# Patient Record
Sex: FEMALE | Race: WHITE | NOT HISPANIC OR LATINO | Employment: FULL TIME | URBAN - METROPOLITAN AREA
[De-identification: names, ages, dates, MRNs, and addresses within clinical notes are randomized per-mention and may not be internally consistent; named-entity substitution may affect disease eponyms.]

---

## 2017-02-10 ENCOUNTER — OFFICE VISIT (OUTPATIENT)
Dept: PSYCHIATRY | Facility: CLINIC | Age: 42
End: 2017-02-10
Payer: COMMERCIAL

## 2017-02-10 DIAGNOSIS — F33.1 MDD (MAJOR DEPRESSIVE DISORDER), RECURRENT EPISODE, MODERATE: Primary | ICD-10-CM

## 2017-02-10 DIAGNOSIS — F41.1 GAD (GENERALIZED ANXIETY DISORDER): ICD-10-CM

## 2017-02-10 DIAGNOSIS — R53.83 FATIGUE, UNSPECIFIED TYPE: ICD-10-CM

## 2017-02-10 PROCEDURE — 90834 PSYTX W PT 45 MINUTES: CPT | Mod: S$GLB,,, | Performed by: SOCIAL WORKER

## 2017-02-27 NOTE — PROGRESS NOTES
Individual Psychotherapy (PhD/LCSW)    2/10/2017    Site:  [facility]         Therapeutic Intervention: Met with patient.  Outpatient - Insight oriented psychotherapy 45 min - CPT code 94988 and Outpatient - Behavior modifying psychotherapy 45 min - CPT code 55392    Chief complaint/reason for encounter: depression , anxiety, worrying, racing thoughts, nervousness, sleep     Interval history and content of current session:  Pt presents as casually dressed, cooperative, anxious, reports ongoing and worsening stress at work, can't address with supervisor due to possibility of being fired . Did try to talk to her about pt stress at work and was not given any assistance. Cymbalta was increased but notes no difference in symptom relief. Reports  depression and anxiety symptoms are 8/10  severe since last session.  Pt is tearful frequently throughout the day,  has low energy, anhedonia, feelings of panic and social isolation, fatigue and sleepiness.  She denies any SI/HI. Denies any A/VH.  Discussed with pt the need to have a follow up with PCP and she agrees. She has appt in March, but needs to see him sooner.     Pt MIL also  over  break.  and pt have been helping his sibs go through her home and take care of her belongings, paperwork, etc. Also stressful . Focused on CBT,  relaxation skills, finding a hobby or interest, the importance of  exercise or to increase enjoyable activities. Discussed pt  self esteem , positive self talk, assertive communication, coping skills to decrease anxiety.  Does have sleep problems, fatigue, low energy and low motivation. Pt verbalized understanding. Pt denies any current SI/HI. Denies any current A/VH.     She rates :  Anxiety 8/10 severe  Depression 8/10 severe.       Treatment plan:  · Target symptoms: depression, anxiety , work stress  · Why chosen therapy is appropriate versus another modality: relevant to diagnosis, patient responds to this modality,  evidence based practice  · Outcome monitoring methods: self-report, observation  · Therapeutic intervention type: insight oriented psychotherapy, behavior modifying psychotherapy, supportive psychotherapy    Risk parameters:  Patient reports no suicidal ideation  Patient reports no homicidal ideation  Patient reports no self-injurious behavior  Patient reports no violent behavior    Verbal deficits: None    Patient's response to intervention:  The patient's response to intervention is accepting.    Progress toward goals and other mental status changes:  The patient's progress toward goals is limited.    Diagnosis:     ICD-10-CM ICD-9-CM   1. MDD (major depressive disorder), recurrent episode, moderate F33.1 296.32   2. PASTORA (generalized anxiety disorder) F41.1 300.02   3. Fatigue, unspecified type R53.83 780.79   GAF=56    Plan:  individual psychotherapy and medication management by physician. Pt to go to ED or call 911 if symptoms worsen or if she has thoughts of harming self and /or others. Pt verbalized understanding.       Return to clinic: 2 weeks, earlier if needed.     Length of Service (minutes): 45

## 2017-03-02 ENCOUNTER — PATIENT OUTREACH (OUTPATIENT)
Dept: ADMINISTRATIVE | Facility: HOSPITAL | Age: 42
End: 2017-03-02

## 2017-03-10 ENCOUNTER — PATIENT OUTREACH (OUTPATIENT)
Dept: ADMINISTRATIVE | Facility: HOSPITAL | Age: 42
End: 2017-03-10

## 2017-03-10 NOTE — LETTER
March 10, 2017    Anneliese Rocha  [address]  [facility] [address]             [facility]  [address]  [facility] [address]  Phone: [phone] Dear Mrs. Rocha:    We have tried to reach you by mychart unsuccessfully.    [facility] is committed to your overall health.  To help you get the most out of each of your visits, we will review your information to make sure you are up to date on all of your recommended tests and/or procedures.       Dr. Holcomb        has found that you may be due for:     Tetanus immunization   Mammogram     If you have a home blood pressure monitor, please bring it with you to your appointment so that we may test for accuracy.     If you have had any of the above done at another facility, please bring the records or information with you so that your record at [facility] will be complete.     If you are currently taking medication, please bring it with you to your appointment for review.     We appreciate the opportunity to provide you with excellent medical care.     Sincerely,  Salena Sanchez  Clinical Care Coordinator  [facility] [address] [facility] [address]  [place] [address]  Phone: [phone]   Fax: [phone]

## 2017-03-24 ENCOUNTER — OFFICE VISIT (OUTPATIENT)
Dept: PSYCHIATRY | Facility: CLINIC | Age: 42
End: 2017-03-24
Payer: COMMERCIAL

## 2017-03-24 DIAGNOSIS — F33.1 MDD (MAJOR DEPRESSIVE DISORDER), RECURRENT EPISODE, MODERATE: Primary | ICD-10-CM

## 2017-03-24 DIAGNOSIS — R53.83 FATIGUE, UNSPECIFIED TYPE: ICD-10-CM

## 2017-03-24 DIAGNOSIS — F41.1 GAD (GENERALIZED ANXIETY DISORDER): ICD-10-CM

## 2017-03-24 PROCEDURE — 90834 PSYTX W PT 45 MINUTES: CPT | Mod: S$GLB,,, | Performed by: SOCIAL WORKER

## 2017-03-24 NOTE — PROGRESS NOTES
Individual Psychotherapy (PhD/LCSW)    3/24/2017    Site:  [facility]         Therapeutic Intervention: Met with patient.  Outpatient - Insight oriented psychotherapy 45 min - CPT code 41706 and Outpatient - Behavior modifying psychotherapy 45 min - CPT code 60253    Chief complaint/reason for encounter: depression , anxiety, worrying, racing thoughts, nervousness, sleep     Interval history and content of current session:  Pt presents as casually dressed, cooperative, dysphoric affect, reports mood is depressed, severe 8/10 an anxiety is moderate 6/10 . Reports low energy, low interest, sad, anhedonic, has racing, worrying thoughts, unable to feel pleasure, very tired, fatigue , + for hopelessness, weight gain, and socially isolated. Cymbalta was increased but notes no difference in symptom relief.  She denies any SI/HI. Denies any A/VH.  Discussed with pt the need to have a follow up with PCP and she agrees. She is supposed to have executive health screenings done, then see him. Advised pt that I will send note to Dr. Holcomb for his review. Also pt agrees to at least be added to waiting list to see psychiatry if needed in the future, currently 3-4 month wait. Discussed deep breathing, mindfulness based techniques, use of mindshift free lobito for guided relaxation, visualization and deep breathing techniques. Also discussed finding a healthy outlet or hobby, possibly a women's Bible study or Hindu group, taking a walk.  Focused on CBT,  the importance of  Exercise.  Pt denies any current SI/HI. Denies any current A/VH.     She rates :  Anxiety 8/10 severe  Depression 6/10 moderate.       Treatment plan:  · Target symptoms: depression, anxiety , work stress  · Why chosen therapy is appropriate versus another modality: relevant to diagnosis, patient responds to this modality, evidence based practice  · Outcome monitoring methods: self-report, observation  · Therapeutic intervention type: insight oriented  psychotherapy, behavior modifying psychotherapy, supportive psychotherapy    Risk parameters:  Patient reports no suicidal ideation  Patient reports no homicidal ideation  Patient reports no self-injurious behavior  Patient reports no violent behavior    Verbal deficits: None    Patient's response to intervention:  The patient's response to intervention is accepting.    Progress toward goals and other mental status changes:  The patient's progress toward goals is limited.    Diagnosis:     ICD-10-CM ICD-9-CM   1. MDD (major depressive disorder), recurrent episode, moderate F33.1 296.32   2. PASTORA (generalized anxiety disorder) F41.1 300.02   3. Fatigue, unspecified type R53.83 780.79   GAF=58    Plan:  individual psychotherapy and medication management by physician. Pt to go to ED or call 911 if symptoms worsen or if she has thoughts of harming self and /or others. Pt verbalized understanding.       Return to clinic: 2 weeks, earlier if needed.     Length of Service (minutes): 45

## 2017-03-24 NOTE — Clinical Note
Dr. Holcomb, Saw pt today and her depression symptoms are worse(8/10 severe), slight improvement in anxiety symptoms. Should she make appt to see you to discuss her symptoms and/or medication adjustment? Thanks for your input. Anila Cochran

## 2017-03-30 DIAGNOSIS — F41.8 DEPRESSION WITH ANXIETY: ICD-10-CM

## 2017-03-30 RX ORDER — ALPRAZOLAM 0.5 MG/1
TABLET ORAL
Qty: 60 TABLET | Refills: 5 | Status: SHIPPED | OUTPATIENT
Start: 2017-03-30 | End: 2017-10-05 | Stop reason: SDUPTHER

## 2017-04-07 ENCOUNTER — OFFICE VISIT (OUTPATIENT)
Dept: PSYCHIATRY | Facility: CLINIC | Age: 42
End: 2017-04-07
Payer: COMMERCIAL

## 2017-04-07 DIAGNOSIS — R53.83 FATIGUE, UNSPECIFIED TYPE: ICD-10-CM

## 2017-04-07 DIAGNOSIS — F41.1 GAD (GENERALIZED ANXIETY DISORDER): ICD-10-CM

## 2017-04-07 DIAGNOSIS — F33.1 MDD (MAJOR DEPRESSIVE DISORDER), RECURRENT EPISODE, MODERATE: Primary | ICD-10-CM

## 2017-04-07 PROCEDURE — 90834 PSYTX W PT 45 MINUTES: CPT | Mod: S$GLB,,, | Performed by: SOCIAL WORKER

## 2017-04-07 NOTE — PROGRESS NOTES
Individual Psychotherapy (PhD/LCSW)    4/7/2017    Site:  [facility]         Therapeutic Intervention: Met with patient.  Outpatient - Insight oriented psychotherapy 45 min - CPT code 39246 and Outpatient - Behavior modifying psychotherapy 45 min - CPT code 72334    Chief complaint/reason for encounter: depression , anxiety, worrying, racing thoughts, nervousness, sleep     Interval history and content of current session:  Pt presents as casually dressed, cooperative, dysphoric affect, reports mood is not better, not worse, depression is severe 8/10 an anxiety is moderate 6/10 . Reports low energy, low interest, sad, anhedonic, has racing, worrying thoughts, unable to feel pleasure, very tired, fatigue , + for hopelessness, weight gain, and socially isolated. Cymbalta was increased but notes no difference in symptom relief.  She denies any SI/HI. Denies any A/VH.  Explored possible enjoyable activities to try and healthy coping skills. Pt hopes to start walking or riding her bike after her foot heals (currently in a boot). Pt also likes to read, reads on weekends. Pt did have an enjoyable visit from her parents last weekend.  Discussed with pt the need to have a follow up with PCP and she agrees. She is supposed to have executive health screenings done, then see him. Focused on deep breathing, mindfulness based techniques, use of mindshift free lobito for guided relaxation, adult coloring books, guided visualization, finding a healthy outlet or hobby, possibly a women's Bible study or Sikh group, taking a walk.   Pt denies any current SI/HI. Denies any current A/VH.     She rates :  Anxiety 8/10 severe  Depression 6/10 moderate.       Treatment plan:  · Target symptoms: depression, anxiety , work stress  · Why chosen therapy is appropriate versus another modality: relevant to diagnosis, patient responds to this modality, evidence based practice  · Outcome monitoring methods: self-report,  observation  · Therapeutic intervention type: insight oriented psychotherapy, behavior modifying psychotherapy, supportive psychotherapy    Risk parameters:  Patient reports no suicidal ideation  Patient reports no homicidal ideation  Patient reports no self-injurious behavior  Patient reports no violent behavior    Verbal deficits: None    Patient's response to intervention:  The patient's response to intervention is accepting.    Progress toward goals and other mental status changes:  The patient's progress toward goals is limited.    Diagnosis:     ICD-10-CM ICD-9-CM   1. MDD (major depressive disorder), recurrent episode, moderate F33.1 296.32   2. PASTORA (generalized anxiety disorder) F41.1 300.02   3. Fatigue, unspecified type R53.83 780.79   GAF=58    Plan:  individual psychotherapy and medication management by physician. Pt to go to ED or call 911 if symptoms worsen or if she has thoughts of harming self and /or others. Pt verbalized understanding.       Return to clinic: 2 weeks, earlier if needed.     Length of Service (minutes): 45

## 2017-04-20 ENCOUNTER — TELEPHONE (OUTPATIENT)
Dept: PSYCHIATRY | Facility: CLINIC | Age: 42
End: 2017-04-20

## 2017-04-20 NOTE — TELEPHONE ENCOUNTER
Message      Appointment canceled for ANNELIESE SMITH ([id number])     Visit Type: ESTABLISHED PATIENT EXTENDED     Date        Time      Length    Provider                  Department     4/21/2017   12:00 PM  60 mins.  Anneliese Cochran [facility]          Reason for Cancellation: Appt Time No Longer Works

## 2017-05-04 DIAGNOSIS — Z00.00 ANNUAL PHYSICAL EXAM: Primary | ICD-10-CM

## 2017-05-05 ENCOUNTER — OFFICE VISIT (OUTPATIENT)
Dept: PSYCHIATRY | Facility: CLINIC | Age: 42
End: 2017-05-05
Payer: COMMERCIAL

## 2017-05-05 DIAGNOSIS — F41.1 GAD (GENERALIZED ANXIETY DISORDER): ICD-10-CM

## 2017-05-05 DIAGNOSIS — F33.1 MDD (MAJOR DEPRESSIVE DISORDER), RECURRENT EPISODE, MODERATE: Primary | ICD-10-CM

## 2017-05-05 DIAGNOSIS — R53.83 FATIGUE, UNSPECIFIED TYPE: ICD-10-CM

## 2017-05-05 PROCEDURE — 90834 PSYTX W PT 45 MINUTES: CPT | Mod: S$GLB,,, | Performed by: SOCIAL WORKER

## 2017-05-05 NOTE — Clinical Note
Dr. Holcomb, Patient will be seeing you on 6/13/17. Mood continues to be moderate to severe depression and moderate anxiety. Please see my attached note. Pt would like to discuss possible medication adjustment or follow up. She is on the waiting list to see Dr. Bryant, but that may take another 3-4 months for the initial psychiatric evaluation.  Thanks, Anila Cochran

## 2017-05-05 NOTE — PROGRESS NOTES
Individual Psychotherapy (PhD/LCSW)    5/5/2017    Site:  [facility] [place]         Therapeutic Intervention: Met with patient.  Outpatient - Insight oriented psychotherapy 45 min - CPT code 11006 and Outpatient - Behavior modifying psychotherapy 45 min - CPT code 30979    Chief complaint/reason for encounter: depression , anxiety, worrying, racing thoughts, nervousness, sleep     Interval history and content of current session:  Pt presents as casually dressed, cooperative, dysphoric affect, reports mood is flat and low, moderate depression, can't cry even when she wants to. Pt did get tearful at end of session when we discussed talking to PCP about med follow up or change. Pt stated she knows it is hard to change a med, and doesn't want to feel worse. Pt is socially isolating self, staying at home, no social outlets, no hobbies , low interest, low motivation, low energy,  has several physical symptoms of anxiety at least 2 times a week. Pt states mood fluctuates from sad, low, flat and anxious. She can  Not experience sly or pleasure. She can't remember the last time she felt happy or joyful. Pt appetite is low, but she feels she is gaining weight, by her clothing feeling tighter. Pt feels hopelessness.  Cymbalta was increased but notes no difference in symptom relief.  She denies any SI/HI. Denies any A/VH.  Explored possible enjoyable activities to try and healthy coping skills. Pt does have a membership to [employer], does not go. Discussed going to heated pool   After work or yoga classes. Pt also used to like kayaking and we talked about going with . Asked her to try 1 activities in the next 2 weeks. Pt has been unable to try new activities since starting therapy. Pt does read on weekends. Pt sees Dr. Holcomb on 6/13/17 and agrees to discuss med check or adjustment due to moderate to severe ongoing symptoms of anxiety and depression.     She rates :  Anxiety 4/10 tolerable today  Depression 6/10  moderate.       Treatment plan:  · Target symptoms: depression, anxiety , work stress  · Why chosen therapy is appropriate versus another modality: relevant to diagnosis, patient responds to this modality, evidence based practice  · Outcome monitoring methods: self-report, observation  · Therapeutic intervention type: insight oriented psychotherapy, behavior modifying psychotherapy, supportive psychotherapy    Risk parameters:  Patient reports no suicidal ideation  Patient reports no homicidal ideation  Patient reports no self-injurious behavior  Patient reports no violent behavior    Verbal deficits: None    Patient's response to intervention:  The patient's response to intervention is accepting.    Progress toward goals and other mental status changes:  The patient's progress toward goals is limited.    Diagnosis:     ICD-10-CM ICD-9-CM   1. MDD (major depressive disorder), recurrent episode, moderate F33.1 296.32   2. PASTORA (generalized anxiety disorder) F41.1 300.02   3. Fatigue, unspecified type R53.83 780.79   GAF=55    Plan:  individual psychotherapy and medication management by physician. Pt to go to ED or call 911 if symptoms worsen or if she has thoughts of harming self and /or others. Pt verbalized understanding.       Return to clinic: 2 weeks, earlier if needed.     Length of Service (minutes): 45

## 2017-06-02 ENCOUNTER — OFFICE VISIT (OUTPATIENT)
Dept: PSYCHIATRY | Facility: CLINIC | Age: 42
End: 2017-06-02
Payer: COMMERCIAL

## 2017-06-02 DIAGNOSIS — R53.83 FATIGUE, UNSPECIFIED TYPE: ICD-10-CM

## 2017-06-02 DIAGNOSIS — F41.1 GAD (GENERALIZED ANXIETY DISORDER): ICD-10-CM

## 2017-06-02 DIAGNOSIS — F33.1 MDD (MAJOR DEPRESSIVE DISORDER), RECURRENT EPISODE, MODERATE: Primary | ICD-10-CM

## 2017-06-02 PROCEDURE — 90834 PSYTX W PT 45 MINUTES: CPT | Mod: S$GLB,,, | Performed by: SOCIAL WORKER

## 2017-06-02 NOTE — PROGRESS NOTES
Individual Psychotherapy (PhD/LCSW)    6/2/2017    Site:  [facility]         Therapeutic Intervention: Met with patient.  Outpatient - Insight oriented psychotherapy 45 min - CPT code 39534 and Outpatient - Behavior modifying psychotherapy 45 min - CPT code 00984    Chief complaint/reason for encounter: depression , anxiety, worrying, racing thoughts, nervousness, sleep     Interval history and content of current session:  Pt presents as casually dressed, cooperative, dysphoric affect, reports mood is flat and low, moderate depression, can't cry even when she wants to. Pt did get tearful at end of session when we discussed talking to PCP about med follow up or change. Work is stressful and pt feels she can not ask for time off. Pt reports not being able to experience pleasure or sly.  Pt is socially isolating self, staying at home, no social outlets, no hobbies , low interest, low motivation, low energy,  has several physical symptoms of anxiety at least 3-4 times a week. Pt states mood fluctuates from sad, low, flat and anxious. She still can't remember the last time she felt happy or joyful. Pt appetite is low, but she feels she is gaining weight, by her clothing feeling tighter. Pt feels hopelessness.  Cymbalta was increased but notes no difference in symptom relief.  She denies any SI/HI. Denies any A/VH.      She has fatigue, all day, no matter how much sleep she is getting. Pt has not tried any suggestions thus far. Explored decreasing social isolation, saying yes to  when he offers to take her out to eat, or to a movie , ex. Pt does have a membership to [employer], does not go. Discussed going to heated pool   After work or yoga classes. Pt also used to like kayaking and we talked about going with . Asked her to try 1 activities in the next 2 weeks.- Pt did not do this, due to fatigue.  Pt has been unable to try new activities since starting therapy. Pt does read on weekends. Pt sees   Aicha on 6/13/17 and agrees to discuss med check or adjustment due to moderate to severe ongoing symptoms of anxiety and depression.     She rates :  Anxiety 6/10 moderate today  Depression 6/10 moderate.       Treatment plan:  · Target symptoms: depression, anxiety , work stress  · Why chosen therapy is appropriate versus another modality: relevant to diagnosis, patient responds to this modality, evidence based practice  · Outcome monitoring methods: self-report, observation  · Therapeutic intervention type: insight oriented psychotherapy, behavior modifying psychotherapy, supportive psychotherapy    Risk parameters:  Patient reports no suicidal ideation  Patient reports no homicidal ideation  Patient reports no self-injurious behavior  Patient reports no violent behavior    Verbal deficits: None    Patient's response to intervention:  The patient's response to intervention is accepting.    Progress toward goals and other mental status changes:  The patient's progress toward goals is limited.    Diagnosis:     ICD-10-CM ICD-9-CM   1. MDD (major depressive disorder), recurrent episode, moderate F33.1 296.32   2. PASTORA (generalized anxiety disorder) F41.1 300.02   GAF=55    Plan:  individual psychotherapy and medication management by physician. Pt to go to ED or call 911 if symptoms worsen or if she has thoughts of harming self and /or others. Pt verbalized understanding.       Return to clinic: 2 weeks, earlier if needed.     Length of Service (minutes): 45

## 2017-06-13 ENCOUNTER — HOSPITAL ENCOUNTER (OUTPATIENT)
Dept: RADIOLOGY | Facility: HOSPITAL | Age: 42
Discharge: HOME | End: 2017-06-13
Attending: FAMILY MEDICINE
Payer: COMMERCIAL

## 2017-06-13 ENCOUNTER — CLINICAL SUPPORT (OUTPATIENT)
Dept: INTERNAL MEDICINE | Facility: CLINIC | Age: 42
End: 2017-06-13
Payer: COMMERCIAL

## 2017-06-13 VITALS — HEIGHT: 71 IN | BODY MASS INDEX: 34.02 KG/M2 | WEIGHT: 243 LBS

## 2017-06-13 DIAGNOSIS — Z00.00 ANNUAL PHYSICAL EXAM: Primary | ICD-10-CM

## 2017-06-13 DIAGNOSIS — Z00.00 ANNUAL PHYSICAL EXAM: ICD-10-CM

## 2017-06-13 DIAGNOSIS — Z12.31 VISIT FOR SCREENING MAMMOGRAM: ICD-10-CM

## 2017-06-13 LAB
ALBUMIN SERPL BCP-MCNC: 3.9 G/DL
ALP SERPL-CCNC: 42 U/L
ALT SERPL W/O P-5'-P-CCNC: 41 U/L
ANION GAP SERPL CALC-SCNC: 12 MMOL/L
AST SERPL-CCNC: 21 U/L
BILIRUB SERPL-MCNC: 0.5 MG/DL
BUN SERPL-MCNC: 9 MG/DL
CALCIUM SERPL-MCNC: 9.8 MG/DL
CHLORIDE SERPL-SCNC: 105 MMOL/L
CHOLEST/HDLC SERPL: 4.4 {RATIO}
CO2 SERPL-SCNC: 22 MMOL/L
CREAT SERPL-MCNC: 0.8 MG/DL
ERYTHROCYTE [DISTWIDTH] IN BLOOD BY AUTOMATED COUNT: 13.1 %
EST. GFR  (AFRICAN AMERICAN): >60 ML/MIN/1.73 M^2
EST. GFR  (NON AFRICAN AMERICAN): >60 ML/MIN/1.73 M^2
ESTIMATED AVG GLUCOSE: 108 MG/DL
GLUCOSE SERPL-MCNC: 132 MG/DL
HBA1C MFR BLD HPLC: 5.4 %
HCT VFR BLD AUTO: 39.5 %
HDL/CHOLESTEROL RATIO: 22.9 %
HDLC SERPL-MCNC: 249 MG/DL
HDLC SERPL-MCNC: 57 MG/DL
HGB BLD-MCNC: 13.3 G/DL
LDLC SERPL CALC-MCNC: 144.6 MG/DL
MCH RBC QN AUTO: 29.8 PG
MCHC RBC AUTO-ENTMCNC: 33.7 %
MCV RBC AUTO: 88 FL
NONHDLC SERPL-MCNC: 192 MG/DL
PLATELET # BLD AUTO: 309 K/UL
PMV BLD AUTO: 11.7 FL
POTASSIUM SERPL-SCNC: 3.7 MMOL/L
PROT SERPL-MCNC: 7.2 G/DL
RBC # BLD AUTO: 4.47 M/UL
SODIUM SERPL-SCNC: 139 MMOL/L
TRIGL SERPL-MCNC: 237 MG/DL
TSH SERPL DL<=0.005 MIU/L-ACNC: 2.91 UIU/ML
WBC # BLD AUTO: 10.41 K/UL

## 2017-06-13 PROCEDURE — 77067 SCR MAMMO BI INCL CAD: CPT | Mod: 26,,, | Performed by: RADIOLOGY

## 2017-06-13 PROCEDURE — 85027 COMPLETE CBC AUTOMATED: CPT | Mod: PO

## 2017-06-13 PROCEDURE — 83036 HEMOGLOBIN GLYCOSYLATED A1C: CPT

## 2017-06-13 PROCEDURE — 77063 BREAST TOMOSYNTHESIS BI: CPT | Mod: 26,,, | Performed by: RADIOLOGY

## 2017-06-13 PROCEDURE — 80053 COMPREHEN METABOLIC PANEL: CPT | Mod: PO

## 2017-06-13 PROCEDURE — 77067 SCR MAMMO BI INCL CAD: CPT | Mod: TC

## 2017-06-13 PROCEDURE — 84443 ASSAY THYROID STIM HORMONE: CPT

## 2017-06-13 PROCEDURE — 80061 LIPID PANEL: CPT

## 2017-06-15 ENCOUNTER — TELEPHONE (OUTPATIENT)
Dept: RADIOLOGY | Facility: HOSPITAL | Age: 42
End: 2017-06-15

## 2017-06-15 NOTE — TELEPHONE ENCOUNTER
Patient notified of mammogram results.   Diagnostic mammogram scheduled on 6/22/15. Patient verbalized and understands.

## 2017-06-22 ENCOUNTER — HOSPITAL ENCOUNTER (OUTPATIENT)
Dept: RADIOLOGY | Facility: HOSPITAL | Age: 42
Discharge: HOME | End: 2017-06-22
Attending: FAMILY MEDICINE
Payer: COMMERCIAL

## 2017-06-22 DIAGNOSIS — R92.8 ABNORMAL MAMMOGRAM OF RIGHT BREAST: ICD-10-CM

## 2017-06-22 PROCEDURE — 76642 ULTRASOUND BREAST LIMITED: CPT | Mod: TC,PO,RT

## 2017-06-22 PROCEDURE — 76642 ULTRASOUND BREAST LIMITED: CPT | Mod: 26,RT,, | Performed by: RADIOLOGY

## 2017-06-22 PROCEDURE — 77061 BREAST TOMOSYNTHESIS UNI: CPT | Mod: 26,,, | Performed by: RADIOLOGY

## 2017-06-22 PROCEDURE — 77065 DX MAMMO INCL CAD UNI: CPT | Mod: 26,,, | Performed by: RADIOLOGY

## 2017-06-22 PROCEDURE — 77061 BREAST TOMOSYNTHESIS UNI: CPT | Mod: TC

## 2017-06-30 ENCOUNTER — OFFICE VISIT (OUTPATIENT)
Dept: PSYCHIATRY | Facility: CLINIC | Age: 42
End: 2017-06-30
Payer: COMMERCIAL

## 2017-06-30 DIAGNOSIS — F41.1 GAD (GENERALIZED ANXIETY DISORDER): ICD-10-CM

## 2017-06-30 DIAGNOSIS — F33.1 MDD (MAJOR DEPRESSIVE DISORDER), RECURRENT EPISODE, MODERATE: Primary | ICD-10-CM

## 2017-06-30 DIAGNOSIS — R53.83 FATIGUE, UNSPECIFIED TYPE: ICD-10-CM

## 2017-06-30 PROCEDURE — 90834 PSYTX W PT 45 MINUTES: CPT | Mod: S$GLB,,, | Performed by: SOCIAL WORKER

## 2017-06-30 NOTE — PROGRESS NOTES
Individual Psychotherapy (PhD/LCSW)    6/30/2017    Site:  [facility], [place]         Therapeutic Intervention: Met with patient.  Outpatient - Insight oriented psychotherapy 45 min - CPT code 25242 and Outpatient - Behavior modifying psychotherapy 45 min - CPT code 51560    Chief complaint/reason for encounter: depression , anxiety, worrying, racing thoughts, nervousness, sleep     Interval history and content of current session:  Pt presents as casually dressed, cooperative, dysthymic affect, reports mood is flat and low, reports mood was not like this a few years ago. Reports increased stress at work over the past 2 weeks. She did ask for Monday off so she can go to [employer] and enjoy some time off. They told her yes to her request and I encouraged her to do this more. Pt also went with 2 ladies form work in June to a restaurant and enjoyed herself. She and  also went out to eat 1 time.  Pt is socially isolating self, staying at home, no social outlets, no hobbies , low interest, low motivation, low energy,  has several physical symptoms of anxiety at least 3-4 times a week. Pt states mood fluctuates from sad, low, flat and anxious. She still can't remember the last time she felt happy or joyful. Pt appetite is low, denies binge eating, denies emotional eating, but she feels she is gaining weight, by her clothing feeling tighter.  Cymbalta was increased but notes no difference in symptom relief.  She denies any SI/HI. Denies any A/VH.  She has fatigue, all day, no matter how much sleep she is getting. Explored decreasing social isolation, saying yes to  when he offers to take her out to eat, or to a movie , ex.  Pt does read on weekends. Pt sees Dr. Holcomb on in July and agrees to discuss med check or adjustment due to moderate to severe ongoing symptoms of anxiety and depression.     She rates :  Anxiety 6/10 moderate today  Depression 6/10 moderate.       Treatment plan:  · Target symptoms:  depression, anxiety , work stress  · Why chosen therapy is appropriate versus another modality: relevant to diagnosis, patient responds to this modality, evidence based practice  · Outcome monitoring methods: self-report, observation  · Therapeutic intervention type: insight oriented psychotherapy, behavior modifying psychotherapy, supportive psychotherapy    Risk parameters:  Patient reports no suicidal ideation  Patient reports no homicidal ideation  Patient reports no self-injurious behavior  Patient reports no violent behavior    Verbal deficits: None    Patient's response to intervention:  The patient's response to intervention is accepting.    Progress toward goals and other mental status changes:  The patient's progress toward goals is limited.    Diagnosis:     ICD-10-CM ICD-9-CM   1. MDD (major depressive disorder), recurrent episode, moderate F33.1 296.32   2. PASTORA (generalized anxiety disorder) F41.1 300.02   3. Fatigue, unspecified type R53.83 780.79   GAF=58    Plan:  individual psychotherapy and medication management by physician. Pt to go to ED or call 911 if symptoms worsen or if she has thoughts of harming self and /or others. Pt verbalized understanding.       Return to clinic: 2 weeks, earlier if needed.     Length of Service (minutes): 45

## 2017-07-21 ENCOUNTER — OFFICE VISIT (OUTPATIENT)
Dept: FAMILY MEDICINE | Facility: CLINIC | Age: 42
End: 2017-07-21
Payer: COMMERCIAL

## 2017-07-21 VITALS
BODY MASS INDEX: 36.39 KG/M2 | WEIGHT: 259.94 LBS | HEART RATE: 89 BPM | TEMPERATURE: 98 F | HEIGHT: 71 IN | SYSTOLIC BLOOD PRESSURE: 123 MMHG | DIASTOLIC BLOOD PRESSURE: 84 MMHG

## 2017-07-21 DIAGNOSIS — F41.8 DEPRESSION WITH ANXIETY: ICD-10-CM

## 2017-07-21 DIAGNOSIS — Z00.00 PREVENTATIVE HEALTH CARE: Primary | ICD-10-CM

## 2017-07-21 PROCEDURE — 99999 PR PBB SHADOW E&M-EST. PATIENT-LVL III: CPT | Mod: PBBFAC,,, | Performed by: FAMILY MEDICINE

## 2017-07-21 PROCEDURE — 99396 PREV VISIT EST AGE 40-64: CPT | Mod: S$GLB,,, | Performed by: FAMILY MEDICINE

## 2017-07-21 RX ORDER — DULOXETIN HYDROCHLORIDE 60 MG/1
120 CAPSULE, DELAYED RELEASE ORAL DAILY
Qty: 60 CAPSULE | Refills: 5 | Status: SHIPPED | OUTPATIENT
Start: 2017-07-21 | End: 2017-09-26 | Stop reason: ALTCHOICE

## 2017-07-21 NOTE — PROGRESS NOTES
July 21, 2017                                                                                                                                                                                                                                                                                      Anneliese Rocha  [address] [place] [address]                                                                                                                                                                                                                                                                                                RE: Anneliese Berna Rocha                                                        Clinic #:[id number]                                                                                                                                   Dear  Anneliese Rocha,                                                                                                                                           Thank you for allowing me to serve you and perform your Executive Health exam on July 21, 2017.   This letter will serve a brief summary of the history, physical findings, and laboratory/studies performed and recommendations at that time.           Taking Cymbalta 90mg daily for depression/anxiety. Still following with Anneliese Cochran who requested possibly increasing her medication.                                                                                 REASON FOR VISIT: Executive Health Preventive Physical Examination    Past Medical History:   Diagnosis Date    Depression with anxiety     tolerating Cymbalta 90mg daily    Fatigue     HTN (hypertension)     Iron deficiency anemia     Migraines     Getting headaches about monthly; using Topamax 25mg and Elavil 50mg for prevention and Frova/cambia as needed; following with Dr. Maldonado    PCOS (polycystic ovarian syndrome)        Past Surgical History:    Procedure Laterality Date    BREAST BIOPSY Right     core  neg    oophrectomy  1994    left       Family History   Problem Relation Age of Onset    Diabetes Father     Hypertension Father     Coronary artery disease Father 55    Thyroid disease Mother     Migraines Mother     Hypertension Mother     Melanoma Paternal Uncle        Social History     Social History    Marital status:      Spouse name: N/A     Occupational History    accounting [employer]     Social History Main Topics    Smoking status: Never Smoker    Smokeless tobacco: Never Used    Alcohol use Yes      Comment: rarely     Social History Narrative    From [place]        Exercise: none regularly       Allergies: Review of patient's allergies indicates no known allergies.    Current Outpatient Prescriptions   Medication Sig Dispense Refill    alprazolam (XANAX) 0.5 MG tablet TAKE 1 TABLET(0.5 MG) BY MOUTH THREE TIMES DAILY AS NEEDED FOR ANXIETY 60 tablet 5    amitriptyline (ELAVIL) 50 MG tablet Take 50 mg by mouth every evening.       duloxetine (CYMBALTA) 60 MG capsule Take 2 capsules (120 mg total) by mouth once daily. 60 capsule 5    FROVA 2.5 mg tablet Take 2.5 mg by mouth as needed.   2    metoprolol succinate (TOPROL-XL) 200 MG 24 hr tablet Take 1 tablet (200 mg total) by mouth once daily. 30 tablet 11    ONABOTULINUMTOXINA (BOTOX INJ) Inject as directed.      topiramate (TOPAMAX) 25 MG tablet Take 25 mg by mouth once daily.       promethazine (PHENERGAN) 25 MG tablet Take 25 mg by mouth every 6 (six) hours as needed.   2     No current facility-administered medications for this visit.        REVIEW OF SYSTEMS:  No recent changes in weight, or complaints of fatigue. No recent changes in vision, or hearing. Denies frequent headaches.No recent changes in voice. No new or changing skin lesions. Denies abnormal bruising or bleeding.  Denies chest pain or sensation of skipped beats. No new onset of shortness of  "breath, or dyspnea on exertion. Denies abdominal discomfort, constipation, diarrhea,or blood in stool. Denies difficulty with urination.   No recent joint swelling or muscle discomfort. Denies pain or weakness in extremeties. No recent loss of balance. Denies problems with sleep or depression.        Remainder of the review of systems without pertinent positves at this time.                                                                              PHYSICAL EXAM:   VITAL SIGNS: /84   Pulse 89   Temp 97.8 °F (36.6 °C) (Oral)   Ht 5' 11" (1.803 m)   Wt 117.9 kg (259 lb 14.8 oz)   LMP 06/20/2017   BMI 36.25 kg/m²   GENERAL APPEARANCE:  Well nourished and normally developed,  pleasant 42 y.o. female, in good spirits.  SKIN: Without rashes or overt lesions.  HEENT: Head normacephalic. There was no scleral icterus. Mucous membranes were moist. Dentition. Neck is supple, no thyromegally, or carotid bruits.  LUNGS: Clear to auscultation bilaterally. Normal respiratory effort.  HEART: Exam reveals regular rate and rhythm. First and second heart sounds normal. No murmurs, rubs or gallops.   ABDOMEN: Soft, non-tender, non-distended. Exam reveals normal bowl sounds, no masses, no organomegaly and no aortic enlargement.    EXTREMITIES:  Nonedematous, both femoral and pedal pulses are normal. No joint stiffness or tenderness. Full range of motion and strength, upper and lower bilaterally.    LAB DATA/STUDIES REVIEWED:  LABS: reviewed    ASSESSMENT/RECOMMENDATIONS :    At this time,  you appear to be in good medical condition.    Increase Cymbalta to 120mg daily and will arrange follow-up in 1 month  Continue to work on regular exercise, maintenance of a healthy weight, balanced diet rich in fruits/vegetables and lean protein, and avoidance of unhealthy habits like smoking and excessive alcohol intake.  I look forward to seeing you again next year.    Please contact me should you have any questions or concerns " regarding physical findings, or my recommendations.       Sincerely yours,    Steve Holcomb M.D.  Department of Family Practice  [facility]

## 2017-08-29 ENCOUNTER — OFFICE VISIT (OUTPATIENT)
Dept: FAMILY MEDICINE | Facility: CLINIC | Age: 42
End: 2017-08-29
Payer: COMMERCIAL

## 2017-08-29 VITALS
WEIGHT: 255.5 LBS | BODY MASS INDEX: 35.77 KG/M2 | DIASTOLIC BLOOD PRESSURE: 70 MMHG | HEART RATE: 78 BPM | RESPIRATION RATE: 18 BRPM | HEIGHT: 71 IN | SYSTOLIC BLOOD PRESSURE: 126 MMHG | TEMPERATURE: 98 F

## 2017-08-29 DIAGNOSIS — F41.8 DEPRESSION WITH ANXIETY: Primary | ICD-10-CM

## 2017-08-29 DIAGNOSIS — Z00.00 PREVENTATIVE HEALTH CARE: ICD-10-CM

## 2017-08-29 DIAGNOSIS — I10 ESSENTIAL HYPERTENSION: ICD-10-CM

## 2017-08-29 PROCEDURE — 3078F DIAST BP <80 MM HG: CPT | Mod: S$GLB,,, | Performed by: FAMILY MEDICINE

## 2017-08-29 PROCEDURE — 99213 OFFICE O/P EST LOW 20 MIN: CPT | Mod: S$GLB,,, | Performed by: FAMILY MEDICINE

## 2017-08-29 PROCEDURE — 99999 PR PBB SHADOW E&M-EST. PATIENT-LVL III: CPT | Mod: PBBFAC,,, | Performed by: FAMILY MEDICINE

## 2017-08-29 PROCEDURE — 3074F SYST BP LT 130 MM HG: CPT | Mod: S$GLB,,, | Performed by: FAMILY MEDICINE

## 2017-08-29 PROCEDURE — 3008F BODY MASS INDEX DOCD: CPT | Mod: S$GLB,,, | Performed by: FAMILY MEDICINE

## 2017-08-29 NOTE — PROGRESS NOTES
"Subjective:       Patient ID: Anneliese Rocha is a 42 y.o. female.    Chief Complaint: Hypertension (4 week follow up )    Here for 1 Month f/u after increasing Cymbalta to 120mg daily  Sleep is more sound.  Mood seems unchanged. C/o persistent fatigue.  Still feels constantly "low'  Occasional nervousness.  Using xanax 1-2 times daily, especially in the morning.  HA frequency is about the same.  Following with Anneliese Cochran, with appt on Friday.  HTN - BP controlled on toprol XL 200mg daily        Past Medical History:   Diagnosis Date    Depression with anxiety     tolerating Cymbalta 90mg daily    Fatigue     HTN (hypertension)     Iron deficiency anemia     Migraines     Getting headaches about monthly; using Topamax 25mg and Elavil 50mg for prevention and Frova/cambia as needed; following with Dr. 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[employer]     Social History Main Topics    Smoking status: Never Smoker    Smokeless tobacco: Never Used    Alcohol use Yes      Comment: rarely    Drug use: No    Sexual activity: Not on file     Other Topics Concern    Not on file     Social History Narrative    From [place]        Exercise: none regularly       Current Outpatient Prescriptions on File Prior to Visit   Medication Sig Dispense Refill    alprazolam (XANAX) 0.5 MG tablet TAKE 1 TABLET(0.5 MG) BY MOUTH THREE TIMES DAILY AS NEEDED FOR ANXIETY 60 tablet 5    amitriptyline (ELAVIL) 50 MG tablet Take 50 mg by mouth every evening.       duloxetine (CYMBALTA) 60 MG capsule Take 2 capsules (120 mg total) by mouth once " "daily. 60 capsule 5    FROVA 2.5 mg tablet Take 2.5 mg by mouth as needed.   2    metoprolol succinate (TOPROL-XL) 200 MG 24 hr tablet Take 1 tablet (200 mg total) by mouth once daily. 30 tablet 11    ONABOTULINUMTOXINA (BOTOX INJ) Inject as directed.      topiramate (TOPAMAX) 25 MG tablet Take 25 mg by mouth once daily.       promethazine (PHENERGAN) 25 MG tablet Take 25 mg by mouth every 6 (six) hours as needed.   2     No current facility-administered medications on file prior to visit.        Family History   Problem Relation Age of Onset    Diabetes Father     Hypertension Father     Coronary artery disease Father 55    Thyroid disease Mother     Migraines Mother     Hypertension Mother     Melanoma Paternal Uncle        Review of Systems   Constitutional: Negative for appetite change, chills, fever and unexpected weight change.   HENT: Negative for sore throat and trouble swallowing.    Eyes: Negative for pain and visual disturbance.   Respiratory: Negative for cough, shortness of breath and wheezing.    Cardiovascular: Negative for chest pain and palpitations.   Gastrointestinal: Negative for abdominal pain, blood in stool, diarrhea, nausea and vomiting.   Genitourinary: Negative for difficulty urinating, dysuria and hematuria.   Musculoskeletal: Negative for arthralgias, gait problem and neck pain.   Skin: Negative for rash and wound.   Neurological: Negative for dizziness, weakness, numbness and headaches.   Hematological: Negative for adenopathy.   Psychiatric/Behavioral: Negative for dysphoric mood.       Objective:      /70   Pulse 78   Temp 98 °F (36.7 °C) (Oral)   Resp 18   Ht 5' 11" (1.803 m)   Wt 115.9 kg (255 lb 8.2 oz)   LMP 08/11/2017 (Approximate)   BMI 35.64 kg/m²   Physical Exam   Constitutional: She is oriented to person, place, and time. She appears well-developed and well-nourished.   HENT:   Head: Normocephalic.   Mouth/Throat: Oropharynx is clear and moist. No " oropharyngeal exudate or posterior oropharyngeal erythema.   Eyes: Conjunctivae and EOM are normal. Pupils are equal, round, and reactive to light.   Neck: Normal range of motion. Neck supple. No thyromegaly present.   Cardiovascular: Normal rate, regular rhythm, S1 normal, S2 normal, normal heart sounds and intact distal pulses.  Exam reveals no gallop and no friction rub.    No murmur heard.  Pulmonary/Chest: Effort normal and breath sounds normal. She has no wheezes. She has no rales.   Abdominal: Normal appearance.   Musculoskeletal:        Right lower leg: She exhibits no edema.        Left lower leg: She exhibits no edema.   Lymphadenopathy:     She has no cervical adenopathy.   Neurological: She is alert and oriented to person, place, and time. No cranial nerve deficit. Gait normal.   Skin: Skin is warm and intact. No rash noted.   Psychiatric: She has a normal mood and affect.       Assessment:       1. Depression with anxiety    2. Preventative health care    3. Essential hypertension        Plan:       Depression with anxiety    Preventative health care  -     (In Office Administered) Tdap Vaccine    Essential hypertension      continue present meds for now  F/u with Anneliese Cochran as planned  Will try to get Dr. Bryant input on any medication adjustments.  Counseled on regular exercise, maintenance of a healthy weight, balanced diet rich in fruits/vegetables and lean protein, and avoidance of unhealthy habits like smoking and excessive alcohol intake.

## 2017-08-29 NOTE — Clinical Note
Patient did not respond to Cymbalta 120mg.  I was wondering if you could pass her case by Dr. Bryant to see if she would be willing to see her or if she has any suggestions about where to go next with her medications.

## 2017-09-01 ENCOUNTER — OFFICE VISIT (OUTPATIENT)
Dept: PSYCHIATRY | Facility: CLINIC | Age: 42
End: 2017-09-01
Payer: COMMERCIAL

## 2017-09-01 DIAGNOSIS — R53.83 FATIGUE, UNSPECIFIED TYPE: ICD-10-CM

## 2017-09-01 DIAGNOSIS — F33.1 MDD (MAJOR DEPRESSIVE DISORDER), RECURRENT EPISODE, MODERATE: Primary | ICD-10-CM

## 2017-09-01 DIAGNOSIS — F41.1 GAD (GENERALIZED ANXIETY DISORDER): ICD-10-CM

## 2017-09-01 PROCEDURE — 90834 PSYTX W PT 45 MINUTES: CPT | Mod: S$GLB,,, | Performed by: SOCIAL WORKER

## 2017-09-01 NOTE — PROGRESS NOTES
"Individual Psychotherapy (PhD/LCSW)    9/1/2017    Site:  [facility]         Therapeutic Intervention: Met with patient.  Outpatient - Insight oriented psychotherapy 45 min - CPT code 74307 and Outpatient - Behavior modifying psychotherapy 45 min - CPT code 89009    Chief complaint/reason for encounter: depression , anxiety, worrying, racing thoughts, nervousness, sleep     Interval history and content of current session:  Pt presents as casually dressed, cooperative, dysthymic affect, reports mood is flat and low, has anhedonia. PCP increased Cymbalta several times and pt notices no difference. Dr. Holcomb had advised her that he would discuss meds with Dr. Bryant and pt has been on waiting list to see Dr. Bryant since March 24, 2017. I will discuss with Dr. Bryant re possible new patient appt. She is sleeping a little better. "I'm not waking up at 3 am every day". Pt has felt more "sad" recently. Pt anxious, nervous, "I wake up every morning feeling anxious." Pt had weight gain and PCP said it could be due to meds.  Pt is eating salads  And has low appetite, frustrated because she thought weight would go down, lost 5 pounds in a month was discouraged by this. Pt also hurt her foot a few months ago, had to were a boot, which decreased her activity level.     She has low energy, low motivation, low interest, "I could sleep all day and still not feel rested."   has chronic medical condition preventing him from working , which places financial responsibility on pt. Pt states mood was not like this a few years ago. Pt is socially isolating self, staying at home, no social outlets, no hobbies , low interest, low motivation, low energy,  has several physical symptoms of anxiety at least 3-4 times a week. Pt states mood fluctuates from sad, low, flat and anxious. She still can't remember the last time she felt happy or joyful. Pt appetite is low, denies binge eating, denies emotional eating, but she gained " weight.  She denies any SI/HI. Denies any A/VH.  She has fatigue, all day, no matter how much sleep she is getting. Explored decreasing social isolation, saying yes to  when he offers to take her out to eat, or to a movie , ex.  Pt does read on weekends. Reads multiple books at one time.       She rates :  Anxiety 6/10 moderate today  Depression 6/10 moderate.       Treatment plan:  · Target symptoms: depression, anxiety , work stress  · Why chosen therapy is appropriate versus another modality: relevant to diagnosis, patient responds to this modality, evidence based practice  · Outcome monitoring methods: self-report, observation  · Therapeutic intervention type: insight oriented psychotherapy, behavior modifying psychotherapy, supportive psychotherapy    Risk parameters:  Patient reports no suicidal ideation  Patient reports no homicidal ideation  Patient reports no self-injurious behavior  Patient reports no violent behavior    Verbal deficits: None    Patient's response to intervention:  The patient's response to intervention is accepting.    Progress toward goals and other mental status changes:  The patient's progress toward goals is limited.    Diagnosis:     ICD-10-CM ICD-9-CM   1. MDD (major depressive disorder), recurrent episode, moderate F33.1 296.32   2. PASTORA (generalized anxiety disorder) F41.1 300.02   3. Fatigue, unspecified type R53.83 780.79   GAF=58    Plan:  individual psychotherapy and medication management by physician.Refer pt to Dr. Bryant for medication evaluation.  Pt to go to ED or call 911 if symptoms worsen or if she has thoughts of harming self and /or others. Pt verbalized understanding.       Return to clinic: 2 weeks, earlier if needed.     Length of Service (minutes): 45

## 2017-09-10 DIAGNOSIS — I10 ESSENTIAL HYPERTENSION: ICD-10-CM

## 2017-09-11 RX ORDER — METOPROLOL SUCCINATE 200 MG/1
TABLET, EXTENDED RELEASE ORAL
Qty: 30 TABLET | Refills: 11 | Status: SHIPPED | OUTPATIENT
Start: 2017-09-11 | End: 2017-11-17 | Stop reason: DRUGHIGH

## 2017-09-26 ENCOUNTER — OFFICE VISIT (OUTPATIENT)
Dept: PSYCHIATRY | Facility: CLINIC | Age: 42
End: 2017-09-26
Payer: COMMERCIAL

## 2017-09-26 VITALS
HEART RATE: 86 BPM | BODY MASS INDEX: 35.82 KG/M2 | DIASTOLIC BLOOD PRESSURE: 79 MMHG | SYSTOLIC BLOOD PRESSURE: 144 MMHG | WEIGHT: 255.88 LBS | HEIGHT: 71 IN

## 2017-09-26 DIAGNOSIS — F33.2 MDD (MAJOR DEPRESSIVE DISORDER), RECURRENT SEVERE, WITHOUT PSYCHOSIS: Primary | ICD-10-CM

## 2017-09-26 DIAGNOSIS — F41.1 GAD (GENERALIZED ANXIETY DISORDER): ICD-10-CM

## 2017-09-26 DIAGNOSIS — I10 ESSENTIAL HYPERTENSION: ICD-10-CM

## 2017-09-26 PROCEDURE — 99999 PR PBB SHADOW E&M-EST. PATIENT-LVL III: CPT | Mod: PBBFAC,,, | Performed by: PSYCHIATRY & NEUROLOGY

## 2017-09-26 PROCEDURE — 90792 PSYCH DIAG EVAL W/MED SRVCS: CPT | Mod: S$GLB,,, | Performed by: PSYCHIATRY & NEUROLOGY

## 2017-09-26 RX ORDER — SERTRALINE HYDROCHLORIDE 50 MG/1
50 TABLET, FILM COATED ORAL DAILY
Qty: 30 TABLET | Refills: 0 | Status: SHIPPED | OUTPATIENT
Start: 2017-09-26 | End: 2017-10-23 | Stop reason: SDUPTHER

## 2017-09-26 RX ORDER — DULOXETIN HYDROCHLORIDE 30 MG/1
30 CAPSULE, DELAYED RELEASE ORAL DAILY
Qty: 30 CAPSULE | Refills: 0 | Status: SHIPPED | OUTPATIENT
Start: 2017-09-26 | End: 2017-10-23 | Stop reason: ALTCHOICE

## 2017-09-26 NOTE — PROGRESS NOTES
"ID: 43yo WF with a h/o depression and anxiety, no prior full psych eval [facility]. Pt is a current pt of Anneliese Cochran LCSW who referred her for med mgmt along with , PCP. Pt has been on wait list since 3/2017. The pt is currently on xanax and cymbalta through PCP. Also on topamax and elavil for migraine mgmt through pcp    CC: depression.   HPI: presents on time. Chart reviewed. "I've had this flat depressive state for a good while now and it's not getting any better. I do have anxiety, too. But it's like I'm never happy. I very rarely feel happy."    Pt saw a psychiatrist as a child because her teacher had suggested she needed ritalin. "and recently we (parents and pt) were discussing that I didn't really need it. I was a straight A student and my parents did not feel I had trouble concentrating." Also saw a psychiatrist in her early 20s for depression- cannot recall medications.     Currently taking cymbalta 120mg po daily through PCP- has been taking x 10yrs- started "around Zuly".  Dose was increased from 90 to 120mg approx 2 mos ago. Has not led to any change in mood or anxiety. Is also taking xanax 0.5mg po daily PRN anxiety- she is currently requiring daily use and 2/7 of the last days she has used 2 doses (ie: 1mg total daily).     Recalls a previous trial of prozac, but does not recall why it was stopped. Does not recall other med trials other than buspar during her wedding planning which was taken as a PRN and was helpful.     On Psychiatric ROS:    Endorses difficulty with maintaining sleep- "no trouble initiating" wakes up at approx 3am and then wakes hourly until 6am, +anhedonia, feeling helpless/hopeless- "about feeling this way. Am I ever going to feel happy again? I was asking my  if I was every mostly happy? He said I was. I can't even remember being but people I work with would never know this. You need to know that.", dec energy, dec concentration, inc'd " "appetite- since Spring 2017, she gained 40lbs "overall I'm 100lbs overweight. I've never been this fat before.", dec PMA "it's a chore. I usually have to take a xanax right when I wake up. I wake up worrying about work and rather not go."     Denies thoughts of SI/intent/plan. "it's not that. I never think that. I just want to feel better. I want this feeling to end but not life."     Endorses feeling +easily overwhelmed, +ruminative thinking, +feeling tense/"on edge"- "on edge"     Denies h/o panic attack- "i've just started noticing that if I have one I get real irrationally cranky, my  can tell that i'm about to start to panic. My heart will be racing, it's all internal though, it's all in my head. I feel a sense of doom. Like i'm not in control everything is falling apart."  Pt is unsure of how they resolve- does not often think about the xanax in the moment "i don't think of it until later."     Denies h/o hypo/manic sxs.   Denies h/o psychosis.   Denies h/o trauma leading to nightmares, re-experiencing, avoidance or hyperarousal.    PPHx: Denies h/o self injury, inpt psych hospitalization, denies h/o suicide attempt     Current Psych Meds: cymbalta 120mg po daily, xanax 0.5mg po PRN anxiety  Past Psych Meds: ritalin, prozac, buspar PRN ("it helped with the stress of planning the wedding")     PMHx: migraines (since childhood)- botox as txmt, monthly "they're well managed"    SubstHx:   T- none  E- very rare due to migraine  D- none  Caffeine- 2 dr.peppers/day    FamPHx: none    Dev/SocHx: b/r [place], raised by m/d who are still  and living. Older sister. Denies abuse growing up. Grad HS, finished some college.  from same area of [place]. moved to finish school at [employer],  has ankylosing spondylitis and it became difficult physically to complete courses - he never finished school.  since 2001. No children. "I wasn't comfortable with IVF and he wasn't comfortable with adoption so " "we just decided it didn't work out for us. 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  and parents "are my support". Living in a home they rent.     Musculoskeletal:  Muscle strength/Tone: no dyskinesia/ no tremor  Gait/Station- non antalgic, no assistance needed    MSE: appears stated age, well groomed, appropriate dress, engages well with examiner. Good e/c. Speech reg rate and vol, nonpressured. Mood is "nervous." Affect congruent. Tearful in appt- appropriate to conversation. Sensorium fully intact. Oriented to date/day/location, current events. Narrative memory intact. Intellectual function is avg based on vocab and basic fund of knowledge. Thought is c/l/gd. No tangentiality or circumstantiality. No FOI/SULTANA. Denies SI/HI. Denies A/VH. No evidence of delusions. Insight and Judgment intact.     Blood pressure (!) 144/79, pulse 86, height 5' 11" (1.803 m), weight 116 kg (255 lb 13.5 oz), last menstrual period 09/15/2017.    Suicide Risk Assessment:   Protective- age, gender, no prior attempts, no prior hospitalizations, no family h/o attempts, no ongoing substance abuse, no psychosis, , denies SI/intent/plan, seeking treatment, access to treatment, future oriented, good primary support, no access to firearms    Risk- race, ongoing Axis I sxs, no children    **Pt is at LOW imminent and long term risk of suicide given current risk factors.    Assessment:  43yo WF with a h/o depression and anxiety, no prior full psych eval w/i [facility]. Pt is a current pt of Anneliese Cochran LCSW who referred her for med mgmt along with , PCP. Pt has been on wait list since 3/2017. The pt is currently on xanax and cymbalta through PCP. Also on topamax and elavil for migraine mgmt through pcp/neuro. On eval today she " meets criteria for MDD, recurrent, severe w/o psychotic sxs despite a recent inc in cymbalta to 120mg po daily. Pt has been on cymbalta x 10yrs. Only recalls previous trial of prozac which was d/c'd for unknown reasons. Now requiring xanax daily- some days 2 tabs (ie:1mg total daily). Some room for behavioral interventions, as well and the pt is motivated. Has good ability to adhere to the new txmt plan. We will taper off cymbalta and start trial of zoloft. Will discuss diet and exercise interventions more fully at next appt. Cont therapy as scheduled. No acute safety concerns. rtc 4wks- will speak with pt via phone in 2wks.    Axis I: MDD, recurrent, severe w/o psychotic sxs; PASTORA  Axis II: none at this time   Axis III: HTN, migraines  Axis IV: occupational, chronic mental health  Axis V: GAF 70    Plan:   1. Taper off cymbalta  2. Start trial of zoloft  3. May consider trial of deplin in the future  4. Discuss exercise and diet at next appt  5. Cont therapy as scheduled; I am in contact with therapist  6. RTC 4wks; will talk to pt via phone in 2wks    -Spent 60min face to face with the pt; >50% time spent in counseling   -Supportive therapy and psychoeducation provided  -R/B/SE's of medications discussed with the pt who expresses understanding and chooses to take medications as prescribed.   -Pt instructed to call clinic, 911 or go to nearest emergency room if sxs worsen or pt is in   crisis. The pt expresses understanding.

## 2017-09-29 ENCOUNTER — OFFICE VISIT (OUTPATIENT)
Dept: PSYCHIATRY | Facility: CLINIC | Age: 42
End: 2017-09-29
Payer: COMMERCIAL

## 2017-09-29 DIAGNOSIS — F33.2 MDD (MAJOR DEPRESSIVE DISORDER), RECURRENT SEVERE, WITHOUT PSYCHOSIS: Primary | ICD-10-CM

## 2017-09-29 DIAGNOSIS — I10 ESSENTIAL HYPERTENSION: ICD-10-CM

## 2017-09-29 DIAGNOSIS — F41.1 GAD (GENERALIZED ANXIETY DISORDER): ICD-10-CM

## 2017-09-29 PROCEDURE — 90834 PSYTX W PT 45 MINUTES: CPT | Mod: S$GLB,,, | Performed by: SOCIAL WORKER

## 2017-09-29 NOTE — PROGRESS NOTES
Individual Psychotherapy (PhD/LCSW)    9/29/2017    Site:  [facility]         Therapeutic Intervention: Met with patient.  Outpatient - Insight oriented psychotherapy 45 min - CPT code 78845 and Outpatient - Behavior modifying psychotherapy 45 min - CPT code 22165    Chief complaint/reason for encounter: depression , anxiety, worrying, racing thoughts, nervousness, sleep     Interval history and content of current session:  Pt presents as casually dressed, cooperative, dysthymic affect, reports mood is flat and low, has anhedonia. Met with Dr. Bryant on 9/26/17 for psychiatric medication evaluation and pt is tapering off Cymbalta, will start zoloft this weekend. Pt continues to have fatigue, low energy, low motivation, low interest, is socially isolating self, staying at home, no social outlets,  Pt states mood fluctuates from sad, low, flat and anxious. Pt appetite is low, denies binge eating, denies emotional eating, but she gained weight.  She denies any SI/HI. Denies any A/VH.  She has fatigue, all day, no matter how much sleep she is getting. Explored relationship with  - satisfying, together for 23 years, have a yorkie, which is like their child, adore her. Pt does get affection from her dog, enjoys this. Explored list of activities to try to decrease depression and anxious thoughts, also discussed focusing on the present moment, relaxation techniques.  Pt still read on weekends. Reads multiple books at one time. Urged pt to contact Dr. Bryant is she has any questions or concerns regarding medication and pt will see me in 2 weeks.     Treatment plan:  · Target symptoms: depression, anxiety , work stress  · Why chosen therapy is appropriate versus another modality: relevant to diagnosis, patient responds to this modality, evidence based practice  · Outcome monitoring methods: self-report, observation  · Therapeutic intervention type: insight oriented psychotherapy, behavior modifying  psychotherapy, supportive psychotherapy    Risk parameters:  Patient reports no suicidal ideation  Patient reports no homicidal ideation  Patient reports no self-injurious behavior  Patient reports no violent behavior    Verbal deficits: None    Patient's response to intervention:  The patient's response to intervention is accepting.    Progress toward goals and other mental status changes:  The patient's progress toward goals is limited.    Diagnosis:     ICD-10-CM ICD-9-CM   1. MDD (major depressive disorder), recurrent severe, without psychosis F33.2 296.33   2. PASTORA (generalized anxiety disorder) F41.1 300.02   3. Essential hypertension I10 401.9       Plan:  individual psychotherapy and medication management by physician.Refer pt to Dr. Bryant for medication evaluation.  Pt to go to ED or call 911 if symptoms worsen or if she has thoughts of harming self and /or others. Pt verbalized understanding.       Return to clinic: 2 weeks, earlier if needed.     Length of Service (minutes): 45

## 2017-10-05 ENCOUNTER — PATIENT MESSAGE (OUTPATIENT)
Dept: PSYCHIATRY | Facility: CLINIC | Age: 42
End: 2017-10-05

## 2017-10-05 DIAGNOSIS — F41.8 DEPRESSION WITH ANXIETY: ICD-10-CM

## 2017-10-05 RX ORDER — ALPRAZOLAM 0.5 MG/1
0.5 TABLET ORAL 2 TIMES DAILY PRN
Qty: 45 TABLET | Refills: 0 | Status: SHIPPED | OUTPATIENT
Start: 2017-10-05 | End: 2017-11-02 | Stop reason: SDUPTHER

## 2017-10-10 ENCOUNTER — PATIENT MESSAGE (OUTPATIENT)
Dept: PSYCHIATRY | Facility: CLINIC | Age: 42
End: 2017-10-10

## 2017-10-23 ENCOUNTER — OFFICE VISIT (OUTPATIENT)
Dept: PSYCHIATRY | Facility: CLINIC | Age: 42
End: 2017-10-23
Payer: COMMERCIAL

## 2017-10-23 VITALS
HEART RATE: 94 BPM | BODY MASS INDEX: 36.03 KG/M2 | SYSTOLIC BLOOD PRESSURE: 139 MMHG | WEIGHT: 257.38 LBS | HEIGHT: 71 IN | DIASTOLIC BLOOD PRESSURE: 76 MMHG

## 2017-10-23 DIAGNOSIS — F41.1 GAD (GENERALIZED ANXIETY DISORDER): ICD-10-CM

## 2017-10-23 DIAGNOSIS — F32.A FATIGUE DUE TO DEPRESSION: ICD-10-CM

## 2017-10-23 DIAGNOSIS — R53.83 FATIGUE DUE TO DEPRESSION: ICD-10-CM

## 2017-10-23 DIAGNOSIS — F33.2 MDD (MAJOR DEPRESSIVE DISORDER), RECURRENT SEVERE, WITHOUT PSYCHOSIS: Primary | ICD-10-CM

## 2017-10-23 PROCEDURE — 99999 PR PBB SHADOW E&M-EST. PATIENT-LVL III: CPT | Mod: PBBFAC,,, | Performed by: PSYCHIATRY & NEUROLOGY

## 2017-10-23 PROCEDURE — 99214 OFFICE O/P EST MOD 30 MIN: CPT | Mod: S$GLB,,, | Performed by: PSYCHIATRY & NEUROLOGY

## 2017-10-23 RX ORDER — SERTRALINE HYDROCHLORIDE 100 MG/1
100 TABLET, FILM COATED ORAL DAILY
Qty: 30 TABLET | Refills: 0 | Status: SHIPPED | OUTPATIENT
Start: 2017-10-23 | End: 2017-11-17 | Stop reason: SDUPTHER

## 2017-10-23 NOTE — PROGRESS NOTES
"ID: 41yo WF with a h/o depression and anxiety, no prior full psych eval w/i [facility]. Pt is a current pt of CYRUS BarraganW who referred her for med mgmt along with , PCP. Pt has been on wait list since 3/2017. The pt is currently on xanax and cymbalta through PCP. Also on topamax and elavil for migraine mgmt through pcp    CC: depression.   Interim Hx: presents on time. Chart reviewed. Reports that she did not have difficulty transitioning to the zoloft, "but I also now don't feel any different." in either direction.     Has now been on zoloft 100mg po qam x 1wk.     Pt continues to report low energy, low motivation. We have a brief conversation about wellbutrin as a possible treatment in the future. Pt expresses understanding.     On Psychiatric ROS:    Endorses difficulty with maintaining sleep- "no trouble initiating" wakes up at approx 3am and then wakes hourly until 6am, +anhedonia, feeling helpless/hopeless- "about feeling this way. Am I ever going to feel happy again? I was asking my  if I was every mostly happy? He said I was. I can't even remember being but people I work with would never know this. You need to know that.", dec energy, dec concentration, inc'd appetite- since Spring 2017, she gained 40lbs "overall I'm 100lbs overweight. I've never been this fat before.", dec PMA "it's a chore. I usually have to take a xanax right when I wake up. I wake up worrying about work and rather not go."     Denies thoughts of SI/intent/plan. "it's not that. I never think that. I just want to feel better. I want this feeling to end but not life."     Endorses feeling +easily overwhelmed, +ruminative thinking, +feeling tense/"on edge"- "on edge"     PPHx: Denies h/o self injury, inpt psych hospitalization, denies h/o suicide attempt     Current Psych Meds: zoloft 100mg po qam, xanax 0.5mg po PRN anxiety  Past Psych Meds: ritalin, prozac, buspar PRN ("it helped with the stress of " "planning the wedding"), cymbalta 120mg po daily    PMHx: migraines (since childhood)- botox as txmt, monthly "they're well managed"    SubstHx:   T- none  E- very rare due to migraine  D- none  Caffeine- 2 dr.peppers/day    FamPHx: none    Musculoskeletal:  Muscle strength/Tone: no dyskinesia/ no tremor  Gait/Station- non antalgic, no assistance needed    MSE: appears stated age, well groomed, appropriate dress, engages well with examiner. Good e/c. Speech reg rate and vol, nonpressured. Mood is "I was very stressed out at work, procrastinating things until tomorrow. i'm ok" Affect euthymic. No tearfulness. Sensorium fully intact. Oriented to date/day/location, current events. Narrative memory intact. Intellectual function is avg based on vocab and basic fund of knowledge. Thought is c/l/gd. No tangentiality or circumstantiality. No FOI/SULTANA. Denies SI/HI. Denies A/VH. No evidence of delusions. Insight and Judgment intact.     Blood pressure 139/76, pulse 94, height 5' 11" (1.803 m), weight 116.8 kg (257 lb 6.4 oz), last menstrual period 09/13/2017.    Suicide Risk Assessment:   Protective- age, gender, no prior attempts, no prior hospitalizations, no family h/o attempts, no ongoing substance abuse, no psychosis, , denies SI/intent/plan, seeking treatment, access to treatment, future oriented, good primary support, no access to firearms    Risk- race, ongoing Axis I sxs, no children    **Pt is at LOW imminent and long term risk of suicide given current risk factors.    Assessment:  41yo WF with a h/o depression and anxiety, no prior full psych eval w/i [facility]. Pt is a current pt of Anneliese Cochran LCSW who referred her for med mgmt along with , PCP. Pt has been on wait list since 3/2017. The pt is currently on xanax and cymbalta through PCP. Also on topamax and elavil for migraine mgmt through pcp/neuro. On eval today she meets criteria for MDD, recurrent, severe w/o psychotic sxs despite " "a recent inc in cymbalta to 120mg po daily. Pt has been on cymbalta x 10yrs. Only recalls previous trial of prozac which was d/c'd for unknown reasons. Now requiring xanax daily- some days 2 tabs (ie:1mg total daily). Some room for behavioral interventions, as well and the pt is motivated. Has good ability to adhere to the new txmt plan. We tapered off cymbalta and started a trial of zoloft. Will discuss diet and exercise interventions more fully over course of txmt. toda she has been on 100mg of zoloft x 1wk. Transition went well, but now not feeling "any different"- will cont for another week and then reassess. may add trial of wellbutrin next as daytime lethargy and dec'd motivation are now the primary concerns to the pt. wgt loss would be an added benefit. Cont therapy as scheduled. No acute safety concerns. rtc 4wks- will speak with pt via phone in 2wks.    Axis I: MDD, recurrent, severe w/o psychotic sxs; PASTORA  Axis II: none at this time   Axis III: HTN, migraines  Axis IV: occupational, chronic mental health  Axis V: GAF 70    Plan:   1. Inc zoloft to 100mg po qam  3. May consider trial of deplin and/or wellbutrin xl in the future  4. Cont to discuss exercise and diet   5. Cont therapy as scheduled; I am in contact with therapist  6. RTC 4wks; will talk to pt via phone in 2wks    -Spent 30min face to face with the pt; >50% time spent in counseling   -Supportive therapy and psychoeducation provided  -R/B/SE's of medications discussed with the pt who expresses understanding and chooses to take medications as prescribed.   -Pt instructed to call clinic, 911 or go to nearest emergency room if sxs worsen or pt is in   crisis. The pt expresses understanding.    "

## 2017-11-02 DIAGNOSIS — F41.8 DEPRESSION WITH ANXIETY: ICD-10-CM

## 2017-11-02 RX ORDER — ALPRAZOLAM 0.5 MG/1
TABLET ORAL
Qty: 45 TABLET | Refills: 0 | Status: SHIPPED | OUTPATIENT
Start: 2017-11-02 | End: 2017-12-07 | Stop reason: SDUPTHER

## 2017-11-03 ENCOUNTER — OFFICE VISIT (OUTPATIENT)
Dept: PSYCHIATRY | Facility: CLINIC | Age: 42
End: 2017-11-03
Payer: COMMERCIAL

## 2017-11-03 DIAGNOSIS — F33.2 MDD (MAJOR DEPRESSIVE DISORDER), RECURRENT SEVERE, WITHOUT PSYCHOSIS: Primary | ICD-10-CM

## 2017-11-03 DIAGNOSIS — R53.83 FATIGUE DUE TO DEPRESSION: ICD-10-CM

## 2017-11-03 DIAGNOSIS — F41.1 GAD (GENERALIZED ANXIETY DISORDER): ICD-10-CM

## 2017-11-03 DIAGNOSIS — F32.A FATIGUE DUE TO DEPRESSION: ICD-10-CM

## 2017-11-03 PROCEDURE — 90834 PSYTX W PT 45 MINUTES: CPT | Mod: S$GLB,,, | Performed by: SOCIAL WORKER

## 2017-11-03 NOTE — PROGRESS NOTES
"Individual Psychotherapy (PhD/LCSW)    11/3/2017    Site:  [facility]         Therapeutic Intervention: Met with patient.  Outpatient - Insight oriented psychotherapy 45 min - CPT code 20022 and Outpatient - Behavior modifying psychotherapy 45 min - CPT code 90700    Chief complaint/reason for encounter: depression , anxiety, worrying, racing thoughts, nervousness, sleep     Interval history and content of current session:  Pt presents as casually dressed, cooperative, flat affect, reports mood is low and pt has a cold this week, coughing, no fever, feels exhausted. Pt increased Zoloft and had no side effects, reports anxiety and depression "has not changed, but I don't feel any worse." Pt interested in Dr. Montana discussion regarding adding Wellbutrin and I will forward this to Dr. Bryant. Pt continues to have fatigue, low energy, low motivation, low interest, is socially isolating self, staying at home, no social outlets.  She denies any SI/HI. Denies any A/VH.  Pt had recent increased stress at work. They will call her when she takes a day off, which she rarely does, about something that is not an emergency. Causes anxiety.  Pt was recently assertive with mother at a family weekend, mother complains and repeats her complaints and pt said something to her about this. Provided support, validation . Pt has feelings of worry, racing thoughts at work and at home. When she wakes up and when she enters work. Discussed positive distractions and visualization and positive self talk to use to decrease anxiety. Urged pt to contact Dr. Bryant is she has any questions or concerns regarding medication and pt will see me in 2 weeks.     Treatment plan:  · Target symptoms: depression, anxiety , work stress  · Why chosen therapy is appropriate versus another modality: relevant to diagnosis, patient responds to this modality, evidence based practice  · Outcome monitoring methods: self-report, observation  · Therapeutic " intervention type: insight oriented psychotherapy, behavior modifying psychotherapy, supportive psychotherapy    Risk parameters:  Patient reports no suicidal ideation  Patient reports no homicidal ideation  Patient reports no self-injurious behavior  Patient reports no violent behavior    Verbal deficits: None    Patient's response to intervention:  The patient's response to intervention is accepting.    Progress toward goals and other mental status changes:  The patient's progress toward goals is limited.    Diagnosis:     ICD-10-CM ICD-9-CM   1. MDD (major depressive disorder), recurrent severe, without psychosis F33.2 296.33   2. PASTORA (generalized anxiety disorder) F41.1 300.02   3. Fatigue due to depression F32.9 311    R53.83 780.79       Plan:  individual psychotherapy and medication management by physician.Refer pt to Dr. Bryant for medication evaluation.  Pt to go to ED or call 911 if symptoms worsen or if she has thoughts of harming self and /or others. Pt verbalized understanding.       Return to clinic: 2 weeks, earlier if needed.     Length of Service (minutes): 45

## 2017-11-17 ENCOUNTER — OFFICE VISIT (OUTPATIENT)
Dept: PSYCHIATRY | Facility: CLINIC | Age: 42
End: 2017-11-17
Payer: COMMERCIAL

## 2017-11-17 VITALS
DIASTOLIC BLOOD PRESSURE: 84 MMHG | HEIGHT: 71 IN | BODY MASS INDEX: 36.08 KG/M2 | WEIGHT: 257.69 LBS | SYSTOLIC BLOOD PRESSURE: 161 MMHG | HEART RATE: 73 BPM

## 2017-11-17 DIAGNOSIS — R53.83 FATIGUE, UNSPECIFIED TYPE: ICD-10-CM

## 2017-11-17 DIAGNOSIS — F33.2 MDD (MAJOR DEPRESSIVE DISORDER), RECURRENT SEVERE, WITHOUT PSYCHOSIS: Primary | ICD-10-CM

## 2017-11-17 DIAGNOSIS — I10 ESSENTIAL HYPERTENSION: ICD-10-CM

## 2017-11-17 DIAGNOSIS — F41.1 GAD (GENERALIZED ANXIETY DISORDER): ICD-10-CM

## 2017-11-17 PROCEDURE — 99214 OFFICE O/P EST MOD 30 MIN: CPT | Mod: S$GLB,,, | Performed by: PSYCHIATRY & NEUROLOGY

## 2017-11-17 PROCEDURE — 90834 PSYTX W PT 45 MINUTES: CPT | Mod: S$GLB,,, | Performed by: SOCIAL WORKER

## 2017-11-17 PROCEDURE — 99999 PR PBB SHADOW E&M-EST. PATIENT-LVL II: CPT | Mod: PBBFAC,,, | Performed by: PSYCHIATRY & NEUROLOGY

## 2017-11-17 RX ORDER — BUPROPION HYDROCHLORIDE 150 MG/1
150 TABLET ORAL DAILY
Qty: 30 TABLET | Refills: 1 | Status: SHIPPED | OUTPATIENT
Start: 2017-11-17 | End: 2017-11-17 | Stop reason: CLARIF

## 2017-11-17 RX ORDER — SERTRALINE HYDROCHLORIDE 100 MG/1
100 TABLET, FILM COATED ORAL DAILY
Qty: 30 TABLET | Refills: 1 | Status: SHIPPED | OUTPATIENT
Start: 2017-11-17 | End: 2017-12-11 | Stop reason: SDUPTHER

## 2017-11-17 RX ORDER — METOPROLOL SUCCINATE 25 MG/1
25 TABLET, EXTENDED RELEASE ORAL DAILY
COMMUNITY
End: 2018-02-06

## 2017-11-17 NOTE — PROGRESS NOTES
"ID: 43yo WF with a h/o depression and anxiety, no prior full psych eval w/i [facility]. Pt is a current pt of Anneliese Cochran LCSW who referred her for med mgmt along with , PCP. Pt has been on wait list since 3/2017. The pt is currently on xanax and cymbalta through PCP. Also on topamax and elavil for migraine mgmt through pcp    CC: depression.   Interim Hx: presents on time. Chart reviewed. Reports that with the increased zoloft she has not noticed great improvement in mood- "I don't feel any better that I notice, but my  thinks I seem a little happier sometimes. The real thing is that I still have the anxiety." reports periods of inc'd anxiety approx 2 x a day requiring use of xanax- using xanax most days, 2x/d.     Will inc zoloft to 150mg to eval last titration of this med prior to transitioning to alternative.     Pt continues to report low energy, low motivation. Discussion of wellbutrin as a possible treatment in the future.   Pt expresses understanding.     I am also considering MTHFR mutation in this pt- may have her begin txmt in near future.     Sleep has cont'd to be an issue- will have her try time released melatonin this next month prior to moving forward with RX sleep aid. Pt on elavil through neuro which has been discussed by neuro as a med they may d/c- pt will inquire- it has not helped with headaches nor with sleep.     On Psychiatric ROS:    Endorses difficulty with maintaining sleep- "I wake up a lot more. I woke up at 2am and I may have slept 2 more hours", +anhedonia, feeling helpless/hopeless- "about feeling this way.", dec energy, dec concentration, inc'd appetite- since Spring 2017, she gained 40lbs "overall I'm 100lbs overweight. I've never been this fat before.", dec PMA "it's a chore. I usually have to take a xanax right when I wake up. I wake up worrying about work and rather not go."     Denies thoughts of SI/intent/plan. "it's not that. I never think that. I " "just want to feel better. I want this feeling to end but not life."     Endorses feeling +easily overwhelmed, +ruminative thinking, +feeling tense/"on edge"- "on edge"     PPHx: Denies h/o self injury, inpt psych hospitalization, denies h/o suicide attempt     Current Psych Meds: zoloft 100mg po qam, xanax 0.5mg po PRN anxiety  Past Psych Meds: ritalin, prozac, buspar PRN ("it helped with the stress of planning the wedding"), cymbalta 120mg po daily (ineffective)    PMHx: migraines (since childhood)- botox as txmt, monthly "they're well managed"    SubstHx:   T- none  E- very rare due to migraine  D- none  Caffeine- 2 dr.peppers/day    FamPHx: none    Musculoskeletal:  Muscle strength/Tone: no dyskinesia/ no tremor  Gait/Station- non antalgic, no assistance needed    MSE: appears stated age, well groomed, appropriate dress, engages well with examiner. Good e/c. Speech reg rate and vol, nonpressured. Mood is "I mean, it's just the same. i'm ok today but just pretty low." Affect euthymic. No tearfulness. Sensorium fully intact. Oriented to date/day/location, current events. Narrative memory intact. Intellectual function is avg based on vocab and basic fund of knowledge. Thought is c/l/gd. No tangentiality or circumstantiality. No FOI/SULTANA. Denies SI/HI. Denies A/VH. No evidence of delusions. Insight and Judgment intact.     Blood pressure (!) 161/84, pulse 73, height 5' 11" (1.803 m), weight 116.9 kg (257 lb 11.2 oz), last menstrual period 11/10/2017.    Suicide Risk Assessment:   Protective- age, gender, no prior attempts, no prior hospitalizations, no family h/o attempts, no ongoing substance abuse, no psychosis, , denies SI/intent/plan, seeking treatment, access to treatment, future oriented, good primary support, no access to firearms    Risk- race, ongoing Axis I sxs, no children    **Pt is at LOW imminent and long term risk of suicide given current risk factors.    Assessment:  41yo WF with a h/o " "depression and anxiety, no prior full psych eval w/i [facility]. Pt is a current pt of CYRUS BarraganW who referred her for med mgmt along with , PCP. Pt has been on wait list since 3/2017. The pt is currently on xanax and cymbalta through PCP. Also on topamax and elavil for migraine mgmt through pcp/neuro. On eval today she meets criteria for MDD, recurrent, severe w/o psychotic sxs despite a recent inc in cymbalta to 120mg po daily. Pt has been on cymbalta x 10yrs. Only recalls previous trial of prozac which was d/c'd for unknown reasons. Now requiring xanax daily- some days 2 tabs (ie:1mg total daily). Some room for behavioral interventions, as well and the pt is motivated. Has good ability to adhere to the new txmt plan. We tapered off cymbalta and started a trial of zoloft. Will discuss diet and exercise interventions more fully over course of txmt. today she has been on 100mg of zoloft x 1mo. Continues to state she does not feel "any different"- will inc one last titration to 150mg prior to discussing transition to alternate med. Have discussed trial of wellbutrin next as daytime lethargy and dec'd motivation are now the primary concerns to the pt, however current level of anxiety leading to xanax use 2x/d. May also consider mthfr mutation with some methyl folate supplement. Will add time released melatonin today for sleep. wgt loss would be an added benefit. Cont therapy as scheduled. No acute safety concerns. rtc 4wks- will speak with pt via phone in 2wks.    Axis I: MDD, recurrent, severe w/o psychotic sxs; PASTORA  Axis II: none at this time   Axis III: HTN, migraines  Axis IV: occupational, chronic mental health  Axis V: GAF 70    Plan:   1. Inc zoloft to 150mg po qam  - will speak with the pt via [facility] in 1 wk regarding response  2. Considering a trial of wellbutrin xl 150mg po qam if anxiety improved on inc'd zoloft dose  3. Cont to discuss exercise and diet   4. Cont therapy as " scheduled; I am in contact with therapist  5. RTC 4-6wks; will talk in 2wks    -Spent 30min face to face with the pt; >50% time spent in counseling   -Supportive therapy and psychoeducation provided  -R/B/SE's of medications discussed with the pt who expresses understanding and chooses to take medications as prescribed.   -Pt instructed to call clinic, 911 or go to nearest emergency room if sxs worsen or pt is in   crisis. The pt expresses understanding.

## 2017-11-17 NOTE — PROGRESS NOTES
"Individual Psychotherapy (PhD/LCSW)    11/17/2017    Site:  [facility]         Therapeutic Intervention: Met with patient.  Outpatient - Insight oriented psychotherapy 45 min - CPT code 21332 and Outpatient - Behavior modifying psychotherapy 45 min - CPT code 70870    Chief complaint/reason for encounter: depression , anxiety, worrying, racing thoughts, nervousness, sleep     Interval history and content of current session:  Pt presents as casually dressed, cooperative, flat affect, reports anxiety and depression are 7-8/10 severe. Feels more anxious than last session. Does not notice a difference in level of depression. Dr. Bryant saw pt prior to my appt and is increasing pt Zoloft and will communicate with pt in the next 2 weeksPt continues to have fatigue, low energy, low motivation, low interest, is socially isolating self, staying at home, no social outlets.   Pt not sleeping well, up at 2 am today and could not go back to sleep. Wakes daily with immediate worrying thoughts. As I explore specific thoughts for CBT work, pt reports "work, home, everything." Pt able to explore anxiety related to spending time with parents and sister's family, and stress regarding pt having to be the responsible one that is in charge of helping.  Also worked on scripts for pt to use when parents are talking negatively or ruminating on issues that they will not address, (mostly about sister and Bro In Law). She denies any SI/HI. Denies any A/VH. Discussed grounding techniques of using her senses to focus on present and thought stopping techniques.     Treatment plan:  · Target symptoms: depression, anxiety , work stress  · Why chosen therapy is appropriate versus another modality: relevant to diagnosis, patient responds to this modality, evidence based practice  · Outcome monitoring methods: self-report, observation  · Therapeutic intervention type: insight oriented psychotherapy, behavior modifying psychotherapy, supportive " psychotherapy    Risk parameters:  Patient reports no suicidal ideation  Patient reports no homicidal ideation  Patient reports no self-injurious behavior  Patient reports no violent behavior    Verbal deficits: None    Patient's response to intervention:  The patient's response to intervention is accepting.    Progress toward goals and other mental status changes:  The patient's progress toward goals is limited.    Diagnosis:     ICD-10-CM ICD-9-CM   1. MDD (major depressive disorder), recurrent severe, without psychosis F33.2 296.33   2. PASTORA (generalized anxiety disorder) F41.1 300.02   3. Essential hypertension I10 401.9   4. Fatigue, unspecified type R53.83 780.79       Plan:  individual psychotherapy and medication management by physician.Refer pt to Dr. Bryant for medication evaluation.  Pt to go to ED or call 911 if symptoms worsen or if she has thoughts of harming self and /or others. Pt verbalized understanding.       Return to clinic: 2 weeks, earlier if needed.     Length of Service (minutes): 45

## 2017-12-05 ENCOUNTER — OFFICE VISIT (OUTPATIENT)
Dept: FAMILY MEDICINE | Facility: CLINIC | Age: 42
End: 2017-12-05
Payer: COMMERCIAL

## 2017-12-05 ENCOUNTER — HOSPITAL ENCOUNTER (OUTPATIENT)
Dept: RADIOLOGY | Facility: HOSPITAL | Age: 42
Discharge: HOME | End: 2017-12-05
Attending: NURSE PRACTITIONER
Payer: COMMERCIAL

## 2017-12-05 VITALS
TEMPERATURE: 99 F | DIASTOLIC BLOOD PRESSURE: 92 MMHG | RESPIRATION RATE: 14 BRPM | HEIGHT: 71 IN | WEIGHT: 258.81 LBS | SYSTOLIC BLOOD PRESSURE: 134 MMHG | OXYGEN SATURATION: 97 % | HEART RATE: 90 BPM | BODY MASS INDEX: 36.23 KG/M2

## 2017-12-05 DIAGNOSIS — R06.02 SHORTNESS OF BREATH: ICD-10-CM

## 2017-12-05 DIAGNOSIS — I10 ESSENTIAL HYPERTENSION: ICD-10-CM

## 2017-12-05 DIAGNOSIS — J20.9 ACUTE BRONCHITIS, UNSPECIFIED ORGANISM: Primary | ICD-10-CM

## 2017-12-05 PROCEDURE — 71020 XR CHEST PA AND LATERAL: CPT | Mod: 26,,, | Performed by: RADIOLOGY

## 2017-12-05 PROCEDURE — 96372 THER/PROPH/DIAG INJ SC/IM: CPT | Mod: S$GLB,,, | Performed by: FAMILY MEDICINE

## 2017-12-05 PROCEDURE — 71020 XR CHEST PA AND LATERAL: CPT | Mod: TC,PO

## 2017-12-05 PROCEDURE — 99999 PR PBB SHADOW E&M-EST. PATIENT-LVL III: CPT | Mod: PBBFAC,,, | Performed by: NURSE PRACTITIONER

## 2017-12-05 PROCEDURE — 99214 OFFICE O/P EST MOD 30 MIN: CPT | Mod: 25,S$GLB,, | Performed by: NURSE PRACTITIONER

## 2017-12-05 RX ORDER — DEXAMETHASONE SODIUM PHOSPHATE 4 MG/ML
8 INJECTION, SOLUTION INTRA-ARTICULAR; INTRALESIONAL; INTRAMUSCULAR; INTRAVENOUS; SOFT TISSUE ONCE
Status: COMPLETED | OUTPATIENT
Start: 2017-12-05 | End: 2017-12-05

## 2017-12-05 RX ORDER — ALBUTEROL SULFATE 90 UG/1
1-2 AEROSOL, METERED RESPIRATORY (INHALATION) EVERY 6 HOURS PRN
Qty: 18 G | Refills: 0 | Status: SHIPPED | OUTPATIENT
Start: 2017-12-05 | End: 2018-02-06

## 2017-12-05 RX ADMIN — DEXAMETHASONE SODIUM PHOSPHATE 8 MG: 4 INJECTION, SOLUTION INTRA-ARTICULAR; INTRALESIONAL; INTRAMUSCULAR; INTRAVENOUS; SOFT TISSUE at 04:12

## 2017-12-05 NOTE — PATIENT INSTRUCTIONS
CORICIDIN HBP over the counter for cough      What Is Acute Bronchitis?  Acute bronchitis is when the airways in your lungs (bronchial tubes) become red and swollen (inflamed). It is usually caused by a viral infection. But it can also occur because of a bacteria or allergen. Symptoms include a cough that produces yellow or greenish mucus and can last for days or sometimes weeks.  Inside healthy lungs    Air travels in and out of the lungs through the airways. The linings of these airways produce sticky mucus. This mucus traps particles that enter the lungs. Tiny structures called cilia then sweep the particles out of the airways.     Healthy airway: Airways are normally open. Air moves in and out easily.      Healthy cilia: Tiny, hairlike cilia sweep mucus and particles up and out of the airways.   Lungs with bronchitis  Bronchitis often occurs with a cold or the flu virus. The airways become inflamed (red and swollen). There is a deep hacking cough from the extra mucus. Other symptoms may include:  · Wheezing  · Chest discomfort  · Shortness of breath  · Mild fever  A second infection, this time due to bacteria, may then occur. And airways irritated by allergens or smoke are more likely to get infected.        Inflamed airway: Inflammation and extra mucus narrow the airway, causing shortness of breath.      Impaired cilia: Extra mucus impairs cilia, causing congestion and wheezing. Smoking makes the problem worse.   Making a diagnosis  A physical exam, health history, and certain tests help your healthcare provider make the diagnosis.  Health history  Your healthcare provider will ask you about your symptoms.  The exam  Your provider listens to your chest for signs of congestion. He or she may also check your ears, nose, and throat.  Possible tests  · A sputum test for bacteria. This requires a sample of mucus from your lungs.  · A nasal or throat swab. This tests to see if you have a bacterial infection.  · A  chest X-ray. This is done if your healthcare provider thinks you have pneumonia.  · Tests to check for an underlying condition. Other tests may be done to check for things such as allergies, asthma, or COPD (chronic obstructive pulmonary disease). You may need to see a specialist for more lung function testing.  Treating a cough  The main treatment for bronchitis is easing symptoms. Avoiding smoke, allergens, and other things that trigger coughing can often help. If the infection is bacterial, you may be given antibiotics. During the illness, it's important to get plenty of sleep. To ease symptoms:  · Dont smoke. Also avoid secondhand smoke.  · Use a humidifier. Or try breathing in steam from a hot shower. This may help loosen mucus.  · Drink a lot of water and juice. They can soothe the throat and may help thin mucus.  · Sit up or use extra pillows when in bed. This helps to lessen coughing and congestion.  · Ask your provider about using medicine. Ask about using cough medicine, pain and fever medicine, or a decongestant.  Antibiotics  Most cases of bronchitis are caused by cold or flu viruses. They dont need antibiotics to treat them, even if your mucus is thick and green or yellow. Antibiotics dont treat viral illness and antibiotics have not been shown to have any benefit in cases of acute bronchitis. Taking antibiotics when they are not needed increases your risk of getting an infection later that is antibiotic-resistant. Antibiotics can also cause severe cases of diarrhea that require other antibiotics to treat.  It is important that you accept your healthcare provider's opinion to not use antibiotics. Your provider will prescribe antibiotics if the infection is caused by bacteria. If they are prescribed:  · Take all of the medicine. Take the medicine until it is used up, even if symptoms have improved. If you dont, the bronchitis may come back.  · Take the medicines as directed. For instance, some  medicines should be taken with food.  · Ask about side effects. Ask your provider or pharmacist what side effects are common, and what to do about them.  Follow-up care  You should see your provider again in 2 to 3 weeks. By this time, symptoms should have improved. An infection that lasts longer may mean you have a more serious problem.  Prevention  · Avoid tobacco smoke. If you smoke, quit. Stay away from smoky places. Ask friends and family not to smoke around you, or in your home or car.  · Get checked for allergies.  · Ask your provider about getting a yearly flu shot. Also ask about pneumococcal or pneumonia shots.  · Wash your hands often. This helps reduce the chance of picking up viruses that cause colds and flu.  Call your healthcare provider if:  · Symptoms worsen, or you have new symptoms  · Breathing problems worsen or  become severe  · Symptoms dont get better within a week, or within 3 days of taking antibiotics   Date Last Reviewed: 2/1/2017  © 2364-2013 [employer]. [address] [place] [address], [place] [address]. All rights reserved. This information is not intended as a substitute for professional medical care. Always follow your healthcare professional's instructions.

## 2017-12-05 NOTE — PROGRESS NOTES
Subjective:       Patient ID: Anneliese Rocha is a 42 y.o. female.    Chief Complaint: Cough (since 10/29/17 pt stated it's getting worse ); Dizziness (just started 12/04/17); and Fatigue    Ms. Rocha is a new patient to me. She presents today for cough.     Cough   This is a new problem. The current episode started in the past 7 days. The problem has been unchanged. The problem occurs constantly. The cough is non-productive. Associated symptoms include shortness of breath. Pertinent negatives include no chest pain, ear congestion, ear pain, eye redness, fever, headaches, hemoptysis, nasal congestion, postnasal drip, rash, sore throat or sweats. Treatments tried: Delsym, cold and cough multisymptom, tessalon pearls. The treatment provided no relief.     Vitals:    12/05/17 1531   BP: (!) 134/92   Pulse: 90   Resp: 14   Temp: 98.9 °F (37.2 °C)     Review of Systems   Constitutional: Negative.  Negative for diaphoresis and fever.   HENT: Negative.  Negative for congestion, ear pain, facial swelling, postnasal drip, sinus pain, sinus pressure, sore throat and trouble swallowing.    Eyes: Negative.  Negative for discharge and redness.   Respiratory: Positive for cough and shortness of breath. Negative for hemoptysis.    Cardiovascular: Negative.  Negative for chest pain and palpitations.   Gastrointestinal: Negative.  Negative for abdominal pain and vomiting.   Genitourinary: Negative.  Negative for difficulty urinating and flank pain.   Musculoskeletal: Negative.  Negative for back pain and neck pain.   Skin: Negative.  Negative for rash and wound.   Neurological: Negative.  Negative for dizziness, light-headedness and headaches.   Hematological: Negative.    Psychiatric/Behavioral: Negative.  Negative for confusion. The patient is not nervous/anxious.        Past Medical History:   Diagnosis Date    Depression with anxiety     tolerating Cymbalta 90mg daily    Fatigue     HTN (hypertension)     Iron deficiency  anemia     Migraines     Getting headaches about monthly; using Topamax 25mg and Elavil 50mg for prevention and Frova/cambia as needed; following with Dr. Maldonado    PCOS (polycystic ovarian syndrome)      Objective:      Physical Exam   Constitutional: She is oriented to person, place, and time. She does not have a sickly appearance. No distress.   HENT:   Head: Normocephalic.   Right Ear: Hearing, tympanic membrane, external ear and ear canal normal.   Left Ear: Hearing, tympanic membrane, external ear and ear canal normal.   Nose: Nose normal.   Mouth/Throat: Uvula is midline, oropharynx is clear and moist and mucous membranes are normal.   Eyes: Conjunctivae and lids are normal.   Neck: Trachea normal and normal range of motion. Neck supple. No JVD present. No tracheal deviation present.   Cardiovascular: Normal rate, regular rhythm, S1 normal, S2 normal and normal heart sounds.    Pulmonary/Chest: Effort normal. She has no decreased breath sounds. She has no rhonchi. She has no rales. She exhibits no tenderness.   Tight breath sounds bilaterally   Persistent dry cough during examination   Abdominal: Normal appearance. She exhibits no distension.   Musculoskeletal: Normal range of motion. She exhibits no edema or deformity.   Neurological: She is alert and oriented to person, place, and time.   Skin: Skin is warm and dry. She is not diaphoretic. No pallor.   Psychiatric: She has a normal mood and affect. Her speech is normal and behavior is normal. Judgment and thought content normal. Cognition and memory are normal.   Nursing note and vitals reviewed.      Assessment:       1. Acute bronchitis, unspecified organism    2. Shortness of breath    3. Essential hypertension        Plan:       Acute bronchitis, unspecified organism  -     dexamethasone injection 8 mg; Inject 2 mLs (8 mg total) into the muscle once.  -     albuterol 90 mcg/actuation inhaler; Inhale 1-2 puffs into the lungs every 6 (six) hours as  needed for Wheezing or Shortness of Breath. Rescue  Dispense: 18 g; Refill: 0  - Coricidin HBP  -education and handout on supportive care    Shortness of breath  -     X-Ray Chest PA And Lateral; Future; Expected date: 12/05/2017    Essential hypertension   Monitor at home; switch to coricidin HBP        Return if symptoms worsen or fail to improve.

## 2017-12-06 ENCOUNTER — TELEPHONE (OUTPATIENT)
Dept: FAMILY MEDICINE | Facility: CLINIC | Age: 42
End: 2017-12-06

## 2017-12-06 NOTE — TELEPHONE ENCOUNTER
Spoke with patient, updated on xray; discussed viral nature of bronchitis; supportive care discussed; advised to follow up if symptoms worsen or persist

## 2017-12-07 DIAGNOSIS — F41.8 DEPRESSION WITH ANXIETY: ICD-10-CM

## 2017-12-07 RX ORDER — ALPRAZOLAM 0.5 MG/1
TABLET ORAL
Qty: 45 TABLET | Refills: 0 | Status: SHIPPED | OUTPATIENT
Start: 2017-12-07 | End: 2018-01-02 | Stop reason: SDUPTHER

## 2017-12-11 ENCOUNTER — PATIENT MESSAGE (OUTPATIENT)
Dept: PSYCHIATRY | Facility: CLINIC | Age: 42
End: 2017-12-11

## 2017-12-11 RX ORDER — BUPROPION HYDROCHLORIDE 150 MG/1
150 TABLET ORAL DAILY
Qty: 30 TABLET | Refills: 0 | Status: SHIPPED | OUTPATIENT
Start: 2017-12-11 | End: 2018-01-02 | Stop reason: SDUPTHER

## 2017-12-11 RX ORDER — SERTRALINE HYDROCHLORIDE 100 MG/1
150 TABLET, FILM COATED ORAL DAILY
Qty: 45 TABLET | Refills: 1 | Status: SHIPPED | OUTPATIENT
Start: 2017-12-11 | End: 2018-01-02 | Stop reason: ALTCHOICE

## 2017-12-15 ENCOUNTER — OFFICE VISIT (OUTPATIENT)
Dept: PSYCHIATRY | Facility: CLINIC | Age: 42
End: 2017-12-15
Payer: COMMERCIAL

## 2017-12-15 DIAGNOSIS — R53.83 FATIGUE, UNSPECIFIED TYPE: ICD-10-CM

## 2017-12-15 DIAGNOSIS — F33.2 MDD (MAJOR DEPRESSIVE DISORDER), RECURRENT SEVERE, WITHOUT PSYCHOSIS: Primary | ICD-10-CM

## 2017-12-15 DIAGNOSIS — F41.1 GAD (GENERALIZED ANXIETY DISORDER): ICD-10-CM

## 2017-12-15 PROCEDURE — 90834 PSYTX W PT 45 MINUTES: CPT | Mod: S$GLB,,, | Performed by: SOCIAL WORKER

## 2017-12-15 NOTE — PROGRESS NOTES
Individual Psychotherapy (PhD/LCSW)    12/15/2017    Site:  [facility]         Therapeutic Intervention: Met with patient.  Outpatient - Insight oriented psychotherapy 45 min - CPT code 35865 and Outpatient - Behavior modifying psychotherapy 45 min - CPT code 81466    Chief complaint/reason for encounter: depression , anxiety, worrying, racing thoughts, nervousness, sleep     Interval history and content of current session:  Pt presents as casually dressed, cooperative, dysthymic affect, reports anxiety and depression are moderate to severe. She started Wellbutrin 3 days ago, and has noticed a possibly slight improvement in focus and energy. Recent major work stressors including meeting with supervisors for her annual performance review,and they told her she could always work more overtime. Pt currently is working 44+ hours a week. They also told her she is not allowed to take off any time in December or January. Pt continues to have fatigue, low energy, low motivation, low interest, is socially isolating self, staying at home, no social outlets.   Pt is taking time released melatonin and is not waking up as much and is sleeping until 5 am instead of 2 am.  Wakes daily with immediate worrying thoughts. Pt also dealing with bronchitis x 1 month. Pt is using thought stopping technique and we talked about how to incorporate small breaks at work, focusing on the present, pt strengths. Pt and  plan to go to parents cabin for a few days alone to destress. Pt denies any current SI/HI. Denies any current A/VH.     Treatment plan:  · Target symptoms: depression, anxiety , work stress  · Why chosen therapy is appropriate versus another modality: relevant to diagnosis, patient responds to this modality, evidence based practice  · Outcome monitoring methods: self-report, observation  · Therapeutic intervention type: insight oriented psychotherapy, behavior modifying psychotherapy, supportive psychotherapy    Risk  parameters:  Patient reports no suicidal ideation  Patient reports no homicidal ideation  Patient reports no self-injurious behavior  Patient reports no violent behavior    Verbal deficits: None    Patient's response to intervention:  The patient's response to intervention is accepting.    Progress toward goals and other mental status changes:  The patient's progress toward goals is fair .    Diagnosis:     ICD-10-CM ICD-9-CM   1. MDD (major depressive disorder), recurrent severe, without psychosis F33.2 296.33   2. PASTORA (generalized anxiety disorder) F41.1 300.02   3. Fatigue, unspecified type R53.83 780.79       Plan:  individual psychotherapy and medication management by physician.Refer pt to Dr. Bryant for medication evaluation.  Pt to go to ED or call 911 if symptoms worsen or if she has thoughts of harming self and /or others. Pt verbalized understanding.       Return to clinic: 2 weeks, earlier if needed.     Length of Service (minutes): 45

## 2018-01-02 ENCOUNTER — OFFICE VISIT (OUTPATIENT)
Dept: PSYCHIATRY | Facility: CLINIC | Age: 43
End: 2018-01-02
Payer: COMMERCIAL

## 2018-01-02 VITALS
WEIGHT: 260.69 LBS | DIASTOLIC BLOOD PRESSURE: 65 MMHG | SYSTOLIC BLOOD PRESSURE: 142 MMHG | HEART RATE: 70 BPM | BODY MASS INDEX: 36.5 KG/M2 | HEIGHT: 71 IN

## 2018-01-02 DIAGNOSIS — F41.8 DEPRESSION WITH ANXIETY: ICD-10-CM

## 2018-01-02 DIAGNOSIS — I10 HYPERTENSION, UNSPECIFIED TYPE: ICD-10-CM

## 2018-01-02 DIAGNOSIS — F41.1 GAD (GENERALIZED ANXIETY DISORDER): ICD-10-CM

## 2018-01-02 DIAGNOSIS — R53.83 OTHER FATIGUE: ICD-10-CM

## 2018-01-02 DIAGNOSIS — F33.2 MDD (MAJOR DEPRESSIVE DISORDER), RECURRENT SEVERE, WITHOUT PSYCHOSIS: Primary | ICD-10-CM

## 2018-01-02 DIAGNOSIS — G43.009 MIGRAINE WITHOUT AURA AND WITHOUT STATUS MIGRAINOSUS, NOT INTRACTABLE: ICD-10-CM

## 2018-01-02 PROCEDURE — 99214 OFFICE O/P EST MOD 30 MIN: CPT | Mod: S$GLB,,, | Performed by: PSYCHIATRY & NEUROLOGY

## 2018-01-02 PROCEDURE — 99999 PR PBB SHADOW E&M-EST. PATIENT-LVL II: CPT | Mod: PBBFAC,,, | Performed by: PSYCHIATRY & NEUROLOGY

## 2018-01-02 RX ORDER — ALPRAZOLAM 0.5 MG/1
TABLET ORAL
Qty: 45 TABLET | Refills: 0 | Status: SHIPPED | OUTPATIENT
Start: 2018-01-02 | End: 2018-03-12 | Stop reason: SDUPTHER

## 2018-01-02 RX ORDER — BUSPIRONE HYDROCHLORIDE 10 MG/1
10 TABLET ORAL 2 TIMES DAILY
Qty: 60 TABLET | Refills: 0 | Status: SHIPPED | OUTPATIENT
Start: 2018-01-02 | End: 2018-01-29 | Stop reason: SDUPTHER

## 2018-01-02 RX ORDER — BUPROPION HYDROCHLORIDE 150 MG/1
150 TABLET ORAL DAILY
Qty: 30 TABLET | Refills: 0 | Status: SHIPPED | OUTPATIENT
Start: 2018-01-02 | End: 2018-02-08 | Stop reason: SDUPTHER

## 2018-01-02 RX ORDER — FLUOXETINE 10 MG/1
10 CAPSULE ORAL DAILY
Qty: 30 CAPSULE | Refills: 0 | Status: SHIPPED | OUTPATIENT
Start: 2018-01-02 | End: 2018-01-12 | Stop reason: SDUPTHER

## 2018-01-02 NOTE — PROGRESS NOTES
"ID: 43yo WF with a h/o depression and anxiety, no prior full psych eval w/i [employer] system. Pt is a current pt of Anneliese Cochran LCSW who referred her for med mgmt along with , PCP. Pt has been on wait list since 3/2017. The pt is currently on xanax and cymbalta through PCP. Also on topamax and elavil for migraine mgmt through pcp    CC: depression.     Interim Hx: presents on time. Chart reviewed. Reports that "my  thinks I'm more anxious. He even mentioned it this morning for a second time." but the pt reports she does think her "focus is definitely better at work and so I'm hesitant to make changes because this is my biggest month productivity wise."     zoloft alone was not effective for mood- has not noticed marked difference as we titrated. May warrant trial of alternative.     Pt also not certain of xanax benefit. Is taking 0.5mg po BID PRN anxiety- taking BID almost daily. "i don't really even notice it after I use it."   approx 15yrs ago used buspar in unknown dose and it was helpful.     We will begin making some changes today.     I am also considering MTHFR mutation in this pt- may have her begin txmt in near future.     Sleep has cont'd to be an issue- will have her try time released melatonin this next month prior to moving forward with RX sleep aid. Pt on elavil through neuro which has been discussed by neuro as a med they may d/c- pt will inquire- it has not helped with headaches nor with sleep. Pt also considering a taper off topomax (only on 25mg daily- no need for taper but will defer these changes to neuro).      On Psychiatric ROS:    Endorses difficulty with maintaining sleep- frequent wakening, +anhedonia, feeling helpless/hopeless- "about feeling this way.", dec energy, improved concentration on wellbutrin, inc'd appetite- since Spring 2017, she gained 40lbs "overall I'm 100lbs overweight. I've never been this fat before.", dec PMA "it's a chore. I usually have to take a " "xanax right when I wake up. I wake up worrying about work and rather not go."     Denies thoughts of SI/intent/plan. "it's not that. I never think that. I just want to feel better. I want this feeling to end but not life."     Endorses feeling +easily overwhelmed, +ruminative thinking, +feeling tense/"on edge"- "on edge"     PPHx: Denies h/o self injury, inpt psych hospitalization, denies h/o suicide attempt     Current Psych Meds: zoloft 150mg po qam, xanax 0.5mg po BID PRN anxiety, wellbutrin xl 150mg po qam  Past Psych Meds: ritalin, prozac, buspar PRN ("it helped with the stress of planning the wedding"), cymbalta 120mg po daily (ineffective)    PMHx: migraines (since childhood)- botox as txmt, monthly "they're well managed"    SubstHx:   T- none  E- very rare due to migraine  D- none  Caffeine- 2 dr.peppers/day    FamPHx: none    Musculoskeletal:  Muscle strength/Tone: no dyskinesia/ no tremor  Gait/Station- non antalgic, no assistance needed    MSE: appears stated age, well groomed, appropriate dress, engages well with examiner. Good e/c. Speech reg rate and vol, nonpressured. Mood is "I don't feel great, but I do feel more focused" Affect euthymic. No tearfulness. Sensorium fully intact. Oriented to date/day/location, current events. Narrative memory intact. Intellectual function is avg based on vocab and basic fund of knowledge. Thought is c/l/gd. No tangentiality or circumstantiality. No FOI/SULTANA. Denies SI/HI. Denies A/VH. No evidence of delusions. Insight and Judgment intact.     Blood pressure (!) 142/65, pulse 70, height 5' 11" (1.803 m), weight 118.3 kg (260 lb 11.2 oz), last menstrual period 12/03/2017.    Suicide Risk Assessment:   Protective- age, gender, no prior attempts, no prior hospitalizations, no family h/o attempts, no ongoing substance abuse, no psychosis, , denies SI/intent/plan, seeking treatment, access to treatment, future oriented, good primary support, no access to " "firearms    Risk- race, ongoing Axis I sxs, no children    **Pt is at LOW imminent and long term risk of suicide given current risk factors.    Assessment:  43yo WF with a h/o depression and anxiety, no prior full psych eval w/i [facility]. Pt is a current pt of Anneliese Cochran LCSW who referred her for med mgmt along with , PCP. Pt has been on wait list since 3/2017. The pt is currently on xanax and cymbalta through PCP. Also on topamax and elavil for migraine mgmt through pcp/neuro. On eval today she meets criteria for MDD, recurrent, severe w/o psychotic sxs despite a recent inc in cymbalta to 120mg po daily. Pt has been on cymbalta x 10yrs. Only recalls previous trial of prozac which was d/c'd for unknown reasons. Now requiring xanax daily- some days 2 tabs (ie:1mg total daily). Some room for behavioral interventions, as well and the pt is motivated. Has good ability to adhere to the new txmt plan. We tapered off cymbalta and started a trial of zoloft. Will discuss diet and exercise interventions more fully over course of txmt. today she has been on 100mg of zoloft x 1mo. Continues to state she does not feel "any different"- will inc one last titration to 150mg prior to discussing transition to alternate med. Have discussed trial of wellbutrin next as daytime lethargy and dec'd motivation are now the primary concerns to the pt, however current level of anxiety leading to xanax use 2x/d. May also consider mthfr mutation with some methyl folate supplement. Will add time released melatonin today for sleep. wgt loss would be an added benefit. Cont therapy as scheduled. No acute safety concerns. We added wellbutrin and today she reports improved energy and focus at work, but also perhaps inc'd anxiety? Will begin taper off zoloft to make room for trial of prozac. Will also add trial of buspar 10mg po BID which was helpful many years ago per pt. F/u in 4wks.     Axis I: MDD, recurrent, severe w/o " psychotic sxs; PASTORA  Axis II: none at this time   Axis III: HTN, migraines  Axis IV: occupational, chronic mental health  Axis V: GAF 70    Plan:   1. Taper off zoloft due to inefficacy; start titration of prozac  2. Cont wellbutrin xl 150mg po qam   3. Start trial of buspar 10mg po BID  4. Cont therapy as scheduled; I am in contact with therapist  5. RTC 4wks    -Spent 30min face to face with the pt; >50% time spent in counseling   -Supportive therapy and psychoeducation provided  -R/B/SE's of medications discussed with the pt who expresses understanding and chooses to take medications as prescribed.   -Pt instructed to call clinic, 911 or go to nearest emergency room if sxs worsen or pt is in   crisis. The pt expresses understanding.

## 2018-01-12 RX ORDER — FLUOXETINE HYDROCHLORIDE 20 MG/1
20 CAPSULE ORAL DAILY
Qty: 30 CAPSULE | Refills: 0 | Status: SHIPPED | OUTPATIENT
Start: 2018-01-12 | End: 2018-02-08 | Stop reason: SDUPTHER

## 2018-01-19 ENCOUNTER — OFFICE VISIT (OUTPATIENT)
Dept: PSYCHIATRY | Facility: CLINIC | Age: 43
End: 2018-01-19
Payer: COMMERCIAL

## 2018-01-19 DIAGNOSIS — F41.1 GAD (GENERALIZED ANXIETY DISORDER): ICD-10-CM

## 2018-01-19 DIAGNOSIS — F33.2 MDD (MAJOR DEPRESSIVE DISORDER), RECURRENT SEVERE, WITHOUT PSYCHOSIS: Primary | ICD-10-CM

## 2018-01-19 DIAGNOSIS — F41.8 DEPRESSION WITH ANXIETY: ICD-10-CM

## 2018-01-19 PROCEDURE — 90834 PSYTX W PT 45 MINUTES: CPT | Mod: S$GLB,,, | Performed by: SOCIAL WORKER

## 2018-01-19 NOTE — PROGRESS NOTES
"Individual Psychotherapy (PhD/LCSW)    1/19/2018    Site:  [facility]         Therapeutic Intervention: Met with patient.  Outpatient - Insight oriented psychotherapy 45 min - CPT code 06445 and Outpatient - Behavior modifying psychotherapy 45 min - CPT code 22177    Chief complaint/reason for encounter: depression , anxiety, worrying, racing thoughts, nervousness, sleep     Interval history and content of current session:  Pt presents as casually dressed, cooperative, reports feeling "very anxious due to work and this month is always stressful." Pt reports  has noticed an improvement in her depression, says she talks and laughs a little more, has recently joked around. Pt does not see it yet. Pt verbally expressed her stressors, feelings, symptoms, recent family issues in detail, and I provided support and validation, reframing and pointed out that patient has expressed her thoughts, views verbally / in depth today for the first time . Pt smiled, laughed and said "this is the most I've talked in a session ever."   Pt also able to verbalize what is ok and not ok re co-workers behaviors. Discussed boundaries re recent family stress and coworker's behavior. She has noticed feeling more sensitive to criticism since October, attributes to having recurrent bronchitis and medication changes. Pt is hopeful and states she and  are going out of town to have lunch , as she promised him to get out more. Pt reports time released melatonin has helped her sleep, is now able to stay asleep until 5 am. Pt noticing improvement in focus, was more forgetful over November and December.  Wakes daily with immediate worrying thoughts. Pt denies any current SI/HI. Denies any current A/VH.     Treatment plan:  · Target symptoms: depression, anxiety , work stress  · Why chosen therapy is appropriate versus another modality: relevant to diagnosis, patient responds to this modality, evidence based practice  · Outcome " monitoring methods: self-report, observation  · Therapeutic intervention type: insight oriented psychotherapy, behavior modifying psychotherapy, supportive psychotherapy    Risk parameters:  Patient reports no suicidal ideation  Patient reports no homicidal ideation  Patient reports no self-injurious behavior  Patient reports no violent behavior    Verbal deficits: None    Patient's response to intervention:  The patient's response to intervention is accepting.    Progress toward goals and other mental status changes:  The patient's progress toward goals is fair .    Diagnosis:     ICD-10-CM ICD-9-CM   1. MDD (major depressive disorder), recurrent severe, without psychosis F33.2 296.33   2. PASTORA (generalized anxiety disorder) F41.1 300.02   3. Depression with anxiety F41.8 300.4       Plan:  individual psychotherapy and medication management by physician.Refer pt to Dr. Bryant for medication evaluation.  Pt to go to ED or call 911 if symptoms worsen or if she has thoughts of harming self and /or others. Pt verbalized understanding.       Return to clinic: 2 weeks, earlier if needed.     Length of Service (minutes): 45

## 2018-01-29 ENCOUNTER — PATIENT MESSAGE (OUTPATIENT)
Dept: PSYCHIATRY | Facility: CLINIC | Age: 43
End: 2018-01-29

## 2018-01-29 RX ORDER — BUSPIRONE HYDROCHLORIDE 10 MG/1
10 TABLET ORAL 2 TIMES DAILY
Qty: 60 TABLET | Refills: 0 | Status: SHIPPED | OUTPATIENT
Start: 2018-01-29 | End: 2018-02-08 | Stop reason: SDUPTHER

## 2018-02-06 ENCOUNTER — HOSPITAL ENCOUNTER (OUTPATIENT)
Dept: RADIOLOGY | Facility: HOSPITAL | Age: 43
Discharge: HOME | End: 2018-02-06
Attending: NURSE PRACTITIONER
Payer: COMMERCIAL

## 2018-02-06 ENCOUNTER — OFFICE VISIT (OUTPATIENT)
Dept: FAMILY MEDICINE | Facility: CLINIC | Age: 43
End: 2018-02-06
Payer: COMMERCIAL

## 2018-02-06 VITALS
HEIGHT: 71 IN | BODY MASS INDEX: 36.6 KG/M2 | DIASTOLIC BLOOD PRESSURE: 100 MMHG | WEIGHT: 261.44 LBS | SYSTOLIC BLOOD PRESSURE: 138 MMHG | TEMPERATURE: 98 F | OXYGEN SATURATION: 98 % | HEART RATE: 70 BPM | RESPIRATION RATE: 16 BRPM

## 2018-02-06 DIAGNOSIS — F41.8 DEPRESSION WITH ANXIETY: ICD-10-CM

## 2018-02-06 DIAGNOSIS — R82.90 CLOUDY URINE: ICD-10-CM

## 2018-02-06 DIAGNOSIS — M54.50 ACUTE BILATERAL LOW BACK PAIN WITHOUT SCIATICA: ICD-10-CM

## 2018-02-06 DIAGNOSIS — I10 ESSENTIAL HYPERTENSION: ICD-10-CM

## 2018-02-06 DIAGNOSIS — M54.50 ACUTE BILATERAL LOW BACK PAIN WITHOUT SCIATICA: Primary | ICD-10-CM

## 2018-02-06 DIAGNOSIS — R31.9 HEMATURIA, UNSPECIFIED TYPE: ICD-10-CM

## 2018-02-06 LAB
BILIRUB SERPL-MCNC: ABNORMAL MG/DL
BLOOD, POC UA: 250
GLUCOSE UR QL STRIP: ABNORMAL
KETONES UR QL STRIP: ABNORMAL
LEUKOCYTE ESTERASE URINE, POC: ABNORMAL
NITRITE, POC UA: ABNORMAL
PH, POC UA: 5
PROTEIN, POC: ABNORMAL
SPECIFIC GRAVITY, POC UA: 1.01
UROBILINOGEN, POC UA: ABNORMAL

## 2018-02-06 PROCEDURE — 3008F BODY MASS INDEX DOCD: CPT | Mod: S$GLB,,, | Performed by: NURSE PRACTITIONER

## 2018-02-06 PROCEDURE — 81000 URINALYSIS NONAUTO W/SCOPE: CPT | Mod: S$GLB,,, | Performed by: NURSE PRACTITIONER

## 2018-02-06 PROCEDURE — 72110 X-RAY EXAM L-2 SPINE 4/>VWS: CPT | Mod: 26,,, | Performed by: RADIOLOGY

## 2018-02-06 PROCEDURE — 99214 OFFICE O/P EST MOD 30 MIN: CPT | Mod: 25,SA,S$GLB, | Performed by: NURSE PRACTITIONER

## 2018-02-06 PROCEDURE — 99999 PR PBB SHADOW E&M-EST. PATIENT-LVL V: CPT | Mod: PBBFAC,,, | Performed by: NURSE PRACTITIONER

## 2018-02-06 PROCEDURE — 72110 X-RAY EXAM L-2 SPINE 4/>VWS: CPT | Mod: TC,FY,PO

## 2018-02-06 RX ORDER — METOPROLOL SUCCINATE 200 MG/1
TABLET, EXTENDED RELEASE ORAL
Refills: 11 | COMMUNITY
Start: 2018-01-18 | End: 2018-10-12 | Stop reason: SDUPTHER

## 2018-02-06 RX ORDER — LISINOPRIL 5 MG/1
5 TABLET ORAL DAILY
Qty: 90 TABLET | Refills: 3 | Status: SHIPPED | OUTPATIENT
Start: 2018-02-06 | End: 2018-02-23

## 2018-02-06 RX ORDER — TIZANIDINE 2 MG/1
2 TABLET ORAL NIGHTLY PRN
Qty: 15 TABLET | Refills: 0 | Status: SHIPPED | OUTPATIENT
Start: 2018-02-06 | End: 2018-02-16

## 2018-02-06 NOTE — PROGRESS NOTES
Subjective:       Patient ID: Anneliese Rocha is a 43 y.o. female.    Chief Complaint: Back Pain (low back ) and cloudy urine    Back Pain   This is a new problem. The current episode started in the past 7 days. The problem occurs daily. The problem has been gradually worsening since onset. The pain is present in the lumbar spine. The pain does not radiate. The pain is at a severity of 6/10. The pain is moderate. The pain is the same all the time. The symptoms are aggravated by sitting. Pertinent negatives include no abdominal pain, bladder incontinence, bowel incontinence, chest pain, dysuria, fever, headaches, leg pain, numbness, paresis, paresthesias, pelvic pain, perianal numbness, tingling, weakness or weight loss. (No dysuria- cloudy urine ) Risk factors include sedentary lifestyle. She has tried NSAIDs for the symptoms. The treatment provided no relief.   Hypertension   This is a chronic problem. The current episode started 1 to 4 weeks ago. The problem has been gradually worsening since onset. The problem is uncontrolled (90 diastolic ). Pertinent negatives include no anxiety, blurred vision, chest pain, headaches, malaise/fatigue, neck pain, orthopnea, palpitations, peripheral edema, PND, shortness of breath or sweats. There are no associated agents to hypertension. Risk factors for coronary artery disease include obesity.     Vitals:    02/06/18 0728   BP: (!) 138/100   Pulse: 70   Resp: 16   Temp: 98.1 °F (36.7 °C)     Review of Systems   Constitutional: Negative.  Negative for diaphoresis, fatigue, fever, malaise/fatigue and weight loss.   HENT: Negative.    Eyes: Negative.  Negative for blurred vision.   Respiratory: Negative.  Negative for cough, shortness of breath and wheezing.    Cardiovascular: Negative.  Negative for chest pain, palpitations, orthopnea and PND.   Gastrointestinal: Negative.  Negative for abdominal pain, bowel incontinence, diarrhea and nausea.   Endocrine: Negative.     Genitourinary: Negative.  Negative for bladder incontinence, difficulty urinating, dysuria, flank pain, hematuria and pelvic pain.        Discolored urine    Musculoskeletal: Positive for back pain and myalgias. Negative for neck pain.   Skin: Negative.  Negative for color change and rash.   Allergic/Immunologic: Negative.    Neurological: Negative.  Negative for tingling, speech difficulty, weakness, numbness, headaches and paresthesias.   Hematological: Negative.    Psychiatric/Behavioral: Negative.        Past Medical History:   Diagnosis Date    Depression with anxiety     tolerating Cymbalta 90mg daily    Fatigue     HTN (hypertension)     Iron deficiency anemia     Migraines     Getting headaches about monthly; using Topamax 25mg and Elavil 50mg for prevention and Frova/cambia as needed; following with Dr. Maldonado    PCOS (polycystic ovarian syndrome)      Objective:      Physical Exam   Constitutional: She is oriented to person, place, and time. She appears well-developed and well-nourished.   HENT:   Head: Normocephalic and atraumatic.   Mouth/Throat: Oropharynx is clear and moist.   Eyes: Conjunctivae and EOM are normal. Pupils are equal, round, and reactive to light.   Neck: Neck supple.   Cardiovascular: Normal rate, regular rhythm, normal heart sounds and intact distal pulses.  Exam reveals no friction rub.    No murmur heard.  Pulmonary/Chest: Effort normal and breath sounds normal. No respiratory distress. She has no wheezes. She has no rales.   Abdominal: Soft. Bowel sounds are normal. There is no tenderness.   Musculoskeletal:        Lumbar back: She exhibits decreased range of motion, pain and spasm.        Back:    No radiation of pain down legs    Neurological: She is alert and oriented to person, place, and time.   Skin: Skin is warm and dry.   Psychiatric: She has a normal mood and affect. Her behavior is normal. Judgment and thought content normal.   Nursing note and vitals reviewed.       Assessment:       1. Acute bilateral low back pain without sciatica    2. Cloudy urine    3. Depression with anxiety    4. Essential hypertension    5. Hematuria, unspecified type        Plan:       Acute bilateral low back pain without sciatica  -     Ambulatory Referral to Physical/Occupational Therapy  -     tiZANidine (ZANAFLEX) 2 MG tablet; Take 1 tablet (2 mg total) by mouth nightly as needed.  Dispense: 15 tablet; Refill: 0  -     X-Ray Lumbar Spine Complete 5 View; Future; Expected date: 02/06/2018    Cloudy urine  -     Ambulatory Referral to Physical/Occupational Therapy  -     POCT URINALYSIS  -     tiZANidine (ZANAFLEX) 2 MG tablet; Take 1 tablet (2 mg total) by mouth nightly as needed.  Dispense: 15 tablet; Refill: 0  -     X-Ray Lumbar Spine Complete 5 View; Future; Expected date: 02/06/2018  -     Urinalysis; Future; Expected date: 02/08/2018    Depression with anxiety  -     Ambulatory Referral to Physical/Occupational Therapy  -     tiZANidine (ZANAFLEX) 2 MG tablet; Take 1 tablet (2 mg total) by mouth nightly as needed.  Dispense: 15 tablet; Refill: 0  -     TSH; Future; Expected date: 02/06/2018    Essential hypertension  -     Ambulatory Referral to Physical/Occupational Therapy  -     lisinopril (PRINIVIL,ZESTRIL) 5 MG tablet; Take 1 tablet (5 mg total) by mouth once daily.  Dispense: 90 tablet; Refill: 3  -     CBC auto differential; Future; Expected date: 02/06/2018  -     Comprehensive metabolic panel; Future; Expected date: 02/06/2018  -     Lipid panel; Future; Expected date: 02/06/2018  -     TSH; Future; Expected date: 02/06/2018  -     Ambulatory referral to Obstetrics / Gynecology      Hematuria, unspecified type  -     Urinalysis; Future; Expected date: 02/08/2018  Will repeat UA - as she recently finished her period.     Will refer to PT for back   Add BP med in am along with toprol xl at night     FU with Dr Holcomb with fasting labs

## 2018-02-08 ENCOUNTER — OFFICE VISIT (OUTPATIENT)
Dept: PSYCHIATRY | Facility: CLINIC | Age: 43
End: 2018-02-08
Payer: COMMERCIAL

## 2018-02-08 VITALS
HEART RATE: 64 BPM | BODY MASS INDEX: 49.43 KG/M2 | DIASTOLIC BLOOD PRESSURE: 64 MMHG | HEIGHT: 61 IN | WEIGHT: 261.81 LBS | SYSTOLIC BLOOD PRESSURE: 138 MMHG

## 2018-02-08 DIAGNOSIS — F41.1 GAD (GENERALIZED ANXIETY DISORDER): ICD-10-CM

## 2018-02-08 DIAGNOSIS — R53.83 FATIGUE, UNSPECIFIED TYPE: ICD-10-CM

## 2018-02-08 DIAGNOSIS — I10 HYPERTENSION, UNSPECIFIED TYPE: ICD-10-CM

## 2018-02-08 DIAGNOSIS — F33.2 MDD (MAJOR DEPRESSIVE DISORDER), RECURRENT SEVERE, WITHOUT PSYCHOSIS: Primary | ICD-10-CM

## 2018-02-08 PROCEDURE — 3008F BODY MASS INDEX DOCD: CPT | Mod: S$GLB,,, | Performed by: PSYCHIATRY & NEUROLOGY

## 2018-02-08 PROCEDURE — 99213 OFFICE O/P EST LOW 20 MIN: CPT | Mod: PO | Performed by: PSYCHIATRY & NEUROLOGY

## 2018-02-08 PROCEDURE — 99999 PR PBB SHADOW E&M-EST. PATIENT-LVL III: CPT | Mod: PBBFAC,,, | Performed by: PSYCHIATRY & NEUROLOGY

## 2018-02-08 PROCEDURE — 99214 OFFICE O/P EST MOD 30 MIN: CPT | Mod: S$GLB,,, | Performed by: PSYCHIATRY & NEUROLOGY

## 2018-02-08 RX ORDER — BUPROPION HYDROCHLORIDE 150 MG/1
150 TABLET ORAL DAILY
Qty: 30 TABLET | Refills: 0 | Status: SHIPPED | OUTPATIENT
Start: 2018-02-08 | End: 2018-03-12 | Stop reason: SDUPTHER

## 2018-02-08 RX ORDER — FLUOXETINE HYDROCHLORIDE 40 MG/1
40 CAPSULE ORAL DAILY
Qty: 30 CAPSULE | Refills: 0 | Status: SHIPPED | OUTPATIENT
Start: 2018-02-08 | End: 2018-03-12 | Stop reason: SDUPTHER

## 2018-02-08 RX ORDER — BUSPIRONE HYDROCHLORIDE 10 MG/1
10 TABLET ORAL 3 TIMES DAILY
Qty: 90 TABLET | Refills: 0 | Status: SHIPPED | OUTPATIENT
Start: 2018-02-08 | End: 2018-03-12 | Stop reason: SDUPTHER

## 2018-02-08 NOTE — PROGRESS NOTES
"ID: 41yo WF with a h/o depression and anxiety, no prior full psych eval w/i [employer] [facility] system. Pt is a current pt of CYRUS BarraganW who referred her for med mgmt along with , PCP. Pt has been on wait list since 3/2017. The pt is currently on xanax and cymbalta through PCP. Also on topamax and elavil for migraine mgmt through pcp    CC: depression.     Interim Hx: presents on time. Chart reviewed.     Is on a new BP med and is having some dizziness and nausea. Has had dizziness and nausea since starting the med. Clear that she did not have these s/e's when starting prozac OR buspar. Is considering reaching out to NP in 's office to ask to stop the med.     Regarding the prozac- "well my  thinks it's helping and Anila does too, but I don't notice a difference. I do notice I talked to her last time more than I usually do. What tiff notices is that i'm laughing more and I have some good mood days."     Denies side effects to the prozac- now on 20mg dose x 2wks.     Is now taking buspar 10mg po TID scheduled. Has only required xanax 2x/wk "at most" (had previously been taking 1-2x/d) - finds the buspar "more useful than the xanax. Not right in the moment but I don't seem to have the variation through the day of sortof roller coaster of anxiety. It's just more stable, I guess."     Started long acting melatonin for sleep and "it is better, but now with my back is hurting from arthritis and i'm waking up more, but before the back pain started my sleep was better with the melatonin."      On Psychiatric ROS:    Endorses difficulty with maintaining sleep- frequent wakening, improving anhedonia, feeling helpless/hopeless- "a little better because I think i'm feeling better.", dec energy, improved concentration on wellbutrin, inc'd appetite- since Spring 2017, dec PMA "i do feel tired thinking about doing things."     Denies thoughts of SI/intent/plan. "it's not that. I never think that. I " "just want to feel better. I want this feeling to end but not life."     Endorses feeling +easily overwhelmed, +ruminative thinking, +feeling tense/"on edge"- "on edge"     PPHx: Denies h/o self injury, inpt psych hospitalization, denies h/o suicide attempt     Current Psych Meds: prozac 20mg po qam, xanax 0.5mg po BID PRN anxiety, wellbutrin xl 150mg po qam  Past Psych Meds: ritalin, prozac, buspar PRN ("it helped with the stress of planning the wedding"), cymbalta 120mg po daily (ineffective), zoloft 150mg (ineffective)    PMHx: migraines (since childhood)- botox as txmt, monthly "they're well managed"    SubstHx:   T- none  E- very rare due to migraine  D- none  Caffeine- 2 dr.peppers/day    FamPHx: none    Musculoskeletal:  Muscle strength/Tone: no dyskinesia/ no tremor  Gait/Station- non antalgic, no assistance needed    MSE: appears stated age, well groomed, appropriate dress, engages well with examiner. Good e/c. Speech reg rate and vol, nonpressured. Mood is "Anila and my  think i'm a little better and I guess I see that. i'm ok today." Affect euthymic. No tearfulness. Sensorium fully intact. Oriented to date/day/location, current events. Narrative memory intact. Intellectual function is avg based on vocab and basic fund of knowledge. Thought is c/l/gd. No tangentiality or circumstantiality. No FOI/SULTANA. Denies SI/HI. Denies A/VH. No evidence of delusions. Insight and Judgment intact.     Blood pressure 138/64, pulse 64, height 5' 1" (1.549 m), weight 118.8 kg (261 lb 12.8 oz), last menstrual period 02/02/2018.    Suicide Risk Assessment:   Protective- age, gender, no prior attempts, no prior hospitalizations, no family h/o attempts, no ongoing substance abuse, no psychosis, , denies SI/intent/plan, seeking treatment, access to treatment, future oriented, good primary support, no access to firearms    Risk- race, ongoing Axis I sxs, no children    **Pt is at LOW imminent and long term risk of " "suicide given current risk factors.    Assessment:  43yo WF with a h/o depression and anxiety, no prior full psych eval w/i [facility] system. Pt is a current pt of Anneliese Cochran LCSW who referred her for med mgmt along with , PCP. Pt has been on wait list since 3/2017. The pt is currently on xanax and cymbalta through PCP. Also on topamax and elavil for migraine mgmt through pcp/neuro. On eval today she meets criteria for MDD, recurrent, severe w/o psychotic sxs despite a recent inc in cymbalta to 120mg po daily. Pt has been on cymbalta x 10yrs. Only recalls previous trial of prozac which was d/c'd for unknown reasons. Now requiring xanax daily- some days 2 tabs (ie:1mg total daily). Some room for behavioral interventions, as well and the pt is motivated. Has good ability to adhere to the new txmt plan. We tapered off cymbalta and started a trial of zoloft. Will discuss diet and exercise interventions more fully over course of txmt. today she has been on 100mg of zoloft x 1mo. Continues to state she does not feel "any different"- will inc one last titration to 150mg prior to discussing transition to alternate med. Have discussed trial of wellbutrin next as daytime lethargy and dec'd motivation are now the primary concerns to the pt, however current level of anxiety leading to xanax use 2x/d. May also consider mthfr mutation with some methyl folate supplement. Will add time released melatonin today for sleep. wgt loss would be an added benefit. Cont therapy as scheduled. No acute safety concerns. We added wellbutrin and today she reports improved energy and focus at work, but also perhaps inc'd anxiety? Tapered off zoloft to make room for trial of prozac. Will also add trial of buspar 10mg po BID which was helpful many years ago per pt. Today she reports  and therapist notice improvement on prozac 20mg po qam- buspar 10mg po TID also helpful with decreased xanax use and less variation in anxiety " through day. Will cont both but inc prozac to 40mg to get at ongoing depressive and anxiety sxs. F/u in 4wks. Cont therapy in the interim.    Axis I: MDD, recurrent, severe w/o psychotic sxs; PASTORA  Axis II: none at this time   Axis III: HTN, migraines  Axis IV: occupational, chronic mental health  Axis V: GAF 70    Plan:   1. Inc prozac to 40mg po qam  2. Cont wellbutrin xl 150mg po qam   3. Cont Buspar 10mg po TID  4. Cont therapy as scheduled; I am in contact with therapist  5. RTC 4wks    -Spent 30min face to face with the pt; >50% time spent in counseling   -Supportive therapy and psychoeducation provided  -R/B/SE's of medications discussed with the pt who expresses understanding and chooses to take medications as prescribed.   -Pt instructed to call clinic, 911 or go to nearest emergency room if sxs worsen or pt is in   crisis. The pt expresses understanding.

## 2018-02-09 ENCOUNTER — PATIENT OUTREACH (OUTPATIENT)
Dept: ADMINISTRATIVE | Facility: HOSPITAL | Age: 43
End: 2018-02-09

## 2018-02-09 ENCOUNTER — LAB VISIT (OUTPATIENT)
Dept: LAB | Facility: HOSPITAL | Age: 43
End: 2018-02-09
Attending: NURSE PRACTITIONER
Payer: COMMERCIAL

## 2018-02-09 ENCOUNTER — OFFICE VISIT (OUTPATIENT)
Dept: PSYCHIATRY | Facility: CLINIC | Age: 43
End: 2018-02-09
Payer: COMMERCIAL

## 2018-02-09 DIAGNOSIS — R53.83 FATIGUE, UNSPECIFIED TYPE: ICD-10-CM

## 2018-02-09 DIAGNOSIS — F33.2 MDD (MAJOR DEPRESSIVE DISORDER), RECURRENT SEVERE, WITHOUT PSYCHOSIS: Primary | ICD-10-CM

## 2018-02-09 DIAGNOSIS — R82.90 CLOUDY URINE: ICD-10-CM

## 2018-02-09 DIAGNOSIS — R31.9 HEMATURIA, UNSPECIFIED TYPE: ICD-10-CM

## 2018-02-09 DIAGNOSIS — F41.1 GAD (GENERALIZED ANXIETY DISORDER): ICD-10-CM

## 2018-02-09 DIAGNOSIS — I10 HYPERTENSION, UNSPECIFIED TYPE: ICD-10-CM

## 2018-02-09 LAB
BILIRUB UR QL STRIP: NEGATIVE
CLARITY UR: ABNORMAL
COLOR UR: YELLOW
GLUCOSE UR QL STRIP: NEGATIVE
HGB UR QL STRIP: NEGATIVE
KETONES UR QL STRIP: NEGATIVE
LEUKOCYTE ESTERASE UR QL STRIP: NEGATIVE
NITRITE UR QL STRIP: NEGATIVE
PH UR STRIP: 7 [PH] (ref 5–8)
PROT UR QL STRIP: NEGATIVE
SP GR UR STRIP: 1.02 (ref 1–1.03)
URN SPEC COLLECT METH UR: ABNORMAL

## 2018-02-09 PROCEDURE — 81003 URINALYSIS AUTO W/O SCOPE: CPT | Mod: PO

## 2018-02-09 PROCEDURE — 90834 PSYTX W PT 45 MINUTES: CPT | Mod: S$GLB,,, | Performed by: SOCIAL WORKER

## 2018-02-09 NOTE — PROGRESS NOTES
Health Maintenance Due   Topic Date Due    TETANUS VACCINE  01/06/1991    Pap Smear with HPV Cotest  01/06/1994

## 2018-02-09 NOTE — PROGRESS NOTES
"Individual Psychotherapy (PhD/LCSW)    2/9/2018    Site:  [facility]         Therapeutic Intervention: Met with patient.  Outpatient - Insight oriented psychotherapy 45 min - CPT code 47831 and Outpatient - Behavior modifying psychotherapy 45 min - CPT code 55864    Chief complaint/reason for encounter: depression , anxiety, worrying, racing thoughts, nervousness, sleep     Interval history and content of current session:  Pt presents as casually dressed, cooperative, saw Dr. Bryant yesterday and Prozac was increased to 40 mg daily. Pt reports not noticing much difference in mood but  has noticed an improvement in her depression, says she talks and laughs a little more.  Pt also using Xanax less, less panic attacks. Pt does not see it yet. Pt initially more quiet, one word answers, reports back is hurting this week. Pt did say she is not into reading right now, can't focus, or not interested. She is more focused at work. Used MI/MET with pt to elicit change talk and pt reports wanting to walk on a new trail by her office if she were feeling better. Also would like to ride her bike again . Pt verbalized that she fears she will not feel better, that this is just who she is, her physical makeup. She gets tearful and states "I hate when I do this. I'm sorry." Explored this nad pt automatic negative thought that crying is bad, rarely allows self to cry. Does feeling chronic sadness, but can't think of a reason for it, so I suggest, sadness is that she has felt this way a long time and pt reports this is true. Provided support, validation, reframing and healthy coping skills, awareness of negative self talk and negative automatic thoughts and CBT - what's the evidence. Evidence shows less Xanax use, more laughing, less panic attacks, getting out of the house more - until her back started to hurt this past 2 weeks. Pt sleep is also improved. Pt denies any current SI/HI. Denies any current A/VH.     Treatment " plan:  · Target symptoms: depression, anxiety , work stress  · Why chosen therapy is appropriate versus another modality: relevant to diagnosis, patient responds to this modality, evidence based practice  · Outcome monitoring methods: self-report, observation  · Therapeutic intervention type: insight oriented psychotherapy, behavior modifying psychotherapy, supportive psychotherapy    Risk parameters:  Patient reports no suicidal ideation  Patient reports no homicidal ideation  Patient reports no self-injurious behavior  Patient reports no violent behavior    Verbal deficits: None    Patient's response to intervention:  The patient's response to intervention is accepting.    Progress toward goals and other mental status changes:  The patient's progress toward goals is fair .    Diagnosis:     ICD-10-CM ICD-9-CM   1. MDD (major depressive disorder), recurrent severe, without psychosis F33.2 296.33   2. PASTORA (generalized anxiety disorder) F41.1 300.02   3. Fatigue, unspecified type R53.83 780.79   4. Hypertension, unspecified type I10 401.9       Plan:  individual psychotherapy and medication management by physician.Refer pt to Dr. Bryant for medication evaluation.  Pt to go to ED or call 911 if symptoms worsen or if she has thoughts of harming self and /or others. Pt verbalized understanding.       Return to clinic: 2 weeks, earlier if needed.     Length of Service (minutes): 45

## 2018-02-16 ENCOUNTER — LAB VISIT (OUTPATIENT)
Dept: LAB | Facility: HOSPITAL | Age: 43
End: 2018-02-16
Attending: NURSE PRACTITIONER
Payer: COMMERCIAL

## 2018-02-16 DIAGNOSIS — F41.8 DEPRESSION WITH ANXIETY: ICD-10-CM

## 2018-02-16 DIAGNOSIS — I10 ESSENTIAL HYPERTENSION: ICD-10-CM

## 2018-02-16 LAB
ALBUMIN SERPL BCP-MCNC: 3.7 G/DL
ALP SERPL-CCNC: 56 U/L
ALT SERPL W/O P-5'-P-CCNC: 33 U/L
ANION GAP SERPL CALC-SCNC: 6 MMOL/L
AST SERPL-CCNC: 22 U/L
BASOPHILS # BLD AUTO: 0.1 K/UL
BASOPHILS NFR BLD: 1.2 %
BILIRUB SERPL-MCNC: 0.3 MG/DL
BUN SERPL-MCNC: 7 MG/DL
CALCIUM SERPL-MCNC: 9.8 MG/DL
CHLORIDE SERPL-SCNC: 106 MMOL/L
CHOLEST SERPL-MCNC: 230 MG/DL
CHOLEST/HDLC SERPL: 3.8 {RATIO}
CO2 SERPL-SCNC: 27 MMOL/L
CREAT SERPL-MCNC: 0.9 MG/DL
DIFFERENTIAL METHOD: ABNORMAL
EOSINOPHIL # BLD AUTO: 0.2 K/UL
EOSINOPHIL NFR BLD: 2.9 %
ERYTHROCYTE [DISTWIDTH] IN BLOOD BY AUTOMATED COUNT: 13.6 %
EST. GFR  (AFRICAN AMERICAN): >60 ML/MIN/1.73 M^2
EST. GFR  (NON AFRICAN AMERICAN): >60 ML/MIN/1.73 M^2
GLUCOSE SERPL-MCNC: 122 MG/DL
HCT VFR BLD AUTO: 38.8 %
HDLC SERPL-MCNC: 60 MG/DL
HDLC SERPL: 26.1 %
HGB BLD-MCNC: 11.5 G/DL
IMM GRANULOCYTES # BLD AUTO: 0.02 K/UL
IMM GRANULOCYTES NFR BLD AUTO: 0.2 %
LDLC SERPL CALC-MCNC: 144.2 MG/DL
LYMPHOCYTES # BLD AUTO: 2.3 K/UL
LYMPHOCYTES NFR BLD: 29 %
MCH RBC QN AUTO: 25.6 PG
MCHC RBC AUTO-ENTMCNC: 29.6 G/DL
MCV RBC AUTO: 86 FL
MONOCYTES # BLD AUTO: 0.7 K/UL
MONOCYTES NFR BLD: 8.8 %
NEUTROPHILS # BLD AUTO: 4.6 K/UL
NEUTROPHILS NFR BLD: 57.9 %
NONHDLC SERPL-MCNC: 170 MG/DL
NRBC BLD-RTO: 0 /100 WBC
PLATELET # BLD AUTO: 335 K/UL
PMV BLD AUTO: 12.2 FL
POTASSIUM SERPL-SCNC: 4.8 MMOL/L
PROT SERPL-MCNC: 7.5 G/DL
RBC # BLD AUTO: 4.49 M/UL
SODIUM SERPL-SCNC: 139 MMOL/L
TRIGL SERPL-MCNC: 129 MG/DL
TSH SERPL DL<=0.005 MIU/L-ACNC: 3.1 UIU/ML
WBC # BLD AUTO: 8.03 K/UL

## 2018-02-16 PROCEDURE — 80061 LIPID PANEL: CPT

## 2018-02-16 PROCEDURE — 36415 COLL VENOUS BLD VENIPUNCTURE: CPT | Mod: PO

## 2018-02-16 PROCEDURE — 85025 COMPLETE CBC W/AUTO DIFF WBC: CPT

## 2018-02-16 PROCEDURE — 84443 ASSAY THYROID STIM HORMONE: CPT

## 2018-02-16 PROCEDURE — 80053 COMPREHEN METABOLIC PANEL: CPT

## 2018-02-18 ENCOUNTER — TELEPHONE (OUTPATIENT)
Dept: FAMILY MEDICINE | Facility: CLINIC | Age: 43
End: 2018-02-18

## 2018-02-18 DIAGNOSIS — D64.9 LOW HEMATOCRIT: ICD-10-CM

## 2018-02-18 DIAGNOSIS — R73.01 ELEVATED FASTING GLUCOSE: Primary | ICD-10-CM

## 2018-02-18 NOTE — TELEPHONE ENCOUNTER
Please call and let her know that her sugar is up and has been up - since she was fasting we need to check [id number]   And her blood counts are lower than they have been so I have added iron labs - If she could do these before her appt with Dr Barraza- he will have them for her

## 2018-02-20 ENCOUNTER — LAB VISIT (OUTPATIENT)
Dept: LAB | Facility: HOSPITAL | Age: 43
End: 2018-02-20
Attending: NURSE PRACTITIONER
Payer: COMMERCIAL

## 2018-02-20 ENCOUNTER — TELEPHONE (OUTPATIENT)
Dept: FAMILY MEDICINE | Facility: CLINIC | Age: 43
End: 2018-02-20

## 2018-02-20 DIAGNOSIS — D64.9 LOW HEMATOCRIT: ICD-10-CM

## 2018-02-20 DIAGNOSIS — R73.01 ELEVATED FASTING GLUCOSE: ICD-10-CM

## 2018-02-20 LAB
ESTIMATED AVG GLUCOSE: 108 MG/DL
FERRITIN SERPL-MCNC: 4 NG/ML
HBA1C MFR BLD HPLC: 5.4 %
IRON SERPL-MCNC: 32 UG/DL
SATURATED IRON: 6 %
TOTAL IRON BINDING CAPACITY: 577 UG/DL
TRANSFERRIN SERPL-MCNC: 390 MG/DL

## 2018-02-20 PROCEDURE — 36415 COLL VENOUS BLD VENIPUNCTURE: CPT | Mod: PO

## 2018-02-20 PROCEDURE — 82728 ASSAY OF FERRITIN: CPT

## 2018-02-20 PROCEDURE — 83540 ASSAY OF IRON: CPT

## 2018-02-20 PROCEDURE — 83036 HEMOGLOBIN GLYCOSYLATED A1C: CPT

## 2018-02-20 NOTE — TELEPHONE ENCOUNTER
Please call and let her know that her diabetes check is good - but her iron is very low     Does she have GYN that she sees? When was the last time seen?     I know she sees Dr Holcomb in 3 days - I will let him know about this and they can discuss plan -   At her age - we would want to figure out what is causing the blood loss - most of the times - it is GYN - So we would want to her to let GYN know and then she may need to see hematology - will let Dr Holcomb know

## 2018-02-21 ENCOUNTER — TELEPHONE (OUTPATIENT)
Dept: FAMILY MEDICINE | Facility: CLINIC | Age: 43
End: 2018-02-21

## 2018-02-21 NOTE — TELEPHONE ENCOUNTER
Returned pt's call in  Regards to lab results per Khushi Humphrey's instructions.  Pt voiced understanding and is gianng Dr Holcomb 2/23/18. CLC

## 2018-02-21 NOTE — TELEPHONE ENCOUNTER
----- Message from Lexus Griffith sent at 2/21/2018  7:38 AM CST -----  Contact: Patient  Anneliese, patient [phone] returning phone call to office. Please advise. Thanks.

## 2018-02-23 ENCOUNTER — OFFICE VISIT (OUTPATIENT)
Dept: FAMILY MEDICINE | Facility: CLINIC | Age: 43
End: 2018-02-23
Payer: COMMERCIAL

## 2018-02-23 VITALS
HEIGHT: 61 IN | SYSTOLIC BLOOD PRESSURE: 154 MMHG | HEART RATE: 118 BPM | OXYGEN SATURATION: 97 % | DIASTOLIC BLOOD PRESSURE: 108 MMHG | WEIGHT: 258.63 LBS | RESPIRATION RATE: 18 BRPM | BODY MASS INDEX: 48.83 KG/M2

## 2018-02-23 DIAGNOSIS — S33.5XXD LUMBAR SPRAIN, SUBSEQUENT ENCOUNTER: ICD-10-CM

## 2018-02-23 DIAGNOSIS — D50.9 IRON DEFICIENCY ANEMIA, UNSPECIFIED IRON DEFICIENCY ANEMIA TYPE: ICD-10-CM

## 2018-02-23 DIAGNOSIS — I10 HYPERTENSION, UNSPECIFIED TYPE: Primary | ICD-10-CM

## 2018-02-23 DIAGNOSIS — F33.2 MDD (MAJOR DEPRESSIVE DISORDER), RECURRENT SEVERE, WITHOUT PSYCHOSIS: ICD-10-CM

## 2018-02-23 DIAGNOSIS — Z00.00 PREVENTATIVE HEALTH CARE: ICD-10-CM

## 2018-02-23 DIAGNOSIS — F41.1 GAD (GENERALIZED ANXIETY DISORDER): ICD-10-CM

## 2018-02-23 PROCEDURE — 90471 IMMUNIZATION ADMIN: CPT | Mod: S$GLB,,, | Performed by: FAMILY MEDICINE

## 2018-02-23 PROCEDURE — 3008F BODY MASS INDEX DOCD: CPT | Mod: S$GLB,,, | Performed by: FAMILY MEDICINE

## 2018-02-23 PROCEDURE — 99214 OFFICE O/P EST MOD 30 MIN: CPT | Mod: 25,S$GLB,, | Performed by: FAMILY MEDICINE

## 2018-02-23 PROCEDURE — 99999 PR PBB SHADOW E&M-EST. PATIENT-LVL IV: CPT | Mod: PBBFAC,,, | Performed by: FAMILY MEDICINE

## 2018-02-23 PROCEDURE — 90715 TDAP VACCINE 7 YRS/> IM: CPT | Mod: S$GLB,,, | Performed by: FAMILY MEDICINE

## 2018-02-23 RX ORDER — HYDROCHLOROTHIAZIDE 25 MG/1
25 TABLET ORAL DAILY
Qty: 30 TABLET | Refills: 11 | Status: SHIPPED | OUTPATIENT
Start: 2018-02-23 | End: 2019-04-05 | Stop reason: SDUPTHER

## 2018-02-23 RX ORDER — FERROUS SULFATE 325(65) MG
325 TABLET ORAL
Qty: 30 TABLET | Refills: 2 | Status: SHIPPED | OUTPATIENT
Start: 2018-02-23 | End: 2018-07-13 | Stop reason: DRUGHIGH

## 2018-02-23 NOTE — PROGRESS NOTES
Subjective:       Patient ID: Anneliese Rocha is a 43 y.o. female.    Chief Complaint: Follow-up (6 mos)    Here for 6 Month f/u after on chronic health issues.  Depression - taking Proxac 40mg and Wellbutrin. Seeing Dr. Bryant  Mood seems stableOccasional nervousness.  Using xanax PRN  HA frequency is about the same.  Following with Anneliese Cochran.  HTN - tolerating toprol XL 200mg daily QHS; Home BP was 130s/80; she had side effects on lisinopril     Back pain has improved    She has GYN appt next week        Past Medical History:   Diagnosis Date    Depression with anxiety     tolerating Cymbalta 90mg daily    Fatigue     HTN (hypertension)     Iron deficiency anemia     Migraines     Getting headaches about monthly; using Topamax 25mg and Elavil 50mg for prevention and Frova/cambia as needed; following with Dr. 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[employer]     Social History Main Topics    Smoking status: Never Smoker    Smokeless tobacco: Never Used    Alcohol use Yes      Comment: rarely    Drug use: No    Sexual activity: Not on file     Other Topics Concern    Not on file     Social History Narrative    From [place]. Lives with .         Exercise: none regularly       Current Outpatient Prescriptions on File Prior to Visit   Medication Sig Dispense Refill    ALPRAZolam (XANAX) 0.5 MG tablet TAKE 1 TO 2 TABLETS BY MOUTH TWICE DAILY AS NEEDED FOR ANXIETY 45 tablet 0    buPROPion (WELLBUTRIN XL) 150 MG TB24 tablet Take 1 tablet (150 mg total) by mouth once daily. 30 tablet 0    busPIRone (BUSPAR) 10 MG  "tablet Take 1 tablet (10 mg total) by mouth 3 (three) times daily. 90 tablet 0    FLUoxetine (PROZAC) 40 MG capsule Take 1 capsule (40 mg total) by mouth once daily. 30 capsule 0    metoprolol succinate (TOPROL-XL) 200 MG 24 hr tablet   11    FROVA 2.5 mg tablet Take 2.5 mg by mouth as needed.   2    ONABOTULINUMTOXINA (BOTOX INJ) Inject as directed.       No current facility-administered medications on file prior to visit.        Family History   Problem Relation Age of Onset    Diabetes Father     Hypertension Father     Coronary artery disease Father 55    Thyroid disease Mother     Migraines Mother     Hypertension Mother     Melanoma Paternal Uncle        Review of Systems   Constitutional: Negative for appetite change, chills, fever and unexpected weight change.   HENT: Negative for sore throat and trouble swallowing.    Eyes: Negative for pain and visual disturbance.   Respiratory: Negative for cough, shortness of breath and wheezing.    Cardiovascular: Negative for chest pain and palpitations.   Gastrointestinal: Negative for abdominal pain, blood in stool, diarrhea, nausea and vomiting.   Genitourinary: Negative for difficulty urinating, dysuria and hematuria.   Musculoskeletal: Positive for back pain. Negative for arthralgias, gait problem and neck pain.   Skin: Negative for rash and wound.   Neurological: Negative for dizziness, weakness, numbness and headaches.   Hematological: Negative for adenopathy.   Psychiatric/Behavioral: Negative for dysphoric mood.       Objective:      BP (!) 154/108   Pulse (!) 118   Resp 18   Ht 5' 1" (1.549 m)   Wt 117.3 kg (258 lb 9.6 oz)   LMP 02/02/2018   SpO2 97%   BMI 48.86 kg/m²   Physical Exam   Constitutional: She is oriented to person, place, and time. She appears well-developed and well-nourished.   HENT:   Head: Normocephalic.   Mouth/Throat: Oropharynx is clear and moist. No oropharyngeal exudate or posterior oropharyngeal erythema.   Eyes: " Conjunctivae and EOM are normal. Pupils are equal, round, and reactive to light.   Neck: Normal range of motion. Neck supple. No thyromegaly present.   Cardiovascular: Normal rate, regular rhythm, S1 normal, S2 normal, normal heart sounds and intact distal pulses.  Exam reveals no gallop and no friction rub.    No murmur heard.  Pulmonary/Chest: Effort normal and breath sounds normal. She has no wheezes. She has no rales.   Abdominal: Normal appearance.   Musculoskeletal:        Lumbar back: She exhibits normal range of motion and no bony tenderness.        Right lower leg: She exhibits no edema.        Left lower leg: She exhibits no edema.   Lymphadenopathy:     She has no cervical adenopathy.   Neurological: She is alert and oriented to person, place, and time. No cranial nerve deficit. Gait normal.   Skin: Skin is warm and intact. No rash noted.   Psychiatric: She has a normal mood and affect.       Results for orders placed or performed in visit on 02/20/18   Hemoglobin A1c   Result Value Ref Range    Hemoglobin A1C 5.4 4.0 - 5.6 %    Estimated Avg Glucose 108 68 - 131 mg/dL   Ferritin   Result Value Ref Range    Ferritin 4 (L) 20.0 - 300.0 ng/mL   Iron and TIBC   Result Value Ref Range    Iron 32 30 - 160 ug/dL    Transferrin 390 (H) 200 - 375 mg/dL    TIBC 577 (H) 250 - 450 ug/dL    Saturated Iron 6 (L) 20 - 50 %       Assessment:       1. Hypertension, unspecified type    2. Preventative health care    3. Iron deficiency anemia, unspecified iron deficiency anemia type    4. Lumbar sprain, subsequent encounter    5. MDD (major depressive disorder), recurrent severe, without psychosis    6. PASTORA (generalized anxiety disorder)        Plan:       Hypertension, unspecified type  -     Basic metabolic panel; Future; Expected date: 02/23/2018  -     hydroCHLOROthiazide (HYDRODIURIL) 25 MG tablet; Take 1 tablet (25 mg total) by mouth once daily.  Dispense: 30 tablet; Refill: 11    Preventative health care  -     (In  Office Administered) Tdap Vaccine    Iron deficiency anemia, unspecified iron deficiency anemia type  -     ferrous sulfate 325 mg (65 mg iron) Tab tablet; Take 1 tablet (325 mg total) by mouth daily with breakfast.  Dispense: 30 tablet; Refill: 2  -     Iron and TIBC; Future; Expected date: 02/23/2018  -     CBC auto differential; Future; Expected date: 02/23/2018    Lumbar sprain, subsequent encounter    MDD (major depressive disorder), recurrent severe, without psychosis    PASTORA (generalized anxiety disorder)        continue present psych meds for now  F/u with Dr. Manny Barragan  Begin HCTZ; continue metoprolol  BP check with GYN in 1 week  Begin iron  F/u 3 months with labs to recheck labs after iron replacement  Counseled on regular exercise, maintenance of a healthy weight, balanced diet rich in fruits/vegetables and lean protein, and avoidance of unhealthy habits like smoking and excessive alcohol intake.

## 2018-03-09 ENCOUNTER — OFFICE VISIT (OUTPATIENT)
Dept: PSYCHIATRY | Facility: CLINIC | Age: 43
End: 2018-03-09
Payer: COMMERCIAL

## 2018-03-09 DIAGNOSIS — F33.2 MDD (MAJOR DEPRESSIVE DISORDER), RECURRENT SEVERE, WITHOUT PSYCHOSIS: Primary | ICD-10-CM

## 2018-03-09 DIAGNOSIS — R53.83 FATIGUE, UNSPECIFIED TYPE: ICD-10-CM

## 2018-03-09 DIAGNOSIS — F41.1 GAD (GENERALIZED ANXIETY DISORDER): ICD-10-CM

## 2018-03-09 DIAGNOSIS — I10 HYPERTENSION, UNSPECIFIED TYPE: ICD-10-CM

## 2018-03-09 PROCEDURE — 90834 PSYTX W PT 45 MINUTES: CPT | Mod: S$GLB,,, | Performed by: SOCIAL WORKER

## 2018-03-09 NOTE — PROGRESS NOTES
"Individual Psychotherapy (PhD/LCSW)    3/9/2018    Site:  [facility]         Therapeutic Intervention: Met with patient.  Outpatient - Insight oriented psychotherapy 45 min - CPT code 65374 and Outpatient - Behavior modifying psychotherapy 45 min - CPT code 44019    Chief complaint/reason for encounter: depression , anxiety, worrying, racing thoughts, nervousness, sleep     Interval history and content of current session:  Pt presents as casually dressed, cooperative, tearful, reports that pt and  have not noticed any change or improvement in mood symptoms. Pt reports anxiety is worse. She found out she is anemic so she is taking an iron supplement, noticing slight improvement in energy. [place] a co-worker this week. Praised her for this nad urged her to do this 1 time next week and increase to 2-3 times a week. Work is more stressful. 2 people are on long term leave so pt is doing her work, plus their work. Pt tearful, states she feels like she is not even noticed unless she makes a mistake, feels invisible, worried she will be fired. Noticing that she is more tearful, easily tearful. Focused on CBT, biggest fear re her job and anxiety / speaking up, fear of being fired, used " what's the worse and best thing that could happen" re losing her job - and explored options. Focused on support, validation, reframing and healthy coping skills. Pt denies any current SI/HI. Denies any current A/VH. Pt seeing Dr. Bryant next week.     Treatment plan:  · Target symptoms: depression, anxiety , work stress  · Why chosen therapy is appropriate versus another modality: relevant to diagnosis, patient responds to this modality, evidence based practice  · Outcome monitoring methods: self-report, observation  · Therapeutic intervention type: insight oriented psychotherapy, behavior modifying psychotherapy, supportive psychotherapy    Risk parameters:  Patient reports no " suicidal ideation  Patient reports no homicidal ideation  Patient reports no self-injurious behavior  Patient reports no violent behavior    Verbal deficits: None    Patient's response to intervention:  The patient's response to intervention is accepting.    Progress toward goals and other mental status changes:  The patient's progress toward goals is limited.    Diagnosis:     ICD-10-CM ICD-9-CM   1. MDD (major depressive disorder), recurrent severe, without psychosis F33.2 296.33   2. PASTORA (generalized anxiety disorder) F41.1 300.02   3. Fatigue, unspecified type R53.83 780.79   4. Hypertension, unspecified type I10 401.9       Plan:  individual psychotherapy and medication management by physician.Refer pt to Dr. Bryant for medication evaluation.  Pt to go to ED or call 911 if symptoms worsen or if she has thoughts of harming self and /or others. Pt verbalized understanding.       Return to clinic: 2 weeks, earlier if needed.     Length of Service (minutes): 45

## 2018-03-12 ENCOUNTER — OFFICE VISIT (OUTPATIENT)
Dept: PSYCHIATRY | Facility: CLINIC | Age: 43
End: 2018-03-12
Payer: COMMERCIAL

## 2018-03-12 VITALS
DIASTOLIC BLOOD PRESSURE: 59 MMHG | HEART RATE: 65 BPM | SYSTOLIC BLOOD PRESSURE: 134 MMHG | WEIGHT: 256.81 LBS | HEIGHT: 61 IN | BODY MASS INDEX: 48.49 KG/M2

## 2018-03-12 DIAGNOSIS — R53.83 OTHER FATIGUE: ICD-10-CM

## 2018-03-12 DIAGNOSIS — F33.2 MDD (MAJOR DEPRESSIVE DISORDER), RECURRENT SEVERE, WITHOUT PSYCHOSIS: Primary | ICD-10-CM

## 2018-03-12 DIAGNOSIS — I10 HYPERTENSION, UNSPECIFIED TYPE: ICD-10-CM

## 2018-03-12 DIAGNOSIS — G43.001 MIGRAINE WITHOUT AURA AND WITH STATUS MIGRAINOSUS, NOT INTRACTABLE: ICD-10-CM

## 2018-03-12 DIAGNOSIS — F41.1 GAD (GENERALIZED ANXIETY DISORDER): ICD-10-CM

## 2018-03-12 DIAGNOSIS — F41.8 DEPRESSION WITH ANXIETY: ICD-10-CM

## 2018-03-12 PROCEDURE — 99999 PR PBB SHADOW E&M-EST. PATIENT-LVL III: CPT | Mod: PBBFAC,,, | Performed by: PSYCHIATRY & NEUROLOGY

## 2018-03-12 PROCEDURE — 3075F SYST BP GE 130 - 139MM HG: CPT | Mod: CPTII,S$GLB,, | Performed by: PSYCHIATRY & NEUROLOGY

## 2018-03-12 PROCEDURE — 99214 OFFICE O/P EST MOD 30 MIN: CPT | Mod: S$GLB,,, | Performed by: PSYCHIATRY & NEUROLOGY

## 2018-03-12 PROCEDURE — 3078F DIAST BP <80 MM HG: CPT | Mod: CPTII,S$GLB,, | Performed by: PSYCHIATRY & NEUROLOGY

## 2018-03-12 RX ORDER — FLUOXETINE HYDROCHLORIDE 20 MG/1
60 CAPSULE ORAL DAILY
Qty: 90 CAPSULE | Refills: 0 | Status: SHIPPED | OUTPATIENT
Start: 2018-03-12 | End: 2018-04-12 | Stop reason: SDUPTHER

## 2018-03-12 RX ORDER — ALPRAZOLAM 0.5 MG/1
TABLET ORAL
Qty: 45 TABLET | Refills: 0 | Status: SHIPPED | OUTPATIENT
Start: 2018-03-12 | End: 2018-06-12 | Stop reason: SDUPTHER

## 2018-03-12 RX ORDER — BUPROPION HYDROCHLORIDE 150 MG/1
150 TABLET ORAL DAILY
Qty: 30 TABLET | Refills: 0 | Status: SHIPPED | OUTPATIENT
Start: 2018-03-12 | End: 2018-04-12 | Stop reason: SDUPTHER

## 2018-03-12 RX ORDER — BUSPIRONE HYDROCHLORIDE 10 MG/1
10 TABLET ORAL 3 TIMES DAILY
Qty: 90 TABLET | Refills: 0 | Status: SHIPPED | OUTPATIENT
Start: 2018-03-12 | End: 2018-04-12 | Stop reason: SDUPTHER

## 2018-03-12 NOTE — PROGRESS NOTES
"ID: 41yo WF with a h/o depression and anxiety, no prior full psych eval w/i [facility]. Pt is a current pt of Anneliese Cochran LCSW who referred her for med mgmt along with , PCP. Pt has been on wait list since 3/2017. The pt is currently on xanax and cymbalta through PCP. Also on topamax and elavil for migraine mgmt through pcp    CC: depression.     Interim Hx: presents on time. Chart reviewed.     "well, I don't think it's (the prozac) working as well as it used to. I just feel anxious all the time and miserable. Constantly."    When asked how this is expressed from a functional standpoint she reports, "well on the weekends. I just want to sleep all the time. Overwhelmed all the time at work, almost paranoid at work. I have a boss that's nit picking at me right now and that's not helping, but I just feel uncomfortable in my skin. My focus is much better since I started the wellbutrin so that's not it really. i'm definitely more alert on the wellbutrin, but something is off."     The last change we made was the inc in prozac- not the wellbutrin- initially she was much improved on the prozac 20mg and we inc'd to 40mg. Per pt's description the current dose may be overly activating.      Not certain ins will cover genetic testing but we may try methyl folate supplementation and observe pt response- recommended dosing of 15mg.     On Psychiatric ROS:    Endorses difficulty with maintaining sleep- frequent wakening, improving anhedonia, feeling helpless/hopeless- due to no improvement on medication mgmt, dec energy, improved concentration on wellbutrin, inc'd appetite- since Spring 2017, dec PMA "i do feel tired thinking about doing things."     Denies thoughts of SI/intent/plan.     Endorses feeling +easily overwhelmed, +ruminative thinking, +feeling tense/"on edge"- "on edge"     PPHx: Denies h/o self injury, inpt psych hospitalization, denies h/o suicide attempt     Current Psych Meds: prozac 40mg " "po qam, xanax 0.5mg po BID PRN anxiety, wellbutrin xl 150mg po qam  Past Psych Meds: ritalin, prozac, buspar PRN ("it helped with the stress of planning the wedding"), cymbalta 120mg po daily (ineffective), zoloft 150mg (ineffective)    PMHx: migraines (since childhood)- botox as txmt, monthly "they're well managed"    SubstHx:   T- none  E- very rare due to migraine  D- none  Caffeine- 2 dr.peppers/day    FamPHx: none    Musculoskeletal:  Muscle strength/Tone: no dyskinesia/ no tremor  Gait/Station- non antalgic, no assistance needed    MSE: appears stated age, well groomed, appropriate dress, engages well with examiner. Good e/c. Speech reg rate and vol, nonpressured. Mood is "very anxious. Like I can jump out of my skin." Affect anxious- tearful today. Sensorium fully intact. Oriented to date/day/location, current events. Narrative memory intact. Intellectual function is avg based on vocab and basic fund of knowledge. Thought is c/l/gd. No tangentiality or circumstantiality. No FOI/SULTANA. Denies SI/HI. Denies A/VH. No evidence of delusions. Insight and Judgment intact.     Blood pressure (!) 134/59, pulse 65, height 5' 1" (1.549 m), weight 116.5 kg (256 lb 12.8 oz), last menstrual period 02/24/2018.    Suicide Risk Assessment:   Protective- age, gender, no prior attempts, no prior hospitalizations, no family h/o attempts, no ongoing substance abuse, no psychosis, , denies SI/intent/plan, seeking treatment, access to treatment, future oriented, good primary support, no access to firearms    Risk- race, ongoing Axis I sxs, no children    **Pt is at LOW imminent and long term risk of suicide given current risk factors.    Assessment:  43yo WF with a h/o depression and anxiety, no prior full psych eval w/i [facility]. Pt is a current pt of Anneliese Cochran LCSW who referred her for med mgmt along with , PCP. Pt has been on wait list since 3/2017. The pt is currently on xanax and cymbalta through " "PCP. Also on topamax and elavil for migraine mgmt through pcp/neuro. On eval today she meets criteria for MDD, recurrent, severe w/o psychotic sxs despite a recent inc in cymbalta to 120mg po daily. Pt has been on cymbalta x 10yrs. Only recalls previous trial of prozac which was d/c'd for unknown reasons. Now requiring xanax daily- some days 2 tabs (ie:1mg total daily). Some room for behavioral interventions, as well and the pt is motivated. Has good ability to adhere to the new txmt plan. We tapered off cymbalta and started a trial of zoloft. Will discuss diet and exercise interventions more fully over course of txmt. today she has been on 100mg of zoloft x 1mo. Continues to state she does not feel "any different"- will inc one last titration to 150mg prior to discussing transition to alternate med. Have discussed trial of wellbutrin next as daytime lethargy and dec'd motivation are now the primary concerns to the pt, however current level of anxiety leading to xanax use 2x/d. May also consider mthfr mutation with some methyl folate supplement. Will add time released melatonin today for sleep. wgt loss would be an added benefit. Cont therapy as scheduled. No acute safety concerns. We added wellbutrin and today she reports improved energy and focus at work, but also perhaps inc'd anxiety? Tapered off zoloft to make room for trial of prozac. Will also add trial of buspar 10mg po BID which was helpful many years ago per pt. Last appt she reported  and therapist notice improvement on prozac 20mg po qam- buspar 10mg po TID also helpful with decreased xanax use and less variation in anxiety through day. Will cont both but inc prozac to 40mg to get at ongoing depressive and anxiety sxs. F/u in 4wks. Today on f/u the pt is reporting inc'd anxiety and I wonder if she is overly activated on the prozac inc. not certain if she can afford genetic testing but is motivated for a trial of the methylfolate supplement. Will " dec the prozac back to 20mg po qam and start supplementation. Cont therapy in the interim.    Axis I: MDD, recurrent, severe w/o psychotic sxs; PASTORA  Axis II: none at this time   Axis III: HTN, migraines  Axis IV: occupational, chronic mental health  Axis V: GAF 70    Plan:   1. Dec prozac to 20mg po qam  2. Cont wellbutrin xl 150mg po qam   3. Cont Buspar 10mg po TID  4. Cont therapy as scheduled; I am in contact with therapist  5. RTC 4wks    -Spent 30min face to face with the pt; >50% time spent in counseling   -Supportive therapy and psychoeducation provided  -R/B/SE's of medications discussed with the pt who expresses understanding and chooses to take medications as prescribed.   -Pt instructed to call clinic, 911 or go to nearest emergency room if sxs worsen or pt is in   crisis. The pt expresses understanding.

## 2018-04-12 ENCOUNTER — OFFICE VISIT (OUTPATIENT)
Dept: PSYCHIATRY | Facility: CLINIC | Age: 43
End: 2018-04-12
Payer: COMMERCIAL

## 2018-04-12 VITALS
HEART RATE: 60 BPM | BODY MASS INDEX: 49.05 KG/M2 | DIASTOLIC BLOOD PRESSURE: 63 MMHG | SYSTOLIC BLOOD PRESSURE: 131 MMHG | HEIGHT: 61 IN | WEIGHT: 259.81 LBS

## 2018-04-12 DIAGNOSIS — F32.A FATIGUE DUE TO DEPRESSION: ICD-10-CM

## 2018-04-12 DIAGNOSIS — I10 HYPERTENSION, UNSPECIFIED TYPE: ICD-10-CM

## 2018-04-12 DIAGNOSIS — F33.2 MDD (MAJOR DEPRESSIVE DISORDER), RECURRENT SEVERE, WITHOUT PSYCHOSIS: Primary | ICD-10-CM

## 2018-04-12 DIAGNOSIS — R53.83 FATIGUE DUE TO DEPRESSION: ICD-10-CM

## 2018-04-12 DIAGNOSIS — F41.1 GAD (GENERALIZED ANXIETY DISORDER): ICD-10-CM

## 2018-04-12 PROCEDURE — 99999 PR PBB SHADOW E&M-EST. PATIENT-LVL III: CPT | Mod: PBBFAC,,, | Performed by: PSYCHIATRY & NEUROLOGY

## 2018-04-12 PROCEDURE — 3078F DIAST BP <80 MM HG: CPT | Mod: CPTII,S$GLB,, | Performed by: PSYCHIATRY & NEUROLOGY

## 2018-04-12 PROCEDURE — 3075F SYST BP GE 130 - 139MM HG: CPT | Mod: CPTII,S$GLB,, | Performed by: PSYCHIATRY & NEUROLOGY

## 2018-04-12 PROCEDURE — 99214 OFFICE O/P EST MOD 30 MIN: CPT | Mod: S$GLB,,, | Performed by: PSYCHIATRY & NEUROLOGY

## 2018-04-12 RX ORDER — BUPROPION HYDROCHLORIDE 150 MG/1
150 TABLET ORAL DAILY
Qty: 30 TABLET | Refills: 2 | Status: SHIPPED | OUTPATIENT
Start: 2018-04-12 | End: 2018-06-12 | Stop reason: SDUPTHER

## 2018-04-12 RX ORDER — BUSPIRONE HYDROCHLORIDE 10 MG/1
10 TABLET ORAL 3 TIMES DAILY
Qty: 90 TABLET | Refills: 2 | Status: SHIPPED | OUTPATIENT
Start: 2018-04-12 | End: 2018-06-12 | Stop reason: SDUPTHER

## 2018-04-12 RX ORDER — FLUOXETINE HYDROCHLORIDE 20 MG/1
60 CAPSULE ORAL DAILY
Qty: 90 CAPSULE | Refills: 2 | Status: SHIPPED | OUTPATIENT
Start: 2018-04-12 | End: 2018-06-12 | Stop reason: SDUPTHER

## 2018-04-12 NOTE — PROGRESS NOTES
"ID: 43yo WF with a h/o depression and anxiety, [facility] full [facility]. Pt is a current pt of Anneliese Cochran LCSW who referred her for med mgmt along with , PCP. Pt has been on wait list since 3/2017. The pt is currently on xanax and cymbalta through PCP. Also on topamax and elavil for migraine mgmt through pcp    CC: depression.     Interim Hx: presents on time. Chart reviewed.     Decreased the prozac to 20mg daily at last appt due to concerns about overstimulation. Today she reports, "well i'm not any worse. I don't think i'm much better but i'm not worse on less." never started methylfolate intervention b/c she wasn't certain of how that would effect the prozac change.    Not certain ins will cover genetic testing but we may try methyl folate supplementation and observe pt response- recommended dosing of 15mg. Will do online due to cost.     Continues to report concern about weight but also has not made any beh intervention- reports "no energy" to walk the track at work. Has been diagnosed with anemia but has been on iron supplement x 1mo w/o noticing marked difference.     On Psychiatric ROS:    Endorses difficulty with maintaining sleep- frequent wakening, improving anhedonia, feeling helpless/hopeless- due to no improvement on medication mgmt, dec energy- "none", improved concentration on wellbutrin, inc'd appetite- since Spring 2017, dec PMA "i do feel tired thinking about doing things."     Denies thoughts of SI/intent/plan.     Endorses feeling +easily overwhelmed, +ruminative thinking, +feeling tense/"on edge"- "on edge"     PPHx: Denies h/o self injury, inpt psych hospitalization, denies h/o suicide attempt     Current Psych Meds: prozac 20mg po qam, xanax 0.5mg po BID PRN anxiety, wellbutrin xl 150mg po qam  Past Psych Meds: ritalin, prozac, buspar PRN ("it helped with the stress of planning the wedding"), cymbalta 120mg po daily (ineffective), zoloft 150mg " "(ineffective)    PMHx: migraines (since childhood)- botox as txmt, monthly "they're well managed"    SubstHx:   T- none  E- very rare due to migraine  D- none  Caffeine- 2 dr.peppers/day    FamPHx: none    Musculoskeletal:  Muscle strength/Tone: no dyskinesia/ no tremor  Gait/Station- non antalgic, no assistance needed    MSE: appears stated age, well groomed, appropriate dress, engages well with examiner. Good e/c. Speech reg rate and vol, nonpressured. Mood is "i'm ok. I think work's a little better so that helps." Affect euthymic- low energy, but euthymic. Sensorium fully intact. Oriented to date/day/location, current events. Narrative memory intact. Intellectual function is avg based on vocab and basic fund of knowledge. Thought is c/l/gd. No tangentiality or circumstantiality. No FOI/SULTANA. Denies SI/HI. Denies A/VH. No evidence of delusions. Insight and Judgment intact.     Blood pressure 131/63, pulse 60, height 5' 1" (1.549 m), weight 117.8 kg (259 lb 12.8 oz), last menstrual period 03/27/2018.    Suicide Risk Assessment:   Protective- age, gender, no prior attempts, no prior hospitalizations, no family h/o attempts, no ongoing substance abuse, no psychosis, , denies SI/intent/plan, seeking treatment, access to treatment, future oriented, good primary support, no access to firearms    Risk- race, ongoing Axis I sxs, no children    **Pt is at LOW imminent and long term risk of suicide given current risk factors.    Assessment:  41yo WF with a h/o depression and anxiety, no prior full psych eval w/i [facility]. Pt is a current pt of CYRUS BarraganW who referred her for med mgmt along with , PCP. Pt has been on wait list since 3/2017. The pt is currently on xanax and cymbalta through PCP. Also on topamax and elavil for migraine mgmt through pcp/neuro. On eval today she meets criteria for MDD, recurrent, severe w/o psychotic sxs despite a recent inc in cymbalta to 120mg po daily. Pt " "has been on cymbalta x 10yrs. Only recalls previous trial of prozac which was d/c'd for unknown reasons. Now requiring xanax daily- some days 2 tabs (ie:1mg total daily). Some room for behavioral interventions, as well and the pt is motivated. Has good ability to adhere to the new txmt plan. We tapered off cymbalta and started a trial of zoloft. Will discuss diet and exercise interventions more fully over course of txmt. today she has been on 100mg of zoloft x 1mo. Continues to state she does not feel "any different"- will inc one last titration to 150mg prior to discussing transition to alternate med. Have discussed trial of wellbutrin next as daytime lethargy and dec'd motivation are now the primary concerns to the pt, however current level of anxiety leading to xanax use 2x/d. May also consider mthfr mutation with some methyl folate supplement. Will add time released melatonin today for sleep. wgt loss would be an added benefit. Cont therapy as scheduled. No acute safety concerns. We added wellbutrin and today she reports improved energy and focus at work, but also perhaps inc'd anxiety? Tapered off zoloft to make room for trial of prozac. Will also add trial of buspar 10mg po BID which was helpful many years ago per pt. Last appt she reported  and therapist notice improvement on prozac 20mg po qam- buspar 10mg po TID also helpful with decreased xanax use and less variation in anxiety through day. Will cont both but inc prozac to 40mg to get at ongoing depressive and anxiety sxs- she then reported inc'd anxiety and I wondered about over activation on the prozac inc. not certain if she can afford genetic testing but is motivated for a trial of the methylfolate supplement. Dec'd the prozac back to 20mg po qam which today she reports has not affected her in either direction- did not start methylfolate supplementation- will start today. Cont therapy in the interim.    Axis I: MDD, recurrent, severe w/o " psychotic sxs; PASTORA  Axis II: none at this time   Axis III: HTN, migraines  Axis IV: occupational, chronic mental health  Axis V: GAF 70    Plan:   1. cont prozac to 20mg po qam  2. Cont wellbutrin xl 150mg po qam   3. Cont Buspar 10mg po TID  4. Start trial of methylfolate  5. Cont therapy as scheduled; I am in contact with therapist  6. RTC 4wks    -Spent 30min face to face with the pt; >50% time spent in counseling   -Supportive therapy and psychoeducation provided  -R/B/SE's of medications discussed with the pt who expresses understanding and chooses to take medications as prescribed.   -Pt instructed to call clinic, 911 or go to nearest emergency room if sxs worsen or pt is in   crisis. The pt expresses understanding.

## 2018-04-27 ENCOUNTER — OFFICE VISIT (OUTPATIENT)
Dept: PSYCHIATRY | Facility: CLINIC | Age: 43
End: 2018-04-27
Payer: COMMERCIAL

## 2018-04-27 DIAGNOSIS — F33.2 MDD (MAJOR DEPRESSIVE DISORDER), RECURRENT SEVERE, WITHOUT PSYCHOSIS: Primary | ICD-10-CM

## 2018-04-27 DIAGNOSIS — F41.1 GAD (GENERALIZED ANXIETY DISORDER): ICD-10-CM

## 2018-04-27 PROCEDURE — 90834 PSYTX W PT 45 MINUTES: CPT | Mod: S$GLB,,, | Performed by: SOCIAL WORKER

## 2018-04-27 NOTE — PROGRESS NOTES
"Individual Psychotherapy (PhD/LCSW)    4/27/2018    Site:  [facility]         Therapeutic Intervention: Met with patient.  Outpatient - Insight oriented psychotherapy 45 min - CPT code 06624 and Outpatient - Behavior modifying psychotherapy 45 min - CPT code 12268    Chief complaint/reason for encounter: depression , anxiety, worrying, racing thoughts, nervousness, sleep     Interval history and content of current session:  Last appt 3/9/18. Pt seeing Dr. Bryant, psychiatry for medication management. Pt presents as casually dressed, cooperative, tearful, reports mood is "flat." Asked several open ended questions to elicit interactive therapy and pt answered "I don't know". Pt has noticed no change. No recent panic attacks, but feels a constant state of worry. Depression is 6/10 moderate. Today Anxiety is 8/10 severe. Worries about work, money, not having energy to clean her home. Doesn't want ot leave the house. Parents came in last weekend and they went out of eat several times and to a [employer]. Pt felt she had no choice but to go. Urged her to get out of the house at least 1 time every weekend and we explored options.  Discussed therapeutic benefit and pt states sometimes she has nothing to say. Pt did get supplement recommended by Dr. Bryant and started last Friday.  Focused on support, validation, reframing and healthy coping skills, encouraged exercise and getting outside more.  Pt denies any current SI/HI. Denies any current A/VH.     Treatment plan:  · Target symptoms: depression, anxiety , work stress  · Why chosen therapy is appropriate versus another modality: relevant to diagnosis, patient responds to this modality, evidence based practice  · Outcome monitoring methods: self-report, observation  · Therapeutic intervention type: insight oriented psychotherapy, behavior modifying psychotherapy, supportive psychotherapy    Risk parameters:  Patient reports no suicidal ideation  Patient reports " no homicidal ideation  Patient reports no self-injurious behavior  Patient reports no violent behavior    Verbal deficits: None    Patient's response to intervention:  The patient's response to intervention is accepting.    Progress toward goals and other mental status changes:  The patient's progress toward goals is limited.    Diagnosis:     ICD-10-CM ICD-9-CM   1. MDD (major depressive disorder), recurrent severe, without psychosis F33.2 296.33   2. PASTORA (generalized anxiety disorder) F41.1 300.02       Plan:  individual psychotherapy and medication management by physician.Refer pt to Dr. Bryant for medication evaluation.  Pt to go to ED or call 911 if symptoms worsen or if she has thoughts of harming self and /or others. Pt verbalized understanding.       Return to clinic: 2 weeks, earlier if needed.     Length of Service (minutes): 45

## 2018-05-02 ENCOUNTER — PATIENT MESSAGE (OUTPATIENT)
Dept: PSYCHIATRY | Facility: CLINIC | Age: 43
End: 2018-05-02

## 2018-05-25 ENCOUNTER — TELEPHONE (OUTPATIENT)
Dept: PSYCHIATRY | Facility: CLINIC | Age: 43
End: 2018-05-25

## 2018-05-25 NOTE — TELEPHONE ENCOUNTER
----- Message from Steve Holcomb MD sent at 8/29/2017  1:52 PM CDT -----  Patient did not respond to Cymbalta 120mg.  I was wondering if you could pass her case by Dr. Bryant to see if she would be willing to see her or if she has any suggestions about where to go next with her medications.

## 2018-06-12 ENCOUNTER — OFFICE VISIT (OUTPATIENT)
Dept: PSYCHIATRY | Facility: CLINIC | Age: 43
End: 2018-06-12
Payer: COMMERCIAL

## 2018-06-12 VITALS
WEIGHT: 257.31 LBS | HEART RATE: 65 BPM | SYSTOLIC BLOOD PRESSURE: 140 MMHG | HEIGHT: 61 IN | BODY MASS INDEX: 48.58 KG/M2 | DIASTOLIC BLOOD PRESSURE: 62 MMHG

## 2018-06-12 DIAGNOSIS — G43.001 MIGRAINE WITHOUT AURA AND WITH STATUS MIGRAINOSUS, NOT INTRACTABLE: ICD-10-CM

## 2018-06-12 DIAGNOSIS — I10 HYPERTENSION, UNSPECIFIED TYPE: ICD-10-CM

## 2018-06-12 DIAGNOSIS — R53.83 FATIGUE DUE TO DEPRESSION: ICD-10-CM

## 2018-06-12 DIAGNOSIS — F32.A FATIGUE DUE TO DEPRESSION: ICD-10-CM

## 2018-06-12 DIAGNOSIS — F41.1 GAD (GENERALIZED ANXIETY DISORDER): ICD-10-CM

## 2018-06-12 DIAGNOSIS — F41.8 DEPRESSION WITH ANXIETY: ICD-10-CM

## 2018-06-12 DIAGNOSIS — F33.2 MDD (MAJOR DEPRESSIVE DISORDER), RECURRENT SEVERE, WITHOUT PSYCHOSIS: Primary | ICD-10-CM

## 2018-06-12 PROCEDURE — 3008F BODY MASS INDEX DOCD: CPT | Mod: CPTII,S$GLB,, | Performed by: PSYCHIATRY & NEUROLOGY

## 2018-06-12 PROCEDURE — 3077F SYST BP >= 140 MM HG: CPT | Mod: CPTII,S$GLB,, | Performed by: PSYCHIATRY & NEUROLOGY

## 2018-06-12 PROCEDURE — 99214 OFFICE O/P EST MOD 30 MIN: CPT | Mod: S$GLB,,, | Performed by: PSYCHIATRY & NEUROLOGY

## 2018-06-12 PROCEDURE — 99999 PR PBB SHADOW E&M-EST. PATIENT-LVL III: CPT | Mod: PBBFAC,,, | Performed by: PSYCHIATRY & NEUROLOGY

## 2018-06-12 PROCEDURE — 3078F DIAST BP <80 MM HG: CPT | Mod: CPTII,S$GLB,, | Performed by: PSYCHIATRY & NEUROLOGY

## 2018-06-12 RX ORDER — BUSPIRONE HYDROCHLORIDE 10 MG/1
10 TABLET ORAL 3 TIMES DAILY
Qty: 90 TABLET | Refills: 2 | Status: SHIPPED | OUTPATIENT
Start: 2018-06-12 | End: 2018-07-13 | Stop reason: SDUPTHER

## 2018-06-12 RX ORDER — BUPROPION HYDROCHLORIDE 150 MG/1
150 TABLET ORAL DAILY
Qty: 90 TABLET | Refills: 0 | Status: SHIPPED | OUTPATIENT
Start: 2018-06-12 | End: 2018-07-13 | Stop reason: SDUPTHER

## 2018-06-12 RX ORDER — FLUOXETINE 10 MG/1
10 CAPSULE ORAL DAILY
Qty: 5 CAPSULE | Refills: 0 | Status: SHIPPED | OUTPATIENT
Start: 2018-06-12 | End: 2018-07-13

## 2018-06-12 RX ORDER — ALPRAZOLAM 0.5 MG/1
TABLET ORAL
Qty: 15 TABLET | Refills: 0 | Status: SHIPPED | OUTPATIENT
Start: 2018-06-12 | End: 2019-03-14 | Stop reason: SDUPTHER

## 2018-06-12 NOTE — PROGRESS NOTES
"ID: 43yo WF with a h/o depression and anxiety, no prior full psych eval w/i [facility]. Pt is a current pt of Anneliese Cochran LCSW who referred her for med mgmt along with , PCP. Pt has been on wait list since 3/2017. The pt is currently on xanax and cymbalta through PCP. Also on topamax and elavil for migraine mgmt through pcp    CC: depression.     Interim Hx: presents on time. Chart reviewed.     "My  thinks the supplement is helping. He says i'm laughing again. And I can tell a little bit. But i'm still just not interested in anything. I didn't leave the house all weekend and i'm still really anxious. Like that feeling that i've had too much caffeine all day long."     Pt amenable to genetic testing today. Has failed mult previous med trials.     Will begin taper off prozac in anticipation of starting a new med with direction from genetic testing results.     On Psychiatric ROS:    Endorses difficulty with maintaining sleep- frequent wakening, improving anhedonia, feeling helpless/hopeless- due to no improvement on medication mgmt, dec energy- "none", improved concentration on wellbutrin, inc'd appetite- since Spring 2017, dec PMA "i do feel tired thinking about doing things."     Denies thoughts of SI/intent/plan.     Endorses feeling +easily overwhelmed, +ruminative thinking, +feeling tense/"on edge"- "on edge"     PPHx: Denies h/o self injury, inpt psych hospitalization, denies h/o suicide attempt     Current Psych Meds: prozac 20mg po qam, xanax 0.5mg po BID PRN anxiety, wellbutrin xl 150mg po qam  Past Psych Meds: ritalin, prozac, buspar PRN ("it helped with the stress of planning the wedding"), cymbalta 120mg po daily (ineffective), zoloft 150mg (ineffective)    PMHx: migraines (since childhood)- botox as txmt, monthly "they're well managed"    SubstHx:   T- none  E- very rare due to migraine  D- none  Caffeine- 2 dr.peppers/day    FamPHx: none    Musculoskeletal:  Muscle " "strength/Tone: no dyskinesia/ no tremor  Gait/Station- non antalgic, no assistance needed    MSE: appears stated age, well groomed, appropriate dress, engages well with examiner. Good e/c. Speech reg rate and vol, nonpressured. Mood is "i've been on edge all day. Like almost like i've had too much caffeine." Affect euthymic- low energy, but euthymic. No evidence of her stated anxiety- this is baseline. Sensorium fully intact. Oriented to date/day/location, current events. Narrative memory intact. Intellectual function is avg based on vocab and basic fund of knowledge. Thought is c/l/gd. No tangentiality or circumstantiality. No FOI/SULTANA. Denies SI/HI. Denies A/VH. No evidence of delusions. Insight and Judgment intact.     Blood pressure (!) 140/62, pulse 65, height 5' 1" (1.549 m), weight 116.7 kg (257 lb 4.8 oz), last menstrual period 04/23/2018.    Suicide Risk Assessment:   Protective- age, gender, no prior attempts, no prior hospitalizations, no family h/o attempts, no ongoing substance abuse, no psychosis, , denies SI/intent/plan, seeking treatment, access to treatment, future oriented, good primary support, no access to firearms    Risk- race, ongoing Axis I sxs, no children    **Pt is at LOW imminent and long term risk of suicide given current risk factors.    Assessment:  43yo WF with a h/o depression and anxiety, no [facility] full [facility]. Pt is a current pt of Anneliese Cochran LCSW who referred her for med mgmt along with , PCP. Pt has been on wait list since 3/2017. The pt is currently on xanax and cymbalta through PCP. Also on topamax and elavil for migraine mgmt through pcp/neuro. On eval today she meets criteria for MDD, recurrent, severe w/o psychotic sxs despite a recent inc in cymbalta to 120mg po daily. Pt has been on cymbalta x 10yrs. Only recalls previous trial of prozac which was d/c'd for unknown reasons. Now requiring xanax daily- some days 2 tabs (ie:1mg " "total daily). Some room for behavioral interventions, as well and the pt is motivated. Has good ability to adhere to the new txmt plan. We tapered off cymbalta and started a trial of zoloft. Will discuss diet and exercise interventions more fully over course of txmt. today she has been on 100mg of zoloft x 1mo. Continues to state she does not feel "any different"- will inc one last titration to 150mg prior to discussing transition to alternate med. Have discussed trial of wellbutrin next as daytime lethargy and dec'd motivation are now the primary concerns to the pt, however current level of anxiety leading to xanax use 2x/d. May also consider mthfr mutation with some methyl folate supplement. Will add time released melatonin today for sleep. wgt loss would be an added benefit. Cont therapy as scheduled. No acute safety concerns. We added wellbutrin and today she reports improved energy and focus at work, but also perhaps inc'd anxiety? Tapered off zoloft to make room for trial of prozac. Will also add trial of buspar 10mg po BID which was helpful many years ago per pt. Last appt she reported  and therapist notice improvement on prozac 20mg po qam- buspar 10mg po TID also helpful with decreased xanax use and less variation in anxiety through day. Will cont both but inc prozac to 40mg to get at ongoing depressive and anxiety sxs- she then reported inc'd anxiety and I wondered about over activation on the prozac inc. not certain if she can afford genetic testing but is motivated for a trial of the methylfolate supplement. Dec'd the prozac back to 20mg po qam which today she reported did not affect her in either direction- started methylfolate supplementation- today reporting some improvement but con'd lethargy, lack of motivation, and reported anxiety. Agrees to do genetic testing. Cont therapy in the interim.    Axis I: MDD, recurrent, severe w/o psychotic sxs; PASTORA  Axis II: none at this time   Axis III: HTN, " migraines  Axis IV: occupational, chronic mental health  Axis V: GAF 70    Plan:   1. Taper prozac to discontinuation  2. Cont wellbutrin xl 150mg po qam   3. Cont Buspar 10mg po TID  4. Cont methylfolate (purchasing online)   5. Cont therapy as scheduled; I am in contact with therapist  6. RTC 4wks  7. Genetic testing today in office.     -Spent 30min face to face with the pt; >50% time spent in counseling   -Supportive therapy and psychoeducation provided  -R/B/SE's of medications discussed with the pt who expresses understanding and chooses to take medications as prescribed.   -Pt instructed to call clinic, 911 or go to nearest emergency room if sxs worsen or pt is in   crisis. The pt expresses understanding.

## 2018-07-13 ENCOUNTER — OFFICE VISIT (OUTPATIENT)
Dept: PSYCHIATRY | Facility: CLINIC | Age: 43
End: 2018-07-13
Payer: COMMERCIAL

## 2018-07-13 VITALS
BODY MASS INDEX: 49.1 KG/M2 | HEART RATE: 69 BPM | WEIGHT: 260.06 LBS | DIASTOLIC BLOOD PRESSURE: 78 MMHG | SYSTOLIC BLOOD PRESSURE: 141 MMHG | HEIGHT: 61 IN

## 2018-07-13 DIAGNOSIS — F33.2 MDD (MAJOR DEPRESSIVE DISORDER), RECURRENT SEVERE, WITHOUT PSYCHOSIS: Primary | ICD-10-CM

## 2018-07-13 DIAGNOSIS — I10 HYPERTENSION, UNSPECIFIED TYPE: ICD-10-CM

## 2018-07-13 DIAGNOSIS — F41.8 DEPRESSION WITH ANXIETY: ICD-10-CM

## 2018-07-13 DIAGNOSIS — F41.1 GAD (GENERALIZED ANXIETY DISORDER): ICD-10-CM

## 2018-07-13 DIAGNOSIS — F32.A FATIGUE DUE TO DEPRESSION: ICD-10-CM

## 2018-07-13 DIAGNOSIS — G43.001 MIGRAINE WITHOUT AURA AND WITH STATUS MIGRAINOSUS, NOT INTRACTABLE: ICD-10-CM

## 2018-07-13 DIAGNOSIS — R53.83 FATIGUE DUE TO DEPRESSION: ICD-10-CM

## 2018-07-13 PROCEDURE — 3078F DIAST BP <80 MM HG: CPT | Mod: CPTII,S$GLB,, | Performed by: PSYCHIATRY & NEUROLOGY

## 2018-07-13 PROCEDURE — 99214 OFFICE O/P EST MOD 30 MIN: CPT | Mod: S$GLB,,, | Performed by: PSYCHIATRY & NEUROLOGY

## 2018-07-13 PROCEDURE — 3008F BODY MASS INDEX DOCD: CPT | Mod: CPTII,S$GLB,, | Performed by: PSYCHIATRY & NEUROLOGY

## 2018-07-13 PROCEDURE — 99999 PR PBB SHADOW E&M-EST. PATIENT-LVL III: CPT | Mod: PBBFAC,,, | Performed by: PSYCHIATRY & NEUROLOGY

## 2018-07-13 PROCEDURE — 3077F SYST BP >= 140 MM HG: CPT | Mod: CPTII,S$GLB,, | Performed by: PSYCHIATRY & NEUROLOGY

## 2018-07-13 RX ORDER — FROVATRIPTAN SUCCINATE 2.5 MG/1
TABLET, FILM COATED ORAL
COMMUNITY
End: 2018-07-13 | Stop reason: SDUPTHER

## 2018-07-13 RX ORDER — BUSPIRONE HYDROCHLORIDE 15 MG/1
15 TABLET ORAL 3 TIMES DAILY
Qty: 90 TABLET | Refills: 2 | Status: SHIPPED | OUTPATIENT
Start: 2018-07-13 | End: 2018-10-12 | Stop reason: SDUPTHER

## 2018-07-13 RX ORDER — ONDANSETRON 4 MG/1
TABLET, FILM COATED ORAL
Refills: 1 | COMMUNITY
Start: 2018-04-13

## 2018-07-13 RX ORDER — BUPROPION HYDROCHLORIDE 150 MG/1
150 TABLET ORAL DAILY
Qty: 90 TABLET | Refills: 0 | Status: SHIPPED | OUTPATIENT
Start: 2018-07-13 | End: 2018-10-12

## 2018-07-13 RX ORDER — TOPIRAMATE 100 MG/1
TABLET, FILM COATED ORAL
COMMUNITY
End: 2018-07-13

## 2018-07-13 RX ORDER — ALPRAZOLAM 0.25 MG/1
TABLET ORAL
COMMUNITY
End: 2018-07-13 | Stop reason: SDUPTHER

## 2018-07-13 RX ORDER — FERROUS SULFATE 325(65) MG
TABLET ORAL
COMMUNITY

## 2018-07-13 RX ORDER — METOPROLOL SUCCINATE 200 MG/1
TABLET, EXTENDED RELEASE ORAL
COMMUNITY
End: 2018-07-13 | Stop reason: SDUPTHER

## 2018-07-13 NOTE — PROGRESS NOTES
"ID: 41yo WF with a h/o depression and anxiety, [facility]. Pt is a current pt of Anneliese Cochran LCSW who referred her for med mgmt along with , PCP. Pt has been on wait list since 3/2017. The pt is currently on xanax and cymbalta through PCP. Also on topamax and elavil for migraine mgmt through pcp    CC: depression.     Interim Hx: presents on time. Chart reviewed.     "My  thinks i'm more angry. And I think that's true. I am laughing a little bit more. Like not as flat. I think that changed with the wellbutrin. But I don't think the depression or anxiety is better."      Laughing and focus were both improved when wellbutrin was started. Started fetzima based on genetic testing results. Started 3 wks ago. Given it's ration of 1:1 Serotonin to NE, fetzima plus wellbutrin may be overly stimulating and leading to the irritability.     Difficult to assess proper direction for txmt as the pt reports both neurovegetative sxs but also a "constant anxiety, feels like i've had too much caffeine, I worry about everything".- if stimulating for neurovegetative sxs, then may inc anxiety? Will cont to try meds based on genetic testing results.     On Psychiatric ROS:    Endorses difficulty with maintaining sleep- frequent wakening, improving anhedonia, feeling helpless/hopeless- due to no improvement on medication mgmt, dec energy- "none", improved concentration on wellbutrin, inc'd appetite- since Spring 2017, dec PMA "i do feel tired thinking about doing things."     Denies thoughts of SI/intent/plan.     Endorses feeling +easily overwhelmed, +ruminative thinking, +feeling tense/"on edge"- "on edge"     PPHx: Denies h/o self injury, inpt psych hospitalization, denies h/o suicide attempt     Current Psych Meds: prozac 20mg po qam, xanax 0.5mg po BID PRN anxiety, wellbutrin xl 150mg po qam  Past Psych Meds: ritalin, prozac, buspar PRN ("it helped with the stress of planning " "the wedding"), cymbalta 120mg po daily (ineffective), zoloft 150mg (ineffective)    PMHx: migraines (since childhood)- botox as txmt, monthly "they're well managed"    SubstHx:   T- none  E- very rare due to migraine  D- none  Caffeine- 2 dr.peppers/day    FamPHx: none    Musculoskeletal:  Muscle strength/Tone: no dyskinesia/ no tremor  Gait/Station- non antalgic, no assistance needed    MSE: appears stated age, well groomed, appropriate dress, engages well with examiner. Good e/c. Speech reg rate and vol, nonpressured. Mood is "well I took today off so i'm a little less anxious but in general i'm just sortof blah." Affect congruent- low energy, flat, tearful when we discuss that nothing tried so far has helped. No evidence of her stated anxiety- this is baseline. Sensorium fully intact. Oriented to date/day/location, current events. Narrative memory intact. Intellectual function is avg based on vocab and basic fund of knowledge. Thought is c/l/gd. No tangentiality or circumstantiality. No FOI/SULTANA. Denies SI/HI. Denies A/VH. No evidence of delusions. Insight and Judgment intact.     Height 5' 1" (1.549 m), weight 117.9 kg (260 lb 0.5 oz), last menstrual period 07/13/2018.    Suicide Risk Assessment:   Protective- age, gender, no prior attempts, no prior hospitalizations, no family h/o attempts, no ongoing substance abuse, no psychosis, , denies SI/intent/plan, seeking treatment, access to treatment, future oriented, good primary support, no access to firearms    Risk- race, ongoing Axis I sxs, no children    **Pt is at LOW imminent and long term risk of suicide given current risk factors.    Assessment:  41yo WF with a h/o depression and anxiety, no prior full psych eval w/i [facility]. Pt is a current pt of Anneliese Cochran LCSW who referred her for med mgmt along with , PCP. Pt has been on wait list since 3/2017. The pt is currently on xanax and cymbalta through PCP. Also on topamax and " "elavil for migraine mgmt through pcp/neuro. On eval today she meets criteria for MDD, recurrent, severe w/o psychotic sxs despite a recent inc in cymbalta to 120mg po daily. Pt has been on cymbalta x 10yrs. Only recalls previous trial of prozac which was d/c'd for unknown reasons. Now requiring xanax daily- some days 2 tabs (ie:1mg total daily). Some room for behavioral interventions, as well and the pt is motivated. Has good ability to adhere to the new txmt plan. We tapered off cymbalta and started a trial of zoloft. Will discuss diet and exercise interventions more fully over course of txmt. today she has been on 100mg of zoloft x 1mo. Continues to state she does not feel "any different"- will inc one last titration to 150mg prior to discussing transition to alternate med. Have discussed trial of wellbutrin next as daytime lethargy and dec'd motivation are now the primary concerns to the pt, however current level of anxiety leading to xanax use 2x/d. May also consider mthfr mutation with some methyl folate supplement. Will add time released melatonin today for sleep. wgt loss would be an added benefit. Cont therapy as scheduled. No acute safety concerns. We added wellbutrin and today she reports improved energy and focus at work, but also perhaps inc'd anxiety? Tapered off zoloft to make room for trial of prozac. Will also add trial of buspar 10mg po BID which was helpful many years ago per pt. Last appt she reported  and therapist notice improvement on prozac 20mg po qam- buspar 10mg po TID also helpful with decreased xanax use and less variation in anxiety through day. Will cont both but inc prozac to 40mg to get at ongoing depressive and anxiety sxs- she then reported inc'd anxiety and I wondered about over activation on the prozac inc. not certain if she can afford genetic testing but is motivated for a trial of the methylfolate supplement. Dec'd the prozac back to 20mg po qam which today she reported " "did not affect her in either direction- started methylfolate supplementation- today reporting some improvement but con'd lethargy, lack of motivation, and reported anxiety. Agrees to do genetic testing. Genetic testing results returned and we started fetzima based on those results. Now reporting "maybe some improvement" but also reporting "no change in depression or anxiety"? Does report inc'd "anger"? May be overstimulation on fetzima (NE) and wellbutrin (dopa)- will inc fetzima to more std dose of 40mg and watch irritability closely.     Axis I: MDD, recurrent, severe w/o psychotic sxs; PASTORA  Axis II: none at this time   Axis III: HTN, migraines  Axis IV: occupational, chronic mental health  Axis V: GAF 70    Plan:   1. Inc fetzima to 40mg po qam  2. Cont wellbutrin xl 150mg po qam (may need to d/c due to overstimulation with the fetzima)   3. Cont Buspar 10mg po TID  4. Cont methylfolate (purchasing online)   5. Cont therapy as scheduled; I am in contact with therapist  6. RTC 4wks  7. Genetic testing results reviewed in office    -Spent 30min face to face with the pt; >50% time spent in counseling   -Supportive therapy and psychoeducation provided  -R/B/SE's of medications discussed with the pt who expresses understanding and chooses to take medications as prescribed.   -Pt instructed to call clinic, 911 or go to nearest emergency room if sxs worsen or pt is in   crisis. The pt expresses understanding.    "

## 2018-08-16 RX ORDER — LEVOMILNACIPRAN HYDROCHLORIDE 40 MG/1
CAPSULE, EXTENDED RELEASE ORAL
Qty: 30 CAPSULE | Refills: 0 | Status: SHIPPED | OUTPATIENT
Start: 2018-08-16 | End: 2018-09-15 | Stop reason: SDUPTHER

## 2018-08-17 ENCOUNTER — OFFICE VISIT (OUTPATIENT)
Dept: PSYCHIATRY | Facility: CLINIC | Age: 43
End: 2018-08-17
Payer: COMMERCIAL

## 2018-08-17 VITALS
SYSTOLIC BLOOD PRESSURE: 154 MMHG | DIASTOLIC BLOOD PRESSURE: 112 MMHG | HEART RATE: 78 BPM | BODY MASS INDEX: 48.47 KG/M2 | HEIGHT: 61 IN | WEIGHT: 256.75 LBS

## 2018-08-17 DIAGNOSIS — F32.A FATIGUE DUE TO DEPRESSION: ICD-10-CM

## 2018-08-17 DIAGNOSIS — G43.001 MIGRAINE WITHOUT AURA AND WITH STATUS MIGRAINOSUS, NOT INTRACTABLE: ICD-10-CM

## 2018-08-17 DIAGNOSIS — I10 HYPERTENSION, UNSPECIFIED TYPE: ICD-10-CM

## 2018-08-17 DIAGNOSIS — R53.83 FATIGUE DUE TO DEPRESSION: ICD-10-CM

## 2018-08-17 DIAGNOSIS — F41.1 GAD (GENERALIZED ANXIETY DISORDER): ICD-10-CM

## 2018-08-17 DIAGNOSIS — F33.2 MDD (MAJOR DEPRESSIVE DISORDER), RECURRENT SEVERE, WITHOUT PSYCHOSIS: Primary | ICD-10-CM

## 2018-08-17 PROCEDURE — 3077F SYST BP >= 140 MM HG: CPT | Mod: CPTII,S$GLB,, | Performed by: PSYCHIATRY & NEUROLOGY

## 2018-08-17 PROCEDURE — 3008F BODY MASS INDEX DOCD: CPT | Mod: CPTII,S$GLB,, | Performed by: PSYCHIATRY & NEUROLOGY

## 2018-08-17 PROCEDURE — 3080F DIAST BP >= 90 MM HG: CPT | Mod: CPTII,S$GLB,, | Performed by: PSYCHIATRY & NEUROLOGY

## 2018-08-17 PROCEDURE — 99999 PR PBB SHADOW E&M-EST. PATIENT-LVL III: CPT | Mod: PBBFAC,,, | Performed by: PSYCHIATRY & NEUROLOGY

## 2018-08-17 PROCEDURE — 99214 OFFICE O/P EST MOD 30 MIN: CPT | Mod: S$GLB,,, | Performed by: PSYCHIATRY & NEUROLOGY

## 2018-08-17 NOTE — PROGRESS NOTES
"ID: 43yo WF with a h/o depression and anxiety, no prior full psych eval w/i [employer] system. Pt is a current pt of Anneliese Cochran LCSW who referred her for med mgmt along with , PCP. Pt has been on wait list since 3/2017. The pt is currently on xanax and cymbalta through PCP. Also on topamax and elavil for migraine mgmt through pcp    CC: depression.     Interim Hx: presents on time. Chart reviewed.     Pt stopped the wellbutrin approx 4 days after inc'ing the fetzima due to inc'd anxiety- we had discussed this possibility at the last appt.     "i think the anxiety might be a tad bit better. The buspar may have helped a little." also feeling "maybe things in general are a little better but maybe I just want them to be." a close coworker/friend was killed in a car accident along with her 17yo daughter last week in [place]. They leave behind a 11yo girl. Coworker was a single mother. "it's been really hard and we all went to the service yesterday and the office has just been a difficult place to be. People grieve in odd ways." some discussion of this.     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 They go every year, "making"  go this year and looking forward to it. -- perhaps more improvement than she is reporting? Will cont to observe.     A little concern about bp but she is drinking a starbucks Nitro at time of the appt and didn't realize the amount of caffeine in them- will monitor home bp this weekend and let me know via portal. Based on results we may inc dosing.    On Psychiatric ROS:    Endorses somewhat improved sleep maintainance, improving anhedonia- "not great", feeling helpless/hopeless- I just feel flat, dec energy- "none", stable concentration, inc'd appetite- since Spring 2017 (lost 4lbs since last appt), dec PMA "i do feel tired thinking about doing things."     Denies thoughts of SI/intent/plan.     Endorses " "feeling +easily overwhelmed, +ruminative thinking, +feeling tense/"on edge"- "on edge"     PPHx: Denies h/o self injury, inpt psych hospitalization, denies h/o suicide attempt     Current Psych Meds: fetzima 40mg po qam  Past Psych Meds: ritalin, prozac, buspar PRN ("it helped with the stress of planning the wedding"), cymbalta 120mg po daily (ineffective), zoloft 150mg (ineffective), prozac 20mg po qam, xanax 0.5mg po BID PRN anxiety, wellbutrin xl 150mg po qam    PMHx: migraines (since childhood)- botox as txmt, monthly "they're well managed"    SubstHx:   T- none  E- very rare due to migraine  D- none  Caffeine- 2 dr.peppers/day    FamPHx: none    Musculoskeletal:  Muscle strength/Tone: no dyskinesia/ no tremor  Gait/Station- non antalgic, no assistance needed    MSE: appears stated age, well groomed, appropriate dress, engages well with examiner. Good e/c. Speech reg rate and vol, nonpressured. Mood is "like a roller coaster kind of today. Work is weird right now because of the grief." Affect congruent- low energy, tearful when we discuss her friend who - more expansive today- laughs and smiles in appt. No evidence of her stated anxiety- this is baseline. Sensorium fully intact. Oriented to date/day/location, current events. Narrative memory intact. Intellectual function is avg based on vocab and basic fund of knowledge. Thought is c/l/gd. No tangentiality or circumstantiality. No FOI/SULTANA. Denies SI/HI. Denies A/VH. No evidence of delusions. Insight and Judgment intact.     Blood pressure (!) 154/112, pulse 78, height 5' 1" (1.549 m), weight 116.4 kg (256 lb 11.6 oz), last menstrual period 2018.    Suicide Risk Assessment:   Protective- age, gender, no prior attempts, no prior hospitalizations, no family h/o attempts, no ongoing substance abuse, no psychosis, , denies SI/intent/plan, seeking treatment, access to treatment, future oriented, good primary support, no access to firearms    Risk- " "race, ongoing Axis I sxs, no children    **Pt is at LOW imminent and long term risk of suicide given current risk factors.    Assessment:  43yo WF with a h/o depression and anxiety, no prior full psych eval w/i [facility] system. Pt is a current pt of Anneliese Cochran LCSW who referred her for med mgmt along with , PCP. Pt has been on wait list since 3/2017. The pt is currently on xanax and cymbalta through PCP. Also on topamax and elavil for migraine mgmt through pcp/neuro. On eval today she meets criteria for MDD, recurrent, severe w/o psychotic sxs despite a recent inc in cymbalta to 120mg po daily. Pt has been on cymbalta x 10yrs. Only recalls previous trial of prozac which was d/c'd for unknown reasons. Now requiring xanax daily- some days 2 tabs (ie:1mg total daily). Some room for behavioral interventions, as well and the pt is motivated. Has good ability to adhere to the new txmt plan. We tapered off cymbalta and started a trial of zoloft. Will discuss diet and exercise interventions more fully over course of txmt. today she has been on 100mg of zoloft x 1mo. Continues to state she does not feel "any different"- will inc one last titration to 150mg prior to discussing transition to alternate med. Have discussed trial of wellbutrin next as daytime lethargy and dec'd motivation are now the primary concerns to the pt, however current level of anxiety leading to xanax use 2x/d. May also consider mthfr mutation with some methyl folate supplement. Will add time released melatonin today for sleep. wgt loss would be an added benefit. Cont therapy as scheduled. No acute safety concerns. We added wellbutrin and today she reports improved energy and focus at work, but also perhaps inc'd anxiety? Tapered off zoloft to make room for trial of prozac. Will also add trial of buspar 10mg po BID which was helpful many years ago per pt. Last appt she reported  and therapist notice improvement on prozac 20mg po " "qam- buspar 10mg po TID also helpful with decreased xanax use and less variation in anxiety through day. Will cont both but inc prozac to 40mg to get at ongoing depressive and anxiety sxs- she then reported inc'd anxiety and I wondered about over activation on the prozac inc. not certain if she can afford genetic testing but is motivated for a trial of the methylfolate supplement. Dec'd the prozac back to 20mg po qam which today she reported did not affect her in either direction- started methylfolate supplementation- today reporting some improvement but con'd lethargy, lack of motivation, and reported anxiety. Agrees to do genetic testing. Genetic testing results returned and we started fetzima based on those results. Now reporting "maybe some improvement" but also reporting "no change in depression or anxiety"? Does report inc'd "anger"? May be overstimulation on fetzima (NE) and wellbutrin (dopa)- inc'd fetzima to more std dose of 40mg and within days the pt d/c'd the wellbutrin due to inc'd irritability. Now noticing "may some improvement" and the pt is more expansive with affect today in appt. Concern about bp but drinking a starbucks nitro at time of appt- will monitor at home for a few days and let me know via portal- we may inc dose in the future.     Axis I: MDD, recurrent, severe w/o psychotic sxs; PASTORA  Axis II: none at this time   Axis III: HTN, migraines  Axis IV: occupational, chronic mental health  Axis V: GAF 70    Plan:   1. Cont fetzima 40mg po qam  2. Cont Buspar 15mg po TID  4. Cont methylfolate (purchasing online)   5. Cont therapy as scheduled; I am in contact with therapist  6. RTC 2mos    -Spent 30min face to face with the pt; >50% time spent in counseling   -Supportive therapy and psychoeducation provided  -R/B/SE's of medications discussed with the pt who expresses understanding and chooses to take medications as prescribed.   -Pt instructed to call clinic, 911 or go to nearest emergency " room if sxs worsen or pt is in   crisis. The pt expresses understanding.

## 2018-09-12 ENCOUNTER — TELEPHONE (OUTPATIENT)
Dept: FAMILY MEDICINE | Facility: CLINIC | Age: 43
End: 2018-09-12

## 2018-09-12 DIAGNOSIS — R51.9 CHRONIC INTRACTABLE HEADACHE, UNSPECIFIED HEADACHE TYPE: Primary | ICD-10-CM

## 2018-09-12 DIAGNOSIS — G89.29 CHRONIC INTRACTABLE HEADACHE, UNSPECIFIED HEADACHE TYPE: Primary | ICD-10-CM

## 2018-09-12 NOTE — TELEPHONE ENCOUNTER
----- Message from Sharonda Coley sent at 9/12/2018  9:01 AM CDT -----  Contact: self   Patient need referral for neurology for headaches please call back at [phone] (home)

## 2018-09-15 RX ORDER — LEVOMILNACIPRAN HYDROCHLORIDE 40 MG/1
CAPSULE, EXTENDED RELEASE ORAL
Qty: 30 CAPSULE | Refills: 0 | Status: SHIPPED | OUTPATIENT
Start: 2018-09-15 | End: 2018-10-12 | Stop reason: SDUPTHER

## 2018-10-06 DIAGNOSIS — I10 ESSENTIAL HYPERTENSION: ICD-10-CM

## 2018-10-07 RX ORDER — METOPROLOL SUCCINATE 200 MG/1
TABLET, EXTENDED RELEASE ORAL
Qty: 30 TABLET | Refills: 11 | Status: SHIPPED | OUTPATIENT
Start: 2018-10-07 | End: 2019-10-09 | Stop reason: SDUPTHER

## 2018-10-12 ENCOUNTER — OFFICE VISIT (OUTPATIENT)
Dept: PSYCHIATRY | Facility: CLINIC | Age: 43
End: 2018-10-12
Payer: COMMERCIAL

## 2018-10-12 VITALS
HEIGHT: 61 IN | DIASTOLIC BLOOD PRESSURE: 84 MMHG | SYSTOLIC BLOOD PRESSURE: 178 MMHG | WEIGHT: 261.5 LBS | BODY MASS INDEX: 49.37 KG/M2 | HEART RATE: 90 BPM

## 2018-10-12 DIAGNOSIS — R53.83 FATIGUE DUE TO DEPRESSION: ICD-10-CM

## 2018-10-12 DIAGNOSIS — F41.1 GAD (GENERALIZED ANXIETY DISORDER): ICD-10-CM

## 2018-10-12 DIAGNOSIS — I10 HYPERTENSION, UNSPECIFIED TYPE: ICD-10-CM

## 2018-10-12 DIAGNOSIS — F32.A FATIGUE DUE TO DEPRESSION: ICD-10-CM

## 2018-10-12 DIAGNOSIS — F33.2 MDD (MAJOR DEPRESSIVE DISORDER), RECURRENT SEVERE, WITHOUT PSYCHOSIS: Primary | ICD-10-CM

## 2018-10-12 PROCEDURE — 3008F BODY MASS INDEX DOCD: CPT | Mod: CPTII,S$GLB,, | Performed by: PSYCHIATRY & NEUROLOGY

## 2018-10-12 PROCEDURE — 3079F DIAST BP 80-89 MM HG: CPT | Mod: CPTII,S$GLB,, | Performed by: PSYCHIATRY & NEUROLOGY

## 2018-10-12 PROCEDURE — 99214 OFFICE O/P EST MOD 30 MIN: CPT | Mod: S$GLB,,, | Performed by: PSYCHIATRY & NEUROLOGY

## 2018-10-12 PROCEDURE — 99999 PR PBB SHADOW E&M-EST. PATIENT-LVL III: CPT | Mod: PBBFAC,,, | Performed by: PSYCHIATRY & NEUROLOGY

## 2018-10-12 PROCEDURE — 3077F SYST BP >= 140 MM HG: CPT | Mod: CPTII,S$GLB,, | Performed by: PSYCHIATRY & NEUROLOGY

## 2018-10-12 RX ORDER — BUSPIRONE HYDROCHLORIDE 15 MG/1
15 TABLET ORAL 3 TIMES DAILY
Qty: 90 TABLET | Refills: 2 | Status: SHIPPED | OUTPATIENT
Start: 2018-10-12 | End: 2019-01-14 | Stop reason: SDUPTHER

## 2018-10-12 RX ORDER — ARIPIPRAZOLE 2 MG/1
2 TABLET ORAL DAILY
Qty: 30 TABLET | Refills: 0 | Status: SHIPPED | OUTPATIENT
Start: 2018-10-12 | End: 2018-11-15 | Stop reason: SDUPTHER

## 2018-10-12 NOTE — PROGRESS NOTES
"ID: 43yo WF with a h/o depression and anxiety, no prior full psych eval w/i [email or web address] system. Pt is a current pt of Anneliese Cochran LCSW who referred her for med mgmt along with , PCP. Pt has been on wait list since 3/2017. The pt is currently on xanax and cymbalta through PCP. Also on topamax and elavil for migraine mgmt through pcp    CC: depression.     Interim Hx: presents on time. Chart reviewed.     Tracked her bp for me and the recording are frequently elevated. Have asked her to discuss with her PCP and keep recordings for him.     "i'm still just really anxious. I don't really know what to do about it and on the weekends I can hardly leave the house. Nathan has to make me do it and even then sometimes I just don't." reports inability to motivate. No exercise implemented.     On Psychiatric ROS:    Endorses somewhat improved sleep maintainance, improving anhedonia- "not great", feeling helpless/hopeless- I just feel flat, dec energy- "none", stable concentration, inc'd appetite- since Spring 2017 (lost 4lbs since last appt), dec PMA "i do feel tired thinking about doing things."     Denies thoughts of SI/intent/plan.     Endorses feeling +easily overwhelmed, +ruminative thinking, +feeling tense/"on edge"- "on edge"     PPHx: Denies h/o self injury, inpt psych hospitalization, denies h/o suicide attempt     Current Psych Meds: fetzima 40mg po qam  Past Psych Meds: ritalin, prozac, buspar PRN ("it helped with the stress of planning the wedding"), cymbalta 120mg po daily (ineffective), zoloft 150mg (ineffective), prozac 20mg po qam, xanax 0.5mg po BID PRN anxiety, wellbutrin xl 150mg po qam    PMHx: migraines (since childhood)- botox as txmt, monthly "they're well managed"    SubstHx:   T- none  E- very rare due to migraine  D- none  Caffeine- 2 dr.peppers/day    FamPHx: none    Musculoskeletal:  Muscle strength/Tone: no dyskinesia/ no tremor  Gait/Station- non antalgic, no assistance needed    MSE: " "appears stated age, well groomed, appropriate dress, engages well with examiner. Good e/c. Speech reg rate and vol, nonpressured. Mood is "just here. i'm fine." Affect congruent- low energy, no tearfulness but does laugh and smile at times in appt. No evidence of her stated anxiety- this is baseline. Sensorium fully intact. Oriented to date/day/location, current events. Narrative memory intact. Intellectual function is avg based on vocab and basic fund of knowledge. Thought is c/l/gd. No tangentiality or circumstantiality. No FOI/SULTANA. Denies SI/HI. Denies A/VH. No evidence of delusions. Insight and Judgment intact.     Blood pressure (!) 178/84, pulse 90, height 5' 1" (1.549 m), weight 118.6 kg (261 lb 8 oz), last menstrual period 10/04/2018.    Suicide Risk Assessment:   Protective- age, gender, no prior attempts, no prior hospitalizations, no family h/o attempts, no ongoing substance abuse, no psychosis, , denies SI/intent/plan, seeking treatment, access to treatment, future oriented, good primary support, no access to firearms    Risk- race, ongoing Axis I sxs, no children    **Pt is at LOW imminent and long term risk of suicide given current risk factors.    Assessment:  41yo WF with a h/o depression and anxiety, no prior full psych eval w/i [facility]. Pt is a current pt of Anneliese Cochran LCSW who referred her for med mgmt along with , PCP. Pt has been on wait list since 3/2017. The pt is currently on xanax and cymbalta through PCP. Also on topamax and elavil for migraine mgmt through pcp/neuro. On eval today she meets criteria for MDD, recurrent, severe w/o psychotic sxs despite a recent inc in cymbalta to 120mg po daily. Pt has been on cymbalta x 10yrs. Only recalls previous trial of prozac which was d/c'd for unknown reasons. Now requiring xanax daily- some days 2 tabs (ie:1mg total daily). Some room for behavioral interventions, as well and the pt is motivated. Has good ability to " "adhere to the new txmt plan. We tapered off cymbalta and started a trial of zoloft. Will discuss diet and exercise interventions more fully over course of txmt. today she has been on 100mg of zoloft x 1mo. Continues to state she does not feel "any different"- will inc one last titration to 150mg prior to discussing transition to alternate med. Have discussed trial of wellbutrin next as daytime lethargy and dec'd motivation are now the primary concerns to the pt, however current level of anxiety leading to xanax use 2x/d. May also consider mthfr mutation with some methyl folate supplement. Will add time released melatonin today for sleep. wgt loss would be an added benefit. Cont therapy as scheduled. No acute safety concerns. We added wellbutrin and today she reports improved energy and focus at work, but also perhaps inc'd anxiety? Tapered off zoloft to make room for trial of prozac. Will also add trial of buspar 10mg po BID which was helpful many years ago per pt. Last appt she reported  and therapist notice improvement on prozac 20mg po qam- buspar 10mg po TID also helpful with decreased xanax use and less variation in anxiety through day. Will cont both but inc prozac to 40mg to get at ongoing depressive and anxiety sxs- she then reported inc'd anxiety and I wondered about over activation on the prozac inc. not certain if she can afford genetic testing but is motivated for a trial of the methylfolate supplement. Dec'd the prozac back to 20mg po qam which today she reported did not affect her in either direction- started methylfolate supplementation- today reporting some improvement but con'd lethargy, lack of motivation, and reported anxiety. Agrees to do genetic testing. Genetic testing results returned and we started fetzima based on those results. Now reporting "maybe some improvement" but also reporting "no change in depression or anxiety"? Does report inc'd "anger"? May be overstimulation on fetzima " "(NE) and wellbutrin (dopa)- inc'd fetzima to more std dose of 40mg and within days the pt d/c'd the wellbutrin due to inc'd irritability. Now noticing "may some improvement" and the pt is more expansive with affect today in appt. Concern about bp but drinking a starbucks nitro at time of appt- will monitor at home for a few days and let me know via portal- we may inc dose in the future. Today pt reporting cont'd difficulty with anxiety and mood- will start a trial of adjunct txmt with abilify 2mg.     Axis I: MDD, recurrent, severe w/o psychotic sxs; PASTORA  Axis II: none at this time   Axis III: HTN, migraines  Axis IV: occupational, chronic mental health  Axis V: GAF 70    Plan:   1. Cont fetzima 40mg po qam  2. Cont Buspar 15mg po TID  3. Cont methylfolate (purchasing online)   4. Start trial of abilify 2mg po daily   5. Cont therapy as scheduled; I am in contact with therapist  6. RTC 1mo    -Spent 30min face to face with the pt; >50% time spent in counseling   -Supportive therapy and psychoeducation provided  -R/B/SE's of medications discussed with the pt who expresses understanding and chooses to take medications as prescribed.   -Pt instructed to call clinic, 911 or go to nearest emergency room if sxs worsen or pt is in   crisis. The pt expresses understanding.    "

## 2018-10-23 ENCOUNTER — TELEPHONE (OUTPATIENT)
Dept: PSYCHIATRY | Facility: CLINIC | Age: 43
End: 2018-10-23

## 2018-10-23 NOTE — TELEPHONE ENCOUNTER
"Called the pt to discuss recently added abilify:    "i think i'm a little better. I have a little more energy. The anxiety is still there though, like I still need the buspar and once I have it, I feel better."     Has been on abilify 2mg not yet 2wks-     Will cont and monitor benefit/side effects- f/u as scheduled    "

## 2018-11-15 RX ORDER — ARIPIPRAZOLE 2 MG/1
TABLET ORAL
Qty: 30 TABLET | Refills: 0 | Status: SHIPPED | OUTPATIENT
Start: 2018-11-15 | End: 2018-12-15 | Stop reason: SDUPTHER

## 2018-11-15 RX ORDER — LEVOMILNACIPRAN HYDROCHLORIDE 40 MG/1
CAPSULE, EXTENDED RELEASE ORAL
Qty: 30 CAPSULE | Refills: 0 | Status: SHIPPED | OUTPATIENT
Start: 2018-11-15 | End: 2018-12-15 | Stop reason: SDUPTHER

## 2018-11-26 ENCOUNTER — OFFICE VISIT (OUTPATIENT)
Dept: PSYCHIATRY | Facility: CLINIC | Age: 43
End: 2018-11-26
Payer: COMMERCIAL

## 2018-11-26 VITALS
HEART RATE: 94 BPM | HEIGHT: 61 IN | DIASTOLIC BLOOD PRESSURE: 87 MMHG | WEIGHT: 266.19 LBS | SYSTOLIC BLOOD PRESSURE: 189 MMHG | BODY MASS INDEX: 50.26 KG/M2

## 2018-11-26 DIAGNOSIS — F41.1 GAD (GENERALIZED ANXIETY DISORDER): ICD-10-CM

## 2018-11-26 DIAGNOSIS — E66.01 MORBID OBESITY WITH BMI OF 50.0-59.9, ADULT: ICD-10-CM

## 2018-11-26 DIAGNOSIS — G43.001 MIGRAINE WITHOUT AURA AND WITH STATUS MIGRAINOSUS, NOT INTRACTABLE: ICD-10-CM

## 2018-11-26 DIAGNOSIS — F33.2 MDD (MAJOR DEPRESSIVE DISORDER), RECURRENT SEVERE, WITHOUT PSYCHOSIS: Primary | ICD-10-CM

## 2018-11-26 DIAGNOSIS — I10 HYPERTENSION, UNSPECIFIED TYPE: ICD-10-CM

## 2018-11-26 DIAGNOSIS — F32.A FATIGUE DUE TO DEPRESSION: ICD-10-CM

## 2018-11-26 DIAGNOSIS — R53.83 FATIGUE DUE TO DEPRESSION: ICD-10-CM

## 2018-11-26 PROCEDURE — 99999 PR PBB SHADOW E&M-EST. PATIENT-LVL III: CPT | Mod: PBBFAC,,, | Performed by: PSYCHIATRY & NEUROLOGY

## 2018-11-26 PROCEDURE — 3008F BODY MASS INDEX DOCD: CPT | Mod: CPTII,S$GLB,, | Performed by: PSYCHIATRY & NEUROLOGY

## 2018-11-26 PROCEDURE — 90833 PSYTX W PT W E/M 30 MIN: CPT | Mod: S$GLB,,, | Performed by: PSYCHIATRY & NEUROLOGY

## 2018-11-26 PROCEDURE — 3077F SYST BP >= 140 MM HG: CPT | Mod: CPTII,S$GLB,, | Performed by: PSYCHIATRY & NEUROLOGY

## 2018-11-26 PROCEDURE — 3079F DIAST BP 80-89 MM HG: CPT | Mod: CPTII,S$GLB,, | Performed by: PSYCHIATRY & NEUROLOGY

## 2018-11-26 PROCEDURE — 99214 OFFICE O/P EST MOD 30 MIN: CPT | Mod: S$GLB,,, | Performed by: PSYCHIATRY & NEUROLOGY

## 2018-11-26 NOTE — PROGRESS NOTES
"ID: 41yo WF with a h/o depression and anxiety, no prior full psych eval w/i [employer] [facility] [email or web address] system. Pt is a current pt of Anneliese Cochran LCSW who referred her for med mgmt along with , PCP. Pt has been on wait list since 3/2017. The pt is currently on xanax and cymbalta through PCP. Also on topamax and elavil for migraine mgmt through pcp    CC: depression.     Interim Hx: presents on time. Chart reviewed.     "i've been real even keeled but i've been still low energy, like no motivation and i've gained a lot of weight." pt then inquires if she is "treatment resistant or just depression" discussion that she may be both but we have also not had full effort with beh interventions. She agrees and reports a willingness to intervene for the next month with some movement and some diet changes- if unable to lose weight while on abilify we will consider d/c as the pt today is reporting limited benefit- although acknowledges improved anxiety since starting.     Also discuss trial of stimulant although trial of wellbutrin led to irritability.    Also discuss hormone and thyroid testing- may cont to consider.     On Psychiatric ROS:    Endorses somewhat improved sleep maintainance, improving anhedonia- "still not great", feeling helpless/hopeless- I just feel flat, dec energy- "none", stable concentration, inc'd appetite- since Spring 2017 (lost 4lbs since last appt), dec PMA "i do feel tired thinking about doing things."     Denies thoughts of SI/intent/plan.     Endorses feeling +easily overwhelmed, +ruminative thinking, +feeling tense/"on edge"- "on edge"     PSYCHOTHERAPY ADD-ON   30 (16-37*) minutes    Time: 20 minutes  Participants: Met with patient    Therapeutic Intervention Type: insight oriented psychotherapy, behavior modifying psychotherapy, supportive psychotherapy  Why chosen therapy is appropriate versus another modality: relevant to diagnosis, patient responds to this modality, evidence based " "practice    Target symptoms: health and wellness  Primary focus: diet, exercise  Psychotherapeutic techniques: support, education, validation, reframing, motivational interviewing    Outcome monitoring methods: self-report, observation, wgt monitoring    Patient's response to intervention:  The patient's response to intervention is accepting, motivated.    Progress toward goals:  The patient's progress toward goals is not progressing.    PPHx: Denies h/o self injury, inpt psych hospitalization, denies h/o suicide attempt     Current Psych Meds: fetzima 40mg po qam, abilify 2mg, buspar TID  Past Psych Meds: ritalin, prozac, buspar PRN ("it helped with the stress of planning the wedding"), cymbalta 120mg po daily (ineffective), zoloft 150mg (ineffective), prozac 20mg po qam, xanax 0.5mg po BID PRN anxiety, wellbutrin xl 150mg po qam    PMHx: migraines (since childhood)- botox as txmt, monthly "they're well managed"    SubstHx:   T- none  E- very rare due to migraine  D- none  Caffeine- 2 dr.peppers/day    FamPHx: none    Musculoskeletal:  Muscle strength/Tone: no dyskinesia/ no tremor  Gait/Station- non antalgic, no assistance needed    MSE: appears stated age, well groomed, appropriate dress, engages well with examiner. Good e/c. Speech reg rate and vol, nonpressured. Mood is "i mean, I guess it's fine." Affect congruent- low energy, some tearfulness, but does laugh and smile at times in appt. No evidence of her stated anxiety- this is baseline. Sensorium fully intact. Oriented to date/day/location, current events. Narrative memory intact. Intellectual function is avg based on vocab and basic fund of knowledge. Thought is c/l/gd. No tangentiality or circumstantiality. No FOI/SULTANA. Denies SI/HI. Denies A/VH. No evidence of delusions. Insight and Judgment intact.     Blood pressure (!) 189/87, pulse 94, height 5' 1" (1.549 m), weight 120.7 kg (266 lb 3.2 oz), last menstrual period 11/01/2018.    Suicide Risk Assessment: " "  Protective- age, gender, no prior attempts, no prior hospitalizations, no family h/o attempts, no ongoing substance abuse, no psychosis, , denies SI/intent/plan, seeking treatment, access to treatment, future oriented, good primary support, no access to firearms    Risk- race, ongoing Axis I sxs, no children    **Pt is at LOW imminent and long term risk of suicide given current risk factors.    Assessment:  43yo WF with a h/o depression and anxiety, no prior full psych eval w/i [facility] system. Pt is a current pt of CYRUS Barragan who referred her for med mgmt along with , PCP. Pt has been on wait list since 3/2017. The pt is currently on xanax and cymbalta through PCP. Also on topamax and elavil for migraine mgmt through pcp/neuro. On eval today she meets criteria for MDD, recurrent, severe w/o psychotic sxs despite a recent inc in cymbalta to 120mg po daily. Pt has been on cymbalta x 10yrs. Only recalls previous trial of prozac which was d/c'd for unknown reasons. Now requiring xanax daily- some days 2 tabs (ie:1mg total daily). Some room for behavioral interventions, as well and the pt is motivated. Has good ability to adhere to the new txmt plan. We tapered off cymbalta and started a trial of zoloft. Will discuss diet and exercise interventions more fully over course of txmt. today she has been on 100mg of zoloft x 1mo. Continues to state she does not feel "any different"- will inc one last titration to 150mg prior to discussing transition to alternate med. Have discussed trial of wellbutrin next as daytime lethargy and dec'd motivation are now the primary concerns to the pt, however current level of anxiety leading to xanax use 2x/d. May also consider mthfr mutation with some methyl folate supplement. Will add time released melatonin today for sleep. wgt loss would be an added benefit. Cont therapy as scheduled. No acute safety concerns. We added wellbutrin and today she reports " "improved energy and focus at work, but also perhaps inc'd anxiety? Tapered off zoloft to make room for trial of prozac. Will also add trial of buspar 10mg po BID which was helpful many years ago per pt. Last appt she reported  and therapist notice improvement on prozac 20mg po qam- buspar 10mg po TID also helpful with decreased xanax use and less variation in anxiety through day. Will cont both but inc prozac to 40mg to get at ongoing depressive and anxiety sxs- she then reported inc'd anxiety and I wondered about over activation on the prozac inc. not certain if she can afford genetic testing but is motivated for a trial of the methylfolate supplement. Dec'd the prozac back to 20mg po qam which today she reported did not affect her in either direction- started methylfolate supplementation- today reporting some improvement but con'd lethargy, lack of motivation, and reported anxiety. Agrees to do genetic testing. Genetic testing results returned and we started fetzima based on those results. Now reporting "maybe some improvement" but also reporting "no change in depression or anxiety"? Does report inc'd "anger"? May be overstimulation on fetzima (NE) and wellbutrin (dopa)- inc'd fetzima to more std dose of 40mg and within days the pt d/c'd the wellbutrin due to inc'd irritability. Now noticing "may some improvement" and the pt is more expansive with affect today in appt. Concern about bp but drinking a starbucks nitro at time of appt- will monitor at home for a few days and let me know via portal- we may inc dose in the future. Today pt reporting cont'd difficulty with anxiety and mood- started a trial of adjunct txmt with abilify 2mg- there has been weight gain but improved anxiety- will try for weight loss with beh interventions and we will re discuss in 4wks.     Axis I: MDD, recurrent, severe w/o psychotic sxs; PASTORA  Axis II: none at this time   Axis III: HTN, migraines  Axis IV: occupational, chronic " mental health  Axis V: GAF 70    Plan:   1. Cont fetzima 40mg po qam  2. Cont Buspar 15mg po TID  3. Cont methylfolate (purchasing online)   4. Cont abilify 2mg po daily   5. Cont therapy as scheduled; I am in contact with therapist  6. RTC 1mo    -Spent 30min face to face with the pt; >50% time spent in counseling   -Supportive therapy and psychoeducation provided  -R/B/SE's of medications discussed with the pt who expresses understanding and chooses to take medications as prescribed.   -Pt instructed to call clinic, 911 or go to nearest emergency room if sxs worsen or pt is in   crisis. The pt expresses understanding.

## 2018-12-17 RX ORDER — LEVOMILNACIPRAN HYDROCHLORIDE 40 MG/1
CAPSULE, EXTENDED RELEASE ORAL
Qty: 30 CAPSULE | Refills: 0 | Status: SHIPPED | OUTPATIENT
Start: 2018-12-17 | End: 2019-01-14 | Stop reason: SDUPTHER

## 2018-12-17 RX ORDER — ARIPIPRAZOLE 2 MG/1
TABLET ORAL
Qty: 30 TABLET | Refills: 0 | Status: SHIPPED | OUTPATIENT
Start: 2018-12-17 | End: 2019-01-14 | Stop reason: SDUPTHER

## 2019-01-14 RX ORDER — BUSPIRONE HYDROCHLORIDE 15 MG/1
TABLET ORAL
Qty: 90 TABLET | Refills: 0 | Status: SHIPPED | OUTPATIENT
Start: 2019-01-14 | End: 2019-02-15 | Stop reason: SDUPTHER

## 2019-01-14 RX ORDER — ARIPIPRAZOLE 2 MG/1
TABLET ORAL
Qty: 30 TABLET | Refills: 0 | Status: SHIPPED | OUTPATIENT
Start: 2019-01-14 | End: 2019-02-15

## 2019-01-14 RX ORDER — LEVOMILNACIPRAN HYDROCHLORIDE 40 MG/1
CAPSULE, EXTENDED RELEASE ORAL
Qty: 30 CAPSULE | Refills: 0 | Status: SHIPPED | OUTPATIENT
Start: 2019-01-14 | End: 2019-02-01

## 2019-01-21 ENCOUNTER — TELEPHONE (OUTPATIENT)
Dept: PSYCHIATRY | Facility: CLINIC | Age: 44
End: 2019-01-21

## 2019-01-21 NOTE — TELEPHONE ENCOUNTER
"Called and spoke to the pt.     She will stop abilify in anticipation of making a med change next appt.     Will also r/s the appt on 2/22 for sooner- 2/1 at 8:30am.     While pt states, "the depression just seems to be getting worse every day." she denies any safety concerns. Expresses future orientation mult times in phone call- for different med trials, appts, work events.   "

## 2019-01-21 NOTE — TELEPHONE ENCOUNTER
"Patient called to reschedule her 1/25/2019 appointment.   Stated due to work she is not able to come in for follow up until February.    Follow up appointment R/S 2/22/2019     Stated, Fetzima and Abilify are not working at all. "feeling the lowest horrible deep depression."    Please advice [phone]  Rosalba      "

## 2019-01-24 ENCOUNTER — TELEPHONE (OUTPATIENT)
Dept: NEUROLOGY | Facility: CLINIC | Age: 44
End: 2019-01-24

## 2019-01-24 NOTE — TELEPHONE ENCOUNTER
----- Message from Elizabeth Palacios sent at 1/24/2019  8:14 AM CST -----  Contact: Self  Type:  Patient Returning Call    Who Called:  Patient  Who Left Message for Patient: Not sure  Does the patient know what this is regarding?:  Patient had appt today at 10 AM and you rescheduled her to Friday 2/1 at 9 AM and patient has another appt at 8:30 AM that day.  Asking if you can get her a later appt that day.  Best Call Back Number:  [phone] (home)   Additional Information:  krystle

## 2019-01-24 NOTE — TELEPHONE ENCOUNTER
Called patient and rescheduled appointment due to provider being sick. Appointment successfully rescheduled. Patient verbalized understanding.

## 2019-02-01 ENCOUNTER — OFFICE VISIT (OUTPATIENT)
Dept: PSYCHIATRY | Facility: CLINIC | Age: 44
End: 2019-02-01
Payer: COMMERCIAL

## 2019-02-01 VITALS
HEIGHT: 61 IN | HEART RATE: 83 BPM | WEIGHT: 270.5 LBS | BODY MASS INDEX: 51.07 KG/M2 | DIASTOLIC BLOOD PRESSURE: 71 MMHG | SYSTOLIC BLOOD PRESSURE: 179 MMHG

## 2019-02-01 DIAGNOSIS — F33.2 MDD (MAJOR DEPRESSIVE DISORDER), RECURRENT SEVERE, WITHOUT PSYCHOSIS: Primary | ICD-10-CM

## 2019-02-01 DIAGNOSIS — R53.83 FATIGUE DUE TO DEPRESSION: ICD-10-CM

## 2019-02-01 DIAGNOSIS — G43.001 MIGRAINE WITHOUT AURA AND WITH STATUS MIGRAINOSUS, NOT INTRACTABLE: ICD-10-CM

## 2019-02-01 DIAGNOSIS — I10 HYPERTENSION, UNSPECIFIED TYPE: ICD-10-CM

## 2019-02-01 DIAGNOSIS — E66.01 MORBID OBESITY WITH BMI OF 50.0-59.9, ADULT: ICD-10-CM

## 2019-02-01 DIAGNOSIS — F32.A FATIGUE DUE TO DEPRESSION: ICD-10-CM

## 2019-02-01 DIAGNOSIS — F41.1 GAD (GENERALIZED ANXIETY DISORDER): ICD-10-CM

## 2019-02-01 PROCEDURE — 99999 PR PBB SHADOW E&M-EST. PATIENT-LVL III: CPT | Mod: PBBFAC,,, | Performed by: PSYCHIATRY & NEUROLOGY

## 2019-02-01 PROCEDURE — 3077F SYST BP >= 140 MM HG: CPT | Mod: CPTII,S$GLB,, | Performed by: PSYCHIATRY & NEUROLOGY

## 2019-02-01 PROCEDURE — 3078F DIAST BP <80 MM HG: CPT | Mod: CPTII,S$GLB,, | Performed by: PSYCHIATRY & NEUROLOGY

## 2019-02-01 PROCEDURE — 99999 PR PBB SHADOW E&M-EST. PATIENT-LVL III: ICD-10-PCS | Mod: PBBFAC,,, | Performed by: PSYCHIATRY & NEUROLOGY

## 2019-02-01 PROCEDURE — 99214 PR OFFICE/OUTPT VISIT, EST, LEVL IV, 30-39 MIN: ICD-10-PCS | Mod: S$GLB,,, | Performed by: PSYCHIATRY & NEUROLOGY

## 2019-02-01 PROCEDURE — 3078F PR MOST RECENT DIASTOLIC BLOOD PRESSURE < 80 MM HG: ICD-10-PCS | Mod: CPTII,S$GLB,, | Performed by: PSYCHIATRY & NEUROLOGY

## 2019-02-01 PROCEDURE — 3008F BODY MASS INDEX DOCD: CPT | Mod: CPTII,S$GLB,, | Performed by: PSYCHIATRY & NEUROLOGY

## 2019-02-01 PROCEDURE — 3077F PR MOST RECENT SYSTOLIC BLOOD PRESSURE >= 140 MM HG: ICD-10-PCS | Mod: CPTII,S$GLB,, | Performed by: PSYCHIATRY & NEUROLOGY

## 2019-02-01 PROCEDURE — 3008F PR BODY MASS INDEX (BMI) DOCUMENTED: ICD-10-PCS | Mod: CPTII,S$GLB,, | Performed by: PSYCHIATRY & NEUROLOGY

## 2019-02-01 PROCEDURE — 99214 OFFICE O/P EST MOD 30 MIN: CPT | Mod: S$GLB,,, | Performed by: PSYCHIATRY & NEUROLOGY

## 2019-02-01 RX ORDER — DESVENLAFAXINE SUCCINATE 50 MG/1
50 TABLET, EXTENDED RELEASE ORAL DAILY
Qty: 30 TABLET | Refills: 0 | Status: SHIPPED | OUTPATIENT
Start: 2019-02-01 | End: 2019-02-15 | Stop reason: DRUGHIGH

## 2019-02-01 RX ORDER — SUMATRIPTAN SUCCINATE 3 MG/1
INJECTION, SOLUTION SUBCUTANEOUS
Refills: 3 | COMMUNITY
Start: 2019-01-07 | End: 2020-08-25 | Stop reason: SDUPTHER

## 2019-02-01 NOTE — PROGRESS NOTES
"ID: 41yo WF with a h/o depression and anxiety, no prior full psych eval w/i [employer] system. Pt is a current pt of CYRUS BarraganW who referred her for med mgmt along with , PCP. Pt has been on wait list since 3/2017. The pt is currently on xanax and cymbalta through PCP. Also on topamax and elavil for migraine mgmt through pcp    CC: depression.     Interim Hx: presents on time. Chart reviewed.     "I just feel blah." pt easily tearful today. stopped taking the abilify following a phone conversation 1wk ago in which she described continued depression, worsening mood, also cont'd anxiety,    Regarding stopping abilify- "I don't feel better but I don't feel worse. I'm just stuck in my head that this is my baseline. That I'll never get better. This is how i'll feel forever. It's just frustrating. Do you think anything will ever work?"  "i'm just nervous all the time. I don't notice the buspar when I take it, but I notice it when I don't take it. I take it like clockwork three times a day..so maybe that's helping?"     We review genetic testing results again today- there are many meds she has not yet tried and some which worked for her in the past but then became ineffective later. Also some opportunities for adjunct treatment.     Have discussed trial of stimulant although trial of wellbutrin led to irritability.    Have also discussed hormone and thyroid testing- may cont to consider.     Will transition fetzima to pristiq today and cont to eval response    On Psychiatric ROS:    Endorses somewhat improved sleep maintainance, anhedonia, feeling helpless/hopeless- I just feel flat, dec energy- "none", stable concentration, inc'd appetite- since Spring 2017, dec PMA "i do feel tired thinking about doing things."     Denies thoughts of SI/intent/plan.     Endorses feeling +easily overwhelmed, +ruminative thinking, +feeling tense/"on edge"- "on edge"     PPHx: Denies h/o self injury, inpt psych " "hospitalization, denies h/o suicide attempt     Current Psych Meds: fetzima 40mg po qam, abilify 2mg, buspar TID  Past Psych Meds: ritalin, prozac, buspar PRN ("it helped with the stress of planning the wedding"), cymbalta 120mg po daily (ineffective), zoloft 150mg (ineffective), prozac 20mg po qam, xanax 0.5mg po BID PRN anxiety, wellbutrin xl 150mg po qam    PMHx: migraines (since childhood)- botox as txmt, monthly "they're well managed"    SubstHx:   T- none  E- very rare due to migraine  D- none  Caffeine- 2 dr.peppers/day    FamPHx: none    Musculoskeletal:  Muscle strength/Tone: no dyskinesia/ no tremor  Gait/Station- non antalgic, no assistance needed    MSE: appears stated age, well groomed, appropriate dress, engages well with examiner. Good e/c. Speech reg rate and vol, nonpressured. Mood is "i'm just really having trouble." Affect congruent- low energy, tearfulness. Sensorium fully intact. Oriented to date/day/location, current events. Narrative memory intact. Intellectual function is avg based on vocab and basic fund of knowledge. Thought is c/l/gd. No tangentiality or circumstantiality. No FOI/SULTANA. Denies SI/HI. Denies A/VH. No evidence of delusions. Insight and Judgment intact.     Blood pressure (!) 179/71, pulse 83, height 5' 1" (1.549 m), weight 122.7 kg (270 lb 8 oz), last menstrual period 01/24/2019.    Suicide Risk Assessment:   Protective- age, gender, no prior attempts, no prior hospitalizations, no family h/o attempts, no ongoing substance abuse, no psychosis, , denies SI/intent/plan, seeking treatment, access to treatment, future oriented, good primary support, no access to firearms    Risk- race, ongoing Axis I sxs, no children    **Pt is at LOW imminent and long term risk of suicide given current risk factors.    Assessment:  41yo WF with a h/o depression and anxiety, no prior full [facility] eval [facility]. Pt is a current pt of Anneliese Cochran LCSW who referred her for med " "mgmt along with , PCP. Pt has been on wait list since 3/2017. The pt is currently on xanax and cymbalta through PCP. Also on topamax and elavil for migraine mgmt through pcp/neuro. On eval today she meets criteria for MDD, recurrent, severe w/o psychotic sxs despite a recent inc in cymbalta to 120mg po daily. Pt has been on cymbalta x 10yrs. Only recalls previous trial of prozac which was d/c'd for unknown reasons. Now requiring xanax daily- some days 2 tabs (ie:1mg total daily). Some room for behavioral interventions, as well and the pt is motivated. Has good ability to adhere to the new txmt plan. We tapered off cymbalta and started a trial of zoloft. Will discuss diet and exercise interventions more fully over course of txmt. today she has been on 100mg of zoloft x 1mo. Continues to state she does not feel "any different"- will inc one last titration to 150mg prior to discussing transition to alternate med. Have discussed trial of wellbutrin next as daytime lethargy and dec'd motivation are now the primary concerns to the pt, however current level of anxiety leading to xanax use 2x/d. May also consider mthfr mutation with some methyl folate supplement. Will add time released melatonin today for sleep. wgt loss would be an added benefit. Cont therapy as scheduled. No acute safety concerns. We added wellbutrin and today she reports improved energy and focus at work, but also perhaps inc'd anxiety? Tapered off zoloft to make room for trial of prozac. Will also add trial of buspar 10mg po BID which was helpful many years ago per pt. Last appt she reported  and therapist notice improvement on prozac 20mg po qam- buspar 10mg po TID also helpful with decreased xanax use and less variation in anxiety through day. Will cont both but inc prozac to 40mg to get at ongoing depressive and anxiety sxs- she then reported inc'd anxiety and I wondered about over activation on the prozac inc. not certain if she " "can afford genetic testing but is motivated for a trial of the methylfolate supplement. Dec'd the prozac back to 20mg po qam which today she reported did not affect her in either direction- started methylfolate supplementation- today reporting some improvement but con'd lethargy, lack of motivation, and reported anxiety. Agrees to do genetic testing. Genetic testing results returned and we started fetzima based on those results. Now reporting "maybe some improvement" but also reporting "no change in depression or anxiety"? Does report inc'd "anger"? May be overstimulation on fetzima (NE) and wellbutrin (dopa)- inc'd fetzima to more std dose of 40mg and within days the pt d/c'd the wellbutrin due to inc'd irritability. Now noticing "may some improvement" and the pt is more expansive with affect today in appt. Concern about bp but drinking a starbucks nitro at time of appt- will monitor at home for a few days and let me know via portal- we may inc dose in the future. Today pt reporting cont'd difficulty with anxiety and mood- started a trial of adjunct txmt with abilify 2mg- there has been weight gain but improved anxiety- will try for weight loss with beh interventions and we will re discuss in 4wks. Called the clinic one week ago with inc'd depressive sxs and wanting to make changes. Stopped abilify and here today to make some cont'd changes. "No different"- w/o the abilify. Will taper off fetzima and start trial of pristiq. Denies acute safety concerns. Will follow closely through changes.     Axis I: MDD, recurrent, severe w/o psychotic sxs; PASTORA  Axis II: none at this time   Axis III: HTN, migraines  Axis IV: occupational, chronic mental health  Axis V: GAF 70    Plan:   1. Cont fetzima 40mg po qam  2. Cont Buspar 15mg po TID  3. Cont methylfolate (purchasing online)   4. No longer taking abilify  5. Cont therapy as scheduled; I am in contact with therapist  6. RTC 2wks    -Spent 30min face to face with the pt; " >50% time spent in counseling   -Supportive therapy and psychoeducation provided  -R/B/SE's of medications discussed with the pt who expresses understanding and chooses to take medications as prescribed.   -Pt instructed to call clinic, 911 or go to nearest emergency room if sxs worsen or pt is in   crisis. The pt expresses understanding.

## 2019-02-05 ENCOUNTER — LAB VISIT (OUTPATIENT)
Dept: LAB | Facility: HOSPITAL | Age: 44
End: 2019-02-05
Attending: PSYCHIATRY & NEUROLOGY
Payer: COMMERCIAL

## 2019-02-05 ENCOUNTER — OFFICE VISIT (OUTPATIENT)
Dept: NEUROLOGY | Facility: CLINIC | Age: 44
End: 2019-02-05
Payer: COMMERCIAL

## 2019-02-05 VITALS
HEART RATE: 84 BPM | WEIGHT: 266.75 LBS | DIASTOLIC BLOOD PRESSURE: 97 MMHG | RESPIRATION RATE: 16 BRPM | SYSTOLIC BLOOD PRESSURE: 154 MMHG | BODY MASS INDEX: 50.36 KG/M2 | HEIGHT: 61 IN

## 2019-02-05 DIAGNOSIS — I10 HYPERTENSION, UNSPECIFIED TYPE: ICD-10-CM

## 2019-02-05 DIAGNOSIS — G43.719 INTRACTABLE CHRONIC MIGRAINE WITHOUT AURA AND WITHOUT STATUS MIGRAINOSUS: ICD-10-CM

## 2019-02-05 DIAGNOSIS — E66.01 MORBID OBESITY: ICD-10-CM

## 2019-02-05 DIAGNOSIS — F41.1 GAD (GENERALIZED ANXIETY DISORDER): ICD-10-CM

## 2019-02-05 DIAGNOSIS — F33.2 SEVERE RECURRENT MAJOR DEPRESSION WITHOUT PSYCHOTIC FEATURES: ICD-10-CM

## 2019-02-05 DIAGNOSIS — G43.719 INTRACTABLE CHRONIC MIGRAINE WITHOUT AURA AND WITHOUT STATUS MIGRAINOSUS: Primary | ICD-10-CM

## 2019-02-05 LAB
ALBUMIN SERPL BCP-MCNC: 4 G/DL
ALP SERPL-CCNC: 35 U/L
ALT SERPL W/O P-5'-P-CCNC: 62 U/L
ANION GAP SERPL CALC-SCNC: 9 MMOL/L
AST SERPL-CCNC: 35 U/L
BASOPHILS # BLD AUTO: 0.08 K/UL
BASOPHILS NFR BLD: 1.1 %
BILIRUB SERPL-MCNC: 0.5 MG/DL
BUN SERPL-MCNC: 7 MG/DL
CALCIUM SERPL-MCNC: 10.9 MG/DL
CHLORIDE SERPL-SCNC: 103 MMOL/L
CO2 SERPL-SCNC: 26 MMOL/L
CREAT SERPL-MCNC: 0.9 MG/DL
DIFFERENTIAL METHOD: NORMAL
EOSINOPHIL # BLD AUTO: 0.2 K/UL
EOSINOPHIL NFR BLD: 2.1 %
ERYTHROCYTE [DISTWIDTH] IN BLOOD BY AUTOMATED COUNT: 12.7 %
EST. GFR  (AFRICAN AMERICAN): >60 ML/MIN/1.73 M^2
EST. GFR  (NON AFRICAN AMERICAN): >60 ML/MIN/1.73 M^2
GLUCOSE SERPL-MCNC: 135 MG/DL
HCT VFR BLD AUTO: 43.4 %
HGB BLD-MCNC: 14 G/DL
IMM GRANULOCYTES # BLD AUTO: 0.03 K/UL
IMM GRANULOCYTES NFR BLD AUTO: 0.4 %
LYMPHOCYTES # BLD AUTO: 2.1 K/UL
LYMPHOCYTES NFR BLD: 29.7 %
MCH RBC QN AUTO: 29.7 PG
MCHC RBC AUTO-ENTMCNC: 32.3 G/DL
MCV RBC AUTO: 92 FL
MONOCYTES # BLD AUTO: 0.7 K/UL
MONOCYTES NFR BLD: 9 %
NEUTROPHILS # BLD AUTO: 4.2 K/UL
NEUTROPHILS NFR BLD: 57.7 %
NRBC BLD-RTO: 0 /100 WBC
PLATELET # BLD AUTO: 349 K/UL
PMV BLD AUTO: 11.8 FL
POTASSIUM SERPL-SCNC: 3.2 MMOL/L
PROT SERPL-MCNC: 7.6 G/DL
RBC # BLD AUTO: 4.72 M/UL
SODIUM SERPL-SCNC: 138 MMOL/L
TSH SERPL DL<=0.005 MIU/L-ACNC: 1.7 UIU/ML
WBC # BLD AUTO: 7.21 K/UL

## 2019-02-05 PROCEDURE — 85025 COMPLETE CBC W/AUTO DIFF WBC: CPT

## 2019-02-05 PROCEDURE — 99999 PR PBB SHADOW E&M-EST. PATIENT-LVL III: CPT | Mod: PBBFAC,,, | Performed by: PSYCHIATRY & NEUROLOGY

## 2019-02-05 PROCEDURE — 36415 COLL VENOUS BLD VENIPUNCTURE: CPT | Mod: PO

## 2019-02-05 PROCEDURE — 3008F BODY MASS INDEX DOCD: CPT | Mod: CPTII,S$GLB,, | Performed by: PSYCHIATRY & NEUROLOGY

## 2019-02-05 PROCEDURE — 80053 COMPREHEN METABOLIC PANEL: CPT

## 2019-02-05 PROCEDURE — 99205 OFFICE O/P NEW HI 60 MIN: CPT | Mod: S$GLB,,, | Performed by: PSYCHIATRY & NEUROLOGY

## 2019-02-05 PROCEDURE — 3077F PR MOST RECENT SYSTOLIC BLOOD PRESSURE >= 140 MM HG: ICD-10-PCS | Mod: CPTII,S$GLB,, | Performed by: PSYCHIATRY & NEUROLOGY

## 2019-02-05 PROCEDURE — 3080F DIAST BP >= 90 MM HG: CPT | Mod: CPTII,S$GLB,, | Performed by: PSYCHIATRY & NEUROLOGY

## 2019-02-05 PROCEDURE — 99205 PR OFFICE/OUTPT VISIT, NEW, LEVL V, 60-74 MIN: ICD-10-PCS | Mod: S$GLB,,, | Performed by: PSYCHIATRY & NEUROLOGY

## 2019-02-05 PROCEDURE — 99999 PR PBB SHADOW E&M-EST. PATIENT-LVL III: ICD-10-PCS | Mod: PBBFAC,,, | Performed by: PSYCHIATRY & NEUROLOGY

## 2019-02-05 PROCEDURE — 84443 ASSAY THYROID STIM HORMONE: CPT

## 2019-02-05 PROCEDURE — 3008F PR BODY MASS INDEX (BMI) DOCUMENTED: ICD-10-PCS | Mod: CPTII,S$GLB,, | Performed by: PSYCHIATRY & NEUROLOGY

## 2019-02-05 PROCEDURE — 3077F SYST BP >= 140 MM HG: CPT | Mod: CPTII,S$GLB,, | Performed by: PSYCHIATRY & NEUROLOGY

## 2019-02-05 PROCEDURE — 3080F PR MOST RECENT DIASTOLIC BLOOD PRESSURE >= 90 MM HG: ICD-10-PCS | Mod: CPTII,S$GLB,, | Performed by: PSYCHIATRY & NEUROLOGY

## 2019-02-05 RX ORDER — PROCHLORPERAZINE MALEATE 5 MG
5 TABLET ORAL 3 TIMES DAILY PRN
Qty: 10 TABLET | Refills: 3 | Status: SHIPPED | OUTPATIENT
Start: 2019-02-05 | End: 2020-08-25

## 2019-02-05 RX ORDER — KETOROLAC TROMETHAMINE 15.75 MG/1
15.75 SPRAY, METERED NASAL EVERY 6 HOURS
Qty: 5 EACH | Refills: 5 | Status: SHIPPED | OUTPATIENT
Start: 2019-02-05 | End: 2019-02-06 | Stop reason: SDUPTHER

## 2019-02-05 RX ORDER — FROVATRIPTAN SUCCINATE 2.5 MG/1
2.5 TABLET, FILM COATED ORAL
Qty: 9 TABLET | Refills: 3 | Status: SHIPPED | OUTPATIENT
Start: 2019-02-05 | End: 2020-08-25 | Stop reason: SDUPTHER

## 2019-02-05 RX ORDER — BUTALBITAL, ASPIRIN, AND CAFFEINE 325; 50; 40 MG/1; MG/1; MG/1
1 CAPSULE ORAL EVERY 4 HOURS PRN
Qty: 12 CAPSULE | Refills: 3 | Status: SHIPPED | OUTPATIENT
Start: 2019-02-05 | End: 2019-03-07

## 2019-02-05 NOTE — PROGRESS NOTES
"Subjective:       Patient ID: Anneliese Rocha is a 44 y.o. female.    Chief Complaint: Migraine    HPI   The patient is a very pleasant 45 y/o female presenting with chief complaint of headache. She has had headaches since "birth." She was cared for by Dr. Nba Maldonado. She is looking forward to establish in our clinic as the location s very convenient for her. She has several headaches a month that last 3 to 4 days. It typically alternates sides, either right or left. She has tried multiple medications in the past including but not limited to Topamax, Elavil, Pristiq, Cymbalta, Magnesium. Currently on Toprol and HCTZ. She has received Botox with good benefit for the last 3 to 4 years.  In terms of acute treatment, she has tried just about all the available triptans. She has found Frova to be likely the most effective in that group. She also responds to Zembrace, Imitrex brand caused significant triptan reaction. Midrin, Goodie powders, Fiorinal, all partially effective. In the past she has had imaging studies reportedly normal.  Please see details of headache characteristics below.    Headache questionnaire    1. When did your Headaches start?    birth      2. Where are your headaches located?   Side of head      3. Your headache's characteristics:   Excruciating,Stabbing, Sharp      4. How long does the headache last?   Hours-days      5. How often does the headache occur?   monthly      6. Are your headaches preceded or accompanied by other symptoms? no   If yes, please describe.  n/a      7. Does the headache awaken you at night? yes   If so, how often? n/a        8. Please ana lilia the word that best describes your headache's intensity:    severe      9. Using a scale of 1 through 10, with 0 = no pain and 10 = the worst pain:   What score is your headache now? 0   What score is your headache at its worst? 10   What score is your headache at its best? 5        10. Possible associated headache symptoms:  [x]  " Sensitivity to light  [] Dizziness  [] Nasal or sinus pressure/ pain   [x] Sensitivity to noise  [] Vertigo  [] Problems with concentration  [x] Sensitivity to smells  [] Ringing in ears  [] Problems with memory    [x] Blurred vision  [] Irritability  [] Problems with task completion   [] Double vision  [] Anger  []  Problems with relaxation  [] Loss of appetite  [] Anxiety  [] Neck tightness, Neck pain  [x] Nausea   [] Nasal congestion  [x] Vomiting         11. Headache improving factors:  [x] Sleep  [] Heat  [x] Darkness  [x] Ice  [] Local pressure [] Menses (period)  [] Massage   [] Medications:        12. Headache worsening factors:   [] Fatigue [] Sneezing  [] Changes in Weather  [x] Light [] Bending Over [] Stress  [x] Noise [] Ovulation  [] Multiple Sclerosis Flare-Up  [] Smells  [] Menses  [] Food   [] Coughing [] Alcohol      13. Number of caffeinated drinks per day: 2      14. Number of diet drinks per day:  0      15. Have you seen any other [facility] within the last 3 years?  No        Medications Tried for Headache    AED Neuromodulators  MAOIs  Ergot Alkaloids    Acetazolamide (Diamox) [] Phenelzine (Nardil) [] Dihydroergotamine (Migranal) []   Carbamazepine (Tegretol) [] Tranylcypromine (Parnate) [] Ergotamine (Ergomar) []   Gabapentin (Neurontin) [] Antihistamine/Serotonergic  Triptans    Lacosamide (Vimpat) [] Cyproheptadine (Periactin) [] Almotriptan (Axert) []   Lamotrigine (Lamictal) [] Antihypertensives  Eletriptan (Relpax) [x]   Levatiracetam (Keppra) [] Atenolol (Tenormin) [] Frovatriptan (Frova) [x]   Oxcarbazepine (Trileptal) [] Bisoprostol (Zebeta) [] Naratriptan (Amerge) []   Phenobarbital [] Candesartan (Atacand) [] Rizatriptan (Maxalt) [x]   Phenytoin (Dilantin) [] Diltiazem (Cardizem) [] Sumatriptan (Imitrex) [x]   Pregabalin (Lyrica) [] Lisinopril (Prinivil, Zestril) [] Zolmitriptan (Zomig) [x]   Primidone (Mysoline) [] Metoprolol (Toprol) [] Combo Abortives    Tiagabine  (Gabatril) [] Nadolol (Corgard) [] Butalbital and Acetaminophen (Bupap) []   Topiramate (Topamax)  (Trokendi) [x] Nicardipine (Cardene) []     Vigabatrin (Sabril) [] Nimodipine (Nimotop) [] Butalbital, Acetaminophen, and caffeine (Fioricet) []   Valproic Acid (Depakote) (Divalproex Sodium) [] Propranolol (Inderal) []     Zonisamide (Zonegran) [] Telmisartan (Micardis) [] Butalbital, Aspirin, and caffeine (Fiorinal) [x]   Benzodiazepines  Timolol (Blocadren) []     Alprazolam (Xanax) [] Verapamil (Calan, Verelan) [] Butalbital, Caffeine, Acetaminophen, and Codeine (Fioricet with Codeine) []   Diazepam (Valium) [] NSAIDs      Lorazepam (Ativan) [] Acetaminophen (Tylenol) [x]     Clonazepam (Klonopin) [] Acetylsalicylic Acid (Aspirin) [] Butalbital, Caffeine, Aspirin, and Codeine  (Fiorinal with Codeine) []   Antidepressants  Diclofenac (Cambia) [x]     Amitriptyline (Elavil) [x] Ibuprofen (Motrin) []     Desipramine (Norpramin) [] Indomethacin (Indocin) [] Aspirin, Caffeine, and Acetaminophen (Excedrin) (Goodys) [x]   Doxepin (Sinequan) [] Ketoprofen (Orudis) []     Fluoxetine (Prozac) [] Ketorolac (Toradol) [] Acetaminophen, Dichloralphenazone, and Isometheptene (Midrin) [x]   Imipramine (Tofranil) [] Naproxen (Anaprox) (Aleve) [x]     Nortriptyline (Pamelor) [] Meclofenamic Acid (Meclomen) []     Venlafaxine (Effexor) [] Meloxicam (Mobic) [] Aspirin, Salicylamide, and Caffeine (BC Powder) []        Review of Systems   Constitutional: Negative for activity change, appetite change, fatigue and fever.   HENT: Negative for congestion, dental problem, hearing loss, sinus pressure, tinnitus, trouble swallowing and voice change.    Eyes: Negative for photophobia, pain, redness and visual disturbance.   Respiratory: Negative for cough, chest tightness and shortness of breath.    Cardiovascular: Negative for chest pain, palpitations and leg swelling.   Gastrointestinal: Negative for abdominal pain, blood in stool, nausea and  vomiting.   Endocrine: Negative for cold intolerance and heat intolerance.   Genitourinary: Negative for difficulty urinating, frequency, menstrual problem and urgency.   Musculoskeletal: Negative for arthralgias, back pain, gait problem, joint swelling, myalgias, neck pain and neck stiffness.   Skin: Negative.    Neurological: Positive for headaches. Negative for dizziness, tremors, seizures, syncope, facial asymmetry, speech difficulty, weakness, light-headedness and numbness.   Hematological: Negative for adenopathy. Does not bruise/bleed easily.   Psychiatric/Behavioral: Negative for agitation, behavioral problems, confusion, decreased concentration, self-injury, sleep disturbance and suicidal ideas. The patient is not nervous/anxious and is not hyperactive.            Past Medical History:   Diagnosis Date    Depression with anxiety     tolerating Cymbalta 90mg daily    Fatigue     HTN (hypertension)     Iron deficiency anemia     Migraines     Getting headaches about monthly; using Topamax 25mg and Elavil 50mg for prevention and Frova/cambia as needed; following with Dr. Maldonado    PCOS (polycystic ovarian syndrome)      Past Surgical History:   Procedure Laterality Date    BREAST BIOPSY Right     core  neg    oophrectomy  1994    left     Family History   Problem Relation Age of Onset    Diabetes Father     Hypertension Father     Coronary artery disease Father 55    Thyroid disease Mother     Migraines Mother     Hypertension Mother     Melanoma Paternal Uncle      Social History     Socioeconomic History    Marital status:      Spouse name: Not on file    Number of children: 0    Years of education: Not on file    Highest education level: Not on file   Social Needs    Financial resource strain: Not on file    Food insecurity - worry: Not on file    Food insecurity - inability: Not on file    Transportation needs - medical: Not on file    Transportation needs - non-medical: Not  on file   Occupational History    Occupation: accounting     Employer: [email or web address]     Comment: [employer]   Tobacco Use    Smoking status: Never Smoker    Smokeless tobacco: Never Used   Substance and Sexual Activity    Alcohol use: Yes     Comment: rarely    Drug use: No    Sexual activity: Not on file   Other Topics Concern    Not on file   Social History Narrative    From [place]. Lives with .         Exercise: none regularly     Review of patient's allergies indicates:   Allergen Reactions    Lisinopril      Dizziness, nausea       Current Outpatient Medications:     busPIRone (BUSPAR) 15 MG tablet, TAKE 1 TABLET(15 MG) BY MOUTH THREE TIMES DAILY, Disp: 90 tablet, Rfl: 0    desvenlafaxine succinate (PRISTIQ) 50 MG Tb24, Take 1 tablet (50 mg total) by mouth once daily., Disp: 30 tablet, Rfl: 0    ferrous sulfate (IRON) 325 mg (65 mg iron) Tab tablet, iron, Disp: , Rfl:     FROVA 2.5 mg tablet, Take 2.5 mg by mouth as needed. , Disp: , Rfl: 2    hydroCHLOROthiazide (HYDRODIURIL) 25 MG tablet, Take 1 tablet (25 mg total) by mouth once daily., Disp: 30 tablet, Rfl: 11    metoprolol succinate (TOPROL-XL) 200 MG 24 hr tablet, TAKE 1 TABLET(200 MG) BY MOUTH EVERY DAY, Disp: 30 tablet, Rfl: 11    onabotulinumtoxina (BOTOX) 200 unit SolR, Botox 200 unit injection  Take 155 units as needed by injection route., Disp: , Rfl:     ondansetron (ZOFRAN) 4 MG tablet, TK 1 T PO TID PRN, Disp: , Rfl: 1    ZEMBRACE SYMTOUCH 3 mg/0.5 mL PnIj, , Disp: , Rfl: 3    ALPRAZolam (XANAX) 0.5 MG tablet, TAKE 1 tab as needed for anxiety, Disp: 15 tablet, Rfl: 0    ARIPiprazole (ABILIFY) 2 MG Tab, TAKE 1 TABLET(2 MG) BY MOUTH EVERY DAY, Disp: 30 tablet, Rfl: 0    levomilnacipran (FETZIMA) 20 mg Cs24, Take 20 mg by mouth once daily., Disp: 5 capsule, Rfl: 0      Objective:      Vitals:    02/05/19 0812   BP: (!) 154/97   Pulse: 84   Resp: 16     Body mass index is 50.4 kg/m².    Physical Exam    Constitutional:   She  appears well-developed and well-nourished. She is well groomed    HENT:    Head: Atraumatic, oral and nasal mucosa intact  Eyes: Conjunctivae and EOM are normal. Pupils are equal, round, and reactive to light OU  Neck: Neck supple. No thyromegaly present  Cardiovascular: Normal rate and normal heart sounds  No murmur heard  Pulmonary/Chest: Effort normal and breath sounds normal  Musculoskeletal: Normal range of motion. No joint stiffness. No vertebral point tenderness  Skin: Skin is warm and dry  Psychiatric: Normal mood and affect     Neuro exam:    Mental status:  Awake, attentive, Alert, oriented to self, place, year and month  Language function is intact  Naming, repetition and spontaneous meaningful speech expression are intact  Speech fluency is good and speech is clear  Remote and recent memory are good  Patient able to count backwards by 7    No findings to suggest executive dysfuntion    Patient has adequate insight      Cranial Nerves:  Smell was not formally evaluated  Cranial Nerves II - XII: intact  Pursuits were smooth, normal saccades, no nystagmus OU  Funduscopic exam - disc were flat and pink, no exudates or hemorrhages OU  Motor - facial movement was symmetrical and normal     Palate moved well and was symmetrical with normal palatal and oral sensation  Tongue movements were full    Coordination:     Rapid alternating movements and rapid finger tapping - normal bilaterally  Finger to nose - normal and symmetric bilaterally   Arm roll - smooth and symmetric   No intentional or positional tremor.     Motor:  Normal muscle bulk and symmetry. No fasciculations were noted    No pronator drift  Strength 5/5 bilaterally     Reflexes:  Tendon reflexes were 2 + at biceps, triceps, brachioradialis, patellar, and 1+ Achilles bilaterally  On plantar stimulation toes were down going bilaterally  No clonus was noted     Sensory: Intact to light touch, pin prick in all extremities. Vibration and proprioception  "intact in all extremities.     Gait: Romberg absent. Normal gait.     Review of Data:  Lab Results   Component Value Date     02/16/2018    K 4.8 02/16/2018     02/16/2018    CO2 27 02/16/2018    BUN 7 02/16/2018    CREATININE 0.9 02/16/2018     (H) 02/16/2018    HGBA1C 5.4 02/20/2018    AST 22 02/16/2018    ALT 33 02/16/2018    ALBUMIN 3.7 02/16/2018    PROT 7.5 02/16/2018    BILITOT 0.3 02/16/2018    CHOL 230 (H) 02/16/2018    HDL 60 02/16/2018    LDLCALC 144.2 02/16/2018    TRIG 129 02/16/2018       Lab Results   Component Value Date    WBC 8.03 02/16/2018    HGB 11.5 (L) 02/16/2018    HCT 38.8 02/16/2018    MCV 86 02/16/2018     02/16/2018       Lab Results   Component Value Date    TSH 3.105 02/16/2018           Assessment and Plan     The patient has chronic migraines ( G43.719) and suffers from headaches more than 15 days a month lasting more than 4 hours a day with no relief of symptoms despite trying multiple medications including but not limited to TOPAMAX, ELAVIL, METOPROLOL, MAGNESIUM, and other. The patient will be an ideal candidate for Botox. We are planning for 3 treatments 3 months apart and aiming for at least 50% improvement in the symptoms. If we see no improvement after 3 treatments, we will discontinue the injections.    The patient has chronic migraines ( G43.719) and suffers from headaches more than 15 days a month lasting more than 4 hours a day with no relief of symptoms despite trying multiple medications including but not limited to topamax,, elavil, metoprolol, magnesium amongts many others since she has had migraines since "birth."  The patient will be an ideal candidate for Botox. We are planning for 3 treatments 3 months apart and aiming for at least 50% improvement in the symptoms. If we see no improvement after 3 treatments, we will discontinue the injections.  PCOS  PASTORA  HTN  Depression  Obesity  Add Emgality for prevention  Optimization of acute " treatment:  1. Start Sprix plus Frova as first line of treatment or Sprix plus Zembrace is headache very rapidly escalating.  2. If pain persists after 4 hours, repeat Frova plus Compazine 5 mg  3. If pain persists take Fiorinal 1 or 2   This protocol can be repeated the following day. If no relief, call the clinic  RTC in 3 months with diaries  CBC, CMP, TSH  I have discussed the side effects of the medications prescribed and the patient acknowledges understanding  Counseling:  More than 50% of the 60 minute encounter was spent face to face counseling the patient regarding current status and future plan of care as well as side of the medications. All questions were answered to patient's satisfaction         Saúl Braswell M.D  Medical Director, Headache and Facial Pain  [facility]

## 2019-02-05 NOTE — PROGRESS NOTES
Headache questionnaire    1. When did your Headaches start?    birth      2. Where are your headaches located?   Side of head      3. Your headache's characteristics:   Excruciating,Stabbing, Sharp      4. How long does the headache last?   Hours-days      5. How often does the headache occur?   monthly      6. Are your headaches preceded or accompanied by other symptoms? no   If yes, please describe.  n/a      7. Does the headache awaken you at night? yes   If so, how often? n/a        8. Please ana lilia the word that best describes your headache's intensity:    severe      9. Using a scale of 1 through 10, with 0 = no pain and 10 = the worst pain:   What score is your headache now? 0   What score is your headache at its worst? 10   What score is your headache at its best? 5        10. Possible associated headache symptoms:  [x]  Sensitivity to light  [] Dizziness  [] Nasal or sinus pressure/ pain   [x] Sensitivity to noise  [] Vertigo  [] Problems with concentration  [x] Sensitivity to smells  [] Ringing in ears  [] Problems with memory    [x] Blurred vision  [] Irritability  [] Problems with task completion   [] Double vision  [] Anger  []  Problems with relaxation  [] Loss of appetite  [] Anxiety  [] Neck tightness, Neck pain  [x] Nausea   [] Nasal congestion  [x] Vomiting         11. Headache improving factors:  [x] Sleep  [] Heat  [x] Darkness  [x] Ice  [] Local pressure [] Menses (period)  [] Massage   [] Medications:        12. Headache worsening factors:   [] Fatigue [] Sneezing  [] Changes in Weather  [x] Light [] Bending Over [] Stress  [x] Noise [] Ovulation  [] Multiple Sclerosis Flare-Up  [] Smells  [] Menses  [] Food   [] Coughing [] Alcohol      13. Number of caffeinated drinks per day: 2      14. Number of diet drinks per day:  0      15. Have you seen any other [facility] within the last 3 years?  No        Please Check any Medications Tried for Headache    AED Neuromodulators  MAOIs  Ergot  Alkaloids    Acetazolamide (Diamox) [] Phenelzine (Nardil) [] Dihydroergotamine (Migranal) []   Carbamazepine (Tegretol) [] Tranylcypromine (Parnate) [] Ergotamine (Ergomar) []   Gabapentin (Neurontin) [] Antihistamine/Serotonergic  Triptans    Lacosamide (Vimpat) [] Cyproheptadine (Periactin) [] Almotriptan (Axert) []   Lamotrigine (Lamictal) [] Antihypertensives  Eletriptan (Relpax) [x]   Levatiracetam (Keppra) [] Atenolol (Tenormin) [] Frovatriptan (Frova) [x]   Oxcarbazepine (Trileptal) [] Bisoprostol (Zebeta) [] Naratriptan (Amerge) []   Phenobarbital [] Candesartan (Atacand) [] Rizatriptan (Maxalt) [x]   Phenytoin (Dilantin) [] Diltiazem (Cardizem) [] Sumatriptan (Imitrex) [x]   Pregabalin (Lyrica) [] Lisinopril (Prinivil, Zestril) [] Zolmitriptan (Zomig) [x]   Primidone (Mysoline) [] Metoprolol (Toprol) [] Combo Abortives    Tiagabine (Gabatril) [] Nadolol (Corgard) [] Butalbital and Acetaminophen (Bupap) []   Topiramate (Topamax)  (Trokendi) [x] Nicardipine (Cardene) []     Vigabatrin (Sabril) [] Nimodipine (Nimotop) [] Butalbital, Acetaminophen, and caffeine (Fioricet) []   Valproic Acid (Depakote) (Divalproex Sodium) [] Propranolol (Inderal) []     Zonisamide (Zonegran) [] Telmisartan (Micardis) [] Butalbital, Aspirin, and caffeine (Fiorinal) [x]   Benzodiazepines  Timolol (Blocadren) []     Alprazolam (Xanax) [] Verapamil (Calan, Verelan) [] Butalbital, Caffeine, Acetaminophen, and Codeine (Fioricet with Codeine) []   Diazepam (Valium) [] NSAIDs      Lorazepam (Ativan) [] Acetaminophen (Tylenol) [x]     Clonazepam (Klonopin) [] Acetylsalicylic Acid (Aspirin) [] Butalbital, Caffeine, Aspirin, and Codeine  (Fiorinal with Codeine) []   Antidepressants  Diclofenac (Cambia) [x]     Amitriptyline (Elavil) [x] Ibuprofen (Motrin) []     Desipramine (Norpramin) [] Indomethacin (Indocin) [] Aspirin, Caffeine, and Acetaminophen (Excedrin) (Goodys) [x]   Doxepin (Sinequan) [] Ketoprofen (Orudis) []     Fluoxetine  (Prozac) [] Ketorolac (Toradol) [] Acetaminophen, Dichloralphenazone, and Isometheptene (Midrin) [x]   Imipramine (Tofranil) [] Naproxen (Anaprox) (Aleve) [x]     Nortriptyline (Pamelor) [] Meclofenamic Acid (Meclomen) []     Venlafaxine (Effexor) [] Meloxicam (Mobic) [] Aspirin, Salicylamide, and Caffeine (BC Powder) []

## 2019-02-05 NOTE — PATIENT INSTRUCTIONS
Continue Botox. Request authorization  Add Emgality for prevention  Optimization of acute treatment:  1. Start Sprix plus Frova as first line of treatment or Sprix plus Zembrace is headache very rapidly escalating.  2. If pain persists after 4 hours, repeat Frova plus Compazine 5 mg  3. If pain persists take Fiorinal 1 or 2   This protocol can be repeated the following day. If no relief, call the clinic  RTC in 3 months with diaries

## 2019-02-05 NOTE — LETTER
February 5, 2019      Steve Holcomb MD  [address] [facility] [address] [place] [address]           [facility] [place]  [address] [facility] [address] [place] [address]  Phone: [phone]  Fax: [phone]          Patient: Anneliese Rocha   MR Number: [id number]   YOB: 1975   Date of Visit: 2/5/2019       Dear Dr. Steve Holcomb:    Thank you for referring Anneliese Rocha to me for evaluation. Attached you will find relevant portions of my assessment and plan of care.    If you have questions, please do not hesitate to call me. I look forward to following Anneliese Rocha along with you.    Sincerely,    Mira Braswell MD    Enclosure  CC:  No Recipients    If you would like to receive this communication electronically, please contact [email or web address] or [phone] to request more information on [facility] Link access.    For providers and/or their staff who would like to refer a patient to [facility], please contact us through our one-stop-shop provider referral line, [facility], at [phone].    If you feel you have received this communication in error or would no longer like to receive these types of communications, please e-mail [email or web address]

## 2019-02-06 ENCOUNTER — TELEPHONE (OUTPATIENT)
Dept: PHARMACY | Facility: CLINIC | Age: 44
End: 2019-02-06

## 2019-02-06 ENCOUNTER — TELEPHONE (OUTPATIENT)
Dept: NEUROLOGY | Facility: CLINIC | Age: 44
End: 2019-02-06

## 2019-02-06 DIAGNOSIS — G43.719 INTRACTABLE CHRONIC MIGRAINE WITHOUT AURA AND WITHOUT STATUS MIGRAINOSUS: Primary | ICD-10-CM

## 2019-02-06 RX ORDER — KETOROLAC TROMETHAMINE 15.75 MG/1
15.75 SPRAY, METERED NASAL EVERY 6 HOURS
Qty: 5 EACH | Refills: 5 | Status: SHIPPED | OUTPATIENT
Start: 2019-02-06 | End: 2021-11-23

## 2019-02-06 NOTE — TELEPHONE ENCOUNTER
----- Message from Mira Braswell MD sent at 2/5/2019  8:27 PM CST -----  Stable labs. Normal Thyroid test

## 2019-02-06 NOTE — TELEPHONE ENCOUNTER
----- Message from Alexandrea Magallanes sent at 2/6/2019  2:47 PM CST -----  Regarding: Sprix  Good afternoon,    I was just speaking with the patient regarding the Emgality prescription that was sent to us at [facility]. Patient states that she spoke with [facility] regarding the Sprix yesterday and they had issues with the insurance. Sprix is not a medication that we can fill at [facility] so there's no way for me to look into it, however I told them patient I would message you guys and see if someone could reach out to [facility] to see what the issue may be.     Thanks,  Alexandrea Magallanes [facility]  Patient Care Advocate   [facility]   Direct ext: [phone]   Fax: [phone]   [email or web address]

## 2019-02-12 NOTE — TELEPHONE ENCOUNTER
DOCUMENTATION ONLY:  Prior Authorization for Emgality approved from 02/11/19 to 05/11/19    Case Id: [id number]    Co-pay: $15 - $0 with Emgality E-Voucher    Forwarded to the clinical pharmacist for consult and shipment.    -ARR

## 2019-02-15 ENCOUNTER — OFFICE VISIT (OUTPATIENT)
Dept: PSYCHIATRY | Facility: CLINIC | Age: 44
End: 2019-02-15
Payer: COMMERCIAL

## 2019-02-15 VITALS
HEIGHT: 71 IN | DIASTOLIC BLOOD PRESSURE: 79 MMHG | SYSTOLIC BLOOD PRESSURE: 176 MMHG | HEART RATE: 70 BPM | BODY MASS INDEX: 37.61 KG/M2 | WEIGHT: 268.63 LBS

## 2019-02-15 DIAGNOSIS — E66.01 MORBID OBESITY WITH BMI OF 50.0-59.9, ADULT: ICD-10-CM

## 2019-02-15 DIAGNOSIS — F41.1 GAD (GENERALIZED ANXIETY DISORDER): ICD-10-CM

## 2019-02-15 DIAGNOSIS — F32.A FATIGUE DUE TO DEPRESSION: ICD-10-CM

## 2019-02-15 DIAGNOSIS — F33.2 MDD (MAJOR DEPRESSIVE DISORDER), RECURRENT SEVERE, WITHOUT PSYCHOSIS: Primary | ICD-10-CM

## 2019-02-15 DIAGNOSIS — R53.83 FATIGUE DUE TO DEPRESSION: ICD-10-CM

## 2019-02-15 DIAGNOSIS — I10 HYPERTENSION, UNSPECIFIED TYPE: ICD-10-CM

## 2019-02-15 DIAGNOSIS — G43.001 MIGRAINE WITHOUT AURA AND WITH STATUS MIGRAINOSUS, NOT INTRACTABLE: ICD-10-CM

## 2019-02-15 PROCEDURE — 3077F SYST BP >= 140 MM HG: CPT | Mod: CPTII,S$GLB,, | Performed by: PSYCHIATRY & NEUROLOGY

## 2019-02-15 PROCEDURE — 3077F PR MOST RECENT SYSTOLIC BLOOD PRESSURE >= 140 MM HG: ICD-10-PCS | Mod: CPTII,S$GLB,, | Performed by: PSYCHIATRY & NEUROLOGY

## 2019-02-15 PROCEDURE — 3078F PR MOST RECENT DIASTOLIC BLOOD PRESSURE < 80 MM HG: ICD-10-PCS | Mod: CPTII,S$GLB,, | Performed by: PSYCHIATRY & NEUROLOGY

## 2019-02-15 PROCEDURE — 99999 PR PBB SHADOW E&M-EST. PATIENT-LVL III: ICD-10-PCS | Mod: PBBFAC,,, | Performed by: PSYCHIATRY & NEUROLOGY

## 2019-02-15 PROCEDURE — 99214 PR OFFICE/OUTPT VISIT, EST, LEVL IV, 30-39 MIN: ICD-10-PCS | Mod: S$GLB,,, | Performed by: PSYCHIATRY & NEUROLOGY

## 2019-02-15 PROCEDURE — 3078F DIAST BP <80 MM HG: CPT | Mod: CPTII,S$GLB,, | Performed by: PSYCHIATRY & NEUROLOGY

## 2019-02-15 PROCEDURE — 99999 PR PBB SHADOW E&M-EST. PATIENT-LVL III: CPT | Mod: PBBFAC,,, | Performed by: PSYCHIATRY & NEUROLOGY

## 2019-02-15 PROCEDURE — 99214 OFFICE O/P EST MOD 30 MIN: CPT | Mod: S$GLB,,, | Performed by: PSYCHIATRY & NEUROLOGY

## 2019-02-15 PROCEDURE — 3008F PR BODY MASS INDEX (BMI) DOCUMENTED: ICD-10-PCS | Mod: CPTII,S$GLB,, | Performed by: PSYCHIATRY & NEUROLOGY

## 2019-02-15 PROCEDURE — 3008F BODY MASS INDEX DOCD: CPT | Mod: CPTII,S$GLB,, | Performed by: PSYCHIATRY & NEUROLOGY

## 2019-02-15 RX ORDER — DESVENLAFAXINE 100 MG/1
100 TABLET, EXTENDED RELEASE ORAL DAILY
Qty: 30 TABLET | Refills: 0 | Status: SHIPPED | OUTPATIENT
Start: 2019-02-15 | End: 2019-03-14 | Stop reason: SDUPTHER

## 2019-02-15 RX ORDER — TRAZODONE HYDROCHLORIDE 50 MG/1
50 TABLET ORAL NIGHTLY
Qty: 30 TABLET | Refills: 0 | Status: SHIPPED | OUTPATIENT
Start: 2019-02-15 | End: 2019-03-14 | Stop reason: SDUPTHER

## 2019-02-15 RX ORDER — BUSPIRONE HYDROCHLORIDE 15 MG/1
TABLET ORAL
Qty: 90 TABLET | Refills: 0 | Status: SHIPPED | OUTPATIENT
Start: 2019-02-15 | End: 2019-03-14 | Stop reason: SDUPTHER

## 2019-02-15 RX ORDER — NEOMYCIN SULFATE, POLYMYXIN B SULFATE AND DEXAMETHASONE 3.5; 10000; 1 MG/ML; [USP'U]/ML; MG/ML
SUSPENSION/ DROPS OPHTHALMIC
Refills: 0 | COMMUNITY
Start: 2019-02-02 | End: 2019-09-20 | Stop reason: ALTCHOICE

## 2019-02-15 NOTE — PROGRESS NOTES
"ID: 41yo WF with a h/o depression and anxiety, no prior full psych eval w/i [facility] system. Pt is a current pt of Anneliese Cochran LCSW who referred her for med mgmt along with , PCP. Pt has been on wait list since 3/2017. The pt is currently on xanax and cymbalta through PCP. Also on topamax and elavil for migraine mgmt through pcp    CC: depression.     Interim Hx: presents on time. Chart reviewed. 2wk f/u on transition to pristiq    "I feel a little better. I definitely still have anxiety, but i'm also not as hungry. I do feel the continued lack of motivation, though but i'm feeling cautiously optimistic. I do think I feel better."     Discuss her bld pressure and need to reach out to PCP regarding uncontrolled bp on 2 meds. (was equally elevated prior to pristiq)     On Psychiatric ROS:    Endorses somewhat improved sleep maintenance- wakes nightly and has difficulty reinitiating, anhedonia, feeling helpless/hopeless- less so b/c med has been helpful, dec energy- "none", stable concentration, inc'd appetite- since Spring 2017, dec PMA "i do feel tired thinking about doing things."     Denies thoughts of SI/intent/plan.     Endorses feeling +easily overwhelmed, +ruminative thinking, +feeling tense/"on edge"- "on edge"     PPHx: Denies h/o self injury, inpt psych hospitalization, denies h/o suicide attempt     Current Psych Meds: fetzima 40mg po qam, abilify 2mg, buspar TID  Past Psych Meds: ritalin, prozac, buspar PRN ("it helped with the stress of planning the wedding"), cymbalta 120mg po daily (ineffective), zoloft 150mg (ineffective), prozac 20mg po qam, xanax 0.5mg po BID PRN anxiety, wellbutrin xl 150mg po qam    PMHx: migraines (since childhood)- botox as txmt, monthly "they're well managed"    SubstHx:   T- none  E- very rare due to migraine  D- none  Caffeine- 2 dr.peppers/day    FamPHx: none    Musculoskeletal:  Muscle strength/Tone: no dyskinesia/ no tremor  Gait/Station- non antalgic, no " "assistance needed    MSE: appears stated age, well groomed, appropriate dress, engages well with examiner. Good e/c. Speech reg rate and vol, nonpressured. Mood is "i'm just really having trouble." Affect congruent- low energy, tearfulness. Sensorium fully intact. Oriented to date/day/location, current events. Narrative memory intact. Intellectual function is avg based on vocab and basic fund of knowledge. Thought is c/l/gd. No tangentiality or circumstantiality. No FOI/SULTANA. Denies SI/HI. Denies A/VH. No evidence of delusions. Insight and Judgment intact.     Blood pressure (!) 176/79, pulse 70, height 5' 11" (1.803 m), weight 121.8 kg (268 lb 9.6 oz), last menstrual period 01/24/2019.    Suicide Risk Assessment:   Protective- age, gender, no prior attempts, no prior hospitalizations, no family h/o attempts, no ongoing substance abuse, no psychosis, , denies SI/intent/plan, seeking treatment, access to treatment, future oriented, good primary support, no access to firearms    Risk- race, ongoing Axis I sxs, no children    **Pt is at LOW imminent and long term risk of suicide given current risk factors.    Assessment:  41yo WF with a h/o depression and anxiety, no prior full psych eval w/i [facility]. Pt is a current pt of Anneliese Cochran LCSW who referred her for med mgmt along with , PCP. Pt has been on wait list since 3/2017. The pt is currently on xanax and cymbalta through PCP. Also on topamax and elavil for migraine mgmt through pcp/neuro. On eval today she meets criteria for MDD, recurrent, severe w/o psychotic sxs despite a recent inc in cymbalta to 120mg po daily. Pt has been on cymbalta x 10yrs. Only recalls previous trial of prozac which was d/c'd for unknown reasons. Now requiring xanax daily- some days 2 tabs (ie:1mg total daily). Some room for behavioral interventions, as well and the pt is motivated. Has good ability to adhere to the new txmt plan. We tapered off cymbalta and " "started a trial of zoloft. Will discuss diet and exercise interventions more fully over course of txmt. today she has been on 100mg of zoloft x 1mo. Continues to state she does not feel "any different"- will inc one last titration to 150mg prior to discussing transition to alternate med. Have discussed trial of wellbutrin next as daytime lethargy and dec'd motivation are now the primary concerns to the pt, however current level of anxiety leading to xanax use 2x/d. May also consider mthfr mutation with some methyl folate supplement. Will add time released melatonin today for sleep. wgt loss would be an added benefit. Cont therapy as scheduled. No acute safety concerns. We added wellbutrin and today she reports improved energy and focus at work, but also perhaps inc'd anxiety? Tapered off zoloft to make room for trial of prozac. Will also add trial of buspar 10mg po BID which was helpful many years ago per pt. Last appt she reported  and therapist notice improvement on prozac 20mg po qam- buspar 10mg po TID also helpful with decreased xanax use and less variation in anxiety through day. Will cont both but inc prozac to 40mg to get at ongoing depressive and anxiety sxs- she then reported inc'd anxiety and I wondered about over activation on the prozac inc. not certain if she can afford genetic testing but is motivated for a trial of the methylfolate supplement. Dec'd the prozac back to 20mg po qam which today she reported did not affect her in either direction- started methylfolate supplementation- today reporting some improvement but con'd lethargy, lack of motivation, and reported anxiety. Agrees to do genetic testing. Genetic testing results returned and we started fetzima based on those results. Now reporting "maybe some improvement" but also reporting "no change in depression or anxiety"? Does report inc'd "anger"? May be overstimulation on fetzima (NE) and wellbutrin (dopa)- inc'd fetzima to more std dose " "of 40mg and within days the pt d/c'd the wellbutrin due to inc'd irritability. Now noticing "may some improvement" and the pt is more expansive with affect today in appt. Concern about bp but drinking a starbucks nitro at time of appt- will monitor at home for a few days and let me know via portal- we may inc dose in the future. Today pt reporting cont'd difficulty with anxiety and mood- started a trial of adjunct txmt with abilify 2mg- there has been weight gain but improved anxiety- will try for weight loss with beh interventions and we will re discuss in 4wks. Called the clinic one week ago with inc'd depressive sxs and wanting to make changes. Stopped abilify and here today to make some cont'd changes. "No different"- w/o the abilify. Will taper off fetzima and start trial of pristiq. On 2wk f/u of pristiq pt is feeling "a little better". Will cont- will also add trazodone for sleep. Denies acute safety concerns. Will follow closely through changes.     [place] I: MDD, recurrent, severe w/o psychotic sxs; PASTORA  Axis II: none at this time   Axis III: HTN, migraines  Axis IV: occupational, chronic mental health  Axis V: GAF 70    Plan:   1. inc pristiq to 100mg po qam  2. Cont Buspar 15mg po TID  3. Cont methylfolate (purchasing online)   4. Start trial of trazodone 50mg po qhs   5. Cont therapy as scheduled; I am in contact with therapist  6. RTC 4wks    -Spent 30min face to face with the pt; >50% time spent in counseling   -Supportive therapy and psychoeducation provided  -R/B/SE's of medications discussed with the pt who expresses understanding and chooses to take medications as prescribed.   -Pt instructed to call clinic, 911 or go to nearest emergency room if sxs worsen or pt is in   crisis. The pt expresses understanding.    "

## 2019-02-26 ENCOUNTER — TELEPHONE (OUTPATIENT)
Dept: PHARMACY | Facility: CLINIC | Age: 44
End: 2019-02-26

## 2019-02-26 NOTE — TELEPHONE ENCOUNTER
Initial Emgality consult completed on  . Emgality 240mg (loading dose) will be shipped on  to arrive at patient's home on 3/1 via [employer]. $0.00 copay. Patient intends to start Emgality on 3/1. Address confirmed. Confirmed 2 patient identifiers - name and . Therapy Appropriate.    Mrs Rocha has had about 10 migraines per month, lasting 4-8 hours at a time reaching a pain level of 9/10 at worst. She has had migraines all her life, at least after 2 years old. She has not had to visit the ER or UC in the past 4 weeks. She reports triggers of stress, some foods, and er menstrual cycle. She reports her QoL as a 5/10.     --Injection experience:   Informed patient on online injection video on  website.    Counseled patient on administration directions:  - Inject two injections (240mg) into the skin as a loading dose, followed by one injection (120mg) every 4 weeks thereafter.   - Store in refrigerator prior to use (do not freeze, do not shake, keep in original box until use).   - Take out of the refrigerator 30 minutes prior to injection to reach room temperature.  - Wash hands before and after injection.  - Monthly RX will come with gauze, band aids, and alcohol swabs.  - Patient may self-inject in either the front/top of the thighs, abdomen- but at least 2 inches away from belly button   - If someone else is giving the injection, they may also use the outer part of your upper arm or your buttocks.   - If injecting 2 pens for the loading dose, use 2 different injection sites.   - Patient was instructed to rotate injections sites monthly.  - Inspect medication - should be clear and colorless to slightly yellow to slightly brown.   - Patient is to wipe down the injection site with the alcohol pad, wait to dry.    - Remove white base cap from pen (twist to remove).  - Place the pen flat against the injection site and turn the lock ring to the unlocked position then push down and hold the teal button -  there will be an initial click; in 10 seconds you will hear a second click.  - Remove the pen from skin and check to see if the gray plunger is visible - this will indicate that the injection is complete.   - Patient will use sharps container; once full, per state law, she/he may securely lock the sharps container and place in trash. Pharmacy will replace the sharps at no additional charge.    Patient was counseled on possible side effects:  - Injection site reaction: redness, soreness, itching, bruising, which should resolve within 3-5 days.  - Allergic reactions: itching, rash, hives, swelling of face, mouth, tongue, throat, trouble breathing.   - Informed that there is no data in pregnancy/breastfeeding.     Advised patient to keep a calendar to stay compliant.   Consultation included: indication; goals of treatment; administration; storage and handling; side effects; how to handle side effects; the importance of compliance; the importance of keeping all follow up appointments.  Patient understands to report any medication changes to [facility] and provider. All questions answered and addressed to patients satisfaction. I will f/u with patient in 7-10 days from start, [facility] to contact patient in 3 weeks for refills.    Mamadou Branch, PharmD  Clinical Pharmacist  [facility]  P: [phone]    [employer] sent 2/26 @ 5:10pm

## 2019-02-26 NOTE — TELEPHONE ENCOUNTER
Call to complete initial consult for Emgality. $0.00 copay at 004. No answer, left voicemail.    Mamadou Branch, PharmD  Clinical Pharmacist  [facility]  P: [phone]

## 2019-03-14 ENCOUNTER — OFFICE VISIT (OUTPATIENT)
Dept: PSYCHIATRY | Facility: CLINIC | Age: 44
End: 2019-03-14
Payer: COMMERCIAL

## 2019-03-14 VITALS
BODY MASS INDEX: 36.86 KG/M2 | SYSTOLIC BLOOD PRESSURE: 176 MMHG | HEART RATE: 78 BPM | HEIGHT: 71 IN | DIASTOLIC BLOOD PRESSURE: 78 MMHG | WEIGHT: 263.31 LBS

## 2019-03-14 DIAGNOSIS — F32.A FATIGUE DUE TO DEPRESSION: ICD-10-CM

## 2019-03-14 DIAGNOSIS — R53.83 FATIGUE DUE TO DEPRESSION: ICD-10-CM

## 2019-03-14 DIAGNOSIS — F33.2 MDD (MAJOR DEPRESSIVE DISORDER), RECURRENT SEVERE, WITHOUT PSYCHOSIS: Primary | ICD-10-CM

## 2019-03-14 DIAGNOSIS — I10 HYPERTENSION, UNSPECIFIED TYPE: ICD-10-CM

## 2019-03-14 DIAGNOSIS — G43.001 MIGRAINE WITHOUT AURA AND WITH STATUS MIGRAINOSUS, NOT INTRACTABLE: ICD-10-CM

## 2019-03-14 DIAGNOSIS — E66.01 MORBID OBESITY WITH BMI OF 50.0-59.9, ADULT: ICD-10-CM

## 2019-03-14 DIAGNOSIS — F41.1 GAD (GENERALIZED ANXIETY DISORDER): ICD-10-CM

## 2019-03-14 DIAGNOSIS — F41.8 DEPRESSION WITH ANXIETY: ICD-10-CM

## 2019-03-14 PROCEDURE — 3078F PR MOST RECENT DIASTOLIC BLOOD PRESSURE < 80 MM HG: ICD-10-PCS | Mod: CPTII,S$GLB,, | Performed by: PSYCHIATRY & NEUROLOGY

## 2019-03-14 PROCEDURE — 99999 PR PBB SHADOW E&M-EST. PATIENT-LVL III: CPT | Mod: PBBFAC,,, | Performed by: PSYCHIATRY & NEUROLOGY

## 2019-03-14 PROCEDURE — 99214 OFFICE O/P EST MOD 30 MIN: CPT | Mod: S$GLB,,, | Performed by: PSYCHIATRY & NEUROLOGY

## 2019-03-14 PROCEDURE — 99214 PR OFFICE/OUTPT VISIT, EST, LEVL IV, 30-39 MIN: ICD-10-PCS | Mod: S$GLB,,, | Performed by: PSYCHIATRY & NEUROLOGY

## 2019-03-14 PROCEDURE — 3077F PR MOST RECENT SYSTOLIC BLOOD PRESSURE >= 140 MM HG: ICD-10-PCS | Mod: CPTII,S$GLB,, | Performed by: PSYCHIATRY & NEUROLOGY

## 2019-03-14 PROCEDURE — 3008F BODY MASS INDEX DOCD: CPT | Mod: CPTII,S$GLB,, | Performed by: PSYCHIATRY & NEUROLOGY

## 2019-03-14 PROCEDURE — 3078F DIAST BP <80 MM HG: CPT | Mod: CPTII,S$GLB,, | Performed by: PSYCHIATRY & NEUROLOGY

## 2019-03-14 PROCEDURE — 99999 PR PBB SHADOW E&M-EST. PATIENT-LVL III: ICD-10-PCS | Mod: PBBFAC,,, | Performed by: PSYCHIATRY & NEUROLOGY

## 2019-03-14 PROCEDURE — 3008F PR BODY MASS INDEX (BMI) DOCUMENTED: ICD-10-PCS | Mod: CPTII,S$GLB,, | Performed by: PSYCHIATRY & NEUROLOGY

## 2019-03-14 PROCEDURE — 3077F SYST BP >= 140 MM HG: CPT | Mod: CPTII,S$GLB,, | Performed by: PSYCHIATRY & NEUROLOGY

## 2019-03-14 RX ORDER — DESVENLAFAXINE 100 MG/1
100 TABLET, EXTENDED RELEASE ORAL DAILY
Qty: 30 TABLET | Refills: 0 | Status: SHIPPED | OUTPATIENT
Start: 2019-03-14 | End: 2019-04-17 | Stop reason: SDUPTHER

## 2019-03-14 RX ORDER — TRAZODONE HYDROCHLORIDE 100 MG/1
TABLET ORAL
Qty: 45 TABLET | Refills: 0 | Status: SHIPPED | OUTPATIENT
Start: 2019-03-14 | End: 2019-04-17 | Stop reason: SDUPTHER

## 2019-03-14 RX ORDER — ALPRAZOLAM 0.5 MG/1
TABLET ORAL
Qty: 15 TABLET | Refills: 0 | Status: SHIPPED | OUTPATIENT
Start: 2019-03-14 | End: 2019-04-04 | Stop reason: SDUPTHER

## 2019-03-14 RX ORDER — BUSPIRONE HYDROCHLORIDE 15 MG/1
TABLET ORAL
Qty: 90 TABLET | Refills: 0 | Status: SHIPPED | OUTPATIENT
Start: 2019-03-14 | End: 2019-04-26 | Stop reason: SDUPTHER

## 2019-03-14 NOTE — PROGRESS NOTES
"ID: 41yo WF with a h/o depression and anxiety, no prior full psych eval w/i [facility]. Pt is a current pt of Anneliese Cochran LCSW who referred her for med mgmt along with , PCP. Pt has been on wait list since 3/2017. The pt is currently on xanax and cymbalta through PCP. Also on topamax and elavil for migraine mgmt through pcp    CC: depression.     Interim Hx: presents on time. Chart reviewed.     "well, I think I feel a little better. I actually laugh now, I don't feel incredibly low, but I have absolutely no energy, though, still and I also still have the anxiety."    Trazodone 100mg po qhs has improved sleep but pt continues to wake through the night frequently.    Considering additional adjunct txmt but want to get stable sleep and reassess. Will inc trazodone to 150mg po qam.     On Psychiatric ROS:    Endorses somewhat improved sleep maintenance- wakes nightly and has difficulty reinitiating, anhedonia, feeling helpless/hopeless- less so b/c med has been helpful, dec energy- "none", stable concentration, inc'd appetite- since Spring 2017, dec PMA "i do feel tired thinking about doing things."     Denies thoughts of SI/intent/plan.     Endorses feeling +easily overwhelmed, +ruminative thinking, +feeling tense/"on edge"- "on edge"     PPHx: Denies h/o self injury, inpt psych hospitalization, denies h/o suicide attempt     Current Psych Meds: pristiq 100mg qam, buspar TID, trazodone 100mg po qhs PRN insomnia  Past Psych Meds: ritalin, prozac, buspar PRN ("it helped with the stress of planning the wedding"), cymbalta 120mg po daily (ineffective), zoloft 150mg (ineffective), prozac 20mg po qam, xanax 0.5mg po BID PRN anxiety, wellbutrin xl 150mg po qam, fetzima 40mg po qam, abilify 2mg     PMHx: migraines (since childhood)- botox as txmt, monthly "they're well managed"    SubstHx:   T- none  E- very rare due to migraine  D- none  Caffeine- 2 dr.peppers/day    FamPHx: " "none    Musculoskeletal:  Muscle strength/Tone: no dyskinesia/ no tremor  Gait/Station- non antalgic, no assistance needed    MSE: appears stated age, well groomed, appropriate dress, engages well with examiner. Good e/c. Speech reg rate and vol, nonpressured. Mood is "i'm a little better" Affect congruent- low energy, but does smile more easily. Sensorium fully intact. Oriented to date/day/location, current events. Narrative memory intact. Intellectual function is avg based on vocab and basic fund of knowledge. Thought is c/l/gd. No tangentiality or circumstantiality. No FOI/SULTANA. Denies SI/HI. Denies A/VH. No evidence of delusions. Insight and Judgment intact.     Blood pressure (!) 176/78, pulse 78, height 5' 11" (1.803 m), weight 119.4 kg (263 lb 4.8 oz), last menstrual period 02/27/2019.    Suicide Risk Assessment:   Protective- age, gender, no prior attempts, no prior hospitalizations, no family h/o attempts, no ongoing substance abuse, no psychosis, , denies SI/intent/plan, seeking treatment, access to treatment, future oriented, good primary support, no access to firearms    Risk- race, ongoing Axis I sxs, no children    **Pt is at LOW imminent and long term risk of suicide given current risk factors.    Assessment:  43yo WF with a h/o depression and anxiety, no prior full [facility] eval [facility]. Pt is a current pt of Anneliese Cochran LCSW who referred her for med mgmt along with , PCP. Pt has been on wait list since 3/2017. The pt is currently on xanax and cymbalta through PCP. Also on topamax and elavil for migraine mgmt through pcp/neuro. On eval today she meets criteria for MDD, recurrent, severe w/o psychotic sxs despite a recent inc in cymbalta to 120mg po daily. Pt has been on cymbalta x 10yrs. Only recalls previous trial of prozac which was d/c'd for unknown reasons. Now requiring xanax daily- some days 2 tabs (ie:1mg total daily). Some room for behavioral interventions, as " "well and the pt is motivated. Has good ability to adhere to the new txmt plan. We tapered off cymbalta and started a trial of zoloft. Will discuss diet and exercise interventions more fully over course of txmt. today she has been on 100mg of zoloft x 1mo. Continues to state she does not feel "any different"- will inc one last titration to 150mg prior to discussing transition to alternate med. Have discussed trial of wellbutrin next as daytime lethargy and dec'd motivation are now the primary concerns to the pt, however current level of anxiety leading to xanax use 2x/d. May also consider mthfr mutation with some methyl folate supplement. Will add time released melatonin today for sleep. wgt loss would be an added benefit. Cont therapy as scheduled. No acute safety concerns. We added wellbutrin and today she reports improved energy and focus at work, but also perhaps inc'd anxiety? Tapered off zoloft to make room for trial of prozac. Will also add trial of buspar 10mg po BID which was helpful many years ago per pt. Last appt she reported  and therapist notice improvement on prozac 20mg po qam- buspar 10mg po TID also helpful with decreased xanax use and less variation in anxiety through day. Will cont both but inc prozac to 40mg to get at ongoing depressive and anxiety sxs- she then reported inc'd anxiety and I wondered about over activation on the prozac inc. not certain if she can afford genetic testing but is motivated for a trial of the methylfolate supplement. Dec'd the prozac back to 20mg po qam which today she reported did not affect her in either direction- started methylfolate supplementation- today reporting some improvement but con'd lethargy, lack of motivation, and reported anxiety. Agrees to do genetic testing. Genetic testing results returned and we started fetzima based on those results. Now reporting "maybe some improvement" but also reporting "no change in depression or anxiety"? Does report " "inc'd "anger"? May be overstimulation on fetzima (NE) and wellbutrin (dopa)- inc'd fetzima to more std dose of 40mg and within days the pt d/c'd the wellbutrin due to inc'd irritability. Now noticing "may some improvement" and the pt is more expansive with affect today in appt. Concern about bp but drinking a starbucks nitro at time of appt- will monitor at home for a few days and let me know via portal- we may inc dose in the future. Today pt reporting cont'd difficulty with anxiety and mood- started a trial of adjunct txmt with abilify 2mg- there has been weight gain but improved anxiety- will try for weight loss with beh interventions and we will re discuss in 4wks. Called the clinic one week ago with inc'd depressive sxs and wanting to make changes. Stopped abilify and here today to make some cont'd changes. "No different"- w/o the abilify. Will taper off fetzima and start trial of pristiq. On f/u of pristiq pt is feeling "a little better" but anxiety continues sleep a little improved on trazodone 100mg will inc to 150mg po qhs and likely add adjunct txmt at next appt.     [place] I: MDD, recurrent, severe w/o psychotic sxs; PASTORA  Axis II: none at this time   Axis III: HTN, migraines  Axis IV: occupational, chronic mental health  Axis V: GAF 70    Plan:   1. Cont pristiq 100mg po qam  2. Cont Buspar 15mg po TID  3. Cont methylfolate (purchasing online)   4. inc trazodone to 150mg po qhs   5. Cont therapy as scheduled; I am in contact with therapist  6. RTC 4wks    -Spent 30min face to face with the pt; >50% time spent in counseling   -Supportive therapy and psychoeducation provided  -R/B/SE's of medications discussed with the pt who expresses understanding and chooses to take medications as prescribed.   -Pt instructed to call clinic, 911 or go to nearest emergency room if sxs worsen or pt is in   crisis. The pt expresses understanding.    "

## 2019-03-25 ENCOUNTER — TELEPHONE (OUTPATIENT)
Dept: PHARMACY | Facility: CLINIC | Age: 44
End: 2019-03-25

## 2019-04-04 DIAGNOSIS — F41.8 DEPRESSION WITH ANXIETY: ICD-10-CM

## 2019-04-04 RX ORDER — ALPRAZOLAM 0.5 MG/1
TABLET ORAL
Qty: 15 TABLET | Refills: 0 | Status: SHIPPED | OUTPATIENT
Start: 2019-04-04 | End: 2019-05-21 | Stop reason: SDUPTHER

## 2019-04-05 DIAGNOSIS — I10 HYPERTENSION, UNSPECIFIED TYPE: ICD-10-CM

## 2019-04-08 RX ORDER — HYDROCHLOROTHIAZIDE 25 MG/1
TABLET ORAL
Qty: 90 TABLET | Refills: 0 | Status: SHIPPED | OUTPATIENT
Start: 2019-04-08 | End: 2019-07-26 | Stop reason: SDUPTHER

## 2019-04-08 NOTE — PROGRESS NOTES
Refill Authorization Note     is requesting a refill authorization.    Brief assessment and rationale for refill: APPROVE: needs labs and annual  Name and strength of medication: hctz  Medication-related problems identified:   Requires labs  Requires appointment    Medication Therapy Plan: NTBS (BMP): lobito 3 more                   How patient will take medication: t1t po bid          Comments:   BP Readings from Last 3 Encounters:   02/05/19 (!) 154/97   02/23/18 (!) 154/108   02/06/18 (!) 138/100      Lab Results   Component Value Date    CREATININE 0.9 02/05/2019    BUN 7 02/05/2019     02/05/2019    K 3.2 (L) 02/05/2019     02/05/2019    CO2 26 02/05/2019

## 2019-04-17 RX ORDER — TRAZODONE HYDROCHLORIDE 100 MG/1
TABLET ORAL
Qty: 45 TABLET | Refills: 0 | Status: SHIPPED | OUTPATIENT
Start: 2019-04-17 | End: 2019-05-17

## 2019-04-17 RX ORDER — DESVENLAFAXINE 100 MG/1
TABLET, EXTENDED RELEASE ORAL
Qty: 30 TABLET | Refills: 0 | Status: SHIPPED | OUTPATIENT
Start: 2019-04-17 | End: 2019-05-17 | Stop reason: SDUPTHER

## 2019-04-23 ENCOUNTER — TELEPHONE (OUTPATIENT)
Dept: PHARMACY | Facility: CLINIC | Age: 44
End: 2019-04-23

## 2019-04-25 NOTE — TELEPHONE ENCOUNTER
Refill call for Emgality. Pt. confirmed they are in need of a refill. Will prepare to ship on  to arrive  at HOME Address in [facility] with patient consent. Copay $0 at 004. Patient has 0 doses remaining. Last dose given on 3/29, next dose due on . Patient has not started any new medications, no missed doses and no side effects. Patient taking medication as prescribed, no change in directions. Patient did not have any concerns or questions for the pharmacist at this time.  & address verified.-NAYANA

## 2019-04-26 ENCOUNTER — PROCEDURE VISIT (OUTPATIENT)
Dept: NEUROLOGY | Facility: CLINIC | Age: 44
End: 2019-04-26
Payer: COMMERCIAL

## 2019-04-26 VITALS
DIASTOLIC BLOOD PRESSURE: 100 MMHG | HEART RATE: 108 BPM | BODY MASS INDEX: 34.88 KG/M2 | RESPIRATION RATE: 16 BRPM | HEIGHT: 71 IN | SYSTOLIC BLOOD PRESSURE: 170 MMHG | WEIGHT: 249.13 LBS

## 2019-04-26 DIAGNOSIS — G43.719 INTRACTABLE CHRONIC MIGRAINE WITHOUT AURA AND WITHOUT STATUS MIGRAINOSUS: Primary | ICD-10-CM

## 2019-04-26 PROCEDURE — 64615 CHEMODENERV MUSC MIGRAINE: CPT | Mod: S$GLB,,, | Performed by: PSYCHIATRY & NEUROLOGY

## 2019-04-26 PROCEDURE — 64615 PR CHEMODENERVATION OF MUSCLE FOR CHRONIC MIGRAINE: ICD-10-PCS | Mod: S$GLB,,, | Performed by: PSYCHIATRY & NEUROLOGY

## 2019-04-26 RX ORDER — CANDESARTAN 16 MG/1
16 TABLET ORAL DAILY
Qty: 30 TABLET | Refills: 11 | Status: SHIPPED | OUTPATIENT
Start: 2019-04-26 | End: 2020-04-27 | Stop reason: SDUPTHER

## 2019-04-26 RX ORDER — BUSPIRONE HYDROCHLORIDE 15 MG/1
TABLET ORAL
Qty: 90 TABLET | Refills: 0 | Status: SHIPPED | OUTPATIENT
Start: 2019-04-26 | End: 2019-05-17 | Stop reason: SDUPTHER

## 2019-04-26 NOTE — PROCEDURES
Procedures   BOTOX PROCEDURE    PROCEDURE PERFORMED: Botulinum toxin injection (66123)  KARLIE, DO NOT CHARGE FOR BOTOX  CLINICAL INDICATION: G43.719    A time out was conducted just before the start of the procedure to verify the correct patient and procedure, procedure location, and all relevant critical information.     Conventional methods of treatment but the patient has been unresponsive and refractory.The patient meets criteria for chronic headaches according to the ICHD-II, the patient has more than 15 headaches a month which last for more than 4 hours a day.    This is the first Botox injections and I am aiming for at least 50%  improvement in the patient's symptoms. Frequency of treatment is every 3 months unless no response to the treatments, at which time we will discontinue the injections.     DESCRIPTION OF PROCEDURE: After obtaining informed consent and under aseptic technique, a total of 155 units of botulinum toxin type A were injected in the following muscles: Procerus 5 units,  5 units bilaterally, frontalis 20 units, temporalis 20 units bilaterally, occipitalis 15 units, upper cervical paraspinals 10 units bilaterally and trapezius 15 units bilaterally. The patient was given a total of 155 units in 31 sites.The patient tolerated the procedure well. There were no complications. The patient was given a prescription for repeat treatment in 3 months.     Unavoidable waste 45 units  KARLIE, DO NOT CHARGE FOR BOTOX        Saúl Braswell M.D  Medical Director, Headache and Facial Pain  [facility]

## 2019-05-01 ENCOUNTER — TELEPHONE (OUTPATIENT)
Dept: NEUROLOGY | Facility: CLINIC | Age: 44
End: 2019-05-01

## 2019-05-01 NOTE — TELEPHONE ENCOUNTER
----- Message from Nubia Cotter sent at 5/1/2019  3:41 PM CDT -----  Type:  Patient Returning Call    Who Called:  Patient  Who Left Message for Patient:  Garrick  Does the patient know what this is regarding?: NA  Best Call Back Number:  [phone]  Additional Information:

## 2019-05-01 NOTE — TELEPHONE ENCOUNTER
"Per Dr. Braswell, "This is a temporary side effect. Continue taking unless blood pressure is below 100/60"  "

## 2019-05-01 NOTE — TELEPHONE ENCOUNTER
"Returned call and spoke with patient. Patient was instructed by Dr. Braswell to call in and let her know how she is doing with her medication Candesartan. Her blood pressure has lowered, however she is experiencing dizziness and feels "air headed". Please advise.   "

## 2019-05-01 NOTE — TELEPHONE ENCOUNTER
----- Message from Nubia Domigno sent at 5/1/2019  9:24 AM CDT -----  Type: Needs Medical Advice    Who Called: Patient   Best Call Back Number: [phone]  Additional Information: called to give update on medication, BP has gone down to 114/66, but is having dizziness with new medication and confusion.     Thank you

## 2019-05-01 NOTE — TELEPHONE ENCOUNTER
Returned call and spoke with patient. Patient called with questions regarding her BP and her medication. Patient's diastolic has been as low as 51, but not at this time. Advised patient to keep an eye out and if it gets below 60 to contact our office for additional directions. Patient verbalized understanding.

## 2019-05-03 ENCOUNTER — PATIENT OUTREACH (OUTPATIENT)
Dept: ADMINISTRATIVE | Facility: HOSPITAL | Age: 44
End: 2019-05-03

## 2019-05-03 NOTE — PROGRESS NOTES
Health Maintenance Due   Topic Date Due    Pap Smear with HPV Cotest  01/06/1994    Mammogram  06/22/2018     Chart review complete/scrubbed 05/03/2019  Digital Medicine Outreach.

## 2019-05-09 ENCOUNTER — OFFICE VISIT (OUTPATIENT)
Dept: NEUROLOGY | Facility: CLINIC | Age: 44
End: 2019-05-09
Payer: COMMERCIAL

## 2019-05-09 VITALS
BODY MASS INDEX: 35.89 KG/M2 | RESPIRATION RATE: 16 BRPM | HEART RATE: 81 BPM | DIASTOLIC BLOOD PRESSURE: 75 MMHG | HEIGHT: 71 IN | WEIGHT: 256.38 LBS | SYSTOLIC BLOOD PRESSURE: 111 MMHG

## 2019-05-09 DIAGNOSIS — E66.01 MORBID OBESITY: ICD-10-CM

## 2019-05-09 DIAGNOSIS — F41.1 GAD (GENERALIZED ANXIETY DISORDER): ICD-10-CM

## 2019-05-09 DIAGNOSIS — G43.719 INTRACTABLE CHRONIC MIGRAINE WITHOUT AURA AND WITHOUT STATUS MIGRAINOSUS: Primary | ICD-10-CM

## 2019-05-09 DIAGNOSIS — F33.2 SEVERE RECURRENT MAJOR DEPRESSION WITHOUT PSYCHOTIC FEATURES: ICD-10-CM

## 2019-05-09 DIAGNOSIS — I10 HYPERTENSION, UNSPECIFIED TYPE: ICD-10-CM

## 2019-05-09 PROCEDURE — 99214 OFFICE O/P EST MOD 30 MIN: CPT | Mod: S$GLB,,, | Performed by: PSYCHIATRY & NEUROLOGY

## 2019-05-09 PROCEDURE — 99999 PR PBB SHADOW E&M-EST. PATIENT-LVL III: CPT | Mod: PBBFAC,,, | Performed by: PSYCHIATRY & NEUROLOGY

## 2019-05-09 PROCEDURE — 3008F BODY MASS INDEX DOCD: CPT | Mod: CPTII,S$GLB,, | Performed by: PSYCHIATRY & NEUROLOGY

## 2019-05-09 PROCEDURE — 3008F PR BODY MASS INDEX (BMI) DOCUMENTED: ICD-10-PCS | Mod: CPTII,S$GLB,, | Performed by: PSYCHIATRY & NEUROLOGY

## 2019-05-09 PROCEDURE — 3074F PR MOST RECENT SYSTOLIC BLOOD PRESSURE < 130 MM HG: ICD-10-PCS | Mod: CPTII,S$GLB,, | Performed by: PSYCHIATRY & NEUROLOGY

## 2019-05-09 PROCEDURE — 99999 PR PBB SHADOW E&M-EST. PATIENT-LVL III: ICD-10-PCS | Mod: PBBFAC,,, | Performed by: PSYCHIATRY & NEUROLOGY

## 2019-05-09 PROCEDURE — 3074F SYST BP LT 130 MM HG: CPT | Mod: CPTII,S$GLB,, | Performed by: PSYCHIATRY & NEUROLOGY

## 2019-05-09 PROCEDURE — 3078F PR MOST RECENT DIASTOLIC BLOOD PRESSURE < 80 MM HG: ICD-10-PCS | Mod: CPTII,S$GLB,, | Performed by: PSYCHIATRY & NEUROLOGY

## 2019-05-09 PROCEDURE — 99214 PR OFFICE/OUTPT VISIT, EST, LEVL IV, 30-39 MIN: ICD-10-PCS | Mod: S$GLB,,, | Performed by: PSYCHIATRY & NEUROLOGY

## 2019-05-09 PROCEDURE — 3078F DIAST BP <80 MM HG: CPT | Mod: CPTII,S$GLB,, | Performed by: PSYCHIATRY & NEUROLOGY

## 2019-05-09 NOTE — PROGRESS NOTES
Procedures   BOTOX PROCEDURE    PROCEDURE PERFORMED: Botulinum toxin injection (11072)  KARLIE, DO NOT CHARGE FOR BOTOX  CLINICAL INDICATION: G43.719    A time out was conducted just before the start of the procedure to verify the correct patient and procedure, procedure location, and all relevant critical information.     Conventional methods of treatment but the patient has been unresponsive and refractory.The patient meets criteria for chronic headaches according to the ICHD-II, the patient has more than 15 headaches a month which last for more than 4 hours a day.    This is the first Botox injections and I am aiming for at least 50%  improvement in the patient's symptoms. Frequency of treatment is every 3 months unless no response to the treatments, at which time we will discontinue the injections.     DESCRIPTION OF PROCEDURE: After obtaining informed consent and under aseptic technique, a total of 155 units of botulinum toxin type A were injected in the following muscles: Procerus 5 units,  5 units bilaterally, frontalis 20 units, temporalis 20 units bilaterally, occipitalis 15 units, upper cervical paraspinals 10 units bilaterally and trapezius 15 units bilaterally. The patient was given a total of 155 units in 31 sites.The patient tolerated the procedure well. There were no complications. The patient was given a prescription for repeat treatment in 3 months.     Unavoidable waste 45 units  KARLIE, DO NOT CHARGE FOR BOTOX        Salú Braswell M.D  Medical Director, Headache and Facial Pain  [facility]

## 2019-05-09 NOTE — PROGRESS NOTES
"Subjective:       Patient ID: Anneliese Rocha is a 44 y.o. female.    Chief Complaint: Migraine  INTERVAL HISTORY 5/9/2019  The patient comes for follow up. She is finally doing better on Botox plus Emgality for prevention. She states that she went almost a month without headache attacks, unprecedented for her. She is tolerating the preventives very well without side effects.     HPI   The patient is a very pleasant 43 y/o female presenting with chief complaint of headache. She has had headaches since "birth." She was cared for by Dr. Nba Maldonado. She is looking forward to establish in our clinic as the location s very convenient for her. She has several headaches a month that last 3 to 4 days. It typically alternates sides, either right or left. She has tried multiple medications in the past including but not limited to Topamax, Elavil, Pristiq, Cymbalta, Magnesium. Currently on Toprol and HCTZ. She has received Botox with good benefit for the last 3 to 4 years.  In terms of acute treatment, she has tried just about all the available triptans. She has found Frova to be likely the most effective in that group. She also responds to Zembrace, Imitrex brand caused significant triptan reaction. Midrin, Goodie powders, Fiorinal, all partially effective. In the past she has had imaging studies reportedly normal.  Please see details of headache characteristics below.    Headache questionnaire    1. When did your Headaches start?    birth      2. Where are your headaches located?   Side of head      3. Your headache's characteristics:   Excruciating,Stabbing, Sharp      4. How long does the headache last?   Hours-days      5. How often does the headache occur?   monthly      6. Are your headaches preceded or accompanied by other symptoms? no   If yes, please describe.  n/a      7. Does the headache awaken you at night? yes   If so, how often? n/a        8. Please ana lilia the word that best describes your headache's " intensity:    severe      9. Using a scale of 1 through 10, with 0 = no pain and 10 = the worst pain:   What score is your headache now? 0   What score is your headache at its worst? 10   What score is your headache at its best? 5        10. Possible associated headache symptoms:  [x]  Sensitivity to light  [] Dizziness  [] Nasal or sinus pressure/ pain   [x] Sensitivity to noise  [] Vertigo  [] Problems with concentration  [x] Sensitivity to smells  [] Ringing in ears  [] Problems with memory    [x] Blurred vision  [] Irritability  [] Problems with task completion   [] Double vision  [] Anger  []  Problems with relaxation  [] Loss of appetite  [] Anxiety  [] Neck tightness, Neck pain  [x] Nausea   [] Nasal congestion  [x] Vomiting         11. Headache improving factors:  [x] Sleep  [] Heat  [x] Darkness  [x] Ice  [] Local pressure [] Menses (period)  [] Massage   [] Medications:        12. Headache worsening factors:   [] Fatigue [] Sneezing  [] Changes in Weather  [x] Light [] Bending Over [] Stress  [x] Noise [] Ovulation  [] Multiple Sclerosis Flare-Up  [] Smells  [] Menses  [] Food   [] Coughing [] Alcohol      13. Number of caffeinated drinks per day: 2      14. Number of diet drinks per day:  0      15. Have you seen any other [facility] within the last 3 years?  No        Medications Tried for Headache    AED Neuromodulators  MAOIs  Ergot Alkaloids    Acetazolamide (Diamox) [] Phenelzine (Nardil) [] Dihydroergotamine (Migranal) []   Carbamazepine (Tegretol) [] Tranylcypromine (Parnate) [] Ergotamine (Ergomar) []   Gabapentin (Neurontin) [] Antihistamine/Serotonergic  Triptans    Lacosamide (Vimpat) [] Cyproheptadine (Periactin) [] Almotriptan (Axert) []   Lamotrigine (Lamictal) [] Antihypertensives  Eletriptan (Relpax) [x]   Levatiracetam (Keppra) [] Atenolol (Tenormin) [] Frovatriptan (Frova) [x]   Oxcarbazepine (Trileptal) [] Bisoprostol (Zebeta) [] Naratriptan (Amerge) []   Phenobarbital []  Candesartan (Atacand) [] Rizatriptan (Maxalt) [x]   Phenytoin (Dilantin) [] Diltiazem (Cardizem) [] Sumatriptan (Imitrex) [x]   Pregabalin (Lyrica) [] Lisinopril (Prinivil, Zestril) [] Zolmitriptan (Zomig) [x]   Primidone (Mysoline) [] Metoprolol (Toprol) [] Combo Abortives    Tiagabine (Gabatril) [] Nadolol (Corgard) [] Butalbital and Acetaminophen (Bupap) []   Topiramate (Topamax)  (Trokendi) [x] Nicardipine (Cardene) []     Vigabatrin (Sabril) [] Nimodipine (Nimotop) [] Butalbital, Acetaminophen, and caffeine (Fioricet) []   Valproic Acid (Depakote) (Divalproex Sodium) [] Propranolol (Inderal) []     Zonisamide (Zonegran) [] Telmisartan (Micardis) [] Butalbital, Aspirin, and caffeine (Fiorinal) [x]   Benzodiazepines  Timolol (Blocadren) []     Alprazolam (Xanax) [] Verapamil (Calan, Verelan) [] Butalbital, Caffeine, Acetaminophen, and Codeine (Fioricet with Codeine) []   Diazepam (Valium) [] NSAIDs      Lorazepam (Ativan) [] Acetaminophen (Tylenol) [x]     Clonazepam (Klonopin) [] Acetylsalicylic Acid (Aspirin) [] Butalbital, Caffeine, Aspirin, and Codeine  (Fiorinal with Codeine) []   Antidepressants  Diclofenac (Cambia) [x]     Amitriptyline (Elavil) [x] Ibuprofen (Motrin) []     Desipramine (Norpramin) [] Indomethacin (Indocin) [] Aspirin, Caffeine, and Acetaminophen (Excedrin) (Goodys) [x]   Doxepin (Sinequan) [] Ketoprofen (Orudis) []     Fluoxetine (Prozac) [] Ketorolac (Toradol) [] Acetaminophen, Dichloralphenazone, and Isometheptene (Midrin) [x]   Imipramine (Tofranil) [] Naproxen (Anaprox) (Aleve) [x]     Nortriptyline (Pamelor) [] Meclofenamic Acid (Meclomen) []     Venlafaxine (Effexor) [] Meloxicam (Mobic) [] Aspirin, Salicylamide, and Caffeine (BC Powder) []        Review of Systems   Constitutional: Negative for activity change, appetite change, fatigue and fever.   HENT: Negative for congestion, dental problem, hearing loss, sinus pressure, tinnitus, trouble swallowing and voice change.    Eyes:  Negative for photophobia, pain, redness and visual disturbance.   Respiratory: Negative for cough, chest tightness and shortness of breath.    Cardiovascular: Negative for chest pain, palpitations and leg swelling.   Gastrointestinal: Negative for abdominal pain, blood in stool, nausea and vomiting.   Endocrine: Negative for cold intolerance and heat intolerance.   Genitourinary: Negative for difficulty urinating, frequency, menstrual problem and urgency.   Musculoskeletal: Negative for arthralgias, back pain, gait problem, joint swelling, myalgias, neck pain and neck stiffness.   Skin: Negative.    Neurological: Positive for headaches. Negative for dizziness, tremors, seizures, syncope, facial asymmetry, speech difficulty, weakness, light-headedness and numbness.   Hematological: Negative for adenopathy. Does not bruise/bleed easily.   Psychiatric/Behavioral: Negative for agitation, behavioral problems, confusion, decreased concentration, self-injury, sleep disturbance and suicidal ideas. The patient is not nervous/anxious and is not hyperactive.            Past Medical History:   Diagnosis Date    Depression with anxiety     tolerating Cymbalta 90mg daily    Fatigue     HTN (hypertension)     Iron deficiency anemia     Migraines     Getting headaches about monthly; using Topamax 25mg and Elavil 50mg for prevention and Frova/cambia as needed; following with Dr. Maldonado    PCOS (polycystic ovarian syndrome)      Past Surgical History:   Procedure Laterality Date    BREAST BIOPSY Right     core  neg    oophrectomy  1994    left     Family History   Problem Relation Age of Onset    Diabetes Father     Hypertension Father     Coronary artery disease Father 55    Thyroid disease Mother     Migraines Mother     Hypertension Mother     Melanoma Paternal Uncle      Social History     Socioeconomic History    Marital status:      Spouse name: Not on file    Number of children: 0    Years of education:  Not on file    Highest education level: Not on file   Social Needs    Financial resource strain: Not on file    Food insecurity - worry: Not on file    Food insecurity - inability: Not on file    Transportation needs - medical: Not on file    Transportation needs - non-medical: Not on file   Occupational History    Occupation: accounting     Employer: [employer]     Comment: [employer]   Tobacco Use    Smoking status: Never Smoker    Smokeless tobacco: Never Used   Substance and Sexual Activity    Alcohol use: Yes     Comment: rarely    Drug use: No    Sexual activity: Not on file   Other Topics Concern    Not on file   Social History Narrative    From [place]. Lives with .         Exercise: none regularly     Review of patient's allergies indicates:   Allergen Reactions    Lisinopril      Dizziness, nausea       Current Outpatient Medications:     busPIRone (BUSPAR) 15 MG tablet, TAKE 1 TABLET(15 MG) BY MOUTH THREE TIMES DAILY, Disp: 90 tablet, Rfl: 0    desvenlafaxine succinate (PRISTIQ) 50 MG Tb24, Take 1 tablet (50 mg total) by mouth once daily., Disp: 30 tablet, Rfl: 0    ferrous sulfate (IRON) 325 mg (65 mg iron) Tab tablet, iron, Disp: , Rfl:     FROVA 2.5 mg tablet, Take 2.5 mg by mouth as needed. , Disp: , Rfl: 2    hydroCHLOROthiazide (HYDRODIURIL) 25 MG tablet, Take 1 tablet (25 mg total) by mouth once daily., Disp: 30 tablet, Rfl: 11    metoprolol succinate (TOPROL-XL) 200 MG 24 hr tablet, TAKE 1 TABLET(200 MG) BY MOUTH EVERY DAY, Disp: 30 tablet, Rfl: 11    onabotulinumtoxina (BOTOX) 200 unit SolR, Botox 200 unit injection  Take 155 units as needed by injection route., Disp: , Rfl:     ondansetron (ZOFRAN) 4 MG tablet, TK 1 T PO TID PRN, Disp: , Rfl: 1    ZEMBRACE SYMTOUCH 3 mg/0.5 mL PnIj, , Disp: , Rfl: 3    ALPRAZolam (XANAX) 0.5 MG tablet, TAKE 1 tab as needed for anxiety, Disp: 15 tablet, Rfl: 0    ARIPiprazole (ABILIFY) 2 MG Tab, TAKE 1 TABLET(2 MG) BY MOUTH EVERY DAY,  Disp: 30 tablet, Rfl: 0    levomilnacipran (FETZIMA) 20 mg Cs24, Take 20 mg by mouth once daily., Disp: 5 capsule, Rfl: 0      Objective:      Vitals:    05/09/19 1323   BP: 111/75   Pulse: 81   Resp: 16     Body mass index is 35.76 kg/m².    Physical Exam    Constitutional:   She appears well-developed and well-nourished. She is well groomed    HENT:    Head: Atraumatic, oral and nasal mucosa intact  Eyes: Conjunctivae and EOM are normal. Pupils are equal, round, and reactive to light OU  Neck: Neck supple. No thyromegaly present  Cardiovascular: Normal rate and normal heart sounds  No murmur heard  Pulmonary/Chest: Effort normal and breath sounds normal  Musculoskeletal: Normal range of motion. No joint stiffness. No vertebral point tenderness  Skin: Skin is warm and dry  Psychiatric: Normal mood and affect     Neuro exam:    Mental status:  Awake, attentive, Alert, oriented to self, place, year and month  Language function is intact  Naming, repetition and spontaneous meaningful speech expression are intact  Speech fluency is good and speech is clear  Remote and recent memory are good  Patient able to count backwards by 7    No findings to suggest executive dysfuntion    Patient has adequate insight      Cranial Nerves:  Smell was not formally evaluated  Cranial Nerves II - XII: intact  Pursuits were smooth, normal saccades, no nystagmus OU  Funduscopic exam - disc were flat and pink, no exudates or hemorrhages OU  Motor - facial movement was symmetrical and normal     Palate moved well and was symmetrical with normal palatal and oral sensation  Tongue movements were full    Coordination:     Rapid alternating movements and rapid finger tapping - normal bilaterally  Finger to nose - normal and symmetric bilaterally   Arm roll - smooth and symmetric   No intentional or positional tremor.     Motor:  Normal muscle bulk and symmetry. No fasciculations were noted    No pronator drift  Strength 5/5 bilaterally      Reflexes:  Tendon reflexes were 2 + at biceps, triceps, brachioradialis, patellar, and 1+ Achilles bilaterally  On plantar stimulation toes were down going bilaterally  No clonus was noted     Sensory: Intact to light touch, pin prick in all extremities. Vibration and proprioception intact in all extremities.     Gait: Romberg absent. Normal gait.     Review of Data:  Lab Results   Component Value Date     02/16/2018    K 4.8 02/16/2018     02/16/2018    CO2 27 02/16/2018    BUN 7 02/16/2018    CREATININE 0.9 02/16/2018     (H) 02/16/2018    HGBA1C 5.4 02/20/2018    AST 22 02/16/2018    ALT 33 02/16/2018    ALBUMIN 3.7 02/16/2018    PROT 7.5 02/16/2018    BILITOT 0.3 02/16/2018    CHOL 230 (H) 02/16/2018    HDL 60 02/16/2018    LDLCALC 144.2 02/16/2018    TRIG 129 02/16/2018       Lab Results   Component Value Date    WBC 8.03 02/16/2018    HGB 11.5 (L) 02/16/2018    HCT 38.8 02/16/2018    MCV 86 02/16/2018     02/16/2018       Lab Results   Component Value Date    TSH 3.105 02/16/2018           Assessment and Plan     The patient has chronic migraines ( G43.719) and suffers from headaches more than 15 days a month lasting more than 4 hours a day with no relief of symptoms despite trying multiple medications including but not limited to TOPAMAX, ELAVIL, METOPROLOL, MAGNESIUM, and other.   She is significantly improved. She went almost a month without headaches.   PCOS  PASTORA  HTN  Depression  Obesity  Continue Emgality and Botox for prevention  Optimization of acute treatment:  1. Start Sprix plus Frova as first line of treatment or Sprix plus Zembrace is headache very rapidly escalating.  2. If pain persists after 4 hours, repeat Frova plus Compazine 5 mg  3. If pain persists take Fiorinal 1 or 2   This protocol can be repeated the following day. If no relief, call the clinic  RTC in 3 months with diaries  CBC, CMP, TSH  I have discussed the side effects of the medications prescribed and  the patient acknowledges understanding  Counseling:  More than 50% of the 25 minute encounter was spent face to face counseling the patient regarding current status and future plan of care as well as side of the medications. All questions were answered to patient's satisfaction         Saúl Braswell M.D  Medical Director, Headache and Facial Pain  [facility]

## 2019-05-10 ENCOUNTER — LAB VISIT (OUTPATIENT)
Dept: LAB | Facility: HOSPITAL | Age: 44
End: 2019-05-10
Attending: FAMILY MEDICINE
Payer: COMMERCIAL

## 2019-05-10 DIAGNOSIS — I10 HYPERTENSION, UNSPECIFIED TYPE: ICD-10-CM

## 2019-05-10 DIAGNOSIS — D50.9 IRON DEFICIENCY ANEMIA, UNSPECIFIED IRON DEFICIENCY ANEMIA TYPE: ICD-10-CM

## 2019-05-10 LAB
ANION GAP SERPL CALC-SCNC: 6 MMOL/L (ref 8–16)
BASOPHILS # BLD AUTO: 0.09 K/UL (ref 0–0.2)
BASOPHILS NFR BLD: 1.3 % (ref 0–1.9)
BUN SERPL-MCNC: 7 MG/DL (ref 6–20)
CALCIUM SERPL-MCNC: 10.1 MG/DL (ref 8.7–10.5)
CHLORIDE SERPL-SCNC: 102 MMOL/L (ref 95–110)
CO2 SERPL-SCNC: 31 MMOL/L (ref 23–29)
CREAT SERPL-MCNC: 0.8 MG/DL (ref 0.5–1.4)
DIFFERENTIAL METHOD: NORMAL
EOSINOPHIL # BLD AUTO: 0.2 K/UL (ref 0–0.5)
EOSINOPHIL NFR BLD: 2.9 % (ref 0–8)
ERYTHROCYTE [DISTWIDTH] IN BLOOD BY AUTOMATED COUNT: 12.8 % (ref 11.5–14.5)
EST. GFR  (AFRICAN AMERICAN): >60 ML/MIN/1.73 M^2
EST. GFR  (NON AFRICAN AMERICAN): >60 ML/MIN/1.73 M^2
GLUCOSE SERPL-MCNC: 117 MG/DL (ref 70–110)
HCT VFR BLD AUTO: 43.5 % (ref 37–48.5)
HGB BLD-MCNC: 14.2 G/DL (ref 12–16)
IMM GRANULOCYTES # BLD AUTO: 0.01 K/UL (ref 0–0.04)
IMM GRANULOCYTES NFR BLD AUTO: 0.1 % (ref 0–0.5)
IRON SERPL-MCNC: 62 UG/DL (ref 30–160)
LYMPHOCYTES # BLD AUTO: 2.5 K/UL (ref 1–4.8)
LYMPHOCYTES NFR BLD: 34.5 % (ref 18–48)
MCH RBC QN AUTO: 30.4 PG (ref 27–31)
MCHC RBC AUTO-ENTMCNC: 32.6 G/DL (ref 32–36)
MCV RBC AUTO: 93 FL (ref 82–98)
MONOCYTES # BLD AUTO: 0.6 K/UL (ref 0.3–1)
MONOCYTES NFR BLD: 8.4 % (ref 4–15)
NEUTROPHILS # BLD AUTO: 3.8 K/UL (ref 1.8–7.7)
NEUTROPHILS NFR BLD: 52.8 % (ref 38–73)
NRBC BLD-RTO: 0 /100 WBC
PLATELET # BLD AUTO: 278 K/UL (ref 150–350)
PMV BLD AUTO: 12.4 FL (ref 9.2–12.9)
POTASSIUM SERPL-SCNC: 3.5 MMOL/L (ref 3.5–5.1)
RBC # BLD AUTO: 4.67 M/UL (ref 4–5.4)
SATURATED IRON: 14 % (ref 20–50)
SODIUM SERPL-SCNC: 139 MMOL/L (ref 136–145)
TOTAL IRON BINDING CAPACITY: 441 UG/DL (ref 250–450)
TRANSFERRIN SERPL-MCNC: 298 MG/DL (ref 200–375)
WBC # BLD AUTO: 7.15 K/UL (ref 3.9–12.7)

## 2019-05-10 PROCEDURE — 36415 COLL VENOUS BLD VENIPUNCTURE: CPT | Mod: PO

## 2019-05-10 PROCEDURE — 85025 COMPLETE CBC W/AUTO DIFF WBC: CPT

## 2019-05-10 PROCEDURE — 83540 ASSAY OF IRON: CPT

## 2019-05-10 PROCEDURE — 80048 BASIC METABOLIC PNL TOTAL CA: CPT

## 2019-05-16 ENCOUNTER — TELEPHONE (OUTPATIENT)
Dept: PHARMACY | Facility: CLINIC | Age: 44
End: 2019-05-16

## 2019-05-17 ENCOUNTER — OFFICE VISIT (OUTPATIENT)
Dept: FAMILY MEDICINE | Facility: CLINIC | Age: 44
End: 2019-05-17
Payer: COMMERCIAL

## 2019-05-17 ENCOUNTER — OFFICE VISIT (OUTPATIENT)
Dept: PSYCHIATRY | Facility: CLINIC | Age: 44
End: 2019-05-17
Payer: COMMERCIAL

## 2019-05-17 VITALS
HEART RATE: 86 BPM | SYSTOLIC BLOOD PRESSURE: 130 MMHG | TEMPERATURE: 98 F | HEIGHT: 71 IN | OXYGEN SATURATION: 97 % | WEIGHT: 260.38 LBS | DIASTOLIC BLOOD PRESSURE: 84 MMHG | BODY MASS INDEX: 36.45 KG/M2

## 2019-05-17 VITALS
BODY MASS INDEX: 36.26 KG/M2 | HEART RATE: 65 BPM | HEIGHT: 71 IN | WEIGHT: 259 LBS | DIASTOLIC BLOOD PRESSURE: 77 MMHG | SYSTOLIC BLOOD PRESSURE: 127 MMHG

## 2019-05-17 DIAGNOSIS — E66.01 MORBID OBESITY WITH BMI OF 50.0-59.9, ADULT: ICD-10-CM

## 2019-05-17 DIAGNOSIS — Z00.00 PREVENTATIVE HEALTH CARE: Primary | ICD-10-CM

## 2019-05-17 DIAGNOSIS — R73.09 ABNORMAL GLUCOSE: ICD-10-CM

## 2019-05-17 DIAGNOSIS — E66.9 OBESITY (BMI 30-39.9): ICD-10-CM

## 2019-05-17 DIAGNOSIS — I10 HYPERTENSION, UNSPECIFIED TYPE: ICD-10-CM

## 2019-05-17 DIAGNOSIS — R53.83 FATIGUE DUE TO DEPRESSION: ICD-10-CM

## 2019-05-17 DIAGNOSIS — F41.1 GAD (GENERALIZED ANXIETY DISORDER): ICD-10-CM

## 2019-05-17 DIAGNOSIS — F32.A FATIGUE DUE TO DEPRESSION: ICD-10-CM

## 2019-05-17 DIAGNOSIS — G43.001 MIGRAINE WITHOUT AURA AND WITH STATUS MIGRAINOSUS, NOT INTRACTABLE: ICD-10-CM

## 2019-05-17 DIAGNOSIS — Z12.39 BREAST CANCER SCREENING: ICD-10-CM

## 2019-05-17 DIAGNOSIS — F33.2 MDD (MAJOR DEPRESSIVE DISORDER), RECURRENT SEVERE, WITHOUT PSYCHOSIS: Primary | ICD-10-CM

## 2019-05-17 PROCEDURE — 99999 PR PBB SHADOW E&M-EST. PATIENT-LVL V: CPT | Mod: PBBFAC,,, | Performed by: FAMILY MEDICINE

## 2019-05-17 PROCEDURE — 3075F SYST BP GE 130 - 139MM HG: CPT | Mod: CPTII,S$GLB,, | Performed by: FAMILY MEDICINE

## 2019-05-17 PROCEDURE — 3078F PR MOST RECENT DIASTOLIC BLOOD PRESSURE < 80 MM HG: ICD-10-PCS | Mod: CPTII,S$GLB,, | Performed by: PSYCHIATRY & NEUROLOGY

## 2019-05-17 PROCEDURE — 99999 PR PBB SHADOW E&M-EST. PATIENT-LVL III: ICD-10-PCS | Mod: PBBFAC,,, | Performed by: PSYCHIATRY & NEUROLOGY

## 2019-05-17 PROCEDURE — 3079F PR MOST RECENT DIASTOLIC BLOOD PRESSURE 80-89 MM HG: ICD-10-PCS | Mod: CPTII,S$GLB,, | Performed by: FAMILY MEDICINE

## 2019-05-17 PROCEDURE — 3078F DIAST BP <80 MM HG: CPT | Mod: CPTII,S$GLB,, | Performed by: PSYCHIATRY & NEUROLOGY

## 2019-05-17 PROCEDURE — 3074F PR MOST RECENT SYSTOLIC BLOOD PRESSURE < 130 MM HG: ICD-10-PCS | Mod: CPTII,S$GLB,, | Performed by: PSYCHIATRY & NEUROLOGY

## 2019-05-17 PROCEDURE — 3008F PR BODY MASS INDEX (BMI) DOCUMENTED: ICD-10-PCS | Mod: CPTII,S$GLB,, | Performed by: PSYCHIATRY & NEUROLOGY

## 2019-05-17 PROCEDURE — 3075F PR MOST RECENT SYSTOLIC BLOOD PRESS GE 130-139MM HG: ICD-10-PCS | Mod: CPTII,S$GLB,, | Performed by: FAMILY MEDICINE

## 2019-05-17 PROCEDURE — 3008F BODY MASS INDEX DOCD: CPT | Mod: CPTII,S$GLB,, | Performed by: PSYCHIATRY & NEUROLOGY

## 2019-05-17 PROCEDURE — 3074F SYST BP LT 130 MM HG: CPT | Mod: CPTII,S$GLB,, | Performed by: PSYCHIATRY & NEUROLOGY

## 2019-05-17 PROCEDURE — 99999 PR PBB SHADOW E&M-EST. PATIENT-LVL V: ICD-10-PCS | Mod: PBBFAC,,, | Performed by: FAMILY MEDICINE

## 2019-05-17 PROCEDURE — 99999 PR PBB SHADOW E&M-EST. PATIENT-LVL III: CPT | Mod: PBBFAC,,, | Performed by: PSYCHIATRY & NEUROLOGY

## 2019-05-17 PROCEDURE — 3079F DIAST BP 80-89 MM HG: CPT | Mod: CPTII,S$GLB,, | Performed by: FAMILY MEDICINE

## 2019-05-17 PROCEDURE — 99396 PR PREVENTIVE VISIT,EST,40-64: ICD-10-PCS | Mod: S$GLB,,, | Performed by: FAMILY MEDICINE

## 2019-05-17 PROCEDURE — 99214 PR OFFICE/OUTPT VISIT, EST, LEVL IV, 30-39 MIN: ICD-10-PCS | Mod: S$GLB,,, | Performed by: PSYCHIATRY & NEUROLOGY

## 2019-05-17 PROCEDURE — 99396 PREV VISIT EST AGE 40-64: CPT | Mod: S$GLB,,, | Performed by: FAMILY MEDICINE

## 2019-05-17 PROCEDURE — 99214 OFFICE O/P EST MOD 30 MIN: CPT | Mod: S$GLB,,, | Performed by: PSYCHIATRY & NEUROLOGY

## 2019-05-17 RX ORDER — DESVENLAFAXINE 100 MG/1
TABLET, EXTENDED RELEASE ORAL
Qty: 30 TABLET | Refills: 2 | Status: SHIPPED | OUTPATIENT
Start: 2019-05-17 | End: 2019-08-23 | Stop reason: SDUPTHER

## 2019-05-17 RX ORDER — TRAZODONE HYDROCHLORIDE 100 MG/1
TABLET ORAL
Qty: 45 TABLET | Refills: 2 | Status: SHIPPED | OUTPATIENT
Start: 2019-05-17 | End: 2019-08-02 | Stop reason: SDUPTHER

## 2019-05-17 RX ORDER — BUSPIRONE HYDROCHLORIDE 15 MG/1
TABLET ORAL
Qty: 90 TABLET | Refills: 2 | Status: SHIPPED | OUTPATIENT
Start: 2019-05-17 | End: 2019-08-28 | Stop reason: SDUPTHER

## 2019-05-17 NOTE — PROGRESS NOTES
"ID: 41yo WF with a h/o depression and anxiety, no prior full psych eval w/i [facility]. Pt is a current pt of Anneliese Cochran LCSW who referred her for med mgmt along with , PCP. Pt has been on wait list since 3/2017. The pt is currently on xanax and cymbalta through PCP. Also on topamax and elavil for migraine mgmt through pcp    CC: depression.     Interim Hx: presents on time. Chart reviewed.     "the last month i've still had some low low days, but it's still better than it had been."     Now taking Trazodone 150mg po qhs-  "so sleep is a little better but it's hard to get up in the morning. i'm not sleeping all weekend anymore though. i'm sedentary but i'm more motivated to be out of bed."     Also finds the buspar and xanax helpful for panic when taken in conjunction rather than when either is taken separately. Took both within an hour recently and avoided mounting full panic attack.     Considering additional adjunct txmt but hesitant given weight and previous rxns to medxns.     On Psychiatric ROS:    Endorses somewhat improved sleep maintenance- wakes nightly and has difficulty reinitiating, anhedonia, feeling helpless/hopeless- less so b/c med has been helpful, dec energy- "none", stable concentration, inc'd appetite- since Spring 2017, dec PMA "i do feel tired thinking about doing things."     Denies thoughts of SI/intent/plan.     Endorses feeling +easily overwhelmed, +ruminative thinking, +feeling tense/"on edge"- "on edge"     PPHx: Denies h/o self injury, inpt psych hospitalization, denies h/o suicide attempt     Current Psych Meds: pristiq 100mg qam, buspar TID, trazodone 100mg po qhs PRN insomnia  Past Psych Meds: ritalin, prozac, buspar PRN ("it helped with the stress of planning the wedding"), cymbalta 120mg po daily (ineffective), zoloft 150mg (ineffective), prozac 20mg po qam, xanax 0.5mg po BID PRN anxiety, wellbutrin xl 150mg po qam, fetzima 40mg po qam, abilify 2mg     PMHx: " "migraines (since childhood)- botox as txmt, monthly "they're well managed"    SubstHx:   T- none  E- very rare due to migraine  D- none  Caffeine- 2 dr.peppers/day    FamPHx: none    Musculoskeletal:  Muscle strength/Tone: no dyskinesia/ no tremor  Gait/Station- non antalgic, no assistance needed    MSE: appears stated age, well groomed, appropriate dress, engages well with examiner. Good e/c. Speech reg rate and vol, nonpressured. Mood is "it's ok." Affect congruent- low energy, but does smile more easily. Sensorium fully intact. Oriented to date/day/location, current events. Narrative memory intact. Intellectual function is avg based on vocab and basic fund of knowledge. Thought is c/l/gd. No tangentiality or circumstantiality. No FOI/SULTANA. Denies SI/HI. Denies A/VH. No evidence of delusions. Insight and Judgment intact.     Blood pressure 127/77, pulse 65, height 5' 11" (1.803 m), weight 117.5 kg (259 lb), last menstrual period 04/25/2019.    Suicide Risk Assessment:   Protective- age, gender, no prior attempts, no prior hospitalizations, no family h/o attempts, no ongoing substance abuse, no psychosis, , denies SI/intent/plan, seeking treatment, access to treatment, future oriented, good primary support, no access to firearms    Risk- race, ongoing Axis I sxs, no children    **Pt is at LOW imminent and long term risk of suicide given current risk factors.    Assessment:  43yo WF with a h/o depression and anxiety, no prior full psych eval w/i [facility]. Pt is a current pt of Anneliese Cochran LCSW who referred her for med mgmt along with , PCP. Pt has been on wait list since 3/2017. The pt is currently on xanax and cymbalta through PCP. Also on topamax and elavil for migraine mgmt through pcp/neuro. On eval today she meets criteria for MDD, recurrent, severe w/o psychotic sxs despite a recent inc in cymbalta to 120mg po daily. Pt has been on cymbalta x 10yrs. Only recalls previous trial of " "prozac which was d/c'd for unknown reasons. Now requiring xanax daily- some days 2 tabs (ie:1mg total daily). Some room for behavioral interventions, as well and the pt is motivated. Has good ability to adhere to the new txmt plan. We tapered off cymbalta and started a trial of zoloft. Will discuss diet and exercise interventions more fully over course of txmt. today she has been on 100mg of zoloft x 1mo. Continues to state she does not feel "any different"- will inc one last titration to 150mg prior to discussing transition to alternate med. Have discussed trial of wellbutrin next as daytime lethargy and dec'd motivation are now the primary concerns to the pt, however current level of anxiety leading to xanax use 2x/d. May also consider mthfr mutation with some methyl folate supplement. Will add time released melatonin today for sleep. wgt loss would be an added benefit. Cont therapy as scheduled. No acute safety concerns. We added wellbutrin and today she reports improved energy and focus at work, but also perhaps inc'd anxiety? Tapered off zoloft to make room for trial of prozac. Will also add trial of buspar 10mg po BID which was helpful many years ago per pt. Last appt she reported  and therapist notice improvement on prozac 20mg po qam- buspar 10mg po TID also helpful with decreased xanax use and less variation in anxiety through day. Will cont both but inc prozac to 40mg to get at ongoing depressive and anxiety sxs- she then reported inc'd anxiety and I wondered about over activation on the prozac inc. not certain if she can afford genetic testing but is motivated for a trial of the methylfolate supplement. Dec'd the prozac back to 20mg po qam which today she reported did not affect her in either direction- started methylfolate supplementation- today reporting some improvement but con'd lethargy, lack of motivation, and reported anxiety. Agrees to do genetic testing. Genetic testing results returned " "and we started fetzima based on those results. Now reporting "maybe some improvement" but also reporting "no change in depression or anxiety"? Does report inc'd "anger"? May be overstimulation on fetzima (NE) and wellbutrin (dopa)- inc'd fetzima to more std dose of 40mg and within days the pt d/c'd the wellbutrin due to inc'd irritability. Now noticing "may some improvement" and the pt is more expansive with affect today in appt. Concern about bp but drinking a [employer] nitro at time of appt- will monitor at home for a few days and let me know via portal- we may inc dose in the future. Today pt reporting cont'd difficulty with anxiety and mood- started a trial of adjunct txmt with abilify 2mg- there has been weight gain but improved anxiety- will try for weight loss with beh interventions and we will re discuss in 4wks. Called the clinic one week ago with inc'd depressive sxs and wanting to make changes. Stopped abilify and here today to make some cont'd changes. "No different"- w/o the abilify. Will taper off fetzima and start trial of pristiq. On f/u of pristiq pt is feeling "a little better" but anxiety continues sleep a little improved on trazodone 100mg will inc to 150mg po qhs and likely add adjunct txmt at next appt. Today reporting cont'd improvement and we will hold off on adjunct at this time.     Axis I: MDD, recurrent, severe w/o psychotic sxs; PASTORA  Axis II: none at this time   Axis III: HTN, migraines  Axis IV: occupational, chronic mental health  Axis V: GAF 70    Plan:   1. Cont pristiq 100mg po qam  2. Cont Buspar 15mg po TID  3. Cont methylfolate (purchasing online)   4. Cont Trazodone 150mg po qhs   5. Cont therapy as scheduled; I am in contact with therapist  6. RTC 3mos    -Spent 30min face to face with the pt; >50% time spent in counseling   -Supportive therapy and psychoeducation provided  -R/B/SE's of medications discussed with the pt who expresses understanding and chooses to take " medications as prescribed.   -Pt instructed to call clinic, 911 or go to nearest emergency room if sxs worsen or pt is in   crisis. The pt expresses understanding.

## 2019-05-17 NOTE — PROGRESS NOTES
Subjective:       Patient ID: Anneliese Rocha is a 44 y.o. female.    Chief Complaint: Annual Exam    Here for annual exam and  f/u on chronic health issues.  Depression - taking Proxac 40mg and Wellbutrin. Seeing Dr. Bryant  Mood seems stable.Using xanax PRN  HAs - stable on present regimen  HTN - controlled on present meds; irbesartan was added on 4/16 by Dr. Braswell      Past Medical History:   Diagnosis Date    Depression with anxiety     tolerating Cymbalta 90mg daily    Fatigue     HTN (hypertension)     Iron deficiency anemia     Migraines     Getting headaches about monthly; using Topamax 25mg and Elavil 50mg for prevention and Frova/cambia as needed; following with Dr. Maldonado    PCOS (polycystic ovarian syndrome)        Past Surgical History:   Procedure Laterality Date    BREAST BIOPSY Right     core  neg    oophrectomy  1994    left       Review of patient's allergies indicates:  No Known Allergies    Social History     Socioeconomic History    Marital status:      Spouse name: Not on file    Number of children: 0    Years of education: Not on file    Highest education level: Not on file   Occupational History    Occupation: accounting     Employer: [employer]     Comment: [employer]   Social Needs    Financial resource strain: Not hard at all    Food insecurity:     Worry: Never true     Inability: Never true    Transportation needs:     Medical: No     Non-medical: No   Tobacco Use    Smoking status: Never Smoker    Smokeless tobacco: Never Used   Substance and Sexual Activity    Alcohol use: Yes     Frequency: Monthly or less     Drinks per session: 1 or 2     Binge frequency: Never     Comment: rarely    Drug use: No    Sexual activity: Not on file   Lifestyle    Physical activity:     Days per week: 0 days     Minutes per session: 0 min    Stress: Rather much   Relationships    Social connections:     Talks on phone: Once a week     Gets together: Never     Attends [employer]  service: Not on file     Active member of club or organization: No     Attends meetings of clubs or organizations: Never     Relationship status:    Other Topics Concern    Not on file   Social History Narrative    From [place]. Lives with .         Exercise: none regularly       Current Outpatient Medications on File Prior to Visit   Medication Sig Dispense Refill    candesartan (ATACAND) 16 MG tablet Take 1 tablet (16 mg total) by mouth once daily. 30 tablet 11    ferrous sulfate (IRON) 325 mg (65 mg iron) Tab tablet iron      frovatriptan (FROVA) 2.5 MG tablet Take 1 tablet (2.5 mg total) by mouth as needed. 9 tablet 3    galcanezumab-gnlm (EMGALITY PEN) 120 mg/mL PnIj Inject 120 mg into the skin every 28 days. 1 mL 5    hydroCHLOROthiazide (HYDRODIURIL) 25 MG tablet TAKE 1 TABLET(25 MG) BY MOUTH EVERY DAY 90 tablet 0    ketorolac (SPRIX) 15.75 mg/spray Spry 15.75 mg by Nasal route every 6 (six) hours. 5 each 5    metoprolol succinate (TOPROL-XL) 200 MG 24 hr tablet TAKE 1 TABLET(200 MG) BY MOUTH EVERY DAY 30 tablet 11    onabotulinumtoxina (BOTOX) 200 unit SolR Botox 200 unit injection   Take 155 units as needed by injection route.      ondansetron (ZOFRAN) 4 MG tablet TK 1 T PO TID PRN  1    prochlorperazine (COMPAZINE) 5 MG tablet Take 1 tablet (5 mg total) by mouth 3 (three) times daily as needed for Nausea. 10 tablet 3    ZEMBRACE SYMTOUCH 3 mg/0.5 mL PnIj   3    neomycin-polymyxin-dexamethasone (MAXITROL) 3.5mg/mL-10,000 unit/mL-0.1 % DrpS SHAKE LQ AND INT 1 GTT IN OS QID  0     Current Facility-Administered Medications on File Prior to Visit   Medication Dose Route Frequency Provider Last Rate Last Dose    onabotulinumtoxina injection 200 Units  200 Units Intramuscular Q90 Days Mira Braswell MD        onabotulinumtoxina injection 200 Units  200 Units Intramuscular Q90 Days Mira Braswell MD   200 Units at 04/26/19 1321       Family History   Problem Relation  "Age of Onset    Diabetes Father     Hypertension Father     Coronary artery disease Father 55    Thyroid disease Mother     Migraines Mother     Hypertension Mother     Melanoma Paternal Uncle        Review of Systems   Constitutional: Negative for activity change, appetite change, chills, fever and unexpected weight change.   HENT: Negative for hearing loss, rhinorrhea, sore throat and trouble swallowing.    Eyes: Negative for pain, discharge and visual disturbance.   Respiratory: Negative for cough, chest tightness, shortness of breath and wheezing.    Cardiovascular: Negative for chest pain and palpitations.   Gastrointestinal: Negative for abdominal pain, blood in stool, constipation, diarrhea, nausea and vomiting.   Endocrine: Negative for polydipsia and polyuria.   Genitourinary: Negative for difficulty urinating, dysuria, hematuria and menstrual problem.   Musculoskeletal: Positive for back pain. Negative for arthralgias, gait problem, joint swelling and neck pain.   Skin: Negative for rash and wound.   Neurological: Positive for headaches. Negative for dizziness, weakness and numbness.   Hematological: Negative for adenopathy.   Psychiatric/Behavioral: Negative for confusion and dysphoric mood.       Objective:      /84 (BP Location: Left arm, Patient Position: Sitting)   Pulse 86   Temp 98.1 °F (36.7 °C) (Oral)   Ht 5' 11" (1.803 m)   Wt 118.1 kg (260 lb 5.8 oz)   LMP 04/25/2019 (Approximate)   SpO2 97%   BMI 36.31 kg/m²   Physical Exam   Constitutional: She is oriented to person, place, and time. She appears well-developed and well-nourished.   HENT:   Head: Normocephalic.   Mouth/Throat: Oropharynx is clear and moist. No oropharyngeal exudate or posterior oropharyngeal erythema.   Eyes: Pupils are equal, round, and reactive to light. Conjunctivae and EOM are normal.   Neck: Normal range of motion. Neck supple. No thyromegaly present.   Cardiovascular: Normal rate, regular rhythm, S1 " normal, S2 normal, normal heart sounds and intact distal pulses. Exam reveals no gallop and no friction rub.   No murmur heard.  Pulmonary/Chest: Effort normal and breath sounds normal. She has no wheezes. She has no rales.   Abdominal: Soft. Normal appearance and bowel sounds are normal. She exhibits no distension. There is no tenderness.   Musculoskeletal:        Lumbar back: She exhibits normal range of motion and no bony tenderness.        Right lower leg: She exhibits no edema.        Left lower leg: She exhibits no edema.   Lymphadenopathy:     She has no cervical adenopathy.   Neurological: She is alert and oriented to person, place, and time. No cranial nerve deficit. Gait normal.   Skin: Skin is warm and intact. No rash noted.   Psychiatric: She has a normal mood and affect.       Results for orders placed or performed in visit on 05/10/19   Iron and TIBC   Result Value Ref Range    Iron 62 30 - 160 ug/dL    Transferrin 298 200 - 375 mg/dL    TIBC 441 250 - 450 ug/dL    Saturated Iron 14 (L) 20 - 50 %   CBC auto differential   Result Value Ref Range    WBC 7.15 3.90 - 12.70 K/uL    RBC 4.67 4.00 - 5.40 M/uL    Hemoglobin 14.2 12.0 - 16.0 g/dL    Hematocrit 43.5 37.0 - 48.5 %    Mean Corpuscular Volume 93 82 - 98 fL    Mean Corpuscular Hemoglobin 30.4 27.0 - 31.0 pg    Mean Corpuscular Hemoglobin Conc 32.6 32.0 - 36.0 g/dL    RDW 12.8 11.5 - 14.5 %    Platelets 278 150 - 350 K/uL    MPV 12.4 9.2 - 12.9 fL    Immature Granulocytes 0.1 0.0 - 0.5 %    Gran # (ANC) 3.8 1.8 - 7.7 K/uL    Immature Grans (Abs) 0.01 0.00 - 0.04 K/uL    Lymph # 2.5 1.0 - 4.8 K/uL    Mono # 0.6 0.3 - 1.0 K/uL    Eos # 0.2 0.0 - 0.5 K/uL    Baso # 0.09 0.00 - 0.20 K/uL    nRBC 0 0 /100 WBC    Gran% 52.8 38.0 - 73.0 %    Lymph% 34.5 18.0 - 48.0 %    Mono% 8.4 4.0 - 15.0 %    Eosinophil% 2.9 0.0 - 8.0 %    Basophil% 1.3 0.0 - 1.9 %    Differential Method Automated    Basic metabolic panel   Result Value Ref Range    Sodium 139 136 - 145  mmol/L    Potassium 3.5 3.5 - 5.1 mmol/L    Chloride 102 95 - 110 mmol/L    CO2 31 (H) 23 - 29 mmol/L    Glucose 117 (H) 70 - 110 mg/dL    BUN, Bld 7 6 - 20 mg/dL    Creatinine 0.8 0.5 - 1.4 mg/dL    Calcium 10.1 8.7 - 10.5 mg/dL    Anion Gap 6 (L) 8 - 16 mmol/L    eGFR if African American >60.0 >60 mL/min/1.73 m^2    eGFR if non African American >60.0 >60 mL/min/1.73 m^2       Assessment:       1. Preventative health care    2. Hypertension, unspecified type    3. Abnormal glucose    4. Breast cancer screening        Plan:       Preventative health care    Hypertension, unspecified type  -     Comprehensive metabolic panel; Future; Expected date: 11/13/2019  -     Lipid panel; Future; Expected date: 11/13/2019    Abnormal glucose  -     Hemoglobin A1c; Future; Expected date: 11/13/2019    Breast cancer screening  -     Mammo Digital Screening Bilateral With CAD; Future; Expected date: 05/17/2019        continue present meds   F/u with present specialists  Schedule appt with GYN  Counseled on regular exercise, maintenance of a healthy weight, balanced diet rich in fruits/vegetables and lean protein, and avoidance of unhealthy habits like smoking and excessive alcohol intake.

## 2019-05-21 DIAGNOSIS — F41.8 DEPRESSION WITH ANXIETY: ICD-10-CM

## 2019-05-21 RX ORDER — ALPRAZOLAM 0.5 MG/1
TABLET ORAL
Qty: 15 TABLET | Refills: 0 | Status: SHIPPED | OUTPATIENT
Start: 2019-05-21 | End: 2019-06-28 | Stop reason: SDUPTHER

## 2019-05-31 ENCOUNTER — HOSPITAL ENCOUNTER (OUTPATIENT)
Dept: RADIOLOGY | Facility: HOSPITAL | Age: 44
Discharge: HOME | End: 2019-05-31
Attending: FAMILY MEDICINE
Payer: COMMERCIAL

## 2019-05-31 VITALS — HEIGHT: 71 IN | BODY MASS INDEX: 36.45 KG/M2 | WEIGHT: 260.38 LBS

## 2019-05-31 DIAGNOSIS — Z12.39 BREAST CANCER SCREENING: ICD-10-CM

## 2019-05-31 PROCEDURE — 77063 BREAST TOMOSYNTHESIS BI: CPT | Mod: 26,,, | Performed by: RADIOLOGY

## 2019-05-31 PROCEDURE — 77067 SCR MAMMO BI INCL CAD: CPT | Mod: 26,,, | Performed by: RADIOLOGY

## 2019-05-31 PROCEDURE — 77067 SCR MAMMO BI INCL CAD: CPT | Mod: TC,PO

## 2019-05-31 PROCEDURE — 77063 MAMMO DIGITAL SCREENING BILAT WITH TOMOSYNTHESIS_CAD: ICD-10-PCS | Mod: 26,,, | Performed by: RADIOLOGY

## 2019-05-31 PROCEDURE — 77067 MAMMO DIGITAL SCREENING BILAT WITH TOMOSYNTHESIS_CAD: ICD-10-PCS | Mod: 26,,, | Performed by: RADIOLOGY

## 2019-06-04 ENCOUNTER — PATIENT MESSAGE (OUTPATIENT)
Dept: FAMILY MEDICINE | Facility: CLINIC | Age: 44
End: 2019-06-04

## 2019-06-14 ENCOUNTER — HOSPITAL ENCOUNTER (OUTPATIENT)
Dept: RADIOLOGY | Facility: HOSPITAL | Age: 44
Discharge: HOME | End: 2019-06-14
Attending: FAMILY MEDICINE
Payer: COMMERCIAL

## 2019-06-14 DIAGNOSIS — R92.8 ABNORMAL MAMMOGRAM: ICD-10-CM

## 2019-06-14 PROCEDURE — 77062 MAMMO DIGITAL DIAGNOSTIC BILAT WITH TOMOSYNTHESIS_CAD: ICD-10-PCS | Mod: 26,,, | Performed by: RADIOLOGY

## 2019-06-14 PROCEDURE — 76642 ULTRASOUND BREAST LIMITED: CPT | Mod: TC,50,PO

## 2019-06-14 PROCEDURE — 77062 BREAST TOMOSYNTHESIS BI: CPT | Mod: TC,PO

## 2019-06-14 PROCEDURE — 77062 BREAST TOMOSYNTHESIS BI: CPT | Mod: 26,,, | Performed by: RADIOLOGY

## 2019-06-14 PROCEDURE — 76642 ULTRASOUND BREAST LIMITED: CPT | Mod: 26,RT,, | Performed by: RADIOLOGY

## 2019-06-14 PROCEDURE — 76642 PR US BREAST UNILAT LIMITED: ICD-10-PCS | Mod: 26,LT,, | Performed by: RADIOLOGY

## 2019-06-14 PROCEDURE — 77066 DX MAMMO INCL CAD BI: CPT | Mod: 26,,, | Performed by: RADIOLOGY

## 2019-06-14 PROCEDURE — 76642 ULTRASOUND BREAST LIMITED: CPT | Mod: 26,LT,, | Performed by: RADIOLOGY

## 2019-06-14 PROCEDURE — 77066 MAMMO DIGITAL DIAGNOSTIC BILAT WITH TOMOSYNTHESIS_CAD: ICD-10-PCS | Mod: 26,,, | Performed by: RADIOLOGY

## 2019-06-17 ENCOUNTER — TELEPHONE (OUTPATIENT)
Dept: PHARMACY | Facility: CLINIC | Age: 44
End: 2019-06-17

## 2019-06-28 DIAGNOSIS — F41.8 DEPRESSION WITH ANXIETY: ICD-10-CM

## 2019-07-01 RX ORDER — ALPRAZOLAM 0.5 MG/1
TABLET ORAL
Qty: 15 TABLET | Refills: 0 | Status: SHIPPED | OUTPATIENT
Start: 2019-07-01 | End: 2019-09-05 | Stop reason: SDUPTHER

## 2019-07-01 NOTE — TELEPHONE ENCOUNTER
Disregard open encounter. Per [facility] pt has a pending refill for Xanax.  Rosalba

## 2019-07-15 ENCOUNTER — TELEPHONE (OUTPATIENT)
Dept: PHARMACY | Facility: CLINIC | Age: 44
End: 2019-07-15

## 2019-07-19 ENCOUNTER — PROCEDURE VISIT (OUTPATIENT)
Dept: NEUROLOGY | Facility: CLINIC | Age: 44
End: 2019-07-19
Payer: COMMERCIAL

## 2019-07-19 VITALS
HEART RATE: 77 BPM | WEIGHT: 258.63 LBS | DIASTOLIC BLOOD PRESSURE: 73 MMHG | HEIGHT: 70 IN | RESPIRATION RATE: 16 BRPM | SYSTOLIC BLOOD PRESSURE: 135 MMHG | BODY MASS INDEX: 37.02 KG/M2

## 2019-07-19 DIAGNOSIS — G43.719 INTRACTABLE CHRONIC MIGRAINE WITHOUT AURA AND WITHOUT STATUS MIGRAINOSUS: Primary | ICD-10-CM

## 2019-07-19 PROCEDURE — 64615 PR CHEMODENERVATION OF MUSCLE FOR CHRONIC MIGRAINE: ICD-10-PCS | Mod: S$GLB,,, | Performed by: PSYCHIATRY & NEUROLOGY

## 2019-07-19 PROCEDURE — 64615 CHEMODENERV MUSC MIGRAINE: CPT | Mod: S$GLB,,, | Performed by: PSYCHIATRY & NEUROLOGY

## 2019-07-19 NOTE — PROCEDURES
Procedures     BOTOX PROCEDURE    PROCEDURE PERFORMED: Botulinum toxin injection (31841)  KARLIE, DO NOT CHARGE FOR BOTOX  CLINICAL INDICATION: G43.719    A time out was conducted just before the start of the procedure to verify the correct patient and procedure, procedure location, and all relevant critical information.     Conventional methods of treatment but the patient has been unresponsive and refractory.The patient meets criteria for chronic headaches according to the ICHD-II, the patient has more than 15 headaches a month which last for more than 4 hours a day.    This is the first Botox injections and I am aiming for at least 50%  improvement in the patient's symptoms. Frequency of treatment is every 3 months unless no response to the treatments, at which time we will discontinue the injections.     DESCRIPTION OF PROCEDURE: After obtaining informed consent and under aseptic technique, a total of 155 units of botulinum toxin type A were injected in the following muscles: Procerus 5 units,  5 units bilaterally, frontalis 20 units, temporalis 20 units bilaterally, occipitalis 15 units, upper cervical paraspinals 10 units bilaterally and trapezius 15 units bilaterally. The patient was given a total of 155 units in 31 sites.The patient tolerated the procedure well. There were no complications. The patient was given a prescription for repeat treatment in 3 months.     Unavoidable waste 45 units  KARLIE, DO NOT CHARGE FOR BOTOX        Saúl Braswell M.D  Medical Director, Headache and Facial Pain  [facility]

## 2019-07-26 DIAGNOSIS — I10 HYPERTENSION, UNSPECIFIED TYPE: ICD-10-CM

## 2019-07-26 RX ORDER — HYDROCHLOROTHIAZIDE 25 MG/1
TABLET ORAL
Qty: 90 TABLET | Refills: 1 | Status: SHIPPED | OUTPATIENT
Start: 2019-07-26 | End: 2020-01-20

## 2019-07-26 NOTE — PROGRESS NOTES
Refill Authorization Note     is requesting a refill authorization.    Brief assessment and rationale for refill: APPROVE: prr  Name and strength of medication: HYDROCHLOROTHIAZIDE 25MG TABLETS       Medication Therapy Plan: HTN LCO controlled 5/19; Labs WNL 5/19; approve 3 more    Medication reconciliation completed: No              How patient will take medication: t1t qd          Comments:   Blood Pressure Readings: <139/89mmHg (12 months)  BP Readings from Last 3 Encounters:   07/19/19 135/73   05/17/19 130/84   05/09/19 111/75        Last Creatinine/ Potassium/ Sodium(diuretics)) (6 months: Diuretics-(Cr/K/Na)  or 12 months: non-diuretics)  Lab Results   Component Value Date    CREATININE 0.8 05/10/2019    CREATININE 0.9 02/05/2019    CREATININE 0.9 02/16/2018      Lab Results   Component Value Date    K 3.5 05/10/2019    K 3.2 (L) 02/05/2019    K 4.8 02/16/2018    Lab Results   Component Value Date     05/10/2019     02/05/2019     02/16/2018         Digital Hypertension Data: (values will display if enrolled)  Last 5 Patient Entered Readings                                      Current 30 Day Average:      There is no flowsheet data to display.        APPOINTMENTS (past 12m or future 3m authorizing provider)  LAST VISIT DATE  Steve Holcomb MD 5/17/2019         NEXT VISIT DATE  Steve Holcomb MD 11/22/2019

## 2019-07-26 NOTE — TELEPHONE ENCOUNTER
I have reviewed and agree with the assessment below with changes.   Approving 6 more as pt has f/u

## 2019-08-02 RX ORDER — TRAZODONE HYDROCHLORIDE 100 MG/1
TABLET ORAL
Qty: 45 TABLET | Refills: 0 | Status: SHIPPED | OUTPATIENT
Start: 2019-08-02 | End: 2019-08-29 | Stop reason: SDUPTHER

## 2019-08-08 ENCOUNTER — TELEPHONE (OUTPATIENT)
Dept: PHARMACY | Facility: CLINIC | Age: 44
End: 2019-08-08

## 2019-08-12 ENCOUNTER — TELEPHONE (OUTPATIENT)
Dept: PHARMACY | Facility: CLINIC | Age: 44
End: 2019-08-12

## 2019-08-23 RX ORDER — DESVENLAFAXINE 100 MG/1
TABLET, EXTENDED RELEASE ORAL
Qty: 30 TABLET | Refills: 0 | Status: SHIPPED | OUTPATIENT
Start: 2019-08-23 | End: 2019-09-20 | Stop reason: SDUPTHER

## 2019-08-28 RX ORDER — BUSPIRONE HYDROCHLORIDE 15 MG/1
TABLET ORAL
Qty: 90 TABLET | Refills: 0 | Status: SHIPPED | OUTPATIENT
Start: 2019-08-28 | End: 2019-09-20 | Stop reason: SDUPTHER

## 2019-08-29 RX ORDER — TRAZODONE HYDROCHLORIDE 100 MG/1
TABLET ORAL
Qty: 45 TABLET | Refills: 0 | Status: SHIPPED | OUTPATIENT
Start: 2019-08-29 | End: 2019-09-20 | Stop reason: SDUPTHER

## 2019-09-05 DIAGNOSIS — F41.8 DEPRESSION WITH ANXIETY: ICD-10-CM

## 2019-09-05 RX ORDER — ALPRAZOLAM 0.5 MG/1
TABLET ORAL
Qty: 15 TABLET | Refills: 0 | Status: SHIPPED | OUTPATIENT
Start: 2019-09-05 | End: 2019-09-20 | Stop reason: ALTCHOICE

## 2019-09-09 DIAGNOSIS — G43.719 INTRACTABLE CHRONIC MIGRAINE WITHOUT AURA AND WITHOUT STATUS MIGRAINOSUS: Primary | ICD-10-CM

## 2019-09-10 ENCOUNTER — TELEPHONE (OUTPATIENT)
Dept: PHARMACY | Facility: CLINIC | Age: 44
End: 2019-09-10

## 2019-09-20 ENCOUNTER — OFFICE VISIT (OUTPATIENT)
Dept: PSYCHIATRY | Facility: CLINIC | Age: 44
End: 2019-09-20
Payer: COMMERCIAL

## 2019-09-20 VITALS
WEIGHT: 259.38 LBS | HEART RATE: 71 BPM | HEIGHT: 70 IN | DIASTOLIC BLOOD PRESSURE: 73 MMHG | SYSTOLIC BLOOD PRESSURE: 118 MMHG | BODY MASS INDEX: 37.13 KG/M2

## 2019-09-20 DIAGNOSIS — I10 HYPERTENSION, UNSPECIFIED TYPE: ICD-10-CM

## 2019-09-20 DIAGNOSIS — R53.83 FATIGUE DUE TO DEPRESSION: ICD-10-CM

## 2019-09-20 DIAGNOSIS — F32.A FATIGUE DUE TO DEPRESSION: ICD-10-CM

## 2019-09-20 DIAGNOSIS — F33.2 MDD (MAJOR DEPRESSIVE DISORDER), RECURRENT SEVERE, WITHOUT PSYCHOSIS: Primary | ICD-10-CM

## 2019-09-20 DIAGNOSIS — F41.1 GAD (GENERALIZED ANXIETY DISORDER): ICD-10-CM

## 2019-09-20 DIAGNOSIS — E66.9 OBESITY (BMI 30-39.9): ICD-10-CM

## 2019-09-20 PROCEDURE — 3078F DIAST BP <80 MM HG: CPT | Mod: CPTII,S$GLB,, | Performed by: PSYCHIATRY & NEUROLOGY

## 2019-09-20 PROCEDURE — 3074F PR MOST RECENT SYSTOLIC BLOOD PRESSURE < 130 MM HG: ICD-10-PCS | Mod: CPTII,S$GLB,, | Performed by: PSYCHIATRY & NEUROLOGY

## 2019-09-20 PROCEDURE — 99214 PR OFFICE/OUTPT VISIT, EST, LEVL IV, 30-39 MIN: ICD-10-PCS | Mod: S$GLB,,, | Performed by: PSYCHIATRY & NEUROLOGY

## 2019-09-20 PROCEDURE — 3074F SYST BP LT 130 MM HG: CPT | Mod: CPTII,S$GLB,, | Performed by: PSYCHIATRY & NEUROLOGY

## 2019-09-20 PROCEDURE — 90833 PR PSYCHOTHERAPY W/PATIENT W/E&M, 30 MIN (ADD ON): ICD-10-PCS | Mod: S$GLB,,, | Performed by: PSYCHIATRY & NEUROLOGY

## 2019-09-20 PROCEDURE — 3008F PR BODY MASS INDEX (BMI) DOCUMENTED: ICD-10-PCS | Mod: CPTII,S$GLB,, | Performed by: PSYCHIATRY & NEUROLOGY

## 2019-09-20 PROCEDURE — 99999 PR PBB SHADOW E&M-EST. PATIENT-LVL III: CPT | Mod: PBBFAC,,, | Performed by: PSYCHIATRY & NEUROLOGY

## 2019-09-20 PROCEDURE — 3078F PR MOST RECENT DIASTOLIC BLOOD PRESSURE < 80 MM HG: ICD-10-PCS | Mod: CPTII,S$GLB,, | Performed by: PSYCHIATRY & NEUROLOGY

## 2019-09-20 PROCEDURE — 99214 OFFICE O/P EST MOD 30 MIN: CPT | Mod: S$GLB,,, | Performed by: PSYCHIATRY & NEUROLOGY

## 2019-09-20 PROCEDURE — 3008F BODY MASS INDEX DOCD: CPT | Mod: CPTII,S$GLB,, | Performed by: PSYCHIATRY & NEUROLOGY

## 2019-09-20 PROCEDURE — 99999 PR PBB SHADOW E&M-EST. PATIENT-LVL III: ICD-10-PCS | Mod: PBBFAC,,, | Performed by: PSYCHIATRY & NEUROLOGY

## 2019-09-20 PROCEDURE — 90833 PSYTX W PT W E/M 30 MIN: CPT | Mod: S$GLB,,, | Performed by: PSYCHIATRY & NEUROLOGY

## 2019-09-20 RX ORDER — TRAZODONE HYDROCHLORIDE 100 MG/1
TABLET ORAL
Qty: 45 TABLET | Refills: 0 | Status: SHIPPED | OUTPATIENT
Start: 2019-09-20 | End: 2019-10-25 | Stop reason: SDUPTHER

## 2019-09-20 RX ORDER — DESVENLAFAXINE 100 MG/1
TABLET, EXTENDED RELEASE ORAL
Qty: 30 TABLET | Refills: 0 | Status: SHIPPED | OUTPATIENT
Start: 2019-09-20 | End: 2019-10-18 | Stop reason: SDUPTHER

## 2019-09-20 RX ORDER — BUSPIRONE HYDROCHLORIDE 15 MG/1
TABLET ORAL
Qty: 90 TABLET | Refills: 0 | Status: SHIPPED | OUTPATIENT
Start: 2019-09-20 | End: 2019-10-25 | Stop reason: SDUPTHER

## 2019-09-20 RX ORDER — CLONAZEPAM 0.5 MG/1
0.25 TABLET ORAL DAILY PRN
Qty: 15 TABLET | Refills: 0 | Status: SHIPPED | OUTPATIENT
Start: 2019-09-20 | End: 2019-10-09 | Stop reason: SDUPTHER

## 2019-09-20 NOTE — PROGRESS NOTES
"ID: 41yo WF with a h/o depression and anxiety, no prior full psych eval w/i [facility]. Pt is a current pt of Anneliese Cochran LCSW who referred her for med mgmt along with , PCP. Pt has been on wait list since 3/2017. The pt is currently on xanax and cymbalta through PCP. Also on topamax and elavil for migraine mgmt through pcp    CC: depression.     Interim Hx: presents on time. Chart reviewed. Pt last seen in 5/2019.    "i'm ok. I have a lot going on. I have a lot anxiety. A lot of family stuff and we're trying to buy a house. I'm just full of anxiety all the time. It's really not bearable at this point."     Finds the xanax unhelpful and has been taking it 2x/day for this week- without efficacy, "but I don't know what else to do."     abilify was previously tried and not helpful, but vraylar may be a good option due to it's dopamine receptor binding and acitvation. Pt consistently reports low motivation, low energy.     On Psychiatric ROS:    Endorses somewhat improved sleep maintenance- wakes nightly and has difficulty reinitiating, anhedonia, feeling helpless/hopeless- less so b/c med has been helpful, dec energy- "none", stable concentration, inc'd appetite- since Spring 2017, dec PMA "i do feel tired thinking about doing things."     Denies thoughts of SI/intent/plan.     Endorses feeling +easily overwhelmed, +ruminative thinking, +feeling tense/"on edge"- "on edge"     PSYCHOTHERAPY ADD-ON   30 (16-37*) minutes    Time: 35 minutes  Participants: Met with patient    Therapeutic Intervention Type: insight oriented psychotherapy, behavior modifying psychotherapy, supportive psychotherapy  Why chosen therapy is appropriate versus another modality: relevant to diagnosis, patient responds to this modality, evidence based practice    Target symptoms: anxiety , adjustment  Primary focus: family dynamics  Psychotherapeutic techniques: support, validation, education, reframing    Outcome monitoring " "methods: self-report, observation, feedback from family    Patient's response to intervention:  The patient's response to intervention is accepting, motivated.    Progress toward goals:  The patient's progress toward goals is fair .    PPHx: Denies h/o self injury, inpt psych hospitalization, denies h/o suicide attempt     Current Psych Meds: pristiq 100mg qam, buspar TID, trazodone 100mg po qhs PRN insomnia, xanax 0.5mg po daily PRN anxiety  Past Psych Meds: ritalin, prozac, buspar PRN ("it helped with the stress of planning the wedding"), cymbalta 120mg po daily (ineffective), zoloft 150mg (ineffective), prozac 20mg po qam, xanax 0.5mg po BID PRN anxiety, wellbutrin xl 150mg po qam, fetzima 40mg po qam, abilify 2mg     PMHx: migraines (since childhood)- botox as txmt, monthly "they're well managed"    SubstHx:   T- none  E- very rare due to migraine  D- none  Caffeine- 2 dr.peppers/day    FamPHx: none    Musculoskeletal:  Muscle strength/Tone: no dyskinesia/ no tremor  Gait/Station- non antalgic, no assistance needed    MSE: appears stated age, well groomed, appropriate dress, engages well with examiner. Good e/c. Speech reg rate and vol, nonpressured. Mood is "my mood feels ok, it's the anxiety." Affect congruent- low energy, but does smile more easily. Sensorium fully intact. Oriented to date/day/location, current events. Narrative memory intact. Intellectual function is avg based on vocab and basic fund of knowledge. Thought is c/l/gd. No tangentiality or circumstantiality. No FOI/SULTAAN. Denies SI/HI. Denies A/VH. No evidence of delusions. Insight and Judgment intact.     Blood pressure 118/73, pulse 71, height 5' 10" (1.778 m), weight 117.7 kg (259 lb 5.9 oz), last menstrual period 09/08/2019.    Suicide Risk Assessment:   Protective- age, gender, no prior attempts, no prior hospitalizations, no family h/o attempts, no ongoing substance abuse, no psychosis, , denies SI/intent/plan, seeking treatment, " "access to treatment, future oriented, good primary support, no access to firearms    Risk- race, ongoing Axis I sxs, no children    **Pt is at LOW imminent and long term risk of suicide given current risk factors.    Assessment:  45yo WF with a h/o depression and anxiety, no prior full psych eval w/i [facility]. Pt is a current pt of Anneliese Cochran [facility] who referred her for med mgmt along with , PCP. Pt has been on wait list since 3/2017. The pt is currently on xanax and cymbalta through PCP. Also on topamax and elavil for migraine mgmt through pcp/neuro. On eval today she meets criteria for MDD, recurrent, severe w/o psychotic sxs despite a recent inc in cymbalta to 120mg po daily. Pt has been on cymbalta x 10yrs. Only recalls previous trial of prozac which was d/c'd for unknown reasons. Now requiring xanax daily- some days 2 tabs (ie:1mg total daily). Some room for behavioral interventions, as well and the pt is motivated. Has good ability to adhere to the new txmt plan. We tapered off cymbalta and started a trial of zoloft. Will discuss diet and exercise interventions more fully over course of txmt. today she has been on 100mg of zoloft x 1mo. Continues to state she does not feel "any different"- will inc one last titration to 150mg prior to discussing transition to alternate med. Have discussed trial of wellbutrin next as daytime lethargy and dec'd motivation are now the primary concerns to the pt, however current level of anxiety leading to xanax use 2x/d. May also consider mthfr mutation with some methyl folate supplement. Will add time released melatonin today for sleep. wgt loss would be an added benefit. Cont therapy as scheduled. No acute safety concerns. We added wellbutrin and today she reports improved energy and focus at work, but also perhaps inc'd anxiety? Tapered off zoloft to make room for trial of prozac. Will also add trial of buspar 10mg po BID which was helpful many years ago " "per pt. Last appt she reported  and therapist notice improvement on prozac 20mg po qam- buspar 10mg po TID also helpful with decreased xanax use and less variation in anxiety through day. Will cont both but inc prozac to 40mg to get at ongoing depressive and anxiety sxs- she then reported inc'd anxiety and I wondered about over activation on the prozac inc. not certain if she can afford genetic testing but is motivated for a trial of the methylfolate supplement. Dec'd the prozac back to 20mg po qam which today she reported did not affect her in either direction- started methylfolate supplementation- today reporting some improvement but con'd lethargy, lack of motivation, and reported anxiety. Agrees to do genetic testing. Genetic testing results returned and we started fetzima based on those results. Now reporting "maybe some improvement" but also reporting "no change in depression or anxiety"? Does report inc'd "anger"? May be overstimulation on fetzima (NE) and wellbutrin (dopa)- inc'd fetzima to more std dose of 40mg and within days the pt d/c'd the wellbutrin due to inc'd irritability. Now noticing "may some improvement" and the pt is more expansive with affect today in appt. Concern about bp but drinking a starbucks nitro at time of appt- will monitor at home for a few days and let me know via portal- we may inc dose in the future. Today pt reporting cont'd difficulty with anxiety and mood- started a trial of adjunct txmt with abilify 2mg- there has been weight gain but improved anxiety- will try for weight loss with beh interventions and we will re discuss in 4wks. Called the clinic one week ago with inc'd depressive sxs and wanting to make changes. Stopped abilify and here today to make some cont'd changes. "No different"- w/o the abilify. Will taper off fetzima and start trial of pristiq. On f/u of pristiq pt is feeling "a little better" but anxiety continues sleep a little improved on trazodone 100mg " will inc to 150mg po qhs and likely add adjunct txmt at next appt. Today reporting cont'd anxiety- will transition xanax to klonopin and start a trial of vraylar 1.5mg po daily.     Axis I: MDD, recurrent, severe w/o psychotic sxs; PASTORA  Axis II: none at this time   Axis III: HTN, migraines  Axis IV: occupational, chronic mental health  Axis V: GAF 70    Plan:   1. Cont pristiq 100mg po qam  2. Cont buspar 15mg po TID  3. stop xanax; start trial of klonopin 0.25mg po daily PRN anxiety  4. Start trial of vraylar 1.5mg po qam  5. Cont methylfolate (purchasing online)   6. Cont Trazodone 150mg po qhs   7. Cont therapy as scheduled; I am in contact with therapist  8. RTC 1mo  9. labwork ordered by PCP- will review 11/2019    -Spent 30min face to face with the pt; >50% time spent in counseling   -Supportive therapy and psychoeducation provided  -R/B/SE's of medications discussed with the pt who expresses understanding and chooses to take medications as prescribed.   -Pt instructed to call clinic, 911 or go to nearest emergency room if sxs worsen or pt is in   crisis. The pt expresses understanding.

## 2019-10-08 ENCOUNTER — TELEPHONE (OUTPATIENT)
Dept: PHARMACY | Facility: CLINIC | Age: 44
End: 2019-10-08

## 2019-10-09 DIAGNOSIS — I10 ESSENTIAL HYPERTENSION: ICD-10-CM

## 2019-10-09 RX ORDER — CLONAZEPAM 0.5 MG/1
TABLET ORAL
Qty: 15 TABLET | Refills: 0 | Status: SHIPPED | OUTPATIENT
Start: 2019-10-09 | End: 2019-10-25 | Stop reason: SDUPTHER

## 2019-10-09 NOTE — PROGRESS NOTES
Refill Authorization Note     is requesting a refill authorization.    Brief assessment and rationale for refill: APPROVE: prr  Name and strength of medication: metoprolol succinate (TOPROL-XL) 200 MG 24 hr tabl       Medication Therapy Plan: HTN - controlled lco(5/19); BP wnl; approve 6 more    Medication reconciliation completed: No              How patient will take medication: t1t po qd          Comments:   Blood Pressure Readings: <139/89mmHg 12 months   BP Readings from Last 3 Encounters:   07/19/19 135/73   05/17/19 130/84   05/09/19 111/75           Pulse Readings from Last 3 Encounters:   07/19/19 77   05/17/19 86   05/09/19 81       Appointments past 12m or future 3m    Date Provider   Last Visit   5/17/2019 Steve Holcomb MD   Next Visit   11/22/2019 Steve Holcomb MD

## 2019-10-10 ENCOUNTER — TELEPHONE (OUTPATIENT)
Dept: NEUROLOGY | Facility: CLINIC | Age: 44
End: 2019-10-10

## 2019-10-10 NOTE — TELEPHONE ENCOUNTER
Called patient in regards to botox delivery and prior authorization of medication. Insurance called pre service department and informed them a new authorization is needed for medication and authorization will not be back before tomorrow. No answer , left voicemail to return call.

## 2019-10-11 ENCOUNTER — TELEPHONE (OUTPATIENT)
Dept: NEUROLOGY | Facility: CLINIC | Age: 44
End: 2019-10-11

## 2019-10-11 RX ORDER — METOPROLOL SUCCINATE 200 MG/1
TABLET, EXTENDED RELEASE ORAL
Qty: 90 TABLET | Refills: 1 | Status: SHIPPED | OUTPATIENT
Start: 2019-10-11 | End: 2020-04-30

## 2019-10-11 NOTE — TELEPHONE ENCOUNTER
Called patient and rescheduled Botox due to it not being delivered on time by insurance. Patient verbalized understanding.

## 2019-10-11 NOTE — TELEPHONE ENCOUNTER
----- Message from Stiven Barker sent at 10/11/2019  7:44 AM CDT -----  Contact: Juan  Type: Needs Medical Advice    Who Called:  Juan [facility]  Symptoms (please be specific):    How long has patient had these symptoms:    Pharmacy name and phone #:    Best Call Back Number: [phone]  Additional Information: called to advise that patient's botox medication will not be able to go out today,called to have appointment be reschedule.stated will ship on 10/14/15 will arrive at the office on 10/15/19.call back to reschedule

## 2019-10-20 RX ORDER — DESVENLAFAXINE 100 MG/1
TABLET, EXTENDED RELEASE ORAL
Qty: 30 TABLET | Refills: 0 | Status: SHIPPED | OUTPATIENT
Start: 2019-10-20 | End: 2019-10-25 | Stop reason: SDUPTHER

## 2019-10-25 ENCOUNTER — PROCEDURE VISIT (OUTPATIENT)
Dept: NEUROLOGY | Facility: CLINIC | Age: 44
End: 2019-10-25
Payer: COMMERCIAL

## 2019-10-25 ENCOUNTER — OFFICE VISIT (OUTPATIENT)
Dept: PSYCHIATRY | Facility: CLINIC | Age: 44
End: 2019-10-25
Payer: COMMERCIAL

## 2019-10-25 VITALS
BODY MASS INDEX: 37.62 KG/M2 | HEIGHT: 70 IN | HEART RATE: 72 BPM | WEIGHT: 262.81 LBS | SYSTOLIC BLOOD PRESSURE: 104 MMHG | DIASTOLIC BLOOD PRESSURE: 68 MMHG

## 2019-10-25 VITALS
DIASTOLIC BLOOD PRESSURE: 85 MMHG | RESPIRATION RATE: 16 BRPM | SYSTOLIC BLOOD PRESSURE: 142 MMHG | WEIGHT: 264.75 LBS | HEART RATE: 76 BPM | HEIGHT: 70 IN | BODY MASS INDEX: 37.9 KG/M2

## 2019-10-25 DIAGNOSIS — F32.A FATIGUE DUE TO DEPRESSION: ICD-10-CM

## 2019-10-25 DIAGNOSIS — F33.2 MDD (MAJOR DEPRESSIVE DISORDER), RECURRENT SEVERE, WITHOUT PSYCHOSIS: Primary | ICD-10-CM

## 2019-10-25 DIAGNOSIS — R53.83 FATIGUE DUE TO DEPRESSION: ICD-10-CM

## 2019-10-25 DIAGNOSIS — F41.1 GAD (GENERALIZED ANXIETY DISORDER): ICD-10-CM

## 2019-10-25 DIAGNOSIS — G43.719 INTRACTABLE CHRONIC MIGRAINE WITHOUT AURA AND WITHOUT STATUS MIGRAINOSUS: Primary | ICD-10-CM

## 2019-10-25 DIAGNOSIS — E66.9 OBESITY (BMI 30-39.9): ICD-10-CM

## 2019-10-25 DIAGNOSIS — I10 HYPERTENSION, UNSPECIFIED TYPE: ICD-10-CM

## 2019-10-25 PROCEDURE — 64615 PR CHEMODENERVATION OF MUSCLE FOR CHRONIC MIGRAINE: ICD-10-PCS | Mod: S$GLB,,, | Performed by: PSYCHIATRY & NEUROLOGY

## 2019-10-25 PROCEDURE — 3074F PR MOST RECENT SYSTOLIC BLOOD PRESSURE < 130 MM HG: ICD-10-PCS | Mod: CPTII,S$GLB,, | Performed by: PSYCHIATRY & NEUROLOGY

## 2019-10-25 PROCEDURE — 99214 OFFICE O/P EST MOD 30 MIN: CPT | Mod: S$GLB,,, | Performed by: PSYCHIATRY & NEUROLOGY

## 2019-10-25 PROCEDURE — 90833 PSYTX W PT W E/M 30 MIN: CPT | Mod: S$GLB,,, | Performed by: PSYCHIATRY & NEUROLOGY

## 2019-10-25 PROCEDURE — 90833 PR PSYCHOTHERAPY W/PATIENT W/E&M, 30 MIN (ADD ON): ICD-10-PCS | Mod: S$GLB,,, | Performed by: PSYCHIATRY & NEUROLOGY

## 2019-10-25 PROCEDURE — 99999 PR PBB SHADOW E&M-EST. PATIENT-LVL III: ICD-10-PCS | Mod: PBBFAC,,, | Performed by: PSYCHIATRY & NEUROLOGY

## 2019-10-25 PROCEDURE — 64615 CHEMODENERV MUSC MIGRAINE: CPT | Mod: S$GLB,,, | Performed by: PSYCHIATRY & NEUROLOGY

## 2019-10-25 PROCEDURE — 3078F DIAST BP <80 MM HG: CPT | Mod: CPTII,S$GLB,, | Performed by: PSYCHIATRY & NEUROLOGY

## 2019-10-25 PROCEDURE — 99214 PR OFFICE/OUTPT VISIT, EST, LEVL IV, 30-39 MIN: ICD-10-PCS | Mod: S$GLB,,, | Performed by: PSYCHIATRY & NEUROLOGY

## 2019-10-25 PROCEDURE — 99999 PR PBB SHADOW E&M-EST. PATIENT-LVL III: CPT | Mod: PBBFAC,,, | Performed by: PSYCHIATRY & NEUROLOGY

## 2019-10-25 PROCEDURE — 3008F BODY MASS INDEX DOCD: CPT | Mod: CPTII,S$GLB,, | Performed by: PSYCHIATRY & NEUROLOGY

## 2019-10-25 PROCEDURE — 3078F PR MOST RECENT DIASTOLIC BLOOD PRESSURE < 80 MM HG: ICD-10-PCS | Mod: CPTII,S$GLB,, | Performed by: PSYCHIATRY & NEUROLOGY

## 2019-10-25 PROCEDURE — 3008F PR BODY MASS INDEX (BMI) DOCUMENTED: ICD-10-PCS | Mod: CPTII,S$GLB,, | Performed by: PSYCHIATRY & NEUROLOGY

## 2019-10-25 PROCEDURE — 3074F SYST BP LT 130 MM HG: CPT | Mod: CPTII,S$GLB,, | Performed by: PSYCHIATRY & NEUROLOGY

## 2019-10-25 RX ORDER — DESVENLAFAXINE 100 MG/1
TABLET, EXTENDED RELEASE ORAL
Qty: 30 TABLET | Refills: 2 | Status: SHIPPED | OUTPATIENT
Start: 2019-10-25 | End: 2019-11-25 | Stop reason: SDUPTHER

## 2019-10-25 RX ORDER — CLONAZEPAM 0.5 MG/1
TABLET ORAL
Qty: 15 TABLET | Refills: 2 | Status: SHIPPED | OUTPATIENT
Start: 2019-10-25 | End: 2020-04-09 | Stop reason: SDUPTHER

## 2019-10-25 RX ORDER — TRAZODONE HYDROCHLORIDE 100 MG/1
TABLET ORAL
Qty: 45 TABLET | Refills: 2 | Status: SHIPPED | OUTPATIENT
Start: 2019-10-25 | End: 2020-01-19

## 2019-10-25 RX ORDER — BUSPIRONE HYDROCHLORIDE 15 MG/1
TABLET ORAL
Qty: 90 TABLET | Refills: 2 | Status: SHIPPED | OUTPATIENT
Start: 2019-10-25 | End: 2020-01-19

## 2019-10-25 NOTE — PROGRESS NOTES
"ID: 41yo WF with a h/o depression and anxiety, no prior full psych eval w/i [facility]. Pt is a current pt of Anneliese Cochran LCSW who referred her for med mgmt along with , PCP. Pt has been on wait list since 3/2017. The pt is currently on xanax and cymbalta through PCP. Also on topamax and elavil for migraine mgmt through pcp    CC: depression.     Interim Hx: presents on time. Chart reviewed.     Pt appears well and reports self as "i'm good. We closed on our new house on 10/4. Still getting settled but that's been good."     "The klonopin was a really noticeable difference. I really noticed that I felt normal on it. Like normal anxiety but not irrational and not affecting my function. When I ran out of it I felt completely different so I wanted to ask about that."     Last appt we also started a trial of vraylar, "Maybe it's been helpful, I mean I have gotten a lot accomplished with the move. And also i'm aware that I couldn't have bought this house and done all of this last year so i'm definitely better now than I have been."     Pt wishes to cont current meds for "a little more time"- will f/u in 2mos.    On Psychiatric ROS:    Endorses somewhat improved sleep maintenance- wakes nightly and has difficulty reinitiating, anhedonia, feeling helpless/hopeless- less so b/c med has been helpful, dec energy- "none", stable concentration, inc'd appetite- since Spring 2017, dec PMA "i do feel tired thinking about doing things."     Denies thoughts of SI/intent/plan.     Endorses feeling +easily overwhelmed, +ruminative thinking, +feeling tense/"on edge"- "on edge"     PSYCHOTHERAPY ADD-ON   30 (16-37*) minutes    Time: 35 minutes  Participants: Met with patient    Therapeutic Intervention Type: insight oriented psychotherapy, behavior modifying psychotherapy, supportive psychotherapy  Why chosen therapy is appropriate versus another modality: relevant to diagnosis, patient responds to this modality, " "evidence based practice    Target symptoms: anxiety , adjustment  Primary focus: family dynamics  Psychotherapeutic techniques: support, validation, education, reframing    Outcome monitoring methods: self-report, observation, feedback from family    Patient's response to intervention:  The patient's response to intervention is accepting, motivated.    Progress toward goals:  The patient's progress toward goals is fair .    PPHx: Denies h/o self injury, inpt psych hospitalization, denies h/o suicide attempt     Current Psych Meds: pristiq 100mg qam, buspar TID, trazodone 100mg po qhs PRN insomnia, xanax 0.5mg po daily PRN anxiety  Past Psych Meds: ritalin, prozac, buspar PRN ("it helped with the stress of planning the wedding"), cymbalta 120mg po daily (ineffective), zoloft 150mg (ineffective), prozac 20mg po qam, xanax 0.5mg po BID PRN anxiety, wellbutrin xl 150mg po qam, fetzima 40mg po qam, abilify 2mg     PMHx: migraines (since childhood)- botox as txmt, monthly "they're well managed"    SubstHx:   T- none  E- very rare due to migraine  D- none  Caffeine- 2 dr.peppers/day    FamPHx: none    Musculoskeletal:  Muscle strength/Tone: no dyskinesia/ no tremor  Gait/Station- non antalgic, no assistance needed    MSE: appears stated age, well groomed, appropriate dress, engages well with examiner. Good e/c. Speech reg rate and vol, nonpressured. Mood is "I feel pretty good today." Affect congruent- low energy, but does smile more easily. Sensorium fully intact. Oriented to date/day/location, current events. Narrative memory intact. Intellectual function is avg based on vocab and basic fund of knowledge. Thought is c/l/gd. No tangentiality or circumstantiality. No FOI/SULTANA. Denies SI/HI. Denies A/VH. No evidence of delusions. Insight and Judgment intact.     Blood pressure 104/68, pulse 72, height 5' 10" (1.778 m), weight 119.2 kg (262 lb 12.6 oz), last menstrual period 10/01/2019.    Suicide Risk Assessment: " "  Protective- age, gender, no prior attempts, no prior hospitalizations, no family h/o attempts, no ongoing substance abuse, no psychosis, , denies SI/intent/plan, seeking treatment, access to treatment, future oriented, good primary support, no access to firearms    Risk- race, ongoing Axis I sxs, no children    **Pt is at LOW imminent and long term risk of suicide given current risk factors.    Assessment:  43yo WF with a h/o depression and anxiety, no prior full psych eval w/i [facility]. Pt is a current pt of CYRUS Barragan who referred her for med mgmt along with , PCP. Pt has been on wait list since 3/2017. The pt is currently on xanax and cymbalta through PCP. Also on topamax and elavil for migraine mgmt through pcp/neuro. On eval today she meets criteria for MDD, recurrent, severe w/o psychotic sxs despite a recent inc in cymbalta to 120mg po daily. Pt has been on cymbalta x 10yrs. Only recalls previous trial of prozac which was d/c'd for unknown reasons. Now requiring xanax daily- some days 2 tabs (ie:1mg total daily). Some room for behavioral interventions, as well and the pt is motivated. Has good ability to adhere to the new txmt plan. We tapered off cymbalta and started a trial of zoloft. Will discuss diet and exercise interventions more fully over course of txmt. today she has been on 100mg of zoloft x 1mo. Continues to state she does not feel "any different"- will inc one last titration to 150mg prior to discussing transition to alternate med. Have discussed trial of wellbutrin next as daytime lethargy and dec'd motivation are now the primary concerns to the pt, however current level of anxiety leading to xanax use 2x/d. May also consider mthfr mutation with some methyl folate supplement. Will add time released melatonin today for sleep. wgt loss would be an added benefit. Cont therapy as scheduled. No acute safety concerns. We added wellbutrin and today she reports " "improved energy and focus at work, but also perhaps inc'd anxiety? Tapered off zoloft to make room for trial of prozac. Will also add trial of buspar 10mg po BID which was helpful many years ago per pt. Last appt she reported  and therapist notice improvement on prozac 20mg po qam- buspar 10mg po TID also helpful with decreased xanax use and less variation in anxiety through day. Will cont both but inc prozac to 40mg to get at ongoing depressive and anxiety sxs- she then reported inc'd anxiety and I wondered about over activation on the prozac inc. not certain if she can afford genetic testing but is motivated for a trial of the methylfolate supplement. Dec'd the prozac back to 20mg po qam which today she reported did not affect her in either direction- started methylfolate supplementation- today reporting some improvement but con'd lethargy, lack of motivation, and reported anxiety. Agrees to do genetic testing. Genetic testing results returned and we started fetzima based on those results. Now reporting "maybe some improvement" but also reporting "no change in depression or anxiety"? Does report inc'd "anger"? May be overstimulation on fetzima (NE) and wellbutrin (dopa)- inc'd fetzima to more std dose of 40mg and within days the pt d/c'd the wellbutrin due to inc'd irritability. Now noticing "may some improvement" and the pt is more expansive with affect today in appt. Concern about bp but drinking a starbucks nitro at time of appt- will monitor at home for a few days and let me know via portal- we may inc dose in the future. Today pt reporting cont'd difficulty with anxiety and mood- started a trial of adjunct txmt with abilify 2mg- there has been weight gain but improved anxiety- will try for weight loss with beh interventions and we will re discuss in 4wks. Called the clinic one week ago with inc'd depressive sxs and wanting to make changes. Stopped abilify and here today to make some cont'd changes. "No " "different"- w/o the abilify. Will taper off fetzima and start trial of pristiq. On f/u of pristiq pt is feeling "a little better" but anxiety continues sleep a little improved on trazodone 100mg will inc to 150mg po qhs and likely add adjunct txmt at next appt. Last appt reported cont'd anxiety- transitioned xanax to klonopin and started a trial of vraylar 1.5mg po daily. Today reporting some improvement which she attributes more to the klonopin but I find her mood improved and motivation/energy have also improved- likely a reflection of the vraylar. She wants to cont w/o change today through holidays and assess.     Axis I: MDD, recurrent, severe w/o psychotic sxs; PASTORA  Axis II: none at this time   Axis III: HTN, migraines  Axis IV: occupational, chronic mental health  Axis V: GAF 70    Plan:   1. Cont pristiq 100mg po qam  2. Cont buspar 15mg po TID  3. Cont klonopin 0.25mg po daily PRN anxiety  4. cont vraylar 1.5mg po qam  5. Cont methylfolate (purchasing online)   6. Cont Trazodone 100-150mg po qhs   7. Cont therapy as scheduled; I am in contact with therapist  8. RTC 2mos  9. labwork ordered by PCP- will review 11/2019    -Spent 30min face to face with the pt; >50% time spent in counseling   -Supportive therapy and psychoeducation provided  -R/B/SE's of medications discussed with the pt who expresses understanding and chooses to take medications as prescribed.   -Pt instructed to call clinic, 911 or go to nearest emergency room if sxs worsen or pt is in   crisis. The pt expresses understanding.  "

## 2019-10-25 NOTE — PROCEDURES
Procedures     BOTOX PROCEDURE    PROCEDURE PERFORMED: Botulinum toxin injection (76977)  [facility], DO NOT CHARGE FOR BOTOX  CLINICAL INDICATION: G43.719    A time out was conducted just before the start of the procedure to verify the correct patient and procedure, procedure location, and all relevant critical information.     Conventional methods of treatment but the patient has been unresponsive and refractory.The patient meets criteria for chronic headaches according to the ICHD-II, the patient has more than 15 headaches a month which last for more than 4 hours a day.    This is the first Botox injections and I am aiming for at least 50%  improvement in the patient's symptoms. Frequency of treatment is every 3 months unless no response to the treatments, at which time we will discontinue the injections.     DESCRIPTION OF PROCEDURE: After obtaining informed consent and under aseptic technique, a total of 155 units of botulinum toxin type A were injected in the following muscles: Procerus 5 units,  5 units bilaterally, frontalis 20 units, temporalis 20 units bilaterally, occipitalis 15 units, upper cervical paraspinals 10 units bilaterally and trapezius 15 units bilaterally. The patient was given a total of 155 units in 31 sites.The patient tolerated the procedure well. There were no complications. The patient was given a prescription for repeat treatment in 3 months.     Unavoidable waste 45 units  [facility], DO NOT CHARGE FOR BOTOX        Saúl Braswell M.D  Medical Director, Headache and Facial Pain  [facility]

## 2019-10-30 ENCOUNTER — TELEPHONE (OUTPATIENT)
Dept: NEUROLOGY | Facility: CLINIC | Age: 44
End: 2019-10-30

## 2019-10-30 NOTE — TELEPHONE ENCOUNTER
----- Message from Emilia Canela sent at 10/30/2019 10:47 AM CDT -----  Contact: self [phone]  Patient returned Rasheeda's call, please call back.  Thank you!

## 2019-10-30 NOTE — TELEPHONE ENCOUNTER
Called patient and scheduled botox appointment. Informed patient if coverage for botox changes I will give her a call before her appointment. Patient verbalized understanding.

## 2019-11-04 ENCOUNTER — TELEPHONE (OUTPATIENT)
Dept: PHARMACY | Facility: CLINIC | Age: 44
End: 2019-11-04

## 2019-11-07 ENCOUNTER — PATIENT OUTREACH (OUTPATIENT)
Dept: ADMINISTRATIVE | Facility: HOSPITAL | Age: 44
End: 2019-11-07

## 2019-11-22 ENCOUNTER — OFFICE VISIT (OUTPATIENT)
Dept: FAMILY MEDICINE | Facility: CLINIC | Age: 44
End: 2019-11-22
Payer: COMMERCIAL

## 2019-11-22 VITALS
BODY MASS INDEX: 38.1 KG/M2 | WEIGHT: 266.13 LBS | SYSTOLIC BLOOD PRESSURE: 138 MMHG | HEIGHT: 70 IN | HEART RATE: 106 BPM | DIASTOLIC BLOOD PRESSURE: 84 MMHG | TEMPERATURE: 98 F | OXYGEN SATURATION: 97 %

## 2019-11-22 DIAGNOSIS — I10 HYPERTENSION, UNSPECIFIED TYPE: Primary | ICD-10-CM

## 2019-11-22 DIAGNOSIS — M54.32 SCIATICA OF LEFT SIDE: ICD-10-CM

## 2019-11-22 DIAGNOSIS — R73.09 ABNORMAL GLUCOSE: ICD-10-CM

## 2019-11-22 DIAGNOSIS — K29.70 GASTRITIS, PRESENCE OF BLEEDING UNSPECIFIED, UNSPECIFIED CHRONICITY, UNSPECIFIED GASTRITIS TYPE: ICD-10-CM

## 2019-11-22 PROCEDURE — 99999 PR PBB SHADOW E&M-EST. PATIENT-LVL V: ICD-10-PCS | Mod: PBBFAC,,, | Performed by: FAMILY MEDICINE

## 2019-11-22 PROCEDURE — 3079F PR MOST RECENT DIASTOLIC BLOOD PRESSURE 80-89 MM HG: ICD-10-PCS | Mod: CPTII,S$GLB,, | Performed by: FAMILY MEDICINE

## 2019-11-22 PROCEDURE — 99999 PR PBB SHADOW E&M-EST. PATIENT-LVL V: CPT | Mod: PBBFAC,,, | Performed by: FAMILY MEDICINE

## 2019-11-22 PROCEDURE — 3008F PR BODY MASS INDEX (BMI) DOCUMENTED: ICD-10-PCS | Mod: CPTII,S$GLB,, | Performed by: FAMILY MEDICINE

## 2019-11-22 PROCEDURE — 3079F DIAST BP 80-89 MM HG: CPT | Mod: CPTII,S$GLB,, | Performed by: FAMILY MEDICINE

## 2019-11-22 PROCEDURE — 99214 PR OFFICE/OUTPT VISIT, EST, LEVL IV, 30-39 MIN: ICD-10-PCS | Mod: S$GLB,,, | Performed by: FAMILY MEDICINE

## 2019-11-22 PROCEDURE — 99214 OFFICE O/P EST MOD 30 MIN: CPT | Mod: S$GLB,,, | Performed by: FAMILY MEDICINE

## 2019-11-22 PROCEDURE — 3075F SYST BP GE 130 - 139MM HG: CPT | Mod: CPTII,S$GLB,, | Performed by: FAMILY MEDICINE

## 2019-11-22 PROCEDURE — 3075F PR MOST RECENT SYSTOLIC BLOOD PRESS GE 130-139MM HG: ICD-10-PCS | Mod: CPTII,S$GLB,, | Performed by: FAMILY MEDICINE

## 2019-11-22 PROCEDURE — 3008F BODY MASS INDEX DOCD: CPT | Mod: CPTII,S$GLB,, | Performed by: FAMILY MEDICINE

## 2019-11-22 RX ORDER — PANTOPRAZOLE SODIUM 40 MG/1
40 TABLET, DELAYED RELEASE ORAL DAILY
Qty: 14 TABLET | Refills: 0 | Status: SHIPPED | OUTPATIENT
Start: 2019-11-22 | End: 2020-03-11 | Stop reason: ALTCHOICE

## 2019-11-22 NOTE — PROGRESS NOTES
Subjective:       Patient ID: Anneliese Rocha is a 44 y.o. female.    Chief Complaint: Follow-up (6 Months)    Here for 6 month f/u on chronic health issues.    Depression - taking Proxac 40mg and Wellbutrin. Seeing Dr. Bryant  Mood seems stable.Using xanax PRN  HAs - stable on present regimen  HTN - controlled on present meds    C/o some epigastric burning for the past few months.    C/o 2 month h/o left posterior buttuck pain that radiates down the leg.  This is constant.  It does awaken her at night occasionally.  Stefan was helping.          Follow-up   Associated symptoms include headaches. Pertinent negatives include no abdominal pain, arthralgias, chest pain, chills, coughing, fever, joint swelling, nausea, neck pain, numbness, rash, sore throat, vomiting or weakness.       Past Medical History:   Diagnosis Date    Depression with anxiety     tolerating Cymbalta 90mg daily    Fatigue     HTN (hypertension)     Iron deficiency anemia     Migraines     Getting headaches about monthly; using Topamax 25mg and Elavil 50mg for prevention and Frova/cambia as needed; following with Dr. Maldonado    PCOS (polycystic ovarian syndrome)        Past Surgical History:   Procedure Laterality Date    BREAST BIOPSY Right     core  neg    oophrectomy  1994    left       Review of patient's allergies indicates:  No Known Allergies    Social History     Socioeconomic History    Marital status:      Spouse name: Not on file    Number of children: 0    Years of education: Not on file    Highest education level: Not on file   Occupational History    Occupation: accounting     Employer: [employer]     Comment: [employer]   Social Needs    Financial resource strain: Not hard at all    Food insecurity:     Worry: Never true     Inability: Never true    Transportation needs:     Medical: No     Non-medical: No   Tobacco Use    Smoking status: Never Smoker    Smokeless tobacco: Never Used   Substance and Sexual Activity     Alcohol use: Yes     Frequency: Monthly or less     Drinks per session: 1 or 2     Binge frequency: Never     Comment: rarely    Drug use: No    Sexual activity: Not on file   Lifestyle    Physical activity:     Days per week: 0 days     Minutes per session: 0 min    Stress: Rather much   Relationships    Social connections:     Talks on phone: Once a week     Gets together: Never     Attends [employer] service: Not on file     Active member of club or organization: No     Attends meetings of clubs or organizations: Never     Relationship status:    Other Topics Concern    Not on file   Social History Narrative    From [place]. Lives with .         Exercise: none regularly       Current Outpatient Medications on File Prior to Visit   Medication Sig Dispense Refill    busPIRone (BUSPAR) 15 MG tablet TAKE 1 TABLET(15 MG) BY MOUTH THREE TIMES DAILY 90 tablet 2    candesartan (ATACAND) 16 MG tablet Take 1 tablet (16 mg total) by mouth once daily. 30 tablet 11    cariprazine (VRAYLAR) 1.5 mg Cap Take 1 capsule (1.5 mg total) by mouth once daily. 30 capsule 2    clonazePAM (KLONOPIN) 0.5 MG tablet TAKE 1/2 TABLET(0.25 MG) BY MOUTH DAILY AS NEEDED FOR ANXIETY 15 tablet 2    ferrous sulfate (IRON) 325 mg (65 mg iron) Tab tablet iron      frovatriptan (FROVA) 2.5 MG tablet Take 1 tablet (2.5 mg total) by mouth as needed. 9 tablet 3    galcanezumab-gnlm (EMGALITY PEN) 120 mg/mL PnIj Inject 120 mg into the skin every 28 days. 1 mL 11    hydroCHLOROthiazide (HYDRODIURIL) 25 MG tablet TAKE 1 TABLET(25 MG) BY MOUTH EVERY DAY 90 tablet 1    ketorolac (SPRIX) 15.75 mg/spray Spry 15.75 mg by Nasal route every 6 (six) hours. 5 each 5    metoprolol succinate (TOPROL-XL) 200 MG 24 hr tablet TAKE 1 TABLET(200 MG) BY MOUTH EVERY DAY 90 tablet 1    onabotulinumtoxina (BOTOX) 200 unit SolR Botox 200 unit injection   Take 155 units as needed by injection route.      ondansetron (ZOFRAN) 4 MG tablet TK 1 T  PO TID PRN  1    prochlorperazine (COMPAZINE) 5 MG tablet Take 1 tablet (5 mg total) by mouth 3 (three) times daily as needed for Nausea. 10 tablet 3    traZODone (DESYREL) 100 MG tablet TAKE 1 TO 1 AND 1/2 TABLETS BY MOUTH EVERY NIGHT AS NEEDED FOR SLEEP 45 tablet 2    UNABLE TO FIND Take 15 mg by mouth once daily. Methylfolate      ZEMBRACE SYMTOUCH 3 mg/0.5 mL PnIj   3     Current Facility-Administered Medications on File Prior to Visit   Medication Dose Route Frequency Provider Last Rate Last Dose    onabotulinumtoxina injection 200 Units  200 Units Intramuscular Q90 Days Mira Braswell MD        onabotulinumtoxina injection 200 Units  200 Units Intramuscular Q90 Days Mira Braswell MD   200 Units at 04/26/19 1321    onabotulinumtoxina injection 200 Units  200 Units Intramuscular Q90 Days Mira Braswell MD   200 Units at 07/19/19 1331    onabotulinumtoxina injection 200 Units  200 Units Intramuscular Q90 Days Mira Braswell MD   200 Units at 10/25/19 1334       Family History   Problem Relation Age of Onset    Diabetes Father     Hypertension Father     Coronary artery disease Father 55    Thyroid disease Mother     Migraines Mother     Hypertension Mother     Melanoma Paternal Uncle     Breast cancer Maternal Grandmother        Review of Systems   Constitutional: Negative for activity change, appetite change, chills, fever and unexpected weight change.   HENT: Negative for hearing loss, rhinorrhea, sore throat and trouble swallowing.    Eyes: Negative for pain, discharge and visual disturbance.   Respiratory: Negative for cough, chest tightness, shortness of breath and wheezing.    Cardiovascular: Negative for chest pain and palpitations.   Gastrointestinal: Negative for abdominal pain, blood in stool, constipation, diarrhea, nausea and vomiting.   Endocrine: Negative for polydipsia and polyuria.   Genitourinary: Negative for difficulty urinating, dysuria, hematuria and  "menstrual problem.   Musculoskeletal: Positive for back pain. Negative for arthralgias, gait problem, joint swelling and neck pain.   Skin: Negative for rash and wound.   Neurological: Positive for headaches. Negative for dizziness, weakness and numbness.   Hematological: Negative for adenopathy.   Psychiatric/Behavioral: Negative for confusion and dysphoric mood.       Objective:      /84 (BP Location: Left arm, Patient Position: Sitting)   Pulse 106   Temp 98.3 °F (36.8 °C) (Oral)   Ht 5' 10" (1.778 m)   Wt 120.7 kg (266 lb 1.5 oz)   SpO2 97%   BMI 38.18 kg/m²   Physical Exam   Constitutional: She is oriented to person, place, and time. She appears well-developed and well-nourished.   HENT:   Head: Normocephalic.   Mouth/Throat: Oropharynx is clear and moist. No oropharyngeal exudate or posterior oropharyngeal erythema.   Eyes: Pupils are equal, round, and reactive to light. Conjunctivae and EOM are normal.   Neck: Normal range of motion. Neck supple. No thyromegaly present.   Cardiovascular: Normal rate, regular rhythm, S1 normal, S2 normal, normal heart sounds and intact distal pulses. Exam reveals no gallop and no friction rub.   No murmur heard.  Pulmonary/Chest: Effort normal and breath sounds normal. She has no wheezes. She has no rales.   Abdominal: Soft. Normal appearance and bowel sounds are normal. She exhibits no distension. There is no tenderness.   Musculoskeletal:        Lumbar back: She exhibits normal range of motion and no bony tenderness.        Right lower leg: She exhibits no edema.        Left lower leg: She exhibits no edema.   Lymphadenopathy:     She has no cervical adenopathy.   Neurological: She is alert and oriented to person, place, and time. No cranial nerve deficit. Gait normal.   Skin: Skin is warm and intact. No rash noted.   Psychiatric: She has a normal mood and affect.       Results for orders placed or performed in visit on 05/10/19   Iron and TIBC   Result Value Ref " Range    Iron 62 30 - 160 ug/dL    Transferrin 298 200 - 375 mg/dL    TIBC 441 250 - 450 ug/dL    Saturated Iron 14 (L) 20 - 50 %   CBC auto differential   Result Value Ref Range    WBC 7.15 3.90 - 12.70 K/uL    RBC 4.67 4.00 - 5.40 M/uL    Hemoglobin 14.2 12.0 - 16.0 g/dL    Hematocrit 43.5 37.0 - 48.5 %    Mean Corpuscular Volume 93 82 - 98 fL    Mean Corpuscular Hemoglobin 30.4 27.0 - 31.0 pg    Mean Corpuscular Hemoglobin Conc 32.6 32.0 - 36.0 g/dL    RDW 12.8 11.5 - 14.5 %    Platelets 278 150 - 350 K/uL    MPV 12.4 9.2 - 12.9 fL    Immature Granulocytes 0.1 0.0 - 0.5 %    Gran # (ANC) 3.8 1.8 - 7.7 K/uL    Immature Grans (Abs) 0.01 0.00 - 0.04 K/uL    Lymph # 2.5 1.0 - 4.8 K/uL    Mono # 0.6 0.3 - 1.0 K/uL    Eos # 0.2 0.0 - 0.5 K/uL    Baso # 0.09 0.00 - 0.20 K/uL    nRBC 0 0 /100 WBC    Gran% 52.8 38.0 - 73.0 %    Lymph% 34.5 18.0 - 48.0 %    Mono% 8.4 4.0 - 15.0 %    Eosinophil% 2.9 0.0 - 8.0 %    Basophil% 1.3 0.0 - 1.9 %    Differential Method Automated    Basic metabolic panel   Result Value Ref Range    Sodium 139 136 - 145 mmol/L    Potassium 3.5 3.5 - 5.1 mmol/L    Chloride 102 95 - 110 mmol/L    CO2 31 (H) 23 - 29 mmol/L    Glucose 117 (H) 70 - 110 mg/dL    BUN, Bld 7 6 - 20 mg/dL    Creatinine 0.8 0.5 - 1.4 mg/dL    Calcium 10.1 8.7 - 10.5 mg/dL    Anion Gap 6 (L) 8 - 16 mmol/L    eGFR if African American >60.0 >60 mL/min/1.73 m^2    eGFR if non African American >60.0 >60 mL/min/1.73 m^2       Assessment:       1. Hypertension, unspecified type    2. Abnormal glucose    3. Sciatica of left side    4. Gastritis, presence of bleeding unspecified, unspecified chronicity, unspecified gastritis type    5. BMI 38.0-38.9,adult        Plan:       Hypertension, unspecified type    Abnormal glucose    Sciatica of left side  -     Ambulatory Referral to Physical/Occupational Therapy    Gastritis, presence of bleeding unspecified, unspecified chronicity, unspecified gastritis type  -     pantoprazole (PROTONIX)  40 MG tablet; Take 1 tablet (40 mg total) by mouth once daily.  Dispense: 14 tablet; Refill: 0    BMI 38.0-38.9,adult  -     Ambulatory Consult to [facility]      trial of 2 weeks of Protonix; bland diet  Labs soon  continue other present meds   F/u with present specialists  Counseled on regular exercise, maintenance of a healthy weight, balanced diet rich in fruits/vegetables and lean protein, and avoidance of unhealthy habits like smoking and excessive alcohol intake.  F/u 6 months or PRN

## 2019-11-23 ENCOUNTER — LAB VISIT (OUTPATIENT)
Dept: LAB | Facility: HOSPITAL | Age: 44
End: 2019-11-23
Attending: FAMILY MEDICINE
Payer: COMMERCIAL

## 2019-11-23 DIAGNOSIS — R73.09 ABNORMAL GLUCOSE: ICD-10-CM

## 2019-11-23 DIAGNOSIS — I10 HYPERTENSION, UNSPECIFIED TYPE: ICD-10-CM

## 2019-11-23 LAB
ALBUMIN SERPL BCP-MCNC: 3.9 G/DL (ref 3.5–5.2)
ALP SERPL-CCNC: 31 U/L (ref 55–135)
ALT SERPL W/O P-5'-P-CCNC: 34 U/L (ref 10–44)
ANION GAP SERPL CALC-SCNC: 9 MMOL/L (ref 8–16)
AST SERPL-CCNC: 23 U/L (ref 10–40)
BILIRUB SERPL-MCNC: 0.5 MG/DL (ref 0.1–1)
BUN SERPL-MCNC: 10 MG/DL (ref 6–20)
CALCIUM SERPL-MCNC: 9.8 MG/DL (ref 8.7–10.5)
CHLORIDE SERPL-SCNC: 104 MMOL/L (ref 95–110)
CHOLEST SERPL-MCNC: 237 MG/DL (ref 120–199)
CHOLEST/HDLC SERPL: 4.2 {RATIO} (ref 2–5)
CO2 SERPL-SCNC: 26 MMOL/L (ref 23–29)
CREAT SERPL-MCNC: 0.8 MG/DL (ref 0.5–1.4)
EST. GFR  (AFRICAN AMERICAN): >60 ML/MIN/1.73 M^2
EST. GFR  (NON AFRICAN AMERICAN): >60 ML/MIN/1.73 M^2
ESTIMATED AVG GLUCOSE: 105 MG/DL (ref 68–131)
GLUCOSE SERPL-MCNC: 116 MG/DL (ref 70–110)
HBA1C MFR BLD HPLC: 5.3 % (ref 4–5.6)
HDLC SERPL-MCNC: 57 MG/DL (ref 40–75)
HDLC SERPL: 24.1 % (ref 20–50)
LDLC SERPL CALC-MCNC: 146.6 MG/DL (ref 63–159)
NONHDLC SERPL-MCNC: 180 MG/DL
POTASSIUM SERPL-SCNC: 3.5 MMOL/L (ref 3.5–5.1)
PROT SERPL-MCNC: 7 G/DL (ref 6–8.4)
SODIUM SERPL-SCNC: 139 MMOL/L (ref 136–145)
TRIGL SERPL-MCNC: 167 MG/DL (ref 30–150)

## 2019-11-23 PROCEDURE — 80061 LIPID PANEL: CPT

## 2019-11-23 PROCEDURE — 83036 HEMOGLOBIN GLYCOSYLATED A1C: CPT

## 2019-11-23 PROCEDURE — 36415 COLL VENOUS BLD VENIPUNCTURE: CPT | Mod: PO

## 2019-11-23 PROCEDURE — 80053 COMPREHEN METABOLIC PANEL: CPT

## 2019-11-25 RX ORDER — DESVENLAFAXINE 100 MG/1
TABLET, EXTENDED RELEASE ORAL
Qty: 30 TABLET | Refills: 0 | Status: SHIPPED | OUTPATIENT
Start: 2019-11-25 | End: 2020-01-24

## 2019-11-26 ENCOUNTER — CLINICAL SUPPORT (OUTPATIENT)
Dept: REHABILITATION | Facility: HOSPITAL | Age: 44
End: 2019-11-26
Attending: FAMILY MEDICINE
Payer: COMMERCIAL

## 2019-11-26 DIAGNOSIS — M79.606 LUMBAR PAIN WITH RADIATION DOWN LEG: ICD-10-CM

## 2019-11-26 DIAGNOSIS — M54.50 LUMBAR PAIN WITH RADIATION DOWN LEG: ICD-10-CM

## 2019-11-26 PROCEDURE — 97110 THERAPEUTIC EXERCISES: CPT | Mod: PO | Performed by: PHYSICAL THERAPIST

## 2019-11-26 PROCEDURE — 97162 PT EVAL MOD COMPLEX 30 MIN: CPT | Mod: PO | Performed by: PHYSICAL THERAPIST

## 2019-11-26 NOTE — PLAN OF CARE
[facility]  Physical Therapy Initial Evaluation    Name: Anneliese Rocha  Clinic Number: [id number]    Therapy Diagnosis:   Encounter Diagnosis   Name Primary?    Lumbar pain with radiation down leg      Physician: Steve Holcomb MD    Physician Orders: PT Eval and Treat   Medical Diagnosis from Referral: Sciatica  Evaluation Date: 11/26/2019  Authorization Period Expiration: 12/31/2019  Plan of Care Expiration: 01/07/2020  Visit # / Visits authorized: 1/ 1    Time In: 4:56 PM   Time Out: 5:42 PM   Total Billable Time: 46 minutes    Precautions: Standard    Subjective   Date of onset: 3 months   History of current condition - Anneliese reports: An insidious onset of left side low back and buttock pain. Her pain is intermittent in nature and sometimes radiates down the left leg to the knee. She denies any numbness or tingling into the LE's. Her pain is increased with coughing or when straining. She is not aware of any other exacerbating factors. She takes Aleve PRN and it is helpful. Her symptoms have improved since the onset.        Past Medical History:   Diagnosis Date    Depression with anxiety     tolerating Cymbalta 90mg daily    Fatigue     HTN (hypertension)     Iron deficiency anemia     Migraines     Getting headaches about monthly; using Topamax 25mg and Elavil 50mg for prevention and Frova/cambia as needed; following with Dr. Maldonado    PCOS (polycystic ovarian syndrome)      Anneliese Rocha  has a past surgical history that includes oophrectomy (1994) and Breast biopsy (Right).    Anneliese has a current medication list which includes the following prescription(s): buspirone, candesartan, cariprazine, clonazepam, desvenlafaxine succinate, ferrous sulfate, frovatriptan, galcanezumab-gnlm, hydrochlorothiazide, ketorolac, metoprolol succinate, onabotulinumtoxina, ondansetron, pantoprazole, prochlorperazine, trazodone, UNABLE TO FIND, and zembrace symtouch, and the  following Facility-Administered Medications: onabotulinumtoxina, onabotulinumtoxina, onabotulinumtoxina, and onabotulinumtoxina.    Review of patient's allergies indicates:   Allergen Reactions    Lisinopril      Dizziness, nausea        Imaging, none:    Prior Therapy: For achilles tendonitis  Social History:  lives with their spouse  Occupation: Works in [employer] department   Prior Level of Function: No limitations prior to onset of pain   Current Level of Function: Limited with general ADL's due to back pain; not as active around the house     Pain:  Current 3/10, worst 8/10, best 0/10   Location: left back   Description: Aching  Aggravating Factors: Coughing/Sneezing, sitting too long   Easing Factors: Aleve     Pts goals: To get rid of the pain    Objective   Mental status: oriented x3  Posture/ Alignment: Fair, Reduced lumbar lordosis , increased thoracic kyphosis     GAIT DEVIATIONS: Anneliese amb with normal gait mechanics and speed .    ROM:   ROM:   AROM  Comment   Flexion: Min loss     Extension: Mod loss     Lat Flex R: Lateral thigh     Lat Flex L: Lateral thigh     Rotation R: WNL     Rotation L: WNL     *pain    * = pain,  amount of   OP = overpressure  Initial    Right° Left°   Flexion WNL WNL   Extension WNL WNL   Internal Rotation WNL WNL   External Rotation WNL WNL   Abduction WNL WNL            Strength: Dermatomes:   Right Left Comment   L2 intact intact    L3 intact intact    L4 intact intact    L5 intact intact    S1 intact intact    S2 intact intact    Saddle intact intact      Myotomes:   Right Left Comment   Hip flexion (L2-3): 5/5 5/5    Knee extension (L3-4): 5/5 5/5    DF (L4-5): 5/5 5/5    Great Toe Ext (L5-S1): 5/5 5/5    Great Toe Flex (S1-S2): 5/5 5/5      DTR:   Right Left Comment   Patellar (L3-4) 2+ 2+    Achilles (S1) 1+ 1+        Special Tests:  Repeated ROM ROM (% of normal) Pain? Radiation?   Flex Stand: 75% Y Y   Ext Stand: 75% Y N   Flex Supine: NT     Ext Prone: 100%        Functional Tests (* indicates w/ pain)   (-) SLR   (-) Zaid   (-) SI compression/distraction   (-) Gaenslens    Palpation:  TTP at L5 on the left     Pt/family was provided educational information, including: role of PT, goals for PT, scheduling - pt verbalized understanding. Discussed insurance plan with pt.       CMS Impairment/Limitation/Restriction for FOTO Lumbar Survey    Therapist reviewed FOTO scores for Anneliese Rocha on 11/26/2019.   FOTO documents entered into [employer] - see Media section.    Limitation Score: 37%  Category: Mobility    Current : CJ = at least 20% but < 40% impaired, limited or restricted               TREATMENT   Treatment Time In: 5:30 PM   Treatment Time Out: 5:42 PM   Total Treatment time separate from Evaluation: 12 minutes    Anneliese received therapeutic exercises to develop ROM for 12 minutes including:  HEP setup and instruction; handout issued  Prone press ups 10x  EIS 3 x 10     Home Exercises and Patient Education Provided    Education provided:   - Pathophysiology of condition   - POC   - HEP     Written Home Exercises Provided: yes.  Exercises were reviewed and Anneliese was able to demonstrate them prior to the end of the session.  Anneliese demonstrated good  understanding of the education provided.     See EMR under Patient Instructions for exercises provided 11/26/2019.      Assessment   Pt presents with a medical diagnosis of sciatica. She presents with limited lumbar ROM, intermittent lumbar and left leg pain, and limited tolerance to prolonged sitting and activities that require forward bending. Her symptoms are consistent with mechanical low back pain. Her provisional classification is a lumbar derangement. She appears to be an extension responder. She will benefit from physical therapy treatment to assist in reducing pain, restoring ROM and improving ADL abilities.     Pt prognosis is Excellent.   Pt will benefit from skilled outpatient Physical Therapy to  address the deficits stated above and in the chart below, provide pt/family education, and to maximize pt's level of independence.     Plan of care discussed with patient: Yes  Pt's spiritual, cultural and educational needs considered and patient is agreeable to the plan of care and goals as stated below:     Anticipated Barriers for therapy: None at this time     Medical Necessity is demonstrated by the following  History  Co-morbidities and personal factors that may impact the plan of care Co-morbidities:   anxiety, depression, high BMI and HTN    Personal Factors:   no deficits     moderate   Examination  Body Structures and Functions, activity limitations and participation restrictions that may impact the plan of care Body Regions:   back  lower extremities    Body Systems:    gross symmetry  ROM    Participation Restrictions:       Activity limitations:   Learning and applying knowledge  no deficits    General Tasks and Commands  no deficits    Communication  no deficits    Mobility  lifting and carrying objects  driving (bike, car, motorcycle)    Self care  no deficits    Domestic Life  doing house work (cleaning house, washing dishes, laundry)    Interactions/Relationships  no deficits    Life Areas  employment    Community and Social Life  community life  recreation and leisure         moderate   Clinical Presentation evolving clinical presentation with changing clinical characteristics moderate   Decision Making/ Complexity Score: moderate     Goals:  Short Term Goals: 3 weeks   1) Pt will be I with HEP  2) Lumbar extension ROM will be normal   3) Pain will be no worse then 4/10 during work or ADL activities     Long Term Goals: 6 weeks   1) Anneliese will perform all ADL activities without limitations related to lumbar pain   2) Anneliese will tolerate 1 hour of sitting without lumbar pain       Plan   Plan of care Certification: 11/26/2019 to 01/07/2020    Outpatient Physical Therapy 2 times weekly for 6  weeks to include the following interventions: Manual Therapy, Moist Heat/ Ice, Patient Education, Therapeutic Activites, Therapeutic Exercise and dry needling .     Dennis Medrano, PT

## 2019-11-26 NOTE — PATIENT INSTRUCTIONS
Press-Up        Press upper body upward, keeping hips in contact with floor. Keep lower back and buttocks relaxed. Hold ____ seconds.  Repeat _10___ times per set. Do ___1_ sets per session. Do _2___ sessions per day.     https://[employer]/94     Copyright © [employer]. All rights reserved.   Backward Bend (Standing)        Arch backward to make hollow of back deeper. Hold ____ seconds.  Repeat _10___ times per set. Do _1___ sets per session. Do _5-6___ sessions per day.     https://[employer]/178     Copyright © [employer]. All rights reserved.

## 2019-12-03 ENCOUNTER — CLINICAL SUPPORT (OUTPATIENT)
Dept: REHABILITATION | Facility: HOSPITAL | Age: 44
End: 2019-12-03
Attending: FAMILY MEDICINE
Payer: COMMERCIAL

## 2019-12-03 ENCOUNTER — TELEPHONE (OUTPATIENT)
Dept: PHARMACY | Facility: CLINIC | Age: 44
End: 2019-12-03

## 2019-12-03 DIAGNOSIS — M79.606 LUMBAR PAIN WITH RADIATION DOWN LEG: ICD-10-CM

## 2019-12-03 DIAGNOSIS — M54.50 LUMBAR PAIN WITH RADIATION DOWN LEG: ICD-10-CM

## 2019-12-03 PROCEDURE — 97110 THERAPEUTIC EXERCISES: CPT | Mod: PO | Performed by: PHYSICAL THERAPIST

## 2019-12-03 NOTE — PROGRESS NOTES
Physical Therapy Daily Treatment Note     Name: Anneliese Rocha  Clinic Number: [id number]    Therapy Diagnosis:   Encounter Diagnosis   Name Primary?    Lumbar pain with radiation down leg      Physician: Steve Holcomb MD    Visit Date: 12/3/2019  Physician Orders: PT Eval and Treat   Medical Diagnosis from Referral: Sciatica  Evaluation Date: 11/26/2019  Authorization Period Expiration: 12/31/2019  Plan of Care Expiration: 01/07/2020  Visit # / Visits authorized: 2     Time In: 5:00 PM   Time Out: 5:34 PM  Total Billable Time: 34 minutes    Precautions: Standard    Subjective     Pt reports: That she is doing better, but had a lot of pain last night..  She was compliant with home exercise program.  Response to previous treatment: Decreased back pain and improved ease of sleeping   Functional change: Improved sleeping ability and improved work tolerance     Pain: 2/10  Location: left back      Objective     Anneliese received therapeutic exercises to develop ROM and core stabilization for 34 minutes including:  Prone press ups 3 x 10   Bridges 2 x 10   Clamshells 2 x 10 red theraband B   SL hip abduction 2 x 10 R, 1 x 10, 1 x 5 left   Supine hip IR 3 min   Prone press ups 10     Home Exercises Provided and Patient Education Provided     Education provided:   - On increasing number of sets of HEP    Written Home Exercises Provided: Patient instructed to cont prior HEP. Verbal instructions for clamshells   Exercises were reviewed and Anneliese was able to demonstrate them prior to the end of the session.  Anneliese demonstrated good  understanding of the education provided.     See EMR under Patient Instructions for exercises provided prior visit.    Assessment     Lumbar symptoms reduced since onset of treatment. Left hip pain increased with clamshells. She may also have possible gluteal tendonitis. She will benefit from increasing her frequency of lumbar extension based exercises.   Anneliese is progressing  well towards her goals.   Pt prognosis is Excellent.     Pt will continue to benefit from skilled outpatient physical therapy to address the deficits listed in the problem list box on initial evaluation, provide pt/family education and to maximize pt's level of independence in the home and community environment.     Pt's spiritual, cultural and educational needs considered and pt agreeable to plan of care and goals.    Anticipated barriers to physical therapy:     Goals:     Short Term Goals: 3 weeks   1) Pt will be I with HEP  2) Lumbar extension ROM will be normal   3) Pain will be no worse then 4/10 during work or ADL activities      Long Term Goals: 6 weeks   1) Anneliese will perform all ADL activities without limitations related to lumbar pain   2) Anneliese will tolerate 1 hour of sitting without lumbar pain        Plan     Continue with extension based treatment program     Dennis Medrano, PT

## 2019-12-10 ENCOUNTER — CLINICAL SUPPORT (OUTPATIENT)
Dept: REHABILITATION | Facility: HOSPITAL | Age: 44
End: 2019-12-10
Attending: FAMILY MEDICINE
Payer: COMMERCIAL

## 2019-12-10 DIAGNOSIS — M54.50 LUMBAR PAIN WITH RADIATION DOWN LEG: ICD-10-CM

## 2019-12-10 DIAGNOSIS — M79.606 LUMBAR PAIN WITH RADIATION DOWN LEG: ICD-10-CM

## 2019-12-10 PROCEDURE — 97110 THERAPEUTIC EXERCISES: CPT | Mod: PO | Performed by: PHYSICAL THERAPIST

## 2019-12-10 NOTE — PROGRESS NOTES
Physical Therapy Daily Treatment Note     Name: Anneliese Rocha  Clinic Number: [id number]    Therapy Diagnosis:   Encounter Diagnosis   Name Primary?    Lumbar pain with radiation down leg      Physician: Steve Holcomb MD    Visit Date: 12/10/2019  Physician Orders: PT Eval and Treat   Medical Diagnosis from Referral: Sciatica  Evaluation Date: 11/26/2019  Authorization Period Expiration: 12/31/2019  Plan of Care Expiration: 01/07/2020  Visit # / Visits authorized: 2     Time In: 5:00 PM   Time Out: 5:34 PM  Total Billable Time: 34 minutes    Precautions: Standard    Subjective     Pt reports: That she had some soreness in her back today, but is feeling fine now.   She was compliant with home exercise program.  Response to previous treatment: Decreased back pain and improved ease of sleeping   Functional change: Improved sleeping ability and improved work tolerance     Pain: 1/10  Location: left back      Objective     Anneliese received therapeutic exercises to develop ROM and core stabilization for 34 minutes including:  Prone press ups  10 x  Bridges 2 x 10   Clamshells 2 x 10 red theraband B, AROM only on left   SL hip abduction 2 x 10 R, 1 x 10, 1 x 5 left   Supine hip IR 3 min   Extension in standing with towel overpressure 10x     Home Exercises Provided and Patient Education Provided     Education provided:   - On use of towel for overpressure during extension in standing     Written Home Exercises Provided: Patient instructed to cont prior HEP. Verbal instructions for clamshells   Exercises were reviewed and Anneliese was able to demonstrate them prior to the end of the session.  Anneliese demonstrated good  understanding of the education provided.     See EMR under Patient Instructions for exercises provided prior visit.    Assessment     Hip soreness was likely muscular in nature. She will benefit from trial of overpressure with extension in standing to assist in improving carryover of reduction  of symptoms. If symptoms don't reduce, she may have to move to prone press ups. .   Anneliese is progressing well towards her goals.   Pt prognosis is Excellent.     Pt will continue to benefit from skilled outpatient physical therapy to address the deficits listed in the problem list box on initial evaluation, provide pt/family education and to maximize pt's level of independence in the home and community environment.     Pt's spiritual, cultural and educational needs considered and pt agreeable to plan of care and goals.    Anticipated barriers to physical therapy:     Goals:     Short Term Goals: 3 weeks   1) Pt will be I with HEP  2) Lumbar extension ROM will be normal   3) Pain will be no worse then 4/10 during work or ADL activities      Long Term Goals: 6 weeks   1) Anneliese will perform all ADL activities without limitations related to lumbar pain   2) Anneliese will tolerate 1 hour of sitting without lumbar pain        Plan     Continue with extension based treatment program     Dennis Medrano, PT

## 2020-01-02 ENCOUNTER — TELEPHONE (OUTPATIENT)
Dept: PHARMACY | Facility: CLINIC | Age: 45
End: 2020-01-02

## 2020-01-18 DIAGNOSIS — I10 HYPERTENSION, UNSPECIFIED TYPE: ICD-10-CM

## 2020-01-19 RX ORDER — BUSPIRONE HYDROCHLORIDE 15 MG/1
TABLET ORAL
Qty: 90 TABLET | Refills: 2 | Status: SHIPPED | OUTPATIENT
Start: 2020-01-19 | End: 2020-05-17

## 2020-01-19 RX ORDER — CARIPRAZINE 1.5 MG/1
CAPSULE, GELATIN COATED ORAL
Qty: 30 CAPSULE | Refills: 2 | Status: SHIPPED | OUTPATIENT
Start: 2020-01-19 | End: 2020-04-27

## 2020-01-19 RX ORDER — TRAZODONE HYDROCHLORIDE 100 MG/1
TABLET ORAL
Qty: 45 TABLET | Refills: 2 | Status: SHIPPED | OUTPATIENT
Start: 2020-01-19 | End: 2020-08-25

## 2020-01-20 RX ORDER — HYDROCHLOROTHIAZIDE 25 MG/1
TABLET ORAL
Qty: 90 TABLET | Refills: 1 | Status: SHIPPED | OUTPATIENT
Start: 2020-01-20 | End: 2020-07-29

## 2020-01-20 NOTE — PROGRESS NOTES
Refill Authorization Note     is requesting a refill authorization.    Brief assessment and rationale for refill: APPROVE: prr                                         Comments:     Requested Prescriptions   Signed Prescriptions Disp Refills    hydroCHLOROthiazide (HYDRODIURIL) 25 MG tablet 90 tablet 1     Sig: TAKE 1 TABLET(25 MG) BY MOUTH EVERY DAY       Cardiovascular: Diuretics - Thiazide Passed - 1/18/2020  7:28 PM        Passed - Patient is at least 18 years old        Passed - Negative Pregnancy Status Check        Passed - Last BP in normal range within 360 days     BP Readings from Last 3 Encounters:   11/22/19 138/84   10/25/19 (!) 142/85   07/19/19 135/73              Passed - Office visit in past 12 months or future 90 days     Recent Outpatient Visits            1 month ago Hypertension, unspecified type    [facility] Steve Holcomb MD    2 months ago MDD (major depressive disorder), recurrent severe, without psychosis    [facility] Alexandrea Bryant MD    4 months ago MDD (major depressive disorder), recurrent severe, without psychosis    [facility] Alexandrea Bryant MD    8 months ago MDD (major depressive disorder), recurrent severe, without psychosis    [facility] Alexandrea Bryant MD    8 months ago Preventative health care    [facility] Steve Holcomb MD          Future Appointments              In 3 days Maria Carmen [place] [facility]    In 1 week Maria Carmen [place] [facility] [place]    In 4 months Steve Holcomb MD [facility]                Passed - Ca in normal range and within 360 days     Calcium   Date Value Ref Range Status   11/23/2019 9.8 8.7 - 10.5 mg/dL Final   05/10/2019 10.1 8.7 - 10.5 mg/dL Final   02/05/2019 10.9 (H) 8.7 - 10.5 mg/dL Final              Passed - Cr is 1.4 or below and within 180 days     Creatinine   Date  Value Ref Range Status   11/23/2019 0.8 0.5 - 1.4 mg/dL Final   05/10/2019 0.8 0.5 - 1.4 mg/dL Final   02/05/2019 0.9 0.5 - 1.4 mg/dL Final              Passed - K in normal range and within 180 days     Potassium   Date Value Ref Range Status   11/23/2019 3.5 3.5 - 5.1 mmol/L Final   05/10/2019 3.5 3.5 - 5.1 mmol/L Final   02/05/2019 3.2 (L) 3.5 - 5.1 mmol/L Final              Passed - Na in normal range and within 180 days     Sodium   Date Value Ref Range Status   11/23/2019 139 136 - 145 mmol/L Final   05/10/2019 139 136 - 145 mmol/L Final   02/05/2019 138 136 - 145 mmol/L Final              Passed - eGFR within 180 days     eGFR if non    Date Value Ref Range Status   11/23/2019 >60.0 >60 mL/min/1.73 m^2 Final     Comment:     Calculation used to obtain the estimated glomerular filtration  rate (eGFR) is the CKD-EPI equation.      05/10/2019 >60.0 >60 mL/min/1.73 m^2 Final     Comment:     Calculation used to obtain the estimated glomerular filtration  rate (eGFR) is the CKD-EPI equation.      02/05/2019 >60.0 >60 mL/min/1.73 m^2 Final     Comment:     Calculation used to obtain the estimated glomerular filtration  rate (eGFR) is the CKD-EPI equation.        eGFR if    Date Value Ref Range Status   11/23/2019 >60.0 >60 mL/min/1.73 m^2 Final   05/10/2019 >60.0 >60 mL/min/1.73 m^2 Final   02/05/2019 >60.0 >60 mL/min/1.73 m^2 Final

## 2020-01-24 RX ORDER — DESVENLAFAXINE 100 MG/1
TABLET, EXTENDED RELEASE ORAL
Qty: 30 TABLET | Refills: 0 | Status: SHIPPED | OUTPATIENT
Start: 2020-01-24 | End: 2020-02-07 | Stop reason: ALTCHOICE

## 2020-02-04 ENCOUNTER — TELEPHONE (OUTPATIENT)
Dept: NEUROLOGY | Facility: CLINIC | Age: 45
End: 2020-02-04

## 2020-02-04 NOTE — TELEPHONE ENCOUNTER
----- Message from Cami Escudero sent at 2/4/2020  1:21 PM CST -----  Contact: self  Type:  Sooner Apoointment Request    Caller is requesting a sooner appointment.  Caller declined first available appointment listed below.  Caller will not accept being placed on the waitlist and is requesting a message be sent to doctor.    Name of Caller:  self  When is the first available appointment?    Symptoms:  Botox  Best Call Back Number:  [phone] (home)   Additional Information:  Patient would like to schedule the appointment. Thanks!

## 2020-02-04 NOTE — TELEPHONE ENCOUNTER
Called patient and rescheduled Botox appointment. Patient expressed understanding and is aware appointment will be with DENIS

## 2020-02-07 ENCOUNTER — OFFICE VISIT (OUTPATIENT)
Dept: PSYCHIATRY | Facility: CLINIC | Age: 45
End: 2020-02-07
Payer: COMMERCIAL

## 2020-02-07 VITALS
SYSTOLIC BLOOD PRESSURE: 117 MMHG | WEIGHT: 271.25 LBS | HEIGHT: 70 IN | HEART RATE: 70 BPM | DIASTOLIC BLOOD PRESSURE: 74 MMHG | BODY MASS INDEX: 38.83 KG/M2

## 2020-02-07 DIAGNOSIS — R53.83 FATIGUE, UNSPECIFIED TYPE: ICD-10-CM

## 2020-02-07 DIAGNOSIS — I10 HYPERTENSION, UNSPECIFIED TYPE: ICD-10-CM

## 2020-02-07 DIAGNOSIS — R53.83 FATIGUE DUE TO DEPRESSION: ICD-10-CM

## 2020-02-07 DIAGNOSIS — F32.A FATIGUE DUE TO DEPRESSION: ICD-10-CM

## 2020-02-07 DIAGNOSIS — F41.1 GAD (GENERALIZED ANXIETY DISORDER): ICD-10-CM

## 2020-02-07 DIAGNOSIS — F33.2 MDD (MAJOR DEPRESSIVE DISORDER), RECURRENT SEVERE, WITHOUT PSYCHOSIS: Primary | ICD-10-CM

## 2020-02-07 DIAGNOSIS — Z79.899 HIGH RISK MEDICATIONS (NOT ANTICOAGULANTS) LONG-TERM USE: ICD-10-CM

## 2020-02-07 PROCEDURE — 99214 PR OFFICE/OUTPT VISIT, EST, LEVL IV, 30-39 MIN: ICD-10-PCS | Mod: S$GLB,,, | Performed by: PSYCHIATRY & NEUROLOGY

## 2020-02-07 PROCEDURE — 99214 OFFICE O/P EST MOD 30 MIN: CPT | Mod: S$GLB,,, | Performed by: PSYCHIATRY & NEUROLOGY

## 2020-02-07 PROCEDURE — 3078F PR MOST RECENT DIASTOLIC BLOOD PRESSURE < 80 MM HG: ICD-10-PCS | Mod: CPTII,S$GLB,, | Performed by: PSYCHIATRY & NEUROLOGY

## 2020-02-07 PROCEDURE — 3074F PR MOST RECENT SYSTOLIC BLOOD PRESSURE < 130 MM HG: ICD-10-PCS | Mod: CPTII,S$GLB,, | Performed by: PSYCHIATRY & NEUROLOGY

## 2020-02-07 PROCEDURE — 3008F PR BODY MASS INDEX (BMI) DOCUMENTED: ICD-10-PCS | Mod: CPTII,S$GLB,, | Performed by: PSYCHIATRY & NEUROLOGY

## 2020-02-07 PROCEDURE — 90833 PSYTX W PT W E/M 30 MIN: CPT | Mod: S$GLB,,, | Performed by: PSYCHIATRY & NEUROLOGY

## 2020-02-07 PROCEDURE — 3078F DIAST BP <80 MM HG: CPT | Mod: CPTII,S$GLB,, | Performed by: PSYCHIATRY & NEUROLOGY

## 2020-02-07 PROCEDURE — 99999 PR PBB SHADOW E&M-EST. PATIENT-LVL III: ICD-10-PCS | Mod: PBBFAC,,, | Performed by: PSYCHIATRY & NEUROLOGY

## 2020-02-07 PROCEDURE — 90833 PR PSYCHOTHERAPY W/PATIENT W/E&M, 30 MIN (ADD ON): ICD-10-PCS | Mod: S$GLB,,, | Performed by: PSYCHIATRY & NEUROLOGY

## 2020-02-07 PROCEDURE — 99999 PR PBB SHADOW E&M-EST. PATIENT-LVL III: CPT | Mod: PBBFAC,,, | Performed by: PSYCHIATRY & NEUROLOGY

## 2020-02-07 PROCEDURE — 3074F SYST BP LT 130 MM HG: CPT | Mod: CPTII,S$GLB,, | Performed by: PSYCHIATRY & NEUROLOGY

## 2020-02-07 PROCEDURE — 3008F BODY MASS INDEX DOCD: CPT | Mod: CPTII,S$GLB,, | Performed by: PSYCHIATRY & NEUROLOGY

## 2020-02-07 RX ORDER — VENLAFAXINE HYDROCHLORIDE 37.5 MG/1
37.5 CAPSULE, EXTENDED RELEASE ORAL DAILY
Qty: 30 CAPSULE | Refills: 0 | Status: SHIPPED | OUTPATIENT
Start: 2020-02-07 | End: 2020-02-28 | Stop reason: SDUPTHER

## 2020-02-07 NOTE — PROGRESS NOTES
"ID: 41yo WF with a h/o depression and anxiety, no prior full psych eval w/i [facility] system. Pt is a current pt of Anneliese Cochran LCSW who referred her for med mgmt along with , PCP. Pt has been on wait list since 3/2017. The pt is currently on xanax and cymbalta through PCP. Also on topamax and elavil for migraine mgmt through pcp    CC: depression.     Interim Hx: presents on time. Chart reviewed. Has not been seen since 10/2019- that was a f/u appt on vraylar addition and pt did feel she had accomplished more in the month with treatment. We have struggled to find an effective treatment      report is, "he doesn't think i'm worse but i'm definitely not better."     Per pt, "there's just a lot of anxiousness. Anxious about work, anxious about going to the grocery store after work, I don't want to do anything but go home."    "I know that my quality of life is not good. (tearful) I can't do this- like it makes me sad to think this is what the rest of my life would feel like. I used to be someone who was active (10-15 yrs ago)." now consistently reports that she can't mount the energy to do any form of exercise. Wants to be on the couch at home all weekend long. Doesn't have any drive for goals in 2020- no plans for trips or work-     Pt wants to make a change- review genetic testing results and previous med trials. Draw out mood chart to discuss why she is currently on a mood stabilizer. Full discussion of meds, indications, etc.    On Psychiatric ROS:    Endorses somewhat improved sleep maintenance- wakes nightly and has difficulty reinitiating, anhedonia, feeling helpless/hopeless- less so b/c med has been helpful, dec energy- "none", stable concentration, inc'd appetite- since Spring 2017, dec PMA "i do feel tired thinking about doing things."     Denies thoughts of SI/intent/plan.     Endorses feeling +easily overwhelmed, +ruminative thinking, +feeling tense/"on edge"- "on edge" " "    PSYCHOTHERAPY ADD-ON   30 (16-37*) minutes    Time: 35 minutes  Participants: Met with patient    Therapeutic Intervention Type: insight oriented psychotherapy, behavior modifying psychotherapy, supportive psychotherapy  Why chosen therapy is appropriate versus another modality: relevant to diagnosis, patient responds to this modality, evidence based practice    Target symptoms: anxiety , adjustment   Primary focus: mood sxs  Psychotherapeutic techniques: support, validation, education, reframing    Outcome monitoring methods: self-report, observation, feedback from family    Patient's response to intervention:  The patient's response to intervention is accepting, motivated.    Progress toward goals:  The patient's progress toward goals is fair .    PPHx: Denies h/o self injury, inpt psych hospitalization, denies h/o suicide attempt     Current Psych Meds: pristiq 100mg qam, buspar TID, trazodone 100mg po qhs PRN insomnia, xanax 0.5mg po daily PRN anxiety  Past Psych Meds: ritalin, prozac, buspar PRN ("it helped with the stress of planning the wedding"), cymbalta 120mg po daily (ineffective), zoloft 150mg (ineffective), prozac 20mg po qam, xanax 0.5mg po BID PRN anxiety, wellbutrin xl 150mg po qam, fetzima 40mg po qam, abilify 2mg     PMHx: migraines (since childhood)- botox as txmt, monthly "they're well managed"    SubstHx:   T- none  E- very rare due to migraine  D- none  Caffeine- 2 dr.peppers/day    FamPHx: none    Musculoskeletal:  Muscle strength/Tone: no dyskinesia/ no tremor  Gait/Station- non antalgic, no assistance needed    MSE: appears stated age, well groomed, appropriate dress, engages well with examiner. Good e/c. Speech reg rate and vol, nonpressured. Mood is "I feel pretty good today." Affect congruent- low energy, but does smile more easily. Sensorium fully intact. Oriented to date/day/location, current events. Narrative memory intact. Intellectual function is avg based on vocab and basic fund " "of knowledge. Thought is c/l/gd. No tangentiality or circumstantiality. No FOI/SULTANA. Denies SI/HI. Denies A/VH. No evidence of delusions. Insight and Judgment intact.     Height 5' 10" (1.778 m), weight 123.1 kg (271 lb 4.4 oz), last menstrual period 01/07/2020.    Results for orders placed or performed in visit on 11/23/19   Hemoglobin A1c   Result Value Ref Range    Hemoglobin A1C 5.3 4.0 - 5.6 %    Estimated Avg Glucose 105 68 - 131 mg/dL   Comprehensive metabolic panel   Result Value Ref Range    Sodium 139 136 - 145 mmol/L    Potassium 3.5 3.5 - 5.1 mmol/L    Chloride 104 95 - 110 mmol/L    CO2 26 23 - 29 mmol/L    Glucose 116 (H) 70 - 110 mg/dL    BUN, Bld 10 6 - 20 mg/dL    Creatinine 0.8 0.5 - 1.4 mg/dL    Calcium 9.8 8.7 - 10.5 mg/dL    Total Protein 7.0 6.0 - 8.4 g/dL    Albumin 3.9 3.5 - 5.2 g/dL    Total Bilirubin 0.5 0.1 - 1.0 mg/dL    Alkaline Phosphatase 31 (L) 55 - 135 U/L    AST 23 10 - 40 U/L    ALT 34 10 - 44 U/L    Anion Gap 9 8 - 16 mmol/L    eGFR if African American >60.0 >60 mL/min/1.73 m^2    eGFR if non African American >60.0 >60 mL/min/1.73 m^2   Lipid panel   Result Value Ref Range    Cholesterol 237 (H) 120 - 199 mg/dL    Triglycerides 167 (H) 30 - 150 mg/dL    HDL 57 40 - 75 mg/dL    LDL Cholesterol 146.6 63.0 - 159.0 mg/dL    Hdl/Cholesterol Ratio 24.1 20.0 - 50.0 %    Total Cholesterol/HDL Ratio 4.2 2.0 - 5.0    Non-HDL Cholesterol 180 mg/dL       Suicide Risk Assessment:   Protective- age, gender, no prior attempts, no prior hospitalizations, no family h/o attempts, no ongoing substance abuse, no psychosis, , denies SI/intent/plan, seeking treatment, access to treatment, future oriented, good primary support, no access to firearms    Risk- race, ongoing Axis I sxs, no children    **Pt is at LOW imminent and long term risk of suicide given current risk factors.    Assessment:  45yo WF with a h/o depression and anxiety, no prior full psych eval w/i [facility]. Pt is a current " "pt of CYRUS BarraganW who referred her for med mgmt along with , PCP. Pt has been on wait list since 3/2017. The pt is currently on xanax and cymbalta through PCP. Also on topamax and elavil for migraine mgmt through pcp/neuro. On eval today she meets criteria for MDD, recurrent, severe w/o psychotic sxs despite a recent inc in cymbalta to 120mg po daily. Pt has been on cymbalta x 10yrs. Only recalls previous trial of prozac which was d/c'd for unknown reasons. Now requiring xanax daily- some days 2 tabs (ie:1mg total daily). Some room for behavioral interventions, as well and the pt is motivated. Has good ability to adhere to the new txmt plan. We tapered off cymbalta and started a trial of zoloft. Will discuss diet and exercise interventions more fully over course of txmt. today she has been on 100mg of zoloft x 1mo. Continues to state she does not feel "any different"- will inc one last titration to 150mg prior to discussing transition to alternate med. Have discussed trial of wellbutrin next as daytime lethargy and dec'd motivation are now the primary concerns to the pt, however current level of anxiety leading to xanax use 2x/d. May also consider mthfr mutation with some methyl folate supplement. Will add time released melatonin today for sleep. wgt loss would be an added benefit. Cont therapy as scheduled. No acute safety concerns. We added wellbutrin and today she reports improved energy and focus at work, but also perhaps inc'd anxiety? Tapered off zoloft to make room for trial of prozac. Will also add trial of buspar 10mg po BID which was helpful many years ago per pt. Last appt she reported  and therapist notice improvement on prozac 20mg po qam- buspar 10mg po TID also helpful with decreased xanax use and less variation in anxiety through day. Will cont both but inc prozac to 40mg to get at ongoing depressive and anxiety sxs- she then reported inc'd anxiety and I wondered about " "over activation on the prozac inc. not certain if she can afford genetic testing but is motivated for a trial of the methylfolate supplement. Dec'd the prozac back to 20mg po qam which today she reported did not affect her in either direction- started methylfolate supplementation- today reporting some improvement but con'd lethargy, lack of motivation, and reported anxiety. Agrees to do genetic testing. Genetic testing results returned and we started fetzima based on those results. Now reporting "maybe some improvement" but also reporting "no change in depression or anxiety"? Does report inc'd "anger"? May be overstimulation on fetzima (NE) and wellbutrin (dopa)- inc'd fetzima to more std dose of 40mg and within days the pt d/c'd the wellbutrin due to inc'd irritability. Now noticing "may some improvement" and the pt is more expansive with affect today in appt. Concern about bp but drinking a starbucks nitro at time of appt- will monitor at home for a few days and let me know via portal- we may inc dose in the future. Today pt reporting cont'd difficulty with anxiety and mood- started a trial of adjunct txmt with abilify 2mg- there has been weight gain but improved anxiety- will try for weight loss with beh interventions and we will re discuss in 4wks. Called the clinic one week ago with inc'd depressive sxs and wanting to make changes. Stopped abilify and here today to make some cont'd changes. "No different"- w/o the abilify. Will taper off fetzima and start trial of pristiq. On f/u of pristiq pt is feeling "a little better" but anxiety continues sleep a little improved on trazodone 100mg will inc to 150mg po qhs and likely add adjunct txmt at next appt. Last appt reported cont'd anxiety- transitioned xanax to klonopin and started a trial of vraylar 1.5mg po daily. Last appt reported some improvement which she attributed more to the klonopin but I find her mood improved and motivation/energy have also improved- " "likely a reflection of the vraylar. She wanted to cont w/o change through holidays and assess. Today here and reporting "no better."- low motivation, energy/lasting lethargy. Will taper off pristiq and start trial of effexor xr? Also will get labs including thyroid panel.     Axis I: MDD, recurrent, severe w/o psychotic sxs; PASTORA  Axis II: none at this time   Axis III: HTN, migraines  Axis IV: occupational, chronic mental health  Axis V: GAF 70    Plan:   1. Taper off pristiq> start trial of effexor xr  2. Cont buspar 15mg po TID  3. Cont klonopin 0.25mg po daily PRN anxiety (rare use)   4. Stop vraylar- reeval.   5. Cont methylfolate (purchasing online)   6. Cont Trazodone 100-150mg po qhs   7. Cont therapy as scheduled; I am in contact with therapist  8. RTC 4wks  9. labwork ordered today for vit and for thyroid panel    -Spent 30min face to face with the pt; >50% time spent in counseling   -Supportive therapy and psychoeducation provided  -R/B/SE's of medications discussed with the pt who expresses understanding and chooses to take medications as prescribed.   -Pt instructed to call clinic, 911 or go to nearest emergency room if sxs worsen or pt is in   crisis. The pt expresses understanding.  "

## 2020-02-08 ENCOUNTER — LAB VISIT (OUTPATIENT)
Dept: LAB | Facility: HOSPITAL | Age: 45
End: 2020-02-08
Attending: PSYCHIATRY & NEUROLOGY
Payer: COMMERCIAL

## 2020-02-08 DIAGNOSIS — R53.83 FATIGUE DUE TO DEPRESSION: ICD-10-CM

## 2020-02-08 DIAGNOSIS — Z79.899 HIGH RISK MEDICATIONS (NOT ANTICOAGULANTS) LONG-TERM USE: ICD-10-CM

## 2020-02-08 DIAGNOSIS — F32.A FATIGUE DUE TO DEPRESSION: ICD-10-CM

## 2020-02-08 DIAGNOSIS — R53.83 FATIGUE, UNSPECIFIED TYPE: ICD-10-CM

## 2020-02-08 LAB
25(OH)D3+25(OH)D2 SERPL-MCNC: 14 NG/ML (ref 30–96)
ALBUMIN SERPL BCP-MCNC: 3.9 G/DL (ref 3.5–5.2)
ALP SERPL-CCNC: 33 U/L (ref 55–135)
ALT SERPL W/O P-5'-P-CCNC: 34 U/L (ref 10–44)
ANION GAP SERPL CALC-SCNC: 9 MMOL/L (ref 8–16)
AST SERPL-CCNC: 22 U/L (ref 10–40)
BILIRUB SERPL-MCNC: 0.4 MG/DL (ref 0.1–1)
BUN SERPL-MCNC: 8 MG/DL (ref 6–20)
CALCIUM SERPL-MCNC: 9.9 MG/DL (ref 8.7–10.5)
CHLORIDE SERPL-SCNC: 102 MMOL/L (ref 95–110)
CHOLEST SERPL-MCNC: 220 MG/DL (ref 120–199)
CHOLEST/HDLC SERPL: 3.8 {RATIO} (ref 2–5)
CO2 SERPL-SCNC: 27 MMOL/L (ref 23–29)
CREAT SERPL-MCNC: 0.8 MG/DL (ref 0.5–1.4)
EST. GFR  (AFRICAN AMERICAN): >60 ML/MIN/1.73 M^2
EST. GFR  (NON AFRICAN AMERICAN): >60 ML/MIN/1.73 M^2
ESTIMATED AVG GLUCOSE: 117 MG/DL (ref 68–131)
GLUCOSE SERPL-MCNC: 117 MG/DL (ref 70–110)
HBA1C MFR BLD HPLC: 5.7 % (ref 4–5.6)
HDLC SERPL-MCNC: 58 MG/DL (ref 40–75)
HDLC SERPL: 26.4 % (ref 20–50)
LDLC SERPL CALC-MCNC: 130.4 MG/DL (ref 63–159)
NONHDLC SERPL-MCNC: 162 MG/DL
POTASSIUM SERPL-SCNC: 3.5 MMOL/L (ref 3.5–5.1)
PROT SERPL-MCNC: 7 G/DL (ref 6–8.4)
SODIUM SERPL-SCNC: 138 MMOL/L (ref 136–145)
T3 SERPL-MCNC: 85 NG/DL (ref 60–180)
T4 FREE SERPL-MCNC: 0.8 NG/DL (ref 0.71–1.51)
TRIGL SERPL-MCNC: 158 MG/DL (ref 30–150)
TSH SERPL DL<=0.005 MIU/L-ACNC: 2.26 UIU/ML (ref 0.4–4)
VIT B12 SERPL-MCNC: 554 PG/ML (ref 210–950)

## 2020-02-08 PROCEDURE — 80061 LIPID PANEL: CPT

## 2020-02-08 PROCEDURE — 84443 ASSAY THYROID STIM HORMONE: CPT

## 2020-02-08 PROCEDURE — 83036 HEMOGLOBIN GLYCOSYLATED A1C: CPT

## 2020-02-08 PROCEDURE — 84480 ASSAY TRIIODOTHYRONINE (T3): CPT

## 2020-02-08 PROCEDURE — 82607 VITAMIN B-12: CPT

## 2020-02-08 PROCEDURE — 82306 VITAMIN D 25 HYDROXY: CPT

## 2020-02-08 PROCEDURE — 80053 COMPREHEN METABOLIC PANEL: CPT

## 2020-02-08 PROCEDURE — 84439 ASSAY OF FREE THYROXINE: CPT

## 2020-02-08 PROCEDURE — 36415 COLL VENOUS BLD VENIPUNCTURE: CPT | Mod: PO

## 2020-02-10 ENCOUNTER — TELEPHONE (OUTPATIENT)
Dept: NEUROLOGY | Facility: CLINIC | Age: 45
End: 2020-02-10

## 2020-02-10 ENCOUNTER — TELEPHONE (OUTPATIENT)
Dept: PSYCHIATRY | Facility: CLINIC | Age: 45
End: 2020-02-10

## 2020-02-10 NOTE — TELEPHONE ENCOUNTER
Called the pt to discuss labwork. Of biggest concern is the elevated glucose and hga1c- encouraged some dietary intervention limiting processed carbs/sugars. Increasing protein. She expresses understanding.     Also discuss thyroid panel WNL    Low vit D- pt will discuss with pharmacist about OTC vit D replacement.

## 2020-02-11 ENCOUNTER — TELEPHONE (OUTPATIENT)
Dept: NEUROLOGY | Facility: CLINIC | Age: 45
End: 2020-02-11

## 2020-02-11 NOTE — TELEPHONE ENCOUNTER
Called patient in regards to botox appoitnment. Due to [facility] and insurance purposes botox will not be approved. No answer , left voicemail to return call.

## 2020-02-11 NOTE — TELEPHONE ENCOUNTER
"Patient returned call in regards to botox appointment. Informed patient per [facility] :    " Dr Braswell,     I can not approve Botox request, here's an explanation why: [facility] requires Botox (Covered under the medical benefit) come from an outside pharmacy, [facility].   As a hospital we must have chain of custody of our medications that we administer to our patients. We cannot accept and administer a medication that we do not purchase and have chain of custody.  If you have any questions please contact my Supervisor Saima Gonzalez, at [phone].     Thanks   Josefina XIONG "    Patient verbalized understanding.  Will route message to Dr Braswell to seek a different treatment.       "

## 2020-02-13 ENCOUNTER — PATIENT OUTREACH (OUTPATIENT)
Dept: ADMINISTRATIVE | Facility: OTHER | Age: 45
End: 2020-02-13

## 2020-02-27 ENCOUNTER — TELEPHONE (OUTPATIENT)
Dept: PHARMACY | Facility: CLINIC | Age: 45
End: 2020-02-27

## 2020-02-28 DIAGNOSIS — F41.1 GAD (GENERALIZED ANXIETY DISORDER): ICD-10-CM

## 2020-02-28 DIAGNOSIS — F33.2 MDD (MAJOR DEPRESSIVE DISORDER), RECURRENT SEVERE, WITHOUT PSYCHOSIS: ICD-10-CM

## 2020-02-28 RX ORDER — VENLAFAXINE HYDROCHLORIDE 75 MG/1
75 CAPSULE, EXTENDED RELEASE ORAL DAILY
Qty: 30 CAPSULE | Refills: 0 | Status: SHIPPED | OUTPATIENT
Start: 2020-02-28 | End: 2020-03-11

## 2020-02-28 RX ORDER — VENLAFAXINE HYDROCHLORIDE 37.5 MG/1
CAPSULE, EXTENDED RELEASE ORAL
Qty: 30 CAPSULE | Refills: 0 | OUTPATIENT
Start: 2020-02-28

## 2020-03-11 ENCOUNTER — OFFICE VISIT (OUTPATIENT)
Dept: PSYCHIATRY | Facility: CLINIC | Age: 45
End: 2020-03-11
Payer: COMMERCIAL

## 2020-03-11 VITALS
BODY MASS INDEX: 39.4 KG/M2 | DIASTOLIC BLOOD PRESSURE: 66 MMHG | HEIGHT: 70 IN | WEIGHT: 275.25 LBS | SYSTOLIC BLOOD PRESSURE: 125 MMHG | HEART RATE: 71 BPM

## 2020-03-11 DIAGNOSIS — F41.1 GAD (GENERALIZED ANXIETY DISORDER): ICD-10-CM

## 2020-03-11 DIAGNOSIS — R53.83 FATIGUE DUE TO DEPRESSION: ICD-10-CM

## 2020-03-11 DIAGNOSIS — E66.9 OBESITY (BMI 30-39.9): ICD-10-CM

## 2020-03-11 DIAGNOSIS — F32.A FATIGUE DUE TO DEPRESSION: ICD-10-CM

## 2020-03-11 DIAGNOSIS — F33.2 MDD (MAJOR DEPRESSIVE DISORDER), RECURRENT SEVERE, WITHOUT PSYCHOSIS: Primary | ICD-10-CM

## 2020-03-11 DIAGNOSIS — I10 HYPERTENSION, UNSPECIFIED TYPE: ICD-10-CM

## 2020-03-11 PROCEDURE — 3074F PR MOST RECENT SYSTOLIC BLOOD PRESSURE < 130 MM HG: ICD-10-PCS | Mod: CPTII,S$GLB,, | Performed by: PSYCHIATRY & NEUROLOGY

## 2020-03-11 PROCEDURE — 3078F DIAST BP <80 MM HG: CPT | Mod: CPTII,S$GLB,, | Performed by: PSYCHIATRY & NEUROLOGY

## 2020-03-11 PROCEDURE — 3008F PR BODY MASS INDEX (BMI) DOCUMENTED: ICD-10-PCS | Mod: CPTII,S$GLB,, | Performed by: PSYCHIATRY & NEUROLOGY

## 2020-03-11 PROCEDURE — 99214 OFFICE O/P EST MOD 30 MIN: CPT | Mod: S$GLB,,, | Performed by: PSYCHIATRY & NEUROLOGY

## 2020-03-11 PROCEDURE — 99999 PR PBB SHADOW E&M-EST. PATIENT-LVL III: CPT | Mod: PBBFAC,,, | Performed by: PSYCHIATRY & NEUROLOGY

## 2020-03-11 PROCEDURE — 99999 PR PBB SHADOW E&M-EST. PATIENT-LVL III: ICD-10-PCS | Mod: PBBFAC,,, | Performed by: PSYCHIATRY & NEUROLOGY

## 2020-03-11 PROCEDURE — 99214 PR OFFICE/OUTPT VISIT, EST, LEVL IV, 30-39 MIN: ICD-10-PCS | Mod: S$GLB,,, | Performed by: PSYCHIATRY & NEUROLOGY

## 2020-03-11 PROCEDURE — 3078F PR MOST RECENT DIASTOLIC BLOOD PRESSURE < 80 MM HG: ICD-10-PCS | Mod: CPTII,S$GLB,, | Performed by: PSYCHIATRY & NEUROLOGY

## 2020-03-11 PROCEDURE — 3074F SYST BP LT 130 MM HG: CPT | Mod: CPTII,S$GLB,, | Performed by: PSYCHIATRY & NEUROLOGY

## 2020-03-11 PROCEDURE — 3008F BODY MASS INDEX DOCD: CPT | Mod: CPTII,S$GLB,, | Performed by: PSYCHIATRY & NEUROLOGY

## 2020-03-11 RX ORDER — VENLAFAXINE HYDROCHLORIDE 150 MG/1
150 CAPSULE, EXTENDED RELEASE ORAL DAILY
Qty: 30 CAPSULE | Refills: 0 | Status: SHIPPED | OUTPATIENT
Start: 2020-03-11 | End: 2020-04-20

## 2020-03-11 NOTE — PROGRESS NOTES
"ID: 43yo WF with a h/o depression and anxiety, no prior full psych eval w/i [facility] [employer] system. Pt is a current pt of Anneliese Cochran LCSW who referred her for med mgmt along with , PCP. Pt has been on wait list since 3/2017. The pt is currently on xanax and cymbalta through PCP. Also on topamax and elavil for migraine mgmt through pcp    CC: depression.     Interim Hx: presents on time. Chart reviewed.     Doing well- now on effexor xr 75mg. "i'm not worse, maybe even a little bit better? Not sure, but not worse. So i'd like to continue that."   Will inc to 150mg po qam. Still consider a possible d/c of vraylar in future but pt understands my plan to limit variables.     Does note feeling a "little more sedated"- inquires about that, but it may be relative to the pristiq previous dosing in comparison to now only on 75mg effexor. Will cont to monitor.      On Psychiatric ROS:    Endorses somewhat improved sleep maintenance- wakes nightly and has difficulty reinitiating, anhedonia, feeling helpless/hopeless- less so b/c med has been helpful, dec energy- "none", stable concentration, inc'd appetite- since Spring 2017, dec PMA "i do feel tired thinking about doing things."     Denies thoughts of SI/intent/plan.     Endorses feeling +easily overwhelmed, +ruminative thinking, +feeling tense/"on edge"- "on edge"     PPHx: Denies h/o self injury, inpt psych hospitalization, denies h/o suicide attempt     Current Psych Meds: pristiq 100mg qam, buspar TID, trazodone 100mg po qhs PRN insomnia, xanax 0.5mg po daily PRN anxiety  Past Psych Meds: ritalin, prozac, buspar PRN ("it helped with the stress of planning the wedding"), cymbalta 120mg po daily (ineffective), zoloft 150mg (ineffective), prozac 20mg po qam, xanax 0.5mg po BID PRN anxiety, wellbutrin xl 150mg po qam, fetzima 40mg po qam, abilify 2mg     PMHx: migraines (since childhood)- botox as txmt, monthly "they're well managed"    SubstHx:   T- none  E- very " "rare due to migraine  D- none  Caffeine- 2 dr.peppers/day    FamPHx: none    Musculoskeletal:  Muscle strength/Tone: no dyskinesia/ no tremor  Gait/Station- non antalgic, no assistance needed    MSE: appears stated age, well groomed, appropriate dress, engages well with examiner. Good e/c. Speech reg rate and vol, nonpressured. Mood is "pretty good. Just a little sleepy." Affect congruent- low energy, but does smile more easily. Sensorium fully intact. Oriented to date/day/location, current events. Narrative memory intact. Intellectual function is avg based on vocab and basic fund of knowledge. Thought is c/l/gd. No tangentiality or circumstantiality. No FOI/SULTANA. Denies SI/HI. Denies A/VH. No evidence of delusions. Insight and Judgment intact.     Blood pressure 125/66, pulse 71, height 5' 10" (1.778 m), weight 124.9 kg (275 lb 3.9 oz), last menstrual period 02/04/2020.    Results for orders placed or performed in visit on 02/08/20   TSH   Result Value Ref Range    TSH 2.259 0.400 - 4.000 uIU/mL   T4, free   Result Value Ref Range    Free T4 0.80 0.71 - 1.51 ng/dL   T3   Result Value Ref Range    T3, Total 85 60 - 180 ng/dL   VITAMIN D   Result Value Ref Range    Vit D, 25-Hydroxy 14 (L) 30 - 96 ng/mL   Vitamin B12   Result Value Ref Range    Vitamin B-12 554 210 - 950 pg/mL   Comprehensive metabolic panel   Result Value Ref Range    Sodium 138 136 - 145 mmol/L    Potassium 3.5 3.5 - 5.1 mmol/L    Chloride 102 95 - 110 mmol/L    CO2 27 23 - 29 mmol/L    Glucose 117 (H) 70 - 110 mg/dL    BUN, Bld 8 6 - 20 mg/dL    Creatinine 0.8 0.5 - 1.4 mg/dL    Calcium 9.9 8.7 - 10.5 mg/dL    Total Protein 7.0 6.0 - 8.4 g/dL    Albumin 3.9 3.5 - 5.2 g/dL    Total Bilirubin 0.4 0.1 - 1.0 mg/dL    Alkaline Phosphatase 33 (L) 55 - 135 U/L    AST 22 10 - 40 U/L    ALT 34 10 - 44 U/L    Anion Gap 9 8 - 16 mmol/L    eGFR if African American >60.0 >60 mL/min/1.73 m^2    eGFR if non African American >60.0 >60 mL/min/1.73 m^2   Hemoglobin " "A1c   Result Value Ref Range    Hemoglobin A1C 5.7 (H) 4.0 - 5.6 %    Estimated Avg Glucose 117 68 - 131 mg/dL   Lipid panel   Result Value Ref Range    Cholesterol 220 (H) 120 - 199 mg/dL    Triglycerides 158 (H) 30 - 150 mg/dL    HDL 58 40 - 75 mg/dL    LDL Cholesterol 130.4 63.0 - 159.0 mg/dL    Hdl/Cholesterol Ratio 26.4 20.0 - 50.0 %    Total Cholesterol/HDL Ratio 3.8 2.0 - 5.0    Non-HDL Cholesterol 162 mg/dL       Suicide Risk Assessment:   Protective- age, gender, no prior attempts, no prior hospitalizations, no family h/o attempts, no ongoing substance abuse, no psychosis, , denies SI/intent/plan, seeking treatment, access to treatment, future oriented, good primary support, no access to firearms    Risk- race, ongoing Axis I sxs, no children    **Pt is at LOW imminent and long term risk of suicide given current risk factors.    Assessment:  45yo WF with a h/o depression and anxiety, no prior full psych eval w/i [facility]. Pt is a current pt of Anneliese Cochran LCSW who referred her for med mgmt along with , PCP. Pt has been on wait list since 3/2017. The pt is currently on xanax and cymbalta through PCP. Also on topamax and elavil for migraine mgmt through pcp/neuro. On eval today she meets criteria for MDD, recurrent, severe w/o psychotic sxs despite a recent inc in cymbalta to 120mg po daily. Pt has been on cymbalta x 10yrs. Only recalls previous trial of prozac which was d/c'd for unknown reasons. Now requiring xanax daily- some days 2 tabs (ie:1mg total daily). Some room for behavioral interventions, as well and the pt is motivated. Has good ability to adhere to the new txmt plan. We tapered off cymbalta and started a trial of zoloft. Will discuss diet and exercise interventions more fully over course of txmt. today she has been on 100mg of zoloft x 1mo. Continues to state she does not feel "any different"- will inc one last titration to 150mg prior to discussing transition to " "alternate med. Have discussed trial of wellbutrin next as daytime lethargy and dec'd motivation are now the primary concerns to the pt, however current level of anxiety leading to xanax use 2x/d. May also consider mthfr mutation with some methyl folate supplement. Will add time released melatonin today for sleep. wgt loss would be an added benefit. Cont therapy as scheduled. No acute safety concerns. We added wellbutrin and today she reports improved energy and focus at work, but also perhaps inc'd anxiety? Tapered off zoloft to make room for trial of prozac. Will also add trial of buspar 10mg po BID which was helpful many years ago per pt. Last appt she reported  and therapist notice improvement on prozac 20mg po qam- buspar 10mg po TID also helpful with decreased xanax use and less variation in anxiety through day. Will cont both but inc prozac to 40mg to get at ongoing depressive and anxiety sxs- she then reported inc'd anxiety and I wondered about over activation on the prozac inc. not certain if she can afford genetic testing but is motivated for a trial of the methylfolate supplement. Dec'd the prozac back to 20mg po qam which today she reported did not affect her in either direction- started methylfolate supplementation- today reporting some improvement but con'd lethargy, lack of motivation, and reported anxiety. Agrees to do genetic testing. Genetic testing results returned and we started fetzima based on those results. Now reporting "maybe some improvement" but also reporting "no change in depression or anxiety"? Does report inc'd "anger"? May be overstimulation on fetzima (NE) and wellbutrin (dopa)- inc'd fetzima to more std dose of 40mg and within days the pt d/c'd the wellbutrin due to inc'd irritability. Now noticing "may some improvement" and the pt is more expansive with affect today in appt. Concern about bp but drinking a starbucks nitro at time of appt- will monitor at home for a few days " "and let me know via portal- we may inc dose in the future. Today pt reporting cont'd difficulty with anxiety and mood- started a trial of adjunct txmt with abilify 2mg- there has been weight gain but improved anxiety- will try for weight loss with beh interventions and we will re discuss in 4wks. Called the clinic one week ago with inc'd depressive sxs and wanting to make changes. Stopped abilify and here today to make some cont'd changes. "No different"- w/o the abilify. Will taper off fetzima and start trial of pristiq. On f/u of pristiq pt is feeling "a little better" but anxiety continues sleep a little improved on trazodone 100mg will inc to 150mg po qhs and likely add adjunct txmt at next appt. Last appt reported cont'd anxiety- transitioned xanax to klonopin and started a trial of vraylar 1.5mg po daily. Last appt reported some improvement which she attributed more to the klonopin but I find her mood improved and motivation/energy have also improved- likely a reflection of the vraylar. She wanted to cont w/o change through holidays and assess. Last appt reported "no better."- low motivation, energy/lasting lethargy. Tapered off pristiq and started a trial of effexor xr- today tolerating the 75mg effexor xr well- will titrate to 150mg po qam. Reviewed labs including thyroid panel.     Axis I: MDD, recurrent, severe w/o psychotic sxs; PASTORA  Axis II: none at this time   Axis III: HTN, migraines  Axis IV: occupational, chronic mental health  Axis V: GAF 70    Plan:   1. Inc effexor xr to 150mg po qam  2. Cont buspar 15mg po BID- TID   3. Cont klonopin 0.25mg po daily PRN anxiety (rare use)   4. Cont vraylar 1.5mg po qam (will cont to consider it's benefit)   5. Cont methylfolate (purchasing online)   6. Cont Trazodone 100-150mg po qhs   7. Cont therapy as scheduled; I am in contact with therapist  8. RTC 4wks  9. labwork reviewed.    -Spent 30min face to face with the pt; >50% time spent in counseling "   -Supportive therapy and psychoeducation provided  -R/B/SE's of medications discussed with the pt who expresses understanding and chooses to take medications as prescribed.   -Pt instructed to call clinic, 911 or go to nearest emergency room if sxs worsen or pt is in   crisis. The pt expresses understanding.

## 2020-04-02 ENCOUNTER — DOCUMENTATION ONLY (OUTPATIENT)
Dept: REHABILITATION | Facility: HOSPITAL | Age: 45
End: 2020-04-02

## 2020-04-02 PROBLEM — M79.606 LUMBAR PAIN WITH RADIATION DOWN LEG: Status: RESOLVED | Noted: 2019-11-26 | Resolved: 2020-04-02

## 2020-04-02 PROBLEM — M54.50 LUMBAR PAIN WITH RADIATION DOWN LEG: Status: RESOLVED | Noted: 2019-11-26 | Resolved: 2020-04-02

## 2020-04-02 NOTE — PROGRESS NOTES
Patient was seen for 3 outpatient PT visits from 11/26/19 to 12/10/19. Treatment included: evaluation, HEP, pt education, and ther ex. Pt has met no  goals. She did not return for any further treatment. This patient is discharged from outpatient PT Services.    Dennis Medrano, PT

## 2020-04-07 ENCOUNTER — TELEPHONE (OUTPATIENT)
Dept: PHARMACY | Facility: CLINIC | Age: 45
End: 2020-04-07

## 2020-04-09 ENCOUNTER — OFFICE VISIT (OUTPATIENT)
Dept: PSYCHIATRY | Facility: CLINIC | Age: 45
End: 2020-04-09
Payer: COMMERCIAL

## 2020-04-09 DIAGNOSIS — F41.1 GAD (GENERALIZED ANXIETY DISORDER): ICD-10-CM

## 2020-04-09 DIAGNOSIS — F33.2 MDD (MAJOR DEPRESSIVE DISORDER), RECURRENT SEVERE, WITHOUT PSYCHOSIS: Primary | ICD-10-CM

## 2020-04-09 PROCEDURE — 99214 PR OFFICE/OUTPT VISIT, EST, LEVL IV, 30-39 MIN: ICD-10-PCS | Mod: GT,,, | Performed by: PSYCHIATRY & NEUROLOGY

## 2020-04-09 PROCEDURE — 99214 OFFICE O/P EST MOD 30 MIN: CPT | Mod: GT,,, | Performed by: PSYCHIATRY & NEUROLOGY

## 2020-04-09 RX ORDER — CLONAZEPAM 0.5 MG/1
TABLET ORAL
Qty: 15 TABLET | Refills: 2 | Status: SHIPPED | OUTPATIENT
Start: 2020-04-09 | End: 2020-11-19 | Stop reason: SDUPTHER

## 2020-04-09 NOTE — PROGRESS NOTES
"Pt being seen via telepsych to limit exposure to covid 19.    The patient location is: (work) [address], [address], [facility] [address]  The chief complaint leading to consultation is: anxiety f/u  Visit type: Virtual visit with synchronous audio and video  Total time spent with patient: 20 min  Each patient to whom he or she provides medical services by telemedicine is:  (1) informed of the relationship between the physician and patient and the respective role of any other health care provider with respect to management of the patient; and (2) notified that he or she may decline to receive medical services by telemedicine and may withdraw from such care at any time.    ID: 43yo WF with a h/o depression and anxiety, no prior full psych eval w/i [facility]. Pt is a current pt of Anneliese Cochran LCSW who referred her for med mgmt along with , PCP. Pt has been on wait list since 3/2017. The pt is currently on xanax and cymbalta through PCP. Also on topamax and elavil for migraine mgmt through pcp    CC: depression.     Interim Hx: presents on time. Chart reviewed.     Has been continuing work, but with modified hours.     Reports a mildly inc'd anxiety as a result of covid "and I really can't explain it because i'm grateful to still be working."     inc'd effexor at last appt to 150mg po qam- "I think I may be a little bit better. And my  thinks I am, too."   Continues to use buspar, has only used klonopin 2x in past several weeks.     On Psychiatric ROS:    Endorses somewhat improved sleep maintenance- wakes nightly and has difficulty reinitiating, anhedonia, feeling helpless/hopeless- less so b/c med has been helpful, dec energy- "none", stable concentration, inc'd appetite- since Spring 2017, dec PMA "i do feel tired thinking about doing things."     Denies thoughts of SI/intent/plan.     Endorses feeling +easily overwhelmed, +ruminative thinking, +feeling tense/"on edge"- "on edge" " "    PPHx: Denies h/o self injury, inpt psych hospitalization, denies h/o suicide attempt     Current Psych Meds: pristiq 100mg qam, buspar TID, trazodone 100mg po qhs PRN insomnia, xanax 0.5mg po daily PRN anxiety  Past Psych Meds: ritalin, prozac, buspar PRN ("it helped with the stress of planning the wedding"), cymbalta 120mg po daily (ineffective), zoloft 150mg (ineffective), prozac 20mg po qam, xanax 0.5mg po BID PRN anxiety, wellbutrin xl 150mg po qam, fetzima 40mg po qam, abilify 2mg     PMHx: migraines (since childhood)- botox as txmt, monthly "they're well managed"    SubstHx:   T- none  E- very rare due to migraine  D- none  Caffeine- 2 dr.peppers/day    FamPHx: none    Musculoskeletal:  Muscle strength/Tone: no dyskinesia/ no tremor  Gait/Station- non antalgic, no assistance needed    MSE: appears stated age, well groomed, appropriate dress, engages well with examiner. Good e/c. Speech reg rate and vol, nonpressured. Mood is "anxious. Even though I have a job and I don't see it going away, but the price of oil is going down or I feel like maybe i'm not working well from home, I just worry." Affect congruent- low energy, but does smile more easily. Sensorium fully intact. Oriented to date/day/location, current events. Narrative memory intact. Intellectual function is avg based on vocab and basic fund of knowledge. Thought is c/l/gd. No tangentiality or circumstantiality. No FOI/SULTANA. Denies SI/HI. Denies A/VH. No evidence of delusions. Insight and Judgment intact.     There were no vitals taken for this visit.    Results for orders placed or performed in visit on 02/08/20   TSH   Result Value Ref Range    TSH 2.259 0.400 - 4.000 uIU/mL   T4, free   Result Value Ref Range    Free T4 0.80 0.71 - 1.51 ng/dL   T3   Result Value Ref Range    T3, Total 85 60 - 180 ng/dL   VITAMIN D   Result Value Ref Range    Vit D, 25-Hydroxy 14 (L) 30 - 96 ng/mL   Vitamin B12   Result Value Ref Range    Vitamin B-12 554 210 - " 950 pg/mL   Comprehensive metabolic panel   Result Value Ref Range    Sodium 138 136 - 145 mmol/L    Potassium 3.5 3.5 - 5.1 mmol/L    Chloride 102 95 - 110 mmol/L    CO2 27 23 - 29 mmol/L    Glucose 117 (H) 70 - 110 mg/dL    BUN, Bld 8 6 - 20 mg/dL    Creatinine 0.8 0.5 - 1.4 mg/dL    Calcium 9.9 8.7 - 10.5 mg/dL    Total Protein 7.0 6.0 - 8.4 g/dL    Albumin 3.9 3.5 - 5.2 g/dL    Total Bilirubin 0.4 0.1 - 1.0 mg/dL    Alkaline Phosphatase 33 (L) 55 - 135 U/L    AST 22 10 - 40 U/L    ALT 34 10 - 44 U/L    Anion Gap 9 8 - 16 mmol/L    eGFR if African American >60.0 >60 mL/min/1.73 m^2    eGFR if non African American >60.0 >60 mL/min/1.73 m^2   Hemoglobin A1c   Result Value Ref Range    Hemoglobin A1C 5.7 (H) 4.0 - 5.6 %    Estimated Avg Glucose 117 68 - 131 mg/dL   Lipid panel   Result Value Ref Range    Cholesterol 220 (H) 120 - 199 mg/dL    Triglycerides 158 (H) 30 - 150 mg/dL    HDL 58 40 - 75 mg/dL    LDL Cholesterol 130.4 63.0 - 159.0 mg/dL    Hdl/Cholesterol Ratio 26.4 20.0 - 50.0 %    Total Cholesterol/HDL Ratio 3.8 2.0 - 5.0    Non-HDL Cholesterol 162 mg/dL       Suicide Risk Assessment:   Protective- age, gender, no prior attempts, no prior hospitalizations, no family h/o attempts, no ongoing substance abuse, no psychosis, , denies SI/intent/plan, seeking treatment, access to treatment, future oriented, good primary support, no access to firearms    Risk- race, ongoing Axis I sxs, no children    **Pt is at LOW imminent and long term risk of suicide given current risk factors.    Assessment:  43yo WF with a h/o depression and anxiety, no prior full psych eval w/i [facility]. Pt is a current pt of Anneliese Cochran LCSW who referred her for med mgmt along with , PCP. Pt has been on wait list since 3/2017. The pt is currently on xanax and cymbalta through PCP. Also on topamax and elavil for migraine mgmt through pcp/neuro. On eval today she meets criteria for MDD, recurrent, severe w/o  "psychotic sxs despite a recent inc in cymbalta to 120mg po daily. Pt has been on cymbalta x 10yrs. Only recalls previous trial of prozac which was d/c'd for unknown reasons. Now requiring xanax daily- some days 2 tabs (ie:1mg total daily). Some room for behavioral interventions, as well and the pt is motivated. Has good ability to adhere to the new txmt plan. We tapered off cymbalta and started a trial of zoloft. Will discuss diet and exercise interventions more fully over course of txmt. today she has been on 100mg of zoloft x 1mo. Continues to state she does not feel "any different"- will inc one last titration to 150mg prior to discussing transition to alternate med. Have discussed trial of wellbutrin next as daytime lethargy and dec'd motivation are now the primary concerns to the pt, however current level of anxiety leading to xanax use 2x/d. May also consider mthfr mutation with some methyl folate supplement. Will add time released melatonin today for sleep. wgt loss would be an added benefit. Cont therapy as scheduled. No acute safety concerns. We added wellbutrin and today she reports improved energy and focus at work, but also perhaps inc'd anxiety? Tapered off zoloft to make room for trial of prozac. Will also add trial of buspar 10mg po BID which was helpful many years ago per pt. Last appt she reported  and therapist notice improvement on prozac 20mg po qam- buspar 10mg po TID also helpful with decreased xanax use and less variation in anxiety through day. Will cont both but inc prozac to 40mg to get at ongoing depressive and anxiety sxs- she then reported inc'd anxiety and I wondered about over activation on the prozac inc. not certain if she can afford genetic testing but is motivated for a trial of the methylfolate supplement. Dec'd the prozac back to 20mg po qam which today she reported did not affect her in either direction- started methylfolate supplementation- today reporting some " "improvement but con'd lethargy, lack of motivation, and reported anxiety. Agrees to do genetic testing. Genetic testing results returned and we started fetzima based on those results. Now reporting "maybe some improvement" but also reporting "no change in depression or anxiety"? Does report inc'd "anger"? May be overstimulation on fetzima (NE) and wellbutrin (dopa)- inc'd fetzima to more std dose of 40mg and within days the pt d/c'd the wellbutrin due to inc'd irritability. Now noticing "may some improvement" and the pt is more expansive with affect today in appt. Concern about bp but drinking a starbucks nitro at time of appt- will monitor at home for a few days and let me know via portal- we may inc dose in the future. Today pt reporting cont'd difficulty with anxiety and mood- started a trial of adjunct txmt with abilify 2mg- there has been weight gain but improved anxiety- will try for weight loss with beh interventions and we will re discuss in 4wks. Called the clinic one week ago with inc'd depressive sxs and wanting to make changes. Stopped abilify and here today to make some cont'd changes. "No different"- w/o the abilify. Will taper off fetzima and start trial of pristiq. On f/u of pristiq pt is feeling "a little better" but anxiety continues sleep a little improved on trazodone 100mg will inc to 150mg po qhs and likely add adjunct txmt at next appt. Last appt reported cont'd anxiety- transitioned xanax to klonopin and started a trial of vraylar 1.5mg po daily. Last appt reported some improvement which she attributed more to the klonopin but I find her mood improved and motivation/energy have also improved- likely a reflection of the vraylar. She wanted to cont w/o change through holidays and assess. Last appt reported "no better."- low motivation, energy/lasting lethargy. Tapered off pristiq and started a trial of effexor xr- today tolerating the 75mg effexor xr well- will titrate to 150mg po qam. Reviewed " "labs including thyroid panel. Pt feels "maybe a little better" on the inc'd effexor. Will cont-     Axis I: MDD, recurrent, severe w/o psychotic sxs; PASTORA  Axis II: none at this time   Axis III: HTN, migraines  Axis IV: occupational, chronic mental health  Axis V: GAF 70    Plan:   1. Cont effexor xr 150mg po qam   2. Cont buspar 15mg po BID- TID   3. Cont klonopin 0.25mg po daily PRN anxiety (rare use)   4. Cont vraylar 1.5mg po qam (will cont to consider it's benefit)   5. Cont methylfolate (purchasing online)   6. Cont Trazodone 100-150mg po qhs   7. Cont therapy as scheduled; I am in contact with therapist  8. RTC 2mos via TP  9. labwork reviewed.    -Spent 20min face to face with the pt; >50% time spent in counseling   -Supportive therapy and psychoeducation provided  -R/B/SE's of medications discussed with the pt who expresses understanding and chooses to take medications as prescribed.   -Pt instructed to call clinic, 911 or go to nearest emergency room if sxs worsen or pt is in   crisis. The pt expresses understanding.  "

## 2020-04-18 DIAGNOSIS — F41.1 GAD (GENERALIZED ANXIETY DISORDER): ICD-10-CM

## 2020-04-18 DIAGNOSIS — F33.2 MDD (MAJOR DEPRESSIVE DISORDER), RECURRENT SEVERE, WITHOUT PSYCHOSIS: ICD-10-CM

## 2020-04-20 ENCOUNTER — PATIENT OUTREACH (OUTPATIENT)
Dept: ADMINISTRATIVE | Facility: HOSPITAL | Age: 45
End: 2020-04-20

## 2020-04-20 RX ORDER — VENLAFAXINE HYDROCHLORIDE 150 MG/1
CAPSULE, EXTENDED RELEASE ORAL
Qty: 30 CAPSULE | Refills: 0 | Status: SHIPPED | OUTPATIENT
Start: 2020-04-20 | End: 2020-05-17

## 2020-04-20 NOTE — PROGRESS NOTES
COVID-19 Workflow non-compliant chart audits. Chart review completed 04/20/2020    Care Everywhere and media, updates requested and reviewed.  Lab júnior, Shareable Ink, and DIS reviewed if applies.      Pap smear

## 2020-04-26 DIAGNOSIS — I10 ESSENTIAL HYPERTENSION: ICD-10-CM

## 2020-04-27 RX ORDER — CARIPRAZINE 1.5 MG/1
CAPSULE, GELATIN COATED ORAL
Qty: 30 CAPSULE | Refills: 2 | Status: SHIPPED | OUTPATIENT
Start: 2020-04-27 | End: 2020-07-30

## 2020-04-27 RX ORDER — CANDESARTAN 16 MG/1
TABLET ORAL
Qty: 30 TABLET | Refills: 11 | Status: SHIPPED | OUTPATIENT
Start: 2020-04-27 | End: 2020-10-07

## 2020-04-30 RX ORDER — METOPROLOL SUCCINATE 200 MG/1
TABLET, EXTENDED RELEASE ORAL
Qty: 90 TABLET | Refills: 2 | Status: SHIPPED | OUTPATIENT
Start: 2020-04-30 | End: 2021-01-26

## 2020-04-30 NOTE — PROGRESS NOTES
Refill Authorization Note    is requesting a refill authorization.    Brief assessment and rationale for refill: APPROVE: prr          Medication Therapy Plan: approve 9 more    Medication reconciliation completed: No                         Comments:      Requested Prescriptions   Pending Prescriptions Disp Refills    metoprolol succinate (TOPROL-XL) 200 MG 24 hr tablet [Pharmacy Med Name: METOPROLOL ER SUCCINATE 200MG TABS] 90 tablet 2     Sig: TAKE 1 TABLET BY MOUTH EVERY DAY       Cardiovascular:  Beta Blockers Passed - 4/26/2020 10:16 PM        Passed - Patient is at least 18 years old        Passed - Negative Pregnancy Status Check        Passed - Last BP in normal range within 360 days.     BP Readings from Last 3 Encounters:   11/22/19 138/84   10/25/19 (!) 142/85   07/19/19 135/73              Passed - Last Heart Rate in normal range within 360 days.     Pulse Readings from Last 3 Encounters:   11/22/19 106   10/25/19 76   07/19/19 77             Passed - Office visit in past 12 months or future 90 days.     Recent Outpatient Visits            3 weeks ago MDD (major depressive disorder), recurrent severe, without psychosis    [facility] Alexandrea Bryant MD    1 month ago MDD (major depressive disorder), recurrent severe, without psychosis    [facility] Alexandrea Bryant MD    2 months ago MDD (major depressive disorder), recurrent severe, without psychosis    [facility] Alexandrea Bryant MD    5 months ago Hypertension, unspecified type    [facility] Steve Holcomb MD    6 months ago MDD (major depressive disorder), recurrent severe, without psychosis    [facility] Alexandrea Bryant MD          Future Appointments              In 3 weeks Steve Holcomb MD [facility]                 Appointments  past 12m or future 3m with PCP    Date Provider   Last Visit   11/22/2019 Steve Holcomb MD    Next Visit   5/22/2020 Steve Holcomb MD   ED visits in past 90 days: 0     Note composed:12:56 PM 04/30/2020

## 2020-04-30 NOTE — TELEPHONE ENCOUNTER
DOCUMENTATION ONLY:  Prior authorization for Emgality approved from 4/28/2020 to 4/28/2021.    Case ID# [id number]    Co-pay: $0.00 with FA on file    Patient Assistance IS NOT required.    Forward to clinical pharmacist for consult & shipment.  -HBR

## 2020-05-05 ENCOUNTER — PATIENT MESSAGE (OUTPATIENT)
Dept: ADMINISTRATIVE | Facility: HOSPITAL | Age: 45
End: 2020-05-05

## 2020-05-06 ENCOUNTER — TELEPHONE (OUTPATIENT)
Dept: PHARMACY | Facility: CLINIC | Age: 45
End: 2020-05-06

## 2020-05-17 DIAGNOSIS — F41.1 GAD (GENERALIZED ANXIETY DISORDER): ICD-10-CM

## 2020-05-17 DIAGNOSIS — F33.2 MDD (MAJOR DEPRESSIVE DISORDER), RECURRENT SEVERE, WITHOUT PSYCHOSIS: ICD-10-CM

## 2020-05-17 RX ORDER — VENLAFAXINE HYDROCHLORIDE 150 MG/1
CAPSULE, EXTENDED RELEASE ORAL
Qty: 30 CAPSULE | Refills: 0 | Status: SHIPPED | OUTPATIENT
Start: 2020-05-17 | End: 2020-06-15

## 2020-05-17 RX ORDER — BUSPIRONE HYDROCHLORIDE 15 MG/1
TABLET ORAL
Qty: 90 TABLET | Refills: 2 | Status: SHIPPED | OUTPATIENT
Start: 2020-05-17 | End: 2020-08-17

## 2020-05-26 ENCOUNTER — OFFICE VISIT (OUTPATIENT)
Dept: FAMILY MEDICINE | Facility: CLINIC | Age: 45
End: 2020-05-26
Payer: COMMERCIAL

## 2020-05-26 DIAGNOSIS — L30.4 INTERTRIGO: ICD-10-CM

## 2020-05-26 DIAGNOSIS — I10 HYPERTENSION, UNSPECIFIED TYPE: Primary | ICD-10-CM

## 2020-05-26 PROCEDURE — 99213 PR OFFICE/OUTPT VISIT, EST, LEVL III, 20-29 MIN: ICD-10-PCS | Mod: 95,,, | Performed by: FAMILY MEDICINE

## 2020-05-26 PROCEDURE — 99213 OFFICE O/P EST LOW 20 MIN: CPT | Mod: 95,,, | Performed by: FAMILY MEDICINE

## 2020-05-26 RX ORDER — NYSTATIN 100000 U/G
CREAM TOPICAL 2 TIMES DAILY
Qty: 30 G | Refills: 1 | Status: SHIPPED | OUTPATIENT
Start: 2020-05-26 | End: 2021-11-24 | Stop reason: ALTCHOICE

## 2020-05-26 NOTE — PROGRESS NOTES
Subjective:       Patient ID: Anneliese Rocha is a 45 y.o. female.      Here for 6 month f/u on chronic health issues.    The patient location is: home  The chief complaint leading to consultation is: HTN    Visit type: audiovisual    Face to Face time with patient: 15 minutes  15 minutes minutes of total time spent on the encounter, which includes face to face time and non-face to face time preparing to see the patient (eg, review of tests), Obtaining and/or reviewing separately obtained history, Documenting clinical information in the electronic or other health record, Independently interpreting results (not separately reported) and communicating results to the patient/family/caregiver, or Care coordination (not separately reported).     Each patient to whom he or she provides medical services by telemedicine is:  (1) informed of the relationship between the physician and patient and the respective role of any other health care provider with respect to management of the patient; and (2) notified that he or she may decline to receive medical services by telemedicine and may withdraw from such care at any time.        Depression - taking effexor, buspar, vraylar. Seeing Dr. Bryant  Mood seems stable.Using klonopin 1/2 PRN  HAs - stable on present regimen; taking Emgality  HTN - controlled on present meds; home /70    C/o some jock itch in bilateral groin folds          Follow-up   Associated symptoms include headaches. Pertinent negatives include no abdominal pain, arthralgias, chest pain, chills, coughing, fever, joint swelling, nausea, neck pain, numbness, rash, sore throat, vomiting or weakness.       Past Medical History:   Diagnosis Date    Depression with anxiety     tolerating Cymbalta 90mg daily    Fatigue     HTN (hypertension)     Iron deficiency anemia     Migraines     Getting headaches about monthly; using Topamax 25mg and Elavil 50mg for prevention and Frova/cambia as needed; following  with Dr. Maldonado    PCOS (polycystic ovarian syndrome)        Past Surgical History:   Procedure Laterality Date    BREAST BIOPSY Right     core  neg    oophrectomy  1994    left       Review of patient's allergies indicates:  No Known Allergies    Social History     Socioeconomic History    Marital status:      Spouse name: Not on file    Number of children: 0    Years of education: Not on file    Highest education level: Not on file   Occupational History    Occupation: accounting     Employer: [employer]     Comment: [employer]   Social Needs    Financial resource strain: Not hard at all    Food insecurity:     Worry: Never true     Inability: Never true    Transportation needs:     Medical: No     Non-medical: No   Tobacco Use    Smoking status: Never Smoker    Smokeless tobacco: Never Used   Substance and Sexual Activity    Alcohol use: Yes     Frequency: Monthly or less     Drinks per session: 1 or 2     Binge frequency: Never     Comment: rarely    Drug use: No    Sexual activity: Not on file   Lifestyle    Physical activity:     Days per week: 0 days     Minutes per session: 0 min    Stress: Rather much   Relationships    Social connections:     Talks on phone: Once a week     Gets together: Never     Attends Orthodox service: Not on file     Active member of club or organization: No     Attends meetings of clubs or organizations: Never     Relationship status:    Other Topics Concern    Not on file   Social History Narrative    From [place]. Lives with .         Exercise: none regularly       Current Outpatient Medications on File Prior to Visit   Medication Sig Dispense Refill    busPIRone (BUSPAR) 15 MG tablet TAKE 1 TABLET BY MOUTH THREE TIMES DAILY 90 tablet 2    candesartan (ATACAND) 16 MG tablet TAKE 1 TABLET(16 MG) BY MOUTH EVERY DAY 30 tablet 11    clonazePAM (KLONOPIN) 0.5 MG tablet TAKE 1/2 TABLET(0.25 MG) BY MOUTH DAILY AS NEEDED FOR ANXIETY 15 tablet 2     ferrous sulfate (IRON) 325 mg (65 mg iron) Tab tablet iron      frovatriptan (FROVA) 2.5 MG tablet Take 1 tablet (2.5 mg total) by mouth as needed. 9 tablet 3    galcanezumab-gnlm (EMGALITY PEN) 120 mg/mL PnIj Inject 120 mg into the skin every 28 days. 1 mL 11    hydroCHLOROthiazide (HYDRODIURIL) 25 MG tablet TAKE 1 TABLET(25 MG) BY MOUTH EVERY DAY 90 tablet 1    ketorolac (SPRIX) 15.75 mg/spray Spry 15.75 mg by Nasal route every 6 (six) hours. 5 each 5    metoprolol succinate (TOPROL-XL) 200 MG 24 hr tablet TAKE 1 TABLET BY MOUTH EVERY DAY 90 tablet 2    onabotulinumtoxina (BOTOX) 200 unit SolR Botox 200 unit injection   Take 155 units as needed by injection route.      ondansetron (ZOFRAN) 4 MG tablet TK 1 T PO TID PRN  1    prochlorperazine (COMPAZINE) 5 MG tablet Take 1 tablet (5 mg total) by mouth 3 (three) times daily as needed for Nausea. 10 tablet 3    traZODone (DESYREL) 100 MG tablet TAKE 1 TO 1 1/2 TABLETS BY MOUTH EVERY NIGHT AT BEDTIME AS NEEDED FOR SLEEP 45 tablet 2    UNABLE TO FIND Take 15 mg by mouth once daily. Methylfolate      venlafaxine (EFFEXOR-XR) 150 MG Cp24 TAKE 1 CAPSULE(150 MG) BY MOUTH EVERY DAY 30 capsule 0    VRAYLAR 1.5 mg Cap TAKE 1 CAPSULE BY MOUTH EVERY DAY 30 capsule 2    ZEMBRACE SYMTOUCH 3 mg/0.5 mL PnIj   3     Current Facility-Administered Medications on File Prior to Visit   Medication Dose Route Frequency Provider Last Rate Last Dose    onabotulinumtoxina injection 200 Units  200 Units Intramuscular Q90 Days Mira Braswell MD   200 Units at 07/19/19 1331    onabotulinumtoxina injection 200 Units  200 Units Intramuscular Q90 Days Mira Braswell MD   200 Units at 10/25/19 1334       Family History   Problem Relation Age of Onset    Diabetes Father     Hypertension Father     Coronary artery disease Father 55    Thyroid disease Mother     Migraines Mother     Hypertension Mother     Melanoma Paternal Uncle     Breast cancer Maternal  Grandmother        Review of Systems   Constitutional: Negative for activity change, appetite change, chills, fever and unexpected weight change.   HENT: Negative for hearing loss, rhinorrhea, sore throat and trouble swallowing.    Eyes: Negative for pain, discharge and visual disturbance.   Respiratory: Negative for cough, chest tightness, shortness of breath and wheezing.    Cardiovascular: Negative for chest pain and palpitations.   Gastrointestinal: Negative for abdominal pain, blood in stool, constipation, diarrhea, nausea and vomiting.   Endocrine: Negative for polydipsia and polyuria.   Genitourinary: Negative for difficulty urinating, dysuria, hematuria and menstrual problem.   Musculoskeletal: Positive for back pain. Negative for arthralgias, gait problem, joint swelling and neck pain.   Skin: Negative for rash and wound.   Neurological: Positive for headaches. Negative for dizziness, weakness and numbness.   Hematological: Negative for adenopathy.   Psychiatric/Behavioral: Negative for confusion and dysphoric mood.       Objective:      Physical Exam   Constitutional: She is oriented to person, place, and time. She appears well-developed and well-nourished.   HENT:   Head: Normocephalic.   Mouth/Throat: Oropharynx is clear and moist. No oropharyngeal exudate or posterior oropharyngeal erythema.   Eyes: Pupils are equal, round, and reactive to light. Conjunctivae and EOM are normal.   Neck: Normal range of motion. Neck supple. No thyromegaly present.   Cardiovascular: Normal rate, regular rhythm, S1 normal, S2 normal, normal heart sounds and intact distal pulses. Exam reveals no gallop and no friction rub.   No murmur heard.  Pulmonary/Chest: Effort normal and breath sounds normal. She has no wheezes. She has no rales.   Abdominal: Soft. Normal appearance and bowel sounds are normal. She exhibits no distension. There is no tenderness.   Musculoskeletal:        Lumbar back: She exhibits normal range of  motion and no bony tenderness.        Right lower leg: She exhibits no edema.        Left lower leg: She exhibits no edema.   Lymphadenopathy:     She has no cervical adenopathy.   Neurological: She is alert and oriented to person, place, and time. No cranial nerve deficit. Gait normal.   Skin: Skin is warm and intact. No rash noted.   Psychiatric: She has a normal mood and affect.       Results for orders placed or performed in visit on 02/08/20   TSH   Result Value Ref Range    TSH 2.259 0.400 - 4.000 uIU/mL   T4, free   Result Value Ref Range    Free T4 0.80 0.71 - 1.51 ng/dL   T3   Result Value Ref Range    T3, Total 85 60 - 180 ng/dL   VITAMIN D   Result Value Ref Range    Vit D, 25-Hydroxy 14 (L) 30 - 96 ng/mL   Vitamin B12   Result Value Ref Range    Vitamin B-12 554 210 - 950 pg/mL   Comprehensive metabolic panel   Result Value Ref Range    Sodium 138 136 - 145 mmol/L    Potassium 3.5 3.5 - 5.1 mmol/L    Chloride 102 95 - 110 mmol/L    CO2 27 23 - 29 mmol/L    Glucose 117 (H) 70 - 110 mg/dL    BUN, Bld 8 6 - 20 mg/dL    Creatinine 0.8 0.5 - 1.4 mg/dL    Calcium 9.9 8.7 - 10.5 mg/dL    Total Protein 7.0 6.0 - 8.4 g/dL    Albumin 3.9 3.5 - 5.2 g/dL    Total Bilirubin 0.4 0.1 - 1.0 mg/dL    Alkaline Phosphatase 33 (L) 55 - 135 U/L    AST 22 10 - 40 U/L    ALT 34 10 - 44 U/L    Anion Gap 9 8 - 16 mmol/L    eGFR if African American >60.0 >60 mL/min/1.73 m^2    eGFR if non African American >60.0 >60 mL/min/1.73 m^2   Hemoglobin A1c   Result Value Ref Range    Hemoglobin A1C 5.7 (H) 4.0 - 5.6 %    Estimated Avg Glucose 117 68 - 131 mg/dL   Lipid panel   Result Value Ref Range    Cholesterol 220 (H) 120 - 199 mg/dL    Triglycerides 158 (H) 30 - 150 mg/dL    HDL 58 40 - 75 mg/dL    LDL Cholesterol 130.4 63.0 - 159.0 mg/dL    Hdl/Cholesterol Ratio 26.4 20.0 - 50.0 %    Total Cholesterol/HDL Ratio 3.8 2.0 - 5.0    Non-HDL Cholesterol 162 mg/dL       Assessment:       1. Hypertension, unspecified type    2. Intertrigo         Plan:       Hypertension, unspecified type    Intertrigo  -     nystatin (MYCOSTATIN) cream; Apply topically 2 (two) times daily.  Dispense: 30 g; Refill: 1        Keep groin area clean and dry  continue other present meds   F/u with present specialists  Counseled on regular exercise, maintenance of a healthy weight, balanced diet rich in fruits/vegetables and lean protein, and avoidance of unhealthy habits like smoking and excessive alcohol intake.  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

## 2020-05-27 DIAGNOSIS — Z00.00 ANNUAL PHYSICAL EXAM: Primary | ICD-10-CM

## 2020-05-31 VITALS — DIASTOLIC BLOOD PRESSURE: 70 MMHG | SYSTOLIC BLOOD PRESSURE: 120 MMHG

## 2020-06-02 ENCOUNTER — TELEPHONE (OUTPATIENT)
Dept: PHARMACY | Facility: CLINIC | Age: 45
End: 2020-06-02

## 2020-06-02 NOTE — TELEPHONE ENCOUNTER
RX call attempt 1 regarding Emgality refill from [facility]. Patient was not reached, voicemail full. Will send [employer] message. Copay $0.00.

## 2020-06-03 ENCOUNTER — OFFICE VISIT (OUTPATIENT)
Dept: PSYCHIATRY | Facility: CLINIC | Age: 45
End: 2020-06-03
Payer: COMMERCIAL

## 2020-06-03 DIAGNOSIS — E66.9 OBESITY (BMI 30-39.9): ICD-10-CM

## 2020-06-03 DIAGNOSIS — F41.1 GAD (GENERALIZED ANXIETY DISORDER): ICD-10-CM

## 2020-06-03 DIAGNOSIS — F33.2 MDD (MAJOR DEPRESSIVE DISORDER), RECURRENT SEVERE, WITHOUT PSYCHOSIS: Primary | ICD-10-CM

## 2020-06-03 DIAGNOSIS — I10 HYPERTENSION, UNSPECIFIED TYPE: ICD-10-CM

## 2020-06-03 PROCEDURE — 99214 OFFICE O/P EST MOD 30 MIN: CPT | Mod: 95,,, | Performed by: PSYCHIATRY & NEUROLOGY

## 2020-06-03 PROCEDURE — 99214 PR OFFICE/OUTPT VISIT, EST, LEVL IV, 30-39 MIN: ICD-10-PCS | Mod: 95,,, | Performed by: PSYCHIATRY & NEUROLOGY

## 2020-06-03 NOTE — PROGRESS NOTES
"Pt being seen via telepsych to limit exposure to covid 19.    The patient location is: (work) [address], [address], [facility] [address]  The chief complaint leading to consultation is: anxiety f/u  Visit type: Virtual visit with synchronous audio and video  Total time spent with patient: 20 min  Each patient to whom he or she provides medical services by telemedicine is:  (1) informed of the relationship between the physician and patient and the respective role of any other health care provider with respect to management of the patient; and (2) notified that he or she may decline to receive medical services by telemedicine and may withdraw from such care at any time.    ID: [id number] WF with a h/o depression and anxiety, no prior full psych eval w/i [facility]. Pt is a current pt of Anneliese Cochran LCSW who referred her for med mgmt along with , PCP. Pt has been on wait list since 3/2017. The pt is currently on xanax and cymbalta through PCP. Also on topamax and elavil for migraine mgmt through pcp    CC: depression.     Interim Hx: presents on time. Chart reviewed.     Has been continuing work, but with modified hours.     "the anxiety is through the roof. I"m worrying my  with it. Like when I have an anxiety attack I just kind of zone out. I get quiet and all in my head. It's not heart racing and sweating, it's not obvious to people but my  is aware. It really exists in me as an internal experience but I just get tunnel vision."     "I know it's part of the situation that was happening before and then added also the current situation in the world but I need to know how to make it better. I'm better than when I first started seeing you but i'm not where I need to be. i'm just here. Barely making it, barely hanging on."     Continues to use buspar and reports "I don't notice it when I take it, but I notice when i'm without it so it definitely is doing something."     Rare use of " "klonopin. Did not find trazodone was helping so is no longer taking- now sleeps until approx 3am and then has frequent wakenings from that time forward.      On Psychiatric ROS:    Endorses somewhat improved sleep maintenance- wakes nightly and has difficulty reinitiating from 3am forward, anhedonia, feeling helpless/hopeless- regarding how she feels emotionally, dec energy- "none", stable concentration, has given up dr.pepper and lost 7lbs by her scale (today 268lbs), dec PMA "i do feel tired thinking about doing things."     Denies thoughts of SI/intent/plan.     Endorses feeling +easily overwhelmed, +ruminative thinking, +feeling tense/"on edge"- "on edge" "all the time"    PPHx: Denies h/o self injury, inpt psych hospitalization, denies h/o suicide attempt     Current Psych Meds: effexor xr 150mg po qam, buspar TID, trazodone 100mg po qhs PRN insomnia, xanax 0.5mg po daily PRN anxiety  Past Psych Meds: ritalin, prozac, buspar PRN ("it helped with the stress of planning the wedding"), cymbalta 120mg po daily (ineffective), zoloft 150mg (ineffective), prozac 20mg po qam, xanax 0.5mg po BID PRN anxiety, wellbutrin xl 150mg po qam, fetzima 40mg po qam, abilify 2mg, pristiq 100mg qam    PMHx: migraines (since childhood)- botox as txmt, monthly "they're well managed"    SubstHx:   T- none  E- very rare due to migraine  D- none  Caffeine- 2 dr.peppers/day    FamPHx: none    Musculoskeletal:  Muscle strength/Tone: no dyskinesia/ no tremor  Gait/Station- non antalgic, no assistance needed    MSE: appears stated age, well groomed, appropriate dress, engages well with examiner. Good e/c. Speech reg rate and vol, nonpressured. Mood is "anxiety is through the roof." Affect congruent- low energy. Sensorium fully intact. Oriented to date/day/location, current events. Narrative memory intact. Intellectual function is avg based on vocab and basic fund of knowledge. Thought is c/l/gd. No tangentiality or circumstantiality. No " FOI/SULTANA. Denies SI/HI. Denies A/VH. No evidence of delusions. Insight and Judgment intact.     There were no vitals taken for this visit.    Results for orders placed or performed in visit on 02/08/20   TSH   Result Value Ref Range    TSH 2.259 0.400 - 4.000 uIU/mL   T4, free   Result Value Ref Range    Free T4 0.80 0.71 - 1.51 ng/dL   T3   Result Value Ref Range    T3, Total 85 60 - 180 ng/dL   VITAMIN D   Result Value Ref Range    Vit D, 25-Hydroxy 14 (L) 30 - 96 ng/mL   Vitamin B12   Result Value Ref Range    Vitamin B-12 554 210 - 950 pg/mL   Comprehensive metabolic panel   Result Value Ref Range    Sodium 138 136 - 145 mmol/L    Potassium 3.5 3.5 - 5.1 mmol/L    Chloride 102 95 - 110 mmol/L    CO2 27 23 - 29 mmol/L    Glucose 117 (H) 70 - 110 mg/dL    BUN, Bld 8 6 - 20 mg/dL    Creatinine 0.8 0.5 - 1.4 mg/dL    Calcium 9.9 8.7 - 10.5 mg/dL    Total Protein 7.0 6.0 - 8.4 g/dL    Albumin 3.9 3.5 - 5.2 g/dL    Total Bilirubin 0.4 0.1 - 1.0 mg/dL    Alkaline Phosphatase 33 (L) 55 - 135 U/L    AST 22 10 - 40 U/L    ALT 34 10 - 44 U/L    Anion Gap 9 8 - 16 mmol/L    eGFR if African American >60.0 >60 mL/min/1.73 m^2    eGFR if non African American >60.0 >60 mL/min/1.73 m^2   Hemoglobin A1c   Result Value Ref Range    Hemoglobin A1C 5.7 (H) 4.0 - 5.6 %    Estimated Avg Glucose 117 68 - 131 mg/dL   Lipid panel   Result Value Ref Range    Cholesterol 220 (H) 120 - 199 mg/dL    Triglycerides 158 (H) 30 - 150 mg/dL    HDL 58 40 - 75 mg/dL    LDL Cholesterol 130.4 63.0 - 159.0 mg/dL    Hdl/Cholesterol Ratio 26.4 20.0 - 50.0 %    Total Cholesterol/HDL Ratio 3.8 2.0 - 5.0    Non-HDL Cholesterol 162 mg/dL       Suicide Risk Assessment:   Protective- age, gender, no prior attempts, no prior hospitalizations, no family h/o attempts, no ongoing substance abuse, no psychosis, , denies SI/intent/plan, seeking treatment, access to treatment, future oriented, good primary support, no access to firearms    Risk- race, ongoing  "Axis I sxs, no children    **Pt is at LOW imminent and long term risk of suicide given current risk factors.    Assessment:  45yo WF with a h/o depression and anxiety, no prior full psych eval w/i [facility]. Pt is a current pt of Anneliese Cochran LCSW who referred her for med mgmt along with , PCP. Pt has been on wait list since 3/2017. The pt is currently on xanax and cymbalta through PCP. Also on topamax and elavil for migraine mgmt through pcp/neuro. On eval today she meets criteria for MDD, recurrent, severe w/o psychotic sxs despite a recent inc in cymbalta to 120mg po daily. Pt has been on cymbalta x 10yrs. Only recalls previous trial of prozac which was d/c'd for unknown reasons. Now requiring xanax daily- some days 2 tabs (ie:1mg total daily). Some room for behavioral interventions, as well and the pt is motivated. Has good ability to adhere to the new txmt plan. We tapered off cymbalta and started a trial of zoloft. Will discuss diet and exercise interventions more fully over course of txmt. today she has been on 100mg of zoloft x 1mo. Continues to state she does not feel "any different"- will inc one last titration to 150mg prior to discussing transition to alternate med. Have discussed trial of wellbutrin next as daytime lethargy and dec'd motivation are now the primary concerns to the pt, however current level of anxiety leading to xanax use 2x/d. May also consider mthfr mutation with some methyl folate supplement. Will add time released melatonin today for sleep. wgt loss would be an added benefit. Cont therapy as scheduled. No acute safety concerns. We added wellbutrin and today she reports improved energy and focus at work, but also perhaps inc'd anxiety? Tapered off zoloft to make room for trial of prozac. Will also add trial of buspar 10mg po BID which was helpful many years ago per pt. Last appt she reported  and therapist notice improvement on prozac 20mg po qam- buspar " "10mg po TID also helpful with decreased xanax use and less variation in anxiety through day. Will cont both but inc prozac to 40mg to get at ongoing depressive and anxiety sxs- she then reported inc'd anxiety and I wondered about over activation on the prozac inc. not certain if she can afford genetic testing but is motivated for a trial of the methylfolate supplement. Dec'd the prozac back to 20mg po qam which today she reported did not affect her in either direction- started methylfolate supplementation- today reporting some improvement but con'd lethargy, lack of motivation, and reported anxiety. Agrees to do genetic testing. Genetic testing results returned and we started fetzima based on those results. Now reporting "maybe some improvement" but also reporting "no change in depression or anxiety"? Does report inc'd "anger"? May be overstimulation on fetzima (NE) and wellbutrin (dopa)- inc'd fetzima to more std dose of 40mg and within days the pt d/c'd the wellbutrin due to inc'd irritability. Now noticing "may some improvement" and the pt is more expansive with affect today in appt. Concern about bp but drinking a [employer] nitro at time of appt- will monitor at home for a few days and let me know via portal- we may inc dose in the future. Today pt reporting cont'd difficulty with anxiety and mood- started a trial of adjunct txmt with abilify 2mg- there has been weight gain but improved anxiety- will try for weight loss with beh interventions and we will re discuss in 4wks. Called the clinic one week ago with inc'd depressive sxs and wanting to make changes. Stopped abilify and here today to make some cont'd changes. "No different"- w/o the abilify. Will taper off fetzima and start trial of pristiq. On f/u of pristiq pt is feeling "a little better" but anxiety continues sleep a little improved on trazodone 100mg will inc to 150mg po qhs and likely add adjunct txmt at next appt. Last appt reported cont'd anxiety- " "transitioned xanax to klonopin and started a trial of vraylar 1.5mg po daily. Last appt reported some improvement which she attributed more to the klonopin but I find her mood improved and motivation/energy have also improved- likely a reflection of the vraylar. She wanted to cont w/o change through holidays and assess. Last appt reported "no better."- low motivation, energy/lasting lethargy. Tapered off pristiq and started a trial of effexor xr- today tolerating the 75mg effexor xr well- will titrate to 150mg po qam. Reviewed labs including thyroid panel. Pt feels "maybe a little better" on the inc'd effexor. Will cont- today reporting once again "anxiety through the roof"? I'm not certain how to best move forward. Reporting consistently ambiguous "maybe a little better" is the consistent phrase or "not sure"- will try without vraylar x 2wks and reeval- if "no different" without it, we may try seroquel HS for sleep, anxiolytic property. May also try lamictal titration? Off label but we have had med failures on most ssri/snri trials and now potentially on adjunct txmts. Have really encouraged exercise to this patient.     Axis I: MDD, recurrent, severe w/o psychotic sxs; PASTORA  Axis II: none at this time   Axis III: HTN, migraines  Axis IV: occupational, chronic mental health  Axis V: GAF 70    Plan:   1. Cont effexor xr 150mg po qam   2. Cont buspar 15mg po BID- TID (3rd dose PRN)  3. Cont klonopin 0.25mg po daily PRN anxiety (rare use)   4. Stop vraylar - assessing if it has been effective  5. Cont methylfolate (purchasing online)   6. Cont Trazodone 100-150mg po qhs   7. Cont therapy as scheduled; I am in contact with therapist  8. RTC 2wks- virtual possible.  9. labwork reviewed.    -Spent 20min face to face with the pt; >50% time spent in counseling   -Supportive therapy and psychoeducation provided  -R/B/SE's of medications discussed with the pt who expresses understanding and chooses to take medications as " prescribed.   -Pt instructed to call clinic, 911 or go to nearest emergency room if sxs worsen or pt is in   crisis. The pt expresses understanding.

## 2020-06-09 ENCOUNTER — TELEPHONE (OUTPATIENT)
Dept: PHARMACY | Facility: CLINIC | Age: 45
End: 2020-06-09

## 2020-07-02 ENCOUNTER — TELEPHONE (OUTPATIENT)
Dept: PHARMACY | Facility: CLINIC | Age: 45
End: 2020-07-02

## 2020-07-13 NOTE — TELEPHONE ENCOUNTER
[facility] has been attempting to reach the patient regarding her prescription refill for Emgality. After numerous unsuccessful attempts, we are sending a postcard to the address on file. At this point in time, no further contact will be made from [facility] until patient responds to our mail correspondence.    Provider notified via [employer] on 7/13    Austin Mckay PharmD  Clinical Pharmacist  [facility]  P: [phone]     Psych/Behavioral

## 2020-07-21 ENCOUNTER — TELEPHONE (OUTPATIENT)
Dept: PHARMACY | Facility: CLINIC | Age: 45
End: 2020-07-21

## 2020-07-21 NOTE — TELEPHONE ENCOUNTER
"Refill and followup call for Emgality. Patient confirmed need of the refill. Will deliver via [employer] on  to arrive on  with patient consent. Copay $0.00 at 004 with auth to charge. Address confirmed. Patient has 0 doses remaining (0 day supply)/ next injection due . Patient missed dose on  because she "completely forgot". Verified that [facility] has been calling the correct phone number. No side effects.  No new medications/allergies/medical conditions. Labs are stable. No ER/Urgent care visits in past month. No Sharps container needed. Patient taking the medication as directed. Patient denies any further questions. Confirmed 2 patient identifiers - Name and .     Mamadou Branch, PharmD  Clinical Pharmacist  [facility]  P: [phone]    "

## 2020-07-22 ENCOUNTER — OFFICE VISIT (OUTPATIENT)
Dept: PSYCHIATRY | Facility: CLINIC | Age: 45
End: 2020-07-22
Payer: COMMERCIAL

## 2020-07-22 DIAGNOSIS — F41.1 GAD (GENERALIZED ANXIETY DISORDER): Primary | ICD-10-CM

## 2020-07-22 DIAGNOSIS — E66.9 OBESITY (BMI 30-39.9): ICD-10-CM

## 2020-07-22 DIAGNOSIS — R53.83 FATIGUE, UNSPECIFIED TYPE: ICD-10-CM

## 2020-07-22 DIAGNOSIS — F33.2 MDD (MAJOR DEPRESSIVE DISORDER), RECURRENT SEVERE, WITHOUT PSYCHOSIS: ICD-10-CM

## 2020-07-22 PROCEDURE — 99214 PR OFFICE/OUTPT VISIT, EST, LEVL IV, 30-39 MIN: ICD-10-PCS | Mod: 95,,, | Performed by: PSYCHIATRY & NEUROLOGY

## 2020-07-22 PROCEDURE — 99214 OFFICE O/P EST MOD 30 MIN: CPT | Mod: 95,,, | Performed by: PSYCHIATRY & NEUROLOGY

## 2020-07-22 NOTE — PROGRESS NOTES
"Pt being seen via telepsych to limit exposure to covid 19.    The patient location is: (work) [address], [address], [facility] [address]  The chief complaint leading to consultation is: anxiety f/u  Visit type: Virtual visit with synchronous audio and video  Total time spent with patient: 20 min  Each patient to whom he or she provides medical services by telemedicine is:  (1) informed of the relationship between the physician and patient and the respective role of any other health care provider with respect to management of the patient; and (2) notified that he or she may decline to receive medical services by telemedicine and may withdraw from such care at any time.    ID: 43yo WF with a h/o depression and anxiety, no prior full psych eval w/i [facility]. Pt is a current pt of Anneliese Cochran LCSW who referred her for med mgmt along with , PCP. Pt has been on wait list since 3/2017. The pt is currently on xanax and cymbalta through PCP. Also on topamax and elavil for migraine mgmt through pcp    CC: depression.     Interim Hx: presents on time. Chart reviewed.     Has been continuing work, but with modified hours.     Had a trial without the vraylar due to pt not noticing "a difference"- "but without it I really could tell so I restarted it and I do feel better."     Continues to report uncertainty around other meds, their benefit. Continues to report fatigue, lethargy. Does note snoring, at times "choking" in sleep- no hx/o sleep study. Thyroid panel earlier this year 2/2020 WNL.     Will refer to sleep study. Pt has failed mult previous trials of meds, always with undefined results or "no difference"- uncertain how to best move forward, but acknowledges the effexor is an improvement on other antidepressants and recent trial without vraylar led to worsening of mood.     On Psychiatric ROS:    Endorses somewhat improved sleep maintenance- wakes nightly and has difficulty reinitiating from 3am " "forward, anhedonia, feeling helpless/hopeless- regarding how she feels emotionally, dec energy- "none", stable concentration, has given up dr.pepper and lost 7lbs by her scale (today 268lbs), dec PMA "i do feel tired thinking about doing things."     Denies thoughts of SI/intent/plan.     Endorses feeling +easily overwhelmed, +ruminative thinking, +feeling tense/"on edge"- "on edge" "all the time"    PPHx: Denies h/o self injury, inpt psych hospitalization, denies h/o suicide attempt     Current Psych Meds: effexor xr 150mg po qam, buspar TID, trazodone 100mg po qhs PRN insomnia, xanax 0.5mg po daily PRN anxiety  Past Psych Meds: ritalin, prozac, buspar PRN ("it helped with the stress of planning the wedding"), cymbalta 120mg po daily (ineffective), zoloft 150mg (ineffective), prozac 20mg po qam, xanax 0.5mg po BID PRN anxiety, wellbutrin xl 150mg po qam, fetzima 40mg po qam, abilify 2mg, pristiq 100mg qam    PMHx: migraines (since childhood)- botox as txmt, monthly "they're well managed"    SubstHx:   T- none  E- very rare due to migraine  D- none  Caffeine- 2 dr.peppers/day    FamPHx: none    Musculoskeletal:  Muscle strength/Tone: no dyskinesia/ no tremor  Gait/Station- non antalgic, no assistance needed    MSE: appears stated age, well groomed, appropriate dress, engages well with examiner. Good e/c. Speech reg rate and vol, nonpressured. Mood is "i'm ok." Affect congruent- low energy. Sensorium fully intact. Oriented to date/day/location, current events. Narrative memory intact. Intellectual function is avg based on vocab and basic fund of knowledge. Thought is c/l/gd. No tangentiality or circumstantiality. No FOI/SULTANA. Denies SI/HI. Denies A/VH. No evidence of delusions. Insight and Judgment intact.     There were no vitals taken for this visit.    Results for orders placed or performed in visit on 02/08/20   TSH   Result Value Ref Range    TSH 2.259 0.400 - 4.000 uIU/mL   T4, free   Result Value Ref Range    " Free T4 0.80 0.71 - 1.51 ng/dL   T3   Result Value Ref Range    T3, Total 85 60 - 180 ng/dL   VITAMIN D   Result Value Ref Range    Vit D, 25-Hydroxy 14 (L) 30 - 96 ng/mL   Vitamin B12   Result Value Ref Range    Vitamin B-12 554 210 - 950 pg/mL   Comprehensive metabolic panel   Result Value Ref Range    Sodium 138 136 - 145 mmol/L    Potassium 3.5 3.5 - 5.1 mmol/L    Chloride 102 95 - 110 mmol/L    CO2 27 23 - 29 mmol/L    Glucose 117 (H) 70 - 110 mg/dL    BUN, Bld 8 6 - 20 mg/dL    Creatinine 0.8 0.5 - 1.4 mg/dL    Calcium 9.9 8.7 - 10.5 mg/dL    Total Protein 7.0 6.0 - 8.4 g/dL    Albumin 3.9 3.5 - 5.2 g/dL    Total Bilirubin 0.4 0.1 - 1.0 mg/dL    Alkaline Phosphatase 33 (L) 55 - 135 U/L    AST 22 10 - 40 U/L    ALT 34 10 - 44 U/L    Anion Gap 9 8 - 16 mmol/L    eGFR if African American >60.0 >60 mL/min/1.73 m^2    eGFR if non African American >60.0 >60 mL/min/1.73 m^2   Hemoglobin A1c   Result Value Ref Range    Hemoglobin A1C 5.7 (H) 4.0 - 5.6 %    Estimated Avg Glucose 117 68 - 131 mg/dL   Lipid panel   Result Value Ref Range    Cholesterol 220 (H) 120 - 199 mg/dL    Triglycerides 158 (H) 30 - 150 mg/dL    HDL 58 40 - 75 mg/dL    LDL Cholesterol 130.4 63.0 - 159.0 mg/dL    Hdl/Cholesterol Ratio 26.4 20.0 - 50.0 %    Total Cholesterol/HDL Ratio 3.8 2.0 - 5.0    Non-HDL Cholesterol 162 mg/dL       Suicide Risk Assessment:   Protective- age, gender, no prior attempts, no prior hospitalizations, no family h/o attempts, no ongoing substance abuse, no psychosis, , denies SI/intent/plan, seeking treatment, access to treatment, future oriented, good primary support, no access to firearms    Risk- race, ongoing Axis I sxs, no children    **Pt is at LOW imminent and long term risk of suicide given current risk factors.    Assessment:  45yo WF with a h/o depression and anxiety, no prior full psych eval w/i [facility]. Pt is a current pt of Anneliese Cochran LCSW who referred her for med mgmt along with  ", PCP. Pt has been on wait list since 3/2017. The pt is currently on xanax and cymbalta through PCP. Also on topamax and elavil for migraine mgmt through pcp/neuro. On eval today she meets criteria for MDD, recurrent, severe w/o psychotic sxs despite a recent inc in cymbalta to 120mg po daily. Pt has been on cymbalta x 10yrs. Only recalls previous trial of prozac which was d/c'd for unknown reasons. Now requiring xanax daily- some days 2 tabs (ie:1mg total daily). Some room for behavioral interventions, as well and the pt is motivated. Has good ability to adhere to the new txmt plan. We tapered off cymbalta and started a trial of zoloft. Will discuss diet and exercise interventions more fully over course of txmt. today she has been on 100mg of zoloft x 1mo. Continues to state she does not feel "any different"- will inc one last titration to 150mg prior to discussing transition to alternate med. Have discussed trial of wellbutrin next as daytime lethargy and dec'd motivation are now the primary concerns to the pt, however current level of anxiety leading to xanax use 2x/d. May also consider mthfr mutation with some methyl folate supplement. Will add time released melatonin today for sleep. wgt loss would be an added benefit. Cont therapy as scheduled. No acute safety concerns. We added wellbutrin and today she reports improved energy and focus at work, but also perhaps inc'd anxiety? Tapered off zoloft to make room for trial of prozac. Will also add trial of buspar 10mg po BID which was helpful many years ago per pt. Last appt she reported  and therapist notice improvement on prozac 20mg po qam- buspar 10mg po TID also helpful with decreased xanax use and less variation in anxiety through day. Will cont both but inc prozac to 40mg to get at ongoing depressive and anxiety sxs- she then reported inc'd anxiety and I wondered about over activation on the prozac inc. not certain if she can afford " "genetic testing but is motivated for a trial of the methylfolate supplement. Dec'd the prozac back to 20mg po qam which today she reported did not affect her in either direction- started methylfolate supplementation- today reporting some improvement but con'd lethargy, lack of motivation, and reported anxiety. Agrees to do genetic testing. Genetic testing results returned and we started fetzima based on those results. Now reporting "maybe some improvement" but also reporting "no change in depression or anxiety"? Does report inc'd "anger"? May be overstimulation on fetzima (NE) and wellbutrin (dopa)- inc'd fetzima to more std dose of 40mg and within days the pt d/c'd the wellbutrin due to inc'd irritability. Now noticing "may some improvement" and the pt is more expansive with affect today in appt. Concern about bp but drinking a [employer] nitro at time of appt- will monitor at home for a few days and let me know via portal- we may inc dose in the future. Today pt reporting cont'd difficulty with anxiety and mood- started a trial of adjunct txmt with abilify 2mg- there has been weight gain but improved anxiety- will try for weight loss with beh interventions and we will re discuss in 4wks. Called the clinic one week ago with inc'd depressive sxs and wanting to make changes. Stopped abilify and here today to make some cont'd changes. "No different"- w/o the abilify. Will taper off fetzima and start trial of pristiq. On f/u of pristiq pt is feeling "a little better" but anxiety continues sleep a little improved on trazodone 100mg will inc to 150mg po qhs and likely add adjunct txmt at next appt. Last appt reported cont'd anxiety- transitioned xanax to klonopin and started a trial of vraylar 1.5mg po daily. Last appt reported some improvement which she attributed more to the klonopin but I find her mood improved and motivation/energy have also improved- likely a reflection of the vraylar. She wanted to cont w/o change " "through holidays and assess. Last appt reported "no better."- low motivation, energy/lasting lethargy. Tapered off pristiq and started a trial of effexor xr- today tolerating the 75mg effexor xr well- will titrate to 150mg po qam. Reviewed labs including thyroid panel. Pt feels "maybe a little better" on the inc'd effexor. Will cont- today reporting once again "anxiety through the roof"? I'm not certain how to best move forward. Reporting consistently ambiguous "maybe a little better" is the consistent phrase or "not sure"- will try without vraylar x 2wks and reeval- if "no different" without it, we may try seroquel HS for sleep, anxiolytic property. May also try lamictal titration? Off label but we have had med failures on most ssri/snri trials and now potentially on adjunct txmts. Have really encouraged exercise to this patient.     Axis I: MDD, recurrent, severe w/o psychotic sxs; PASTORA  Axis II: none at this time   Axis III: HTN, migraines  Axis IV: occupational, chronic mental health  Axis V: GAF 70    Plan:   1. Cont effexor xr 150mg po qam   2. Cont buspar 15mg po BID- TID (3rd dose PRN)  3. Cont klonopin 0.25mg po daily PRN anxiety (rare use)   4. Cont vraylar 1.5mg po daily  5. Cont methylfolate (purchasing online)   6. Cont Trazodone 100-150mg po qhs   7. Cont therapy as scheduled; I am in contact with therapist  8. RTC 2mos - virtual possible.  9. labwork reviewed.  10. Referred to sleep med for possible sleep study    -Spent 20min face to face with the pt; >50% time spent in counseling   -Supportive therapy and psychoeducation provided  -R/B/SE's of medications discussed with the pt who expresses understanding and chooses to take medications as prescribed.   -Pt instructed to call clinic, 911 or go to nearest emergency room if sxs worsen or pt is in   crisis. The pt expresses understanding.    "

## 2020-07-28 DIAGNOSIS — I10 HYPERTENSION, UNSPECIFIED TYPE: ICD-10-CM

## 2020-07-28 NOTE — TELEPHONE ENCOUNTER
Care Due:                  Date            Visit Type   Department     Provider  --------------------------------------------------------------------------------                                BLANCA BETTENCOURT      [facility]  Last Visit: 05-      VISIT        MEDICINE       KRIS OREILLY  Next Visit: None Scheduled  None         None Found                                                            Last  Test          Frequency    Reason                     Performed    Due Date  --------------------------------------------------------------------------------    Cr..........  6 months...  hydroCHLOROthiazide......  02- 08-    K...........  6 months...  hydroCHLOROthiazide......  02- 08-    Na..........  6 months...  hydroCHLOROthiazide......  02- 08-    Powered by Electron Database. Reference number: [id number]. 7/28/2020 1:20:50 PM CDT

## 2020-07-29 RX ORDER — HYDROCHLOROTHIAZIDE 25 MG/1
TABLET ORAL
Qty: 90 TABLET | Refills: 0 | Status: SHIPPED | OUTPATIENT
Start: 2020-07-29 | End: 2020-10-07

## 2020-07-29 NOTE — PROGRESS NOTES
Refill Authorization Note    is requesting a refill authorization.    Brief assessment and rationale for refill: APPROVE: Needs Labs     Medication-related problems identified: Requires labs    Medication Therapy Plan: CDMR: NTCHAVO (TAYLOR) per pcp preference; approve 3 more    Medication reconciliation completed: No                         Comments:      Orders Placed This Encounter    Basic metabolic panel    hydroCHLOROthiazide (HYDRODIURIL) 25 MG tablet      Requested Prescriptions   Signed Prescriptions Disp Refills    hydroCHLOROthiazide (HYDRODIURIL) 25 MG tablet 90 tablet 0     Sig: TAKE 1 TABLET(25 MG) BY MOUTH EVERY DAY       Cardiovascular: Diuretics - Thiazide Passed - 7/28/2020  1:20 PM        Passed - Patient is at least 18 years old        Passed - Negative Pregnancy Status Check        Passed - Last BP in normal range within 360 days.     BP Readings from Last 3 Encounters:   05/31/20 120/70   11/22/19 138/84   10/25/19 (!) 142/85              Passed - Office visit in past 12 months or future 90 days.     Recent Outpatient Visits            1 week ago PASTORA (generalized anxiety disorder)    [facility] Alexandrea Bryant MD    1 month ago MDD (major depressive disorder), recurrent severe, without psychosis    [facility] Alexandrea Bryant MD    2 months ago Hypertension, unspecified type    [facility] Steve Holcomb MD    3 months ago MDD (major depressive disorder), recurrent severe, without psychosis    [facility] Alexandrea Bryant MD    4 months ago MDD (major depressive disorder), recurrent severe, without psychosis    [facility] Alexandrea Bryant MD                    Passed - Ca in normal range and within 360 days     Calcium   Date Value Ref Range Status   02/08/2020 9.9 8.7 - 10.5 mg/dL Final   11/23/2019 9.8 8.7 - 10.5 mg/dL Final   05/10/2019 10.1 8.7 - 10.5 mg/dL Final              Passed - Cr is 1.3 or below and  within 180 days     Creatinine   Date Value Ref Range Status   02/08/2020 0.8 0.5 - 1.4 mg/dL Final   11/23/2019 0.8 0.5 - 1.4 mg/dL Final   05/10/2019 0.8 0.5 - 1.4 mg/dL Final              Passed - K in normal range and within 180 days     Potassium   Date Value Ref Range Status   02/08/2020 3.5 3.5 - 5.1 mmol/L Final   11/23/2019 3.5 3.5 - 5.1 mmol/L Final   05/10/2019 3.5 3.5 - 5.1 mmol/L Final              Passed - Na is between 130 and 148 and within 180 days     Sodium   Date Value Ref Range Status   02/08/2020 138 136 - 145 mmol/L Final   11/23/2019 139 136 - 145 mmol/L Final   05/10/2019 139 136 - 145 mmol/L Final              Passed - eGFR within 180 days     eGFR if non    Date Value Ref Range Status   02/08/2020 >60.0 >60 mL/min/1.73 m^2 Final     Comment:     Calculation used to obtain the estimated glomerular filtration  rate (eGFR) is the CKD-EPI equation.      11/23/2019 >60.0 >60 mL/min/1.73 m^2 Final     Comment:     Calculation used to obtain the estimated glomerular filtration  rate (eGFR) is the CKD-EPI equation.      05/10/2019 >60.0 >60 mL/min/1.73 m^2 Final     Comment:     Calculation used to obtain the estimated glomerular filtration  rate (eGFR) is the CKD-EPI equation.        eGFR if    Date Value Ref Range Status   02/08/2020 >60.0 >60 mL/min/1.73 m^2 Final   11/23/2019 >60.0 >60 mL/min/1.73 m^2 Final   05/10/2019 >60.0 >60 mL/min/1.73 m^2 Final                  Appointments  past 12m or future 3m with PCP    Date Provider   Last Visit   5/26/2020 Steve Holcomb MD   Next Visit   Visit date not found Steve Holcomb MD   ED visits in past 90 days: 0     Note composed:5:56 PM 07/29/2020

## 2020-07-29 NOTE — TELEPHONE ENCOUNTER
Provider Staff:     Please schedule patient for Labs (BMP)    Please also check with your provider if any further labs need to be added and scheduled together.    Thanks!  [facility]     Appointments  past 12m or future 3m with PCP    Date Provider   Last Visit   5/26/2020 Steve Holcomb MD   Next Visit   Visit date not found Steve Holcomb MD     Note composed:5:56 PM 07/29/2020

## 2020-07-30 RX ORDER — CARIPRAZINE 1.5 MG/1
CAPSULE, GELATIN COATED ORAL
Qty: 30 CAPSULE | Refills: 0 | Status: SHIPPED | OUTPATIENT
Start: 2020-07-30 | End: 2020-09-24 | Stop reason: ALTCHOICE

## 2020-08-04 ENCOUNTER — LAB VISIT (OUTPATIENT)
Dept: LAB | Facility: HOSPITAL | Age: 45
End: 2020-08-04
Attending: FAMILY MEDICINE
Payer: COMMERCIAL

## 2020-08-04 DIAGNOSIS — I10 HYPERTENSION, UNSPECIFIED TYPE: ICD-10-CM

## 2020-08-04 PROCEDURE — 80048 BASIC METABOLIC PNL TOTAL CA: CPT

## 2020-08-04 PROCEDURE — 36415 COLL VENOUS BLD VENIPUNCTURE: CPT | Mod: PO

## 2020-08-05 LAB
ANION GAP SERPL CALC-SCNC: 12 MMOL/L (ref 8–16)
BUN SERPL-MCNC: 8 MG/DL (ref 6–20)
CALCIUM SERPL-MCNC: 10.3 MG/DL (ref 8.7–10.5)
CHLORIDE SERPL-SCNC: 100 MMOL/L (ref 95–110)
CO2 SERPL-SCNC: 25 MMOL/L (ref 23–29)
CREAT SERPL-MCNC: 0.8 MG/DL (ref 0.5–1.4)
EST. GFR  (AFRICAN AMERICAN): >60 ML/MIN/1.73 M^2
EST. GFR  (NON AFRICAN AMERICAN): >60 ML/MIN/1.73 M^2
GLUCOSE SERPL-MCNC: 101 MG/DL (ref 70–110)
POTASSIUM SERPL-SCNC: 3.4 MMOL/L (ref 3.5–5.1)
SODIUM SERPL-SCNC: 137 MMOL/L (ref 136–145)

## 2020-08-18 ENCOUNTER — TELEPHONE (OUTPATIENT)
Dept: PHARMACY | Facility: CLINIC | Age: 45
End: 2020-08-18

## 2020-08-23 ENCOUNTER — PATIENT OUTREACH (OUTPATIENT)
Dept: ADMINISTRATIVE | Facility: OTHER | Age: 45
End: 2020-08-23

## 2020-08-23 NOTE — PROGRESS NOTES
Health Maintenance Due   Topic Date Due    Hepatitis C Screening  1975    HIV Screening  01/06/1988    Cervical Cancer Screening  01/06/1994    Mammogram  06/14/2020     Updates were requested from care everywhere.  Chart was reviewed for overdue Proactive [facility] ([facility]) topics (CRS, Breast Cancer Screening, Eye exam)  Health Maintenance has been updated.  [facility] immunization registry triggered.  Immunizations were reconciled.  [employer] searched for mammogram. No record found.  Mammogram tasked to patient.

## 2020-08-25 ENCOUNTER — OFFICE VISIT (OUTPATIENT)
Dept: NEUROLOGY | Facility: CLINIC | Age: 45
End: 2020-08-25
Payer: COMMERCIAL

## 2020-08-25 VITALS
HEART RATE: 75 BPM | RESPIRATION RATE: 17 BRPM | DIASTOLIC BLOOD PRESSURE: 72 MMHG | HEIGHT: 70 IN | WEIGHT: 274.38 LBS | SYSTOLIC BLOOD PRESSURE: 115 MMHG | BODY MASS INDEX: 39.28 KG/M2

## 2020-08-25 DIAGNOSIS — E66.01 MORBID OBESITY: ICD-10-CM

## 2020-08-25 DIAGNOSIS — F41.1 GAD (GENERALIZED ANXIETY DISORDER): ICD-10-CM

## 2020-08-25 DIAGNOSIS — I10 HYPERTENSION, UNSPECIFIED TYPE: ICD-10-CM

## 2020-08-25 DIAGNOSIS — G43.719 INTRACTABLE CHRONIC MIGRAINE WITHOUT AURA AND WITHOUT STATUS MIGRAINOSUS: Primary | ICD-10-CM

## 2020-08-25 PROCEDURE — 99999 PR PBB SHADOW E&M-EST. PATIENT-LVL III: CPT | Mod: PBBFAC,,, | Performed by: PSYCHIATRY & NEUROLOGY

## 2020-08-25 PROCEDURE — 3008F PR BODY MASS INDEX (BMI) DOCUMENTED: ICD-10-PCS | Mod: CPTII,S$GLB,, | Performed by: PSYCHIATRY & NEUROLOGY

## 2020-08-25 PROCEDURE — 3078F PR MOST RECENT DIASTOLIC BLOOD PRESSURE < 80 MM HG: ICD-10-PCS | Mod: CPTII,S$GLB,, | Performed by: PSYCHIATRY & NEUROLOGY

## 2020-08-25 PROCEDURE — 99999 PR PBB SHADOW E&M-EST. PATIENT-LVL III: ICD-10-PCS | Mod: PBBFAC,,, | Performed by: PSYCHIATRY & NEUROLOGY

## 2020-08-25 PROCEDURE — 99214 OFFICE O/P EST MOD 30 MIN: CPT | Mod: S$GLB,,, | Performed by: PSYCHIATRY & NEUROLOGY

## 2020-08-25 PROCEDURE — 3074F PR MOST RECENT SYSTOLIC BLOOD PRESSURE < 130 MM HG: ICD-10-PCS | Mod: CPTII,S$GLB,, | Performed by: PSYCHIATRY & NEUROLOGY

## 2020-08-25 PROCEDURE — 3074F SYST BP LT 130 MM HG: CPT | Mod: CPTII,S$GLB,, | Performed by: PSYCHIATRY & NEUROLOGY

## 2020-08-25 PROCEDURE — 99214 PR OFFICE/OUTPT VISIT, EST, LEVL IV, 30-39 MIN: ICD-10-PCS | Mod: S$GLB,,, | Performed by: PSYCHIATRY & NEUROLOGY

## 2020-08-25 PROCEDURE — 3078F DIAST BP <80 MM HG: CPT | Mod: CPTII,S$GLB,, | Performed by: PSYCHIATRY & NEUROLOGY

## 2020-08-25 PROCEDURE — 3008F BODY MASS INDEX DOCD: CPT | Mod: CPTII,S$GLB,, | Performed by: PSYCHIATRY & NEUROLOGY

## 2020-08-25 RX ORDER — FROVATRIPTAN SUCCINATE 2.5 MG/1
2.5 TABLET, FILM COATED ORAL
Qty: 9 TABLET | Refills: 3 | Status: SHIPPED | OUTPATIENT
Start: 2020-08-25 | End: 2021-11-24 | Stop reason: ALTCHOICE

## 2020-08-25 RX ORDER — GALCANEZUMAB 120 MG/ML
INJECTION, SOLUTION SUBCUTANEOUS
COMMUNITY
Start: 2020-06-09 | End: 2023-05-03 | Stop reason: SDUPTHER

## 2020-08-25 RX ORDER — BUTALBITAL, ACETAMINOPHEN AND CAFFEINE 50; 325; 40 MG/1; MG/1; MG/1
TABLET ORAL
Qty: 10 TABLET | Refills: 3 | Status: SHIPPED | OUTPATIENT
Start: 2020-08-25 | End: 2023-06-15

## 2020-08-25 RX ORDER — CANDESARTAN 16 MG/1
TABLET ORAL
COMMUNITY
Start: 2020-05-26 | End: 2020-10-07

## 2020-08-25 RX ORDER — SUMATRIPTAN SUCCINATE 3 MG/1
3 INJECTION, SOLUTION SUBCUTANEOUS
Qty: 4 SYRINGE | Refills: 11 | Status: SHIPPED | OUTPATIENT
Start: 2020-08-25 | End: 2022-05-25

## 2020-08-25 NOTE — PROGRESS NOTES
"Subjective:       Patient ID: Anneliese Rocha is a 45 y.o. female.    Chief Complaint: Migraine    INTERVAL HISTORY 8/25/2020  The patient comes for follow up. She was finally doing better on Botox plus Emgality for prevention. We stopped the Botox as it was not covered by her insurance through [facility]. Now that has changed and we can treat her again. On the dual protocol she went almost a month without any headache attacks, unprecedented for her. She would like to discuss what to do for acute attacks. Otherwise information below is still accurate and current.      INTERVAL HISTORY 5/9/2019  The patient comes for follow up. She is finally doing better on Botox plus Emgality for prevention. She states that she went almost a month without headache attacks, unprecedented for her. She is tolerating the preventives very well without side effects.     HPI   The patient is a very pleasant 43 y/o female presenting with chief complaint of headache. She has had headaches since "birth." She was cared for by Dr. Nba Maldonado. She is looking forward to establish in our clinic as the location s very convenient for her. She has several headaches a month that last 3 to 4 days. It typically alternates sides, either right or left. She has tried multiple medications in the past including but not limited to Topamax, Elavil, Pristiq, Cymbalta, Magnesium. Currently on Toprol and HCTZ. She has received Botox with good benefit for the last 3 to 4 years.  In terms of acute treatment, she has tried just about all the available triptans. She has found Frova to be likely the most effective in that group. She also responds to Zembrace, Imitrex brand caused significant triptan reaction. Midrin, Goodie powders, Fiorinal, all partially effective. In the past she has had imaging studies reportedly normal.  Please see details of headache characteristics below.    Headache questionnaire    1. When did your Headaches start?    birth      2. Where " are your headaches located?   Side of head      3. Your headache's characteristics:   Excruciating,Stabbing, Sharp      4. How long does the headache last?   Hours-days      5. How often does the headache occur?   monthly      6. Are your headaches preceded or accompanied by other symptoms? no   If yes, please describe.  n/a      7. Does the headache awaken you at night? yes   If so, how often? n/a        8. Please ana lilia the word that best describes your headache's intensity:    severe      9. Using a scale of 1 through 10, with 0 = no pain and 10 = the worst pain:   What score is your headache now? 0   What score is your headache at its worst? 10   What score is your headache at its best? 5        10. Possible associated headache symptoms:  [x]  Sensitivity to light  [] Dizziness  [] Nasal or sinus pressure/ pain   [x] Sensitivity to noise  [] Vertigo  [] Problems with concentration  [x] Sensitivity to smells  [] Ringing in ears  [] Problems with memory    [x] Blurred vision  [] Irritability  [] Problems with task completion   [] Double vision  [] Anger  []  Problems with relaxation  [] Loss of appetite  [] Anxiety  [] Neck tightness, Neck pain  [x] Nausea   [] Nasal congestion  [x] Vomiting         11. Headache improving factors:  [x] Sleep  [] Heat  [x] Darkness  [x] Ice  [] Local pressure [] Menses (period)  [] Massage   [] Medications:        12. Headache worsening factors:   [] Fatigue [] Sneezing  [] Changes in Weather  [x] Light [] Bending Over [] Stress  [x] Noise [] Ovulation  [] Multiple Sclerosis Flare-Up  [] Smells  [] Menses  [] Food   [] Coughing [] Alcohol      13. Number of caffeinated drinks per day: 2      14. Number of diet drinks per day:  0      15. Have you seen any other [facility] within the last 3 years?  No        Medications Tried for Headache    AED Neuromodulators  MAOIs  Ergot Alkaloids    Acetazolamide (Diamox) [] Phenelzine (Nardil) [] Dihydroergotamine (Migranal) []    Carbamazepine (Tegretol) [] Tranylcypromine (Parnate) [] Ergotamine (Ergomar) []   Gabapentin (Neurontin) [] Antihistamine/Serotonergic  Triptans    Lacosamide (Vimpat) [] Cyproheptadine (Periactin) [] Almotriptan (Axert) []   Lamotrigine (Lamictal) [] Antihypertensives  Eletriptan (Relpax) [x]   Levatiracetam (Keppra) [] Atenolol (Tenormin) [] Frovatriptan (Frova) [x]   Oxcarbazepine (Trileptal) [] Bisoprostol (Zebeta) [] Naratriptan (Amerge) []   Phenobarbital [] Candesartan (Atacand) [] Rizatriptan (Maxalt) [x]   Phenytoin (Dilantin) [] Diltiazem (Cardizem) [] Sumatriptan (Imitrex) [x]   Pregabalin (Lyrica) [] Lisinopril (Prinivil, Zestril) [] Zolmitriptan (Zomig) [x]   Primidone (Mysoline) [] Metoprolol (Toprol) [] Combo Abortives    Tiagabine (Gabatril) [] Nadolol (Corgard) [] Butalbital and Acetaminophen (Bupap) []   Topiramate (Topamax)  (Trokendi) [x] Nicardipine (Cardene) []     Vigabatrin (Sabril) [] Nimodipine (Nimotop) [] Butalbital, Acetaminophen, and caffeine (Fioricet) []   Valproic Acid (Depakote) (Divalproex Sodium) [] Propranolol (Inderal) []     Zonisamide (Zonegran) [] Telmisartan (Micardis) [] Butalbital, Aspirin, and caffeine (Fiorinal) [x]   Benzodiazepines  Timolol (Blocadren) []     Alprazolam (Xanax) [] Verapamil (Calan, Verelan) [] Butalbital, Caffeine, Acetaminophen, and Codeine (Fioricet with Codeine) []   Diazepam (Valium) [] NSAIDs      Lorazepam (Ativan) [] Acetaminophen (Tylenol) [x]     Clonazepam (Klonopin) [] Acetylsalicylic Acid (Aspirin) [] Butalbital, Caffeine, Aspirin, and Codeine  (Fiorinal with Codeine) []   Antidepressants  Diclofenac (Cambia) [x]     Amitriptyline (Elavil) [x] Ibuprofen (Motrin) []     Desipramine (Norpramin) [] Indomethacin (Indocin) [] Aspirin, Caffeine, and Acetaminophen (Excedrin) (Goodys) [x]   Doxepin (Sinequan) [] Ketoprofen (Orudis) []     Fluoxetine (Prozac) [] Ketorolac (Toradol) [] Acetaminophen, Dichloralphenazone, and Isometheptene (Midrin)  [x]   Imipramine (Tofranil) [] Naproxen (Anaprox) (Aleve) [x]     Nortriptyline (Pamelor) [] Meclofenamic Acid (Meclomen) []     Venlafaxine (Effexor) [] Meloxicam (Mobic) [] Aspirin, Salicylamide, and Caffeine (BC Powder) []        Review of Systems   Constitutional: Negative for activity change, appetite change, fatigue and fever.   HENT: Negative for congestion, dental problem, hearing loss, sinus pressure, tinnitus, trouble swallowing and voice change.    Eyes: Negative for photophobia, pain, redness and visual disturbance.   Respiratory: Negative for cough, chest tightness and shortness of breath.    Cardiovascular: Negative for chest pain, palpitations and leg swelling.   Gastrointestinal: Negative for abdominal pain, blood in stool, nausea and vomiting.   Endocrine: Negative for cold intolerance and heat intolerance.   Genitourinary: Negative for difficulty urinating, frequency, menstrual problem and urgency.   Musculoskeletal: Negative for arthralgias, back pain, gait problem, joint swelling, myalgias, neck pain and neck stiffness.   Skin: Negative.    Neurological: Positive for headaches. Negative for dizziness, tremors, seizures, syncope, facial asymmetry, speech difficulty, weakness, light-headedness and numbness.   Hematological: Negative for adenopathy. Does not bruise/bleed easily.   Psychiatric/Behavioral: Negative for agitation, behavioral problems, confusion, decreased concentration, self-injury, sleep disturbance and suicidal ideas. The patient is not nervous/anxious and is not hyperactive.            Past Medical History:   Diagnosis Date    Depression with anxiety     tolerating Cymbalta 90mg daily    Fatigue     HTN (hypertension)     Iron deficiency anemia     Migraines     Getting headaches about monthly; using Topamax 25mg and Elavil 50mg for prevention and Frova/cambia as needed; following with Dr. Maldonado    PCOS (polycystic ovarian syndrome)      Past Surgical History:   Procedure  Laterality Date    BREAST BIOPSY Right     core  neg    oophrectomy  1994    left     Family History   Problem Relation Age of Onset    Diabetes Father     Hypertension Father     Coronary artery disease Father 55    Thyroid disease Mother     Migraines Mother     Hypertension Mother     Melanoma Paternal Uncle      Social History     Socioeconomic History    Marital status:      Spouse name: Not on file    Number of children: 0    Years of education: Not on file    Highest education level: Not on file   Social Needs    Financial resource strain: Not on file    Food insecurity - worry: Not on file    Food insecurity - inability: Not on file    Transportation needs - medical: Not on file    Transportation needs - non-medical: Not on file   Occupational History    Occupation: accounting     Employer: [employer]     Comment: [employer]   Tobacco Use    Smoking status: Never Smoker    Smokeless tobacco: Never Used   Substance and Sexual Activity    Alcohol use: Yes     Comment: rarely    Drug use: No    Sexual activity: Not on file   Other Topics Concern    Not on file   Social History Narrative    From [place]. Lives with .         Exercise: none regularly     Review of patient's allergies indicates:   Allergen Reactions    Lisinopril      Dizziness, nausea       Current Outpatient Medications:     busPIRone (BUSPAR) 15 MG tablet, TAKE 1 TABLET(15 MG) BY MOUTH THREE TIMES DAILY, Disp: 90 tablet, Rfl: 0    desvenlafaxine succinate (PRISTIQ) 50 MG Tb24, Take 1 tablet (50 mg total) by mouth once daily., Disp: 30 tablet, Rfl: 0    ferrous sulfate (IRON) 325 mg (65 mg iron) Tab tablet, iron, Disp: , Rfl:     FROVA 2.5 mg tablet, Take 2.5 mg by mouth as needed. , Disp: , Rfl: 2    hydroCHLOROthiazide (HYDRODIURIL) 25 MG tablet, Take 1 tablet (25 mg total) by mouth once daily., Disp: 30 tablet, Rfl: 11    metoprolol succinate (TOPROL-XL) 200 MG 24 hr tablet, TAKE 1 TABLET(200 MG) BY  MOUTH EVERY DAY, Disp: 30 tablet, Rfl: 11    onabotulinumtoxina (BOTOX) 200 unit SolR, Botox 200 unit injection  Take 155 units as needed by injection route., Disp: , Rfl:     ondansetron (ZOFRAN) 4 MG tablet, TK 1 T PO TID PRN, Disp: , Rfl: 1    ZEMBRACE SYMTOUCH 3 mg/0.5 mL PnIj, , Disp: , Rfl: 3    ALPRAZolam (XANAX) 0.5 MG tablet, TAKE 1 tab as needed for anxiety, Disp: 15 tablet, Rfl: 0    ARIPiprazole (ABILIFY) 2 MG Tab, TAKE 1 TABLET(2 MG) BY MOUTH EVERY DAY, Disp: 30 tablet, Rfl: 0    levomilnacipran (FETZIMA) 20 mg Cs24, Take 20 mg by mouth once daily., Disp: 5 capsule, Rfl: 0      Objective:      Vitals:    08/25/20 1346   BP: 115/72   Pulse: 75   Resp: 17     Body mass index is 39.37 kg/m².      Physical Exam    Constitutional:   She appears well-developed and well-nourished. She is well groomed    HENT:    Head: Atraumatic, oral and nasal mucosa intact  Eyes: Conjunctivae and EOM are normal. Pupils are equal, round, and reactive to light OU  Neck: Neck supple. No thyromegaly present  Cardiovascular: Normal rate and normal heart sounds  No murmur heard  Pulmonary/Chest: Effort normal and breath sounds normal  Musculoskeletal: Normal range of motion. No joint stiffness. No vertebral point tenderness  Skin: Skin is warm and dry  Psychiatric: Normal mood and affect     Neuro exam:    Mental status:  Awake, attentive, Alert, oriented to self, place, year and month  Language function is intact  Naming, repetition and spontaneous meaningful speech expression are intact  Speech fluency is good and speech is clear  Remote and recent memory are good  Patient able to count backwards by 7    No findings to suggest executive dysfuntion    Patient has adequate insight      Cranial Nerves:  Smell was not formally evaluated  Cranial Nerves II - XII: intact  Pursuits were smooth, normal saccades, no nystagmus OU  Funduscopic exam - disc were flat and pink, no exudates or hemorrhages OU  Motor - facial movement was  symmetrical and normal     Palate moved well and was symmetrical with normal palatal and oral sensation  Tongue movements were full    Coordination:     Rapid alternating movements and rapid finger tapping - normal bilaterally  Finger to nose - normal and symmetric bilaterally   Arm roll - smooth and symmetric   No intentional or positional tremor.     Motor:  Normal muscle bulk and symmetry. No fasciculations were noted    No pronator drift  Strength 5/5 bilaterally     Reflexes:  Tendon reflexes were 2 + at biceps, triceps, brachioradialis, patellar, and 1+ Achilles bilaterally  On plantar stimulation toes were down going bilaterally  No clonus was noted     Sensory: Intact to light touch, pin prick in all extremities. Vibration and proprioception intact in all extremities.     Gait: Romberg absent. Normal gait.     Review of Data:  Lab Results   Component Value Date     08/04/2020    K 3.4 (L) 08/04/2020     08/04/2020    CO2 25 08/04/2020    BUN 8 08/04/2020    CREATININE 0.8 08/04/2020     08/04/2020    HGBA1C 5.7 (H) 02/08/2020    AST 22 02/08/2020    ALT 34 02/08/2020    ALBUMIN 3.9 02/08/2020    PROT 7.0 02/08/2020    BILITOT 0.4 02/08/2020    CHOL 220 (H) 02/08/2020    HDL 58 02/08/2020    LDLCALC 130.4 02/08/2020    TRIG 158 (H) 02/08/2020       Lab Results   Component Value Date    WBC 7.15 05/10/2019    HGB 14.2 05/10/2019    HCT 43.5 05/10/2019    MCV 93 05/10/2019     05/10/2019       Lab Results   Component Value Date    TSH 2.259 02/08/2020         Assessment and Plan     The patient has chronic migraines ( G43.719) and suffers from headaches more than 15 days a month lasting more than 4 hours a day with no relief of symptoms despite trying multiple medications including but not limited to TOPAMAX, ELAVIL, METOPROLOL, MAGNESIUM, and other. She is an excellent candidate for Botox.    PCOS  PASTORA  HTN  Depression  Obesity  Continue Emgality and Botox for prevention  Optimization  of acute treatment:  Start with Zembrace for severe attacks and rescue with Fioricet  Start with Frova for moderate attacks and rescue with Fioricet  DO NOT use Frova and Imitrex the same  Nurtec can be used with all of the above  This protocol can be repeated the following day. If no relief, call the clinic  RTC in 3 months with diaries    I have discussed the side effects of the medications prescribed and the patient acknowledges understanding  Counseling:  More than 50% of the 25 minute encounter was spent face to face counseling the patient regarding current status and future plan of care as well as side of the medications. All questions were answered to patient's satisfaction         Saúl Braswell M.D  Medical Director, Headache and Facial Pain  [facility]

## 2020-08-25 NOTE — PATIENT INSTRUCTIONS
Start with Zembrace for severe attacks and rescue with Fioricet  Start with Frova for moderate attacks and rescue with Fioricet  DO NOT use Frova and Imitrex the same  Nurtec can be used with all of the above

## 2020-09-11 ENCOUNTER — TELEPHONE (OUTPATIENT)
Dept: PHARMACY | Facility: CLINIC | Age: 45
End: 2020-09-11

## 2020-09-15 ENCOUNTER — TELEPHONE (OUTPATIENT)
Dept: NEUROLOGY | Facility: CLINIC | Age: 45
End: 2020-09-15

## 2020-09-15 NOTE — TELEPHONE ENCOUNTER
Called patient and notified that the approval on the Botox has not been received or the medication from [facility]. Pt will call and find out what is going on and then call us back. Notified patient that appointment will probably have to be rescheduled if neither is received. Verbalized understanding.

## 2020-09-17 ENCOUNTER — TELEPHONE (OUTPATIENT)
Dept: NEUROLOGY | Facility: CLINIC | Age: 45
End: 2020-09-17

## 2020-09-17 NOTE — TELEPHONE ENCOUNTER
Reny RN called patient and rescheduled appointment. Date/Time/Location confirmed. Verbalized understanding. Referral attached.

## 2020-09-24 ENCOUNTER — TELEPHONE (OUTPATIENT)
Dept: PSYCHIATRY | Facility: CLINIC | Age: 45
End: 2020-09-24

## 2020-09-24 ENCOUNTER — OFFICE VISIT (OUTPATIENT)
Dept: PSYCHIATRY | Facility: CLINIC | Age: 45
End: 2020-09-24
Payer: COMMERCIAL

## 2020-09-24 DIAGNOSIS — I10 HYPERTENSION, UNSPECIFIED TYPE: ICD-10-CM

## 2020-09-24 DIAGNOSIS — Z79.899 HIGH RISK MEDICATIONS (NOT ANTICOAGULANTS) LONG-TERM USE: Primary | ICD-10-CM

## 2020-09-24 DIAGNOSIS — R53.83 FATIGUE DUE TO DEPRESSION: ICD-10-CM

## 2020-09-24 DIAGNOSIS — F32.A FATIGUE DUE TO DEPRESSION: ICD-10-CM

## 2020-09-24 DIAGNOSIS — F41.1 GAD (GENERALIZED ANXIETY DISORDER): ICD-10-CM

## 2020-09-24 DIAGNOSIS — F33.2 MDD (MAJOR DEPRESSIVE DISORDER), RECURRENT SEVERE, WITHOUT PSYCHOSIS: ICD-10-CM

## 2020-09-24 DIAGNOSIS — E66.9 OBESITY (BMI 30-39.9): ICD-10-CM

## 2020-09-24 PROCEDURE — 99214 OFFICE O/P EST MOD 30 MIN: CPT | Mod: 95,,, | Performed by: PSYCHIATRY & NEUROLOGY

## 2020-09-24 PROCEDURE — 99214 PR OFFICE/OUTPT VISIT, EST, LEVL IV, 30-39 MIN: ICD-10-PCS | Mod: 95,,, | Performed by: PSYCHIATRY & NEUROLOGY

## 2020-09-24 NOTE — PROGRESS NOTES
"Pt being seen via telepsych to limit exposure to covid 19.    The patient location is: (work) [address], [address], [facility] [address]  The chief complaint leading to consultation is: anxiety f/u  Visit type: Virtual visit with synchronous audio and video  Total time spent with patient: 20 min  Each patient to whom he or she provides medical services by telemedicine is:  (1) informed of the relationship between the physician and patient and the respective role of any other health care provider with respect to management of the patient; and (2) notified that he or she may decline to receive medical services by telemedicine and may withdraw from such care at any time.    ID: 43yo WF with a h/o depression and anxiety, no prior full psych eval w/i [facility]. Pt is a current pt of Anenliese Cochran LCSW who referred her for med mgmt along with , PCP. Pt has been on wait list since 3/2017. The pt is currently on xanax and cymbalta through PCP. Also on topamax and elavil for migraine mgmt through pcp    CC: depression.     Interim Hx: presents on time. Chart reviewed.     Has been continuing work, but with modified hours.      Reports, "I haven't been doing that great. A couple of months ago my father in law  and tiff wasn't close with him and he wasn't nice to me, but oddly that brought some stuff up. Just a lot of anger. But a couple of weeks ago I was at my lowest point. My  said we really need to talk about it."     "I don't know what to do. I don't know what to do to feel better. I never laugh, I never smile. I'll try anything."     Continues to report uncertainty around other meds, their benefit. Continues to report fatigue, lethargy. Does note snoring, at times "choking" in sleep- no hx/o sleep study. Thyroid panel earlier this year 2020 WNL.     Referred to sleep study. Pt has failed mult previous trials of meds, always with undefined results or "no difference"- uncertain how to " "best move forward, but acknowledges the effexor is an improvement on other antidepressants and recent trial without vraylar led to worsening of mood.     Is open to the idea of a trial of Lithium today? Will get li pre w/u labs and just to limit meds, we'll stop the vraylar in anticipation of Li starting.     On Psychiatric ROS:    Endorses somewhat improved sleep maintenance- wakes nightly and has difficulty reinitiating from 3am forward, anhedonia, feeling helpless/hopeless- regarding how she feels emotionally, dec energy- "none", stable concentration, has given up dr.pepper and lost 7lbs by her scale (today 268lbs), dec PMA "i do feel tired thinking about doing things."     Denies thoughts of SI/intent/plan.     Endorses feeling +easily overwhelmed, +ruminative thinking, +feeling tense/"on edge"- "on edge" "all the time"    PPHx: Denies h/o self injury, inpt psych hospitalization, denies h/o suicide attempt     Current Psych Meds: effexor xr 150mg po qam, buspar TID, trazodone 100mg po qhs PRN insomnia, xanax 0.5mg po daily PRN anxiety  Past Psych Meds: ritalin, prozac, buspar PRN ("it helped with the stress of planning the wedding"), cymbalta 120mg po daily (ineffective), zoloft 150mg (ineffective), prozac 20mg po qam, xanax 0.5mg po BID PRN anxiety, wellbutrin xl 150mg po qam, fetzima 40mg po qam, abilify 2mg, pristiq 100mg qam    PMHx: migraines (since childhood)- botox as txmt, monthly "they're well managed"    SubstHx:   T- none  E- very rare due to migraine  D- none  Caffeine- 2 dr.peppers/day    FamPHx: none    Musculoskeletal:  Muscle strength/Tone: no dyskinesia/ no tremor  Gait/Station- non antalgic, no assistance needed    MSE: appears stated age, well groomed, appropriate dress, engages well with examiner. Good e/c. Speech reg rate and vol, nonpressured. Mood is "i'v not been doing that well." Affect congruent- low energy. Sensorium fully intact. Oriented to date/day/location, current events. " Narrative memory intact. Intellectual function is avg based on vocab and basic fund of knowledge. Thought is c/l/gd. No tangentiality or circumstantiality. No FOI/SULTANA. Denies SI/HI. Denies A/VH. No evidence of delusions. Insight and Judgment intact.     There were no vitals taken for this visit.    Results for orders placed or performed in visit on 08/04/20   Basic metabolic panel   Result Value Ref Range    Sodium 137 136 - 145 mmol/L    Potassium 3.4 (L) 3.5 - 5.1 mmol/L    Chloride 100 95 - 110 mmol/L    CO2 25 23 - 29 mmol/L    Glucose 101 70 - 110 mg/dL    BUN, Bld 8 6 - 20 mg/dL    Creatinine 0.8 0.5 - 1.4 mg/dL    Calcium 10.3 8.7 - 10.5 mg/dL    Anion Gap 12 8 - 16 mmol/L    eGFR if African American >60.0 >60 mL/min/1.73 m^2    eGFR if non African American >60.0 >60 mL/min/1.73 m^2       Suicide Risk Assessment:   Protective- age, gender, no prior attempts, no prior hospitalizations, no family h/o attempts, no ongoing substance abuse, no psychosis, , denies SI/intent/plan, seeking treatment, access to treatment, future oriented, good primary support, no access to firearms    Risk- race, ongoing Axis I sxs, no children    **Pt is at LOW imminent and long term risk of suicide given current risk factors.    Assessment:  45yo WF with a h/o depression and anxiety, no prior full psych eval w/i [facility]. Pt is a current pt of Anneliese Cochran LCSW who referred her for med mgmt along with , PCP. Pt has been on wait list since 3/2017. The pt is currently on xanax and cymbalta through PCP. Also on topamax and elavil for migraine mgmt through pcp/neuro. On eval today she meets criteria for MDD, recurrent, severe w/o psychotic sxs despite a recent inc in cymbalta to 120mg po daily. Pt has been on cymbalta x 10yrs. Only recalls previous trial of prozac which was d/c'd for unknown reasons. Now requiring xanax daily- some days 2 tabs (ie:1mg total daily). Some room for behavioral interventions, as  "well and the pt is motivated. Has good ability to adhere to the new txmt plan. We tapered off cymbalta and started a trial of zoloft. Will discuss diet and exercise interventions more fully over course of txmt. today she has been on 100mg of zoloft x 1mo. Continues to state she does not feel "any different"- will inc one last titration to 150mg prior to discussing transition to alternate med. Have discussed trial of wellbutrin next as daytime lethargy and dec'd motivation are now the primary concerns to the pt, however current level of anxiety leading to xanax use 2x/d. May also consider mthfr mutation with some methyl folate supplement. Will add time released melatonin today for sleep. wgt loss would be an added benefit. Cont therapy as scheduled. No acute safety concerns. We added wellbutrin and today she reports improved energy and focus at work, but also perhaps inc'd anxiety? Tapered off zoloft to make room for trial of prozac. Will also add trial of buspar 10mg po BID which was helpful many years ago per pt. Last appt she reported  and therapist notice improvement on prozac 20mg po qam- buspar 10mg po TID also helpful with decreased xanax use and less variation in anxiety through day. Will cont both but inc prozac to 40mg to get at ongoing depressive and anxiety sxs- she then reported inc'd anxiety and I wondered about over activation on the prozac inc. not certain if she can afford genetic testing but is motivated for a trial of the methylfolate supplement. Dec'd the prozac back to 20mg po qam which today she reported did not affect her in either direction- started methylfolate supplementation- today reporting some improvement but con'd lethargy, lack of motivation, and reported anxiety. Agrees to do genetic testing. Genetic testing results returned and we started fetzima based on those results. Now reporting "maybe some improvement" but also reporting "no change in depression or anxiety"? Does report " "inc'd "anger"? May be overstimulation on fetzima (NE) and wellbutrin (dopa)- inc'd fetzima to more std dose of 40mg and within days the pt d/c'd the wellbutrin due to inc'd irritability. Now noticing "may some improvement" and the pt is more expansive with affect today in appt. Concern about bp but drinking a starbucks nitro at time of appt- will monitor at home for a few days and let me know via portal- we may inc dose in the future. Today pt reporting cont'd difficulty with anxiety and mood- started a trial of adjunct txmt with abilify 2mg- there has been weight gain but improved anxiety- will try for weight loss with beh interventions and we will re discuss in 4wks. Called the clinic one week ago with inc'd depressive sxs and wanting to make changes. Stopped abilify and here today to make some cont'd changes. "No different"- w/o the abilify. Will taper off fetzima and start trial of pristiq. On f/u of pristiq pt is feeling "a little better" but anxiety continues sleep a little improved on trazodone 100mg will inc to 150mg po qhs and likely add adjunct txmt at next appt. Last appt reported cont'd anxiety- transitioned xanax to klonopin and started a trial of vraylar 1.5mg po daily. Last appt reported some improvement which she attributed more to the klonopin but I find her mood improved and motivation/energy have also improved- likely a reflection of the vraylar. She wanted to cont w/o change through holidays and assess. Last appt reported "no better."- low motivation, energy/lasting lethargy. Tapered off pristiq and started a trial of effexor xr- today tolerating the 75mg effexor xr well- will titrate to 150mg po qam. Reviewed labs including thyroid panel. Pt feels "maybe a little better" on the inc'd effexor. Will cont- today reporting once again "anxiety through the roof"? I'm not certain how to best move forward. Reporting consistently ambiguous "maybe a little better" is the consistent phrase or "not sure"- " "will try without vraylar x 2wks and reeval- if "no different" without it, we may try seroquel HS for sleep, anxiolytic property. May also try lamictal titration? Off label but we have had med failures on most ssri/snri trials and now potentially on adjunct txmts. Have really encouraged exercise to this patient. Today pt reports no better, reaching a low 2 wks ago,  "I'll do anything." discuss prior med trials - discuss possibly adding low dose lithium?  Will get pre lithium labwork done.     Axis I: MDD, recurrent, severe w/o psychotic sxs; PASTORA  Axis II: none at this time   Axis III: HTN, migraines  Axis IV: occupational, chronic mental health  Axis V: GAF 70    Plan:   1. Cont effexor xr 150mg po qam   2. Cont buspar 15mg po BID- TID (3rd dose PRN)  3. Cont klonopin 0.25mg po daily PRN anxiety (rare use)   4. Stop Vraylar  5. Consider Lithium - will get pre testing completed  5. Cont methylfolate (purchasing online)   6. Cont Trazodone 100-150mg po qhs   7. Cont therapy as scheduled; I am in contact with therapist  8. RTC 1mo  9. labwork ordered  10. Referred to sleep med for possible sleep study    -Spent 20min face to face with the pt; >50% time spent in counseling   -Supportive therapy and psychoeducation provided  -R/B/SE's of medications discussed with the pt who expresses understanding and chooses to take medications as prescribed.   -Pt instructed to call clinic, 911 or go to nearest emergency room if sxs worsen or pt is in   crisis. The pt expresses understanding.      "

## 2020-09-29 ENCOUNTER — TELEPHONE (OUTPATIENT)
Dept: FAMILY MEDICINE | Facility: CLINIC | Age: 45
End: 2020-09-29

## 2020-09-29 NOTE — TELEPHONE ENCOUNTER
----- Message from Davina Soler sent at 9/29/2020 10:48 AM CDT -----  Contact: Dilcia with [facility] [phone]  Calling to let you know he will be delivering patients Botox. They need to speak with someone prior to delivering ,       Press the prompt for office delivery or office base.  Please all today Dilcia with [facility] [phone]

## 2020-09-29 NOTE — TELEPHONE ENCOUNTER
Spoke with delivery dispatcher for delivery of Botox, Scheduled for Thursday 10/1/2020 between [id number], closed [phone].

## 2020-09-30 ENCOUNTER — PATIENT MESSAGE (OUTPATIENT)
Dept: NEUROLOGY | Facility: CLINIC | Age: 45
End: 2020-09-30

## 2020-09-30 ENCOUNTER — TELEPHONE (OUTPATIENT)
Dept: NEUROLOGY | Facility: CLINIC | Age: 45
End: 2020-09-30

## 2020-10-01 ENCOUNTER — CLINICAL SUPPORT (OUTPATIENT)
Dept: CARDIOLOGY | Facility: CLINIC | Age: 45
End: 2020-10-01
Payer: COMMERCIAL

## 2020-10-01 ENCOUNTER — LAB VISIT (OUTPATIENT)
Dept: LAB | Facility: HOSPITAL | Age: 45
End: 2020-10-01
Attending: PSYCHIATRY & NEUROLOGY
Payer: COMMERCIAL

## 2020-10-01 ENCOUNTER — PATIENT OUTREACH (OUTPATIENT)
Dept: ADMINISTRATIVE | Facility: OTHER | Age: 45
End: 2020-10-01

## 2020-10-01 DIAGNOSIS — Z79.899 HIGH RISK MEDICATIONS (NOT ANTICOAGULANTS) LONG-TERM USE: ICD-10-CM

## 2020-10-01 LAB
BACTERIA #/AREA URNS HPF: ABNORMAL /HPF
BILIRUB UR QL STRIP: NEGATIVE
CLARITY UR: ABNORMAL
COLOR UR: ABNORMAL
GLUCOSE UR QL STRIP: NEGATIVE
HGB UR QL STRIP: ABNORMAL
KETONES UR QL STRIP: NEGATIVE
LEUKOCYTE ESTERASE UR QL STRIP: NEGATIVE
MICROSCOPIC COMMENT: ABNORMAL
NITRITE UR QL STRIP: NEGATIVE
PH UR STRIP: 7 [PH] (ref 5–8)
PROT UR QL STRIP: ABNORMAL
RBC #/AREA URNS HPF: >100 /HPF (ref 0–4)
SP GR UR STRIP: 1.02 (ref 1–1.03)
URN SPEC COLLECT METH UR: ABNORMAL

## 2020-10-01 PROCEDURE — 99999 PR PBB SHADOW E&M-EST. PATIENT-LVL I: ICD-10-PCS | Mod: PBBFAC,,,

## 2020-10-01 PROCEDURE — 93000 ELECTROCARDIOGRAM COMPLETE: CPT | Mod: S$GLB,,, | Performed by: INTERNAL MEDICINE

## 2020-10-01 PROCEDURE — 81000 URINALYSIS NONAUTO W/SCOPE: CPT | Mod: PO

## 2020-10-01 PROCEDURE — 99999 PR PBB SHADOW E&M-EST. PATIENT-LVL I: CPT | Mod: PBBFAC,,,

## 2020-10-01 PROCEDURE — 93000 EKG 12-LEAD: ICD-10-PCS | Mod: S$GLB,,, | Performed by: INTERNAL MEDICINE

## 2020-10-01 NOTE — PROGRESS NOTES
Health Maintenance Due   Topic Date Due    Mammogram  06/14/2020     Updates were requested from care everywhere.  Chart was reviewed for overdue Proactive [facility] Encounters (JENN) topics (CRS, Breast Cancer Screening, Eye exam)  Health Maintenance has been updated.  LINKS immunization registry triggered.  Immunizations were reconciled.

## 2020-10-02 ENCOUNTER — PROCEDURE VISIT (OUTPATIENT)
Dept: NEUROLOGY | Facility: CLINIC | Age: 45
End: 2020-10-02
Payer: COMMERCIAL

## 2020-10-02 VITALS
HEART RATE: 75 BPM | HEIGHT: 70 IN | WEIGHT: 273.25 LBS | DIASTOLIC BLOOD PRESSURE: 79 MMHG | RESPIRATION RATE: 17 BRPM | BODY MASS INDEX: 39.12 KG/M2 | SYSTOLIC BLOOD PRESSURE: 122 MMHG

## 2020-10-02 DIAGNOSIS — G43.719 INTRACTABLE CHRONIC MIGRAINE WITHOUT AURA AND WITHOUT STATUS MIGRAINOSUS: Primary | ICD-10-CM

## 2020-10-02 PROCEDURE — 64615 PR CHEMODENERVATION OF MUSCLE FOR CHRONIC MIGRAINE: ICD-10-PCS | Mod: S$GLB,,, | Performed by: PSYCHIATRY & NEUROLOGY

## 2020-10-02 PROCEDURE — 64615 CHEMODENERV MUSC MIGRAINE: CPT | Mod: S$GLB,,, | Performed by: PSYCHIATRY & NEUROLOGY

## 2020-10-02 RX ORDER — RIMEGEPANT SULFATE 75 MG/75MG
TABLET, ORALLY DISINTEGRATING ORAL
Qty: 8 TABLET | Refills: 11 | Status: SHIPPED | OUTPATIENT
Start: 2020-10-02 | End: 2021-06-15 | Stop reason: CLARIF

## 2020-10-02 NOTE — PROCEDURES
Procedures     BOTOX PROCEDURE    PROCEDURE PERFORMED: Botulinum toxin injection (87659)  [facility] DO NOT CHARGE FOR BOTOX  CLINICAL INDICATION: G43.719    A time out was conducted just before the start of the procedure to verify the correct patient and procedure, procedure location, and all relevant critical information.     Conventional methods of treatment but the patient has been unresponsive and refractory.The patient meets criteria for chronic headaches according to the ICHD-II, the patient has more than 15 headaches a month which last for more than 4 hours a day.    This is the first Botox injections and I am aiming for at least 50%  improvement in the patient's symptoms. Frequency of treatment is every 3 months unless no response to the treatments, at which time we will discontinue the injections.     DESCRIPTION OF PROCEDURE: After obtaining informed consent and under aseptic technique, a total of 155 units of botulinum toxin type A were injected in the following muscles: Procerus 5 units,  5 units bilaterally, frontalis 20 units, temporalis 20 units bilaterally, occipitalis 15 units, upper cervical paraspinals 10 units bilaterally and trapezius 15 units bilaterally. The patient was given a total of 155 units in 31 sites.The patient tolerated the procedure well. There were no complications. The patient was given a prescription for repeat treatment in 3 months.     Unavoidable waste 45 units  [facility], DO NOT CHARGE FOR BOTOX        Saúl Braswell M.D  Medical Director, Headache and Facial Pain  [facility]

## 2020-10-05 ENCOUNTER — PATIENT MESSAGE (OUTPATIENT)
Dept: PSYCHIATRY | Facility: CLINIC | Age: 45
End: 2020-10-05

## 2020-10-05 ENCOUNTER — TELEPHONE (OUTPATIENT)
Dept: PSYCHIATRY | Facility: CLINIC | Age: 45
End: 2020-10-05

## 2020-10-05 NOTE — TELEPHONE ENCOUNTER
"Pt recd EKG results and is concerned with the documented "Nonspecific T wave abnormality now evident in Lateral leads".  Pt would like to discuss this further.  Please advise.   "

## 2020-10-05 NOTE — TELEPHONE ENCOUNTER
I spoke to the patient - discussed non specific T wave abnormalities are common and need to be interpreted based on history, symptoms (she denies any cardiac symptoms).  Pt reassured that her ECG was read by Cardiologist as normal and that Dr Bryant will be in contact with her to discuss further.

## 2020-10-06 DIAGNOSIS — Z79.899 HIGH RISK MEDICATIONS (NOT ANTICOAGULANTS) LONG-TERM USE: Primary | ICD-10-CM

## 2020-10-06 RX ORDER — LITHIUM CARBONATE 150 MG/1
150 CAPSULE ORAL DAILY
Qty: 30 CAPSULE | Refills: 0 | Status: SHIPPED | OUTPATIENT
Start: 2020-10-06 | End: 2020-10-22

## 2020-10-06 NOTE — TELEPHONE ENCOUNTER
"Called and spoke to the pt. Given that we are wanting to start Lithium for med mgmt, pt more comfortable with a referral to cardiology to assess.     Despite this concern, she is "really ready to start feeling better and i'm just sortof waiting and hopeful about lithium so I think I want to just start the low dose we talked about and deal with cardiology as soon as they're able to see me."     -will place cards referral  -will start Li 150mg po qam  -will schedule a f/u in 2wks.     "

## 2020-10-07 ENCOUNTER — TELEPHONE (OUTPATIENT)
Dept: FAMILY MEDICINE | Facility: CLINIC | Age: 45
End: 2020-10-07

## 2020-10-07 DIAGNOSIS — I10 HYPERTENSION, UNSPECIFIED TYPE: Primary | ICD-10-CM

## 2020-10-07 RX ORDER — CANDESARTAN 32 MG/1
32 TABLET ORAL DAILY
Qty: 30 TABLET | Refills: 11 | Status: SHIPPED | OUTPATIENT
Start: 2020-10-07 | End: 2021-10-11

## 2020-10-07 NOTE — TELEPHONE ENCOUNTER
Since patient will be starting lithium, we will need to have her stop her HCTZ.  Have her stop the HCTZ, and I need her to increase the candasratan to 32mg. I have sent in a new prescription for this.  Arrange nurse BP check in 2 weeks after making the change.

## 2020-10-07 NOTE — TELEPHONE ENCOUNTER
----- Message from Cleopatra Cifuentes MD sent at 10/7/2020 12:17 PM CDT -----  Hi ,  I have really struggled to gain any forward traction with this patient and her mood sxs. I do not currently have her categorized as bipolar, but her level of depression and lack of response to both unipolar antidepressants, and adjunct txmt with atypical antipsychotics, have led to a discussion of a trial of low dose Lithium. I have done the pre lithium w/u including an EKG and other labs, but as you know, she is on hctz. Wondering what your thoughts are regarding moving forward with a Lithium trial? Could we work together on the hctz- I will of course be monitoring closely but if you think it's a contraindication I will follow your lead. Thanks for your help with this case- cleopatra cifuentes md

## 2020-10-08 ENCOUNTER — TELEPHONE (OUTPATIENT)
Dept: PHARMACY | Facility: CLINIC | Age: 45
End: 2020-10-08

## 2020-10-15 ENCOUNTER — PATIENT MESSAGE (OUTPATIENT)
Dept: PSYCHIATRY | Facility: CLINIC | Age: 45
End: 2020-10-15

## 2020-10-22 ENCOUNTER — OFFICE VISIT (OUTPATIENT)
Dept: PSYCHIATRY | Facility: CLINIC | Age: 45
End: 2020-10-22
Payer: COMMERCIAL

## 2020-10-22 DIAGNOSIS — Z79.899 HIGH RISK MEDICATIONS (NOT ANTICOAGULANTS) LONG-TERM USE: ICD-10-CM

## 2020-10-22 DIAGNOSIS — R53.83 FATIGUE DUE TO DEPRESSION: ICD-10-CM

## 2020-10-22 DIAGNOSIS — I10 HYPERTENSION, UNSPECIFIED TYPE: ICD-10-CM

## 2020-10-22 DIAGNOSIS — F32.A FATIGUE DUE TO DEPRESSION: ICD-10-CM

## 2020-10-22 DIAGNOSIS — F33.2 MDD (MAJOR DEPRESSIVE DISORDER), RECURRENT SEVERE, WITHOUT PSYCHOSIS: Primary | ICD-10-CM

## 2020-10-22 DIAGNOSIS — E66.9 OBESITY (BMI 30-39.9): ICD-10-CM

## 2020-10-22 DIAGNOSIS — F41.1 GAD (GENERALIZED ANXIETY DISORDER): ICD-10-CM

## 2020-10-22 PROCEDURE — 99214 OFFICE O/P EST MOD 30 MIN: CPT | Mod: 95,,, | Performed by: PSYCHIATRY & NEUROLOGY

## 2020-10-22 PROCEDURE — 99214 PR OFFICE/OUTPT VISIT, EST, LEVL IV, 30-39 MIN: ICD-10-PCS | Mod: 95,,, | Performed by: PSYCHIATRY & NEUROLOGY

## 2020-10-22 RX ORDER — LITHIUM CARBONATE 150 MG/1
150 CAPSULE ORAL 2 TIMES DAILY
Qty: 60 CAPSULE | Refills: 0 | Status: SHIPPED | OUTPATIENT
Start: 2020-10-22 | End: 2020-11-19 | Stop reason: SDUPTHER

## 2020-10-22 NOTE — PROGRESS NOTES
"Pt being seen via telepsych to limit exposure to covid 19.    The patient location is: home, [address], [facility] [address]  The chief complaint leading to consultation is: anxiety f/u  Visit type: Virtual visit with synchronous audio and video  Total time spent with patient: 20 min  Each patient to whom he or she provides medical services by telemedicine is:  (1) informed of the relationship between the physician and patient and the respective role of any other health care provider with respect to management of the patient; and (2) notified that he or she may decline to receive medical services by telemedicine and may withdraw from such care at any time.    ID: 43yo WF with a h/o depression and anxiety, no prior full psych eval w/i [facility]. Pt is a current pt of Anneliese Cochran LCSW who referred her for med mgmt along with , PCP. Pt has been on wait list since 3/2017. The pt is currently on xanax and cymbalta through PCP. Also on topamax and elavil for migraine mgmt through pcp    CC: depression.     Interim Hx: presents on time. Chart reviewed. In the interim we have started a trial of lithium 150mg po qam.     "So I think i'm a little better. Nathan thinks i'm 50% better and i'm not sure about that but I do feel a little better. A little lighter or something." is still sleeping a lot on weekends and denies feeling any better regarding motivation. Denies any side effects. Has not noticed any inc'd appetite or weight gain which was a concern.     On Psychiatric ROS:    Endorses somewhat improved sleep maintenance- wakes nightly and has difficulty reinitiating from 3am forward, anhedonia, feeling helpless/hopeless- regarding how she feels emotionally, dec energy- "none", stable concentration, dec PMA "i do feel tired thinking about doing things."     Denies thoughts of SI/intent/plan.     Endorses feeling +easily overwhelmed, +ruminative thinking, +feeling tense/"on edge"- "on edge" "all the " "time"- mildly improved on newly added lithium    PPHx: Denies h/o self injury, inpt psych hospitalization, denies h/o suicide attempt     Current Psych Meds: effexor xr 150mg po qam, buspar TID, trazodone 100mg po qhs PRN insomnia, xanax 0.5mg po daily PRN anxiety. Lithium 150mg po qam  Past Psych Meds: ritalin, prozac, buspar PRN ("it helped with the stress of planning the wedding"), cymbalta 120mg po daily (ineffective), zoloft 150mg (ineffective), prozac 20mg po qam, xanax 0.5mg po BID PRN anxiety, wellbutrin xl 150mg po qam, fetzima 40mg po qam, abilify 2mg, pristiq 100mg qam    PMHx: migraines (since childhood)- botox as txmt, monthly "they're well managed"    SubstHx:   T- none  E- very rare due to migraine  D- none  Caffeine- 2 dr.peppers/day    FamPHx: none    Musculoskeletal:  Muscle strength/Tone: no dyskinesia/ no tremor  Gait/Station- non antalgic, no assistance needed    MSE: appears stated age, well groomed, appropriate dress, engages well with examiner. Good e/c. Speech reg rate and vol, nonpressured. Mood is "I think I feel a little better." Affect congruent- low energy. Sensorium fully intact. Oriented to date/day/location, current events. Narrative memory intact. Intellectual function is avg based on vocab and basic fund of knowledge. Thought is c/l/gd. No tangentiality or circumstantiality. No FOI/SULTANA. Denies SI/HI. Denies A/VH. No evidence of delusions. Insight and Judgment intact.     There were no vitals taken for this visit.    Results for orders placed or performed in visit on 10/01/20   TSH   Result Value Ref Range    TSH 2.220 0.400 - 4.000 uIU/mL   T4, free   Result Value Ref Range    Free T4 0.84 0.71 - 1.51 ng/dL   Comprehensive metabolic panel   Result Value Ref Range    Sodium 139 136 - 145 mmol/L    Potassium 3.2 (L) 3.5 - 5.1 mmol/L    Chloride 102 95 - 110 mmol/L    CO2 26 23 - 29 mmol/L    Glucose 135 (H) 70 - 110 mg/dL    BUN, Bld 12 6 - 20 mg/dL    Creatinine 0.8 0.5 - 1.4 mg/dL "    Calcium 9.6 8.7 - 10.5 mg/dL    Total Protein 7.1 6.0 - 8.4 g/dL    Albumin 4.0 3.5 - 5.2 g/dL    Total Bilirubin 0.4 0.1 - 1.0 mg/dL    Alkaline Phosphatase 36 (L) 55 - 135 U/L    AST 20 10 - 40 U/L    ALT 28 10 - 44 U/L    Anion Gap 11 8 - 16 mmol/L    eGFR if African American >60.0 >60 mL/min/1.73 m^2    eGFR if non African American >60.0 >60 mL/min/1.73 m^2   Hemoglobin A1C   Result Value Ref Range    Hemoglobin A1C 5.6 4.0 - 5.6 %    Estimated Avg Glucose 114 68 - 131 mg/dL   Lipid Panel   Result Value Ref Range    Cholesterol 224 (H) 120 - 199 mg/dL    Triglycerides 156 (H) 30 - 150 mg/dL    HDL 53 40 - 75 mg/dL    LDL Cholesterol 139.8 63.0 - 159.0 mg/dL    Hdl/Cholesterol Ratio 23.7 20.0 - 50.0 %    Total Cholesterol/HDL Ratio 4.2 2.0 - 5.0    Non-HDL Cholesterol 171 mg/dL       Suicide Risk Assessment:   Protective- age, gender, no prior attempts, no prior hospitalizations, no family h/o attempts, no ongoing substance abuse, no psychosis, , denies SI/intent/plan, seeking treatment, access to treatment, future oriented, good primary support, no access to firearms    Risk- race, ongoing Axis I sxs, no children    **Pt is at LOW imminent and long term risk of suicide given current risk factors.    Assessment:  43yo WF with a h/o depression and anxiety, no prior full psych eval w/i [facility]. Pt is a current pt of CYRUS BarraganW who referred her for med mgmt along with , PCP. Pt has been on wait list since 3/2017. The pt is currently on xanax and cymbalta through PCP. Also on topamax and elavil for migraine mgmt through pcp/neuro. On eval today she meets criteria for MDD, recurrent, severe w/o psychotic sxs despite a recent inc in cymbalta to 120mg po daily. Pt has been on cymbalta x 10yrs. Only recalls previous trial of prozac which was d/c'd for unknown reasons. Now requiring xanax daily- some days 2 tabs (ie:1mg total daily). Some room for behavioral interventions, as well  "and the pt is motivated. Has good ability to adhere to the new txmt plan. We tapered off cymbalta and started a trial of zoloft. Will discuss diet and exercise interventions more fully over course of txmt. today she has been on 100mg of zoloft x 1mo. Continues to state she does not feel "any different"- will inc one last titration to 150mg prior to discussing transition to alternate med. Have discussed trial of wellbutrin next as daytime lethargy and dec'd motivation are now the primary concerns to the pt, however current level of anxiety leading to xanax use 2x/d. May also consider mthfr mutation with some methyl folate supplement. Will add time released melatonin today for sleep. wgt loss would be an added benefit. Cont therapy as scheduled. No acute safety concerns. We added wellbutrin and today she reports improved energy and focus at work, but also perhaps inc'd anxiety? Tapered off zoloft to make room for trial of prozac. Will also add trial of buspar 10mg po BID which was helpful many years ago per pt. Last appt she reported  and therapist notice improvement on prozac 20mg po qam- buspar 10mg po TID also helpful with decreased xanax use and less variation in anxiety through day. Will cont both but inc prozac to 40mg to get at ongoing depressive and anxiety sxs- she then reported inc'd anxiety and I wondered about over activation on the prozac inc. not certain if she can afford genetic testing but is motivated for a trial of the methylfolate supplement. Dec'd the prozac back to 20mg po qam which today she reported did not affect her in either direction- started methylfolate supplementation- today reporting some improvement but con'd lethargy, lack of motivation, and reported anxiety. Agrees to do genetic testing. Genetic testing results returned and we started fetzima based on those results. Now reporting "maybe some improvement" but also reporting "no change in depression or anxiety"? Does report inc'd " ""anger"? May be overstimulation on fetzima (NE) and wellbutrin (dopa)- inc'd fetzima to more std dose of 40mg and within days the pt d/c'd the wellbutrin due to inc'd irritability. Now noticing "may some improvement" and the pt is more expansive with affect today in appt. Concern about bp but drinking a starbucks nitro at time of appt- will monitor at home for a few days and let me know via portal- we may inc dose in the future. Today pt reporting cont'd difficulty with anxiety and mood- started a trial of adjunct txmt with abilify 2mg- there has been weight gain but improved anxiety- will try for weight loss with beh interventions and we will re discuss in 4wks. Called the clinic one week ago with inc'd depressive sxs and wanting to make changes. Stopped abilify and here today to make some cont'd changes. "No different"- w/o the abilify. Will taper off fetzima and start trial of pristiq. On f/u of pristiq pt is feeling "a little better" but anxiety continues sleep a little improved on trazodone 100mg will inc to 150mg po qhs and likely add adjunct txmt at next appt. Last appt reported cont'd anxiety- transitioned xanax to klonopin and started a trial of vraylar 1.5mg po daily. Last appt reported some improvement which she attributed more to the klonopin but I find her mood improved and motivation/energy have also improved- likely a reflection of the vraylar. She wanted to cont w/o change through holidays and assess. Last appt reported "no better."- low motivation, energy/lasting lethargy. Tapered off pristiq and started a trial of effexor xr- today tolerating the 75mg effexor xr well- will titrate to 150mg po qam. Reviewed labs including thyroid panel. Pt feels "maybe a little better" on the inc'd effexor. Will cont- today reporting once again "anxiety through the roof"? I'm not certain how to best move forward. Reporting consistently ambiguous "maybe a little better" is the consistent phrase or "not sure"- will " "try without vraylar x 2wks and reeval- if "no different" without it, we may try seroquel HS for sleep, anxiolytic property. May also try lamictal titration? Off label but we have had med failures on most ssri/snri trials and now potentially on adjunct txmts. Have really encouraged exercise to this patient. Today pt reports no better, reaching a low 2 wks ago,  "I'll do anything." discuss prior med trials - discuss possibly adding low dose lithium?  Will get pre lithium labwork done.     We added lithium 150mg po qam following discussion with pcp and hctz has been d/c'd. Today on close f/u the pt reporting "maybe a little better"-  believes she's "50% better. Very noticeable." no s/e's. Will cont effort to get at motivation and anhedonia- will inc to 150mg po bid.    Axis I: MDD, recurrent, severe w/o psychotic sxs; PASTORA  Axis II: none at this time   Axis III: HTN, migraines  Axis IV: occupational, chronic mental health  Axis V: GAF 70    Plan:   1. Cont effexor xr 150mg po qam   2. Cont buspar 15mg po BID- TID (3rd dose PRN)  3. Cont klonopin 0.25mg po daily PRN anxiety (rare use)   4. Inc lithium to 150mg po bid  5. Cont methylfolate (purchasing online)   6. Cont Trazodone 100-150mg po qhs   7. Cont therapy as scheduled; I am in contact with therapist  8. RTC 1mo  9. labwork ordered  10. Referred to sleep med for possible sleep study; referred to cardiology regarding EKG    -Spent 20min face to face with the pt; >50% time spent in counseling   -Supportive therapy and psychoeducation provided  -R/B/SE's of medications discussed with the pt who expresses understanding and chooses to take medications as prescribed.   -Pt instructed to call clinic, 911 or go to nearest emergency room if sxs worsen or pt is in   crisis. The pt expresses understanding.        "

## 2020-10-23 ENCOUNTER — CLINICAL SUPPORT (OUTPATIENT)
Dept: FAMILY MEDICINE | Facility: CLINIC | Age: 45
End: 2020-10-23
Payer: COMMERCIAL

## 2020-10-23 DIAGNOSIS — I10 HYPERTENSION, UNSPECIFIED TYPE: Primary | ICD-10-CM

## 2020-10-23 PROCEDURE — 99999 PR PBB SHADOW E&M-EST. PATIENT-LVL I: CPT | Mod: PBBFAC,,,

## 2020-10-23 PROCEDURE — 99999 PR PBB SHADOW E&M-EST. PATIENT-LVL I: ICD-10-PCS | Mod: PBBFAC,,,

## 2020-10-23 NOTE — PROGRESS NOTES
Anneliese Rocha 45 y.o. female is here today for Blood Pressure check.   History of HTN yes.    Review of patient's allergies indicates:   Allergen Reactions    Lisinopril      Dizziness, nausea     Creatinine   Date Value Ref Range Status   10/01/2020 0.8 0.5 - 1.4 mg/dL Final     Sodium   Date Value Ref Range Status   10/01/2020 139 136 - 145 mmol/L Final     Potassium   Date Value Ref Range Status   10/01/2020 3.2 (L) 3.5 - 5.1 mmol/L Final   ]  Patient verifies taking blood pressure medications on a regular basis at the same time of the day.     Current Outpatient Medications:     busPIRone (BUSPAR) 15 MG tablet, TAKE 1 TABLET BY MOUTH THREE TIMES DAILY, Disp: 90 tablet, Rfl: 2    butalbital-acetaminophen-caffeine -40 mg (FIORICET, ESGIC) -40 mg per tablet, 1 tab PO q6 hr PRN severe migraine. No more than 10 tab per 30 days., Disp: 10 tablet, Rfl: 3    candesartan (ATACAND) 32 MG tablet, Take 1 tablet (32 mg total) by mouth once daily., Disp: 30 tablet, Rfl: 11    clonazePAM (KLONOPIN) 0.5 MG tablet, TAKE 1/2 TABLET(0.25 MG) BY MOUTH DAILY AS NEEDED FOR ANXIETY, Disp: 15 tablet, Rfl: 2    EMGALITY  mg/mL PnIj, , Disp: , Rfl:     ferrous sulfate (IRON) 325 mg (65 mg iron) Tab tablet, iron, Disp: , Rfl:     frovatriptan (FROVA) 2.5 MG tablet, Take 1 tablet (2.5 mg total) by mouth as needed., Disp: 9 tablet, Rfl: 3    galcanezumab-gnlm (EMGALITY PEN) 120 mg/mL PnIj, Inject 120 mg into the skin every 28 days., Disp: 1 mL, Rfl: 11    ketorolac (SPRIX) 15.75 mg/spray Spry, 15.75 mg by Nasal route every 6 (six) hours., Disp: 5 each, Rfl: 5    lithium carbonate 150 MG capsule, Take 1 capsule (150 mg total) by mouth 2 (two) times a day., Disp: 60 capsule, Rfl: 0    metoprolol succinate (TOPROL-XL) 200 MG 24 hr tablet, TAKE 1 TABLET BY MOUTH EVERY DAY, Disp: 90 tablet, Rfl: 2    nystatin (MYCOSTATIN) cream, Apply topically 2 (two) times daily., Disp: 30 g, Rfl: 1    onabotulinumtoxina  (BOTOX) 200 unit SolR, Botox 200 unit injection  Take 155 units as needed by injection route., Disp: , Rfl:     ondansetron (ZOFRAN) 4 MG tablet, TK 1 T PO TID PRN, Disp: , Rfl: 1    rimegepant (NURTEC) ODT 75 mg, One dissolving tablet on the tongue daily as needed for migraine. No more than once per day., Disp: 8 tablet, Rfl: 11    UNABLE TO FIND, Take 15 mg by mouth once daily. Methylfolate, Disp: , Rfl:     venlafaxine (EFFEXOR-XR) 150 MG Cp24, TAKE 1 CAPSULE(150 MG) BY MOUTH EVERY DAY, Disp: 30 capsule, Rfl: 0    ZEMBRACE SYMTOUCH 3 mg/0.5 mL PnIj, Inject 3 mg into the skin every 72 hours as needed., Disp: 4 Syringe, Rfl: 11    Current Facility-Administered Medications:     onabotulinumtoxina injection 200 Units, 200 Units, Intramuscular, Q90 Days, Mira Braswell MD    onabotulinumtoxina injection 200 Units, 200 Units, Intramuscular, Q90 Days, Mira Braswell MD, 200 Units at 10/02/20 1322  Does patient have record of home blood pressure readings yes. Readings have been averaging 100/70.   Last dose of blood pressure medication was taken at 6:45am.  Patient is asymptomatic.   Complains of none.      ,   .    Blood pressure reading after 15 minutes was 126/82, Pulse 66.  Dr. Holcomb notified.

## 2020-10-29 ENCOUNTER — OFFICE VISIT (OUTPATIENT)
Dept: CARDIOLOGY | Facility: CLINIC | Age: 45
End: 2020-10-29
Payer: COMMERCIAL

## 2020-10-29 VITALS
HEIGHT: 70 IN | BODY MASS INDEX: 39.16 KG/M2 | DIASTOLIC BLOOD PRESSURE: 79 MMHG | HEART RATE: 67 BPM | SYSTOLIC BLOOD PRESSURE: 139 MMHG | WEIGHT: 273.56 LBS

## 2020-10-29 DIAGNOSIS — E66.9 OBESITY (BMI 30-39.9): ICD-10-CM

## 2020-10-29 DIAGNOSIS — Z79.899 HIGH RISK MEDICATIONS (NOT ANTICOAGULANTS) LONG-TERM USE: ICD-10-CM

## 2020-10-29 DIAGNOSIS — F33.2 MDD (MAJOR DEPRESSIVE DISORDER), RECURRENT SEVERE, WITHOUT PSYCHOSIS: ICD-10-CM

## 2020-10-29 DIAGNOSIS — I10 HYPERTENSION, UNSPECIFIED TYPE: Primary | ICD-10-CM

## 2020-10-29 PROCEDURE — 3078F DIAST BP <80 MM HG: CPT | Mod: CPTII,S$GLB,, | Performed by: INTERNAL MEDICINE

## 2020-10-29 PROCEDURE — 3075F SYST BP GE 130 - 139MM HG: CPT | Mod: CPTII,S$GLB,, | Performed by: INTERNAL MEDICINE

## 2020-10-29 PROCEDURE — 99204 PR OFFICE/OUTPT VISIT, NEW, LEVL IV, 45-59 MIN: ICD-10-PCS | Mod: S$GLB,,, | Performed by: INTERNAL MEDICINE

## 2020-10-29 PROCEDURE — 99999 PR PBB SHADOW E&M-EST. PATIENT-LVL IV: CPT | Mod: PBBFAC,,, | Performed by: INTERNAL MEDICINE

## 2020-10-29 PROCEDURE — 3075F PR MOST RECENT SYSTOLIC BLOOD PRESS GE 130-139MM HG: ICD-10-PCS | Mod: CPTII,S$GLB,, | Performed by: INTERNAL MEDICINE

## 2020-10-29 PROCEDURE — 99204 OFFICE O/P NEW MOD 45 MIN: CPT | Mod: S$GLB,,, | Performed by: INTERNAL MEDICINE

## 2020-10-29 PROCEDURE — 3008F PR BODY MASS INDEX (BMI) DOCUMENTED: ICD-10-PCS | Mod: CPTII,S$GLB,, | Performed by: INTERNAL MEDICINE

## 2020-10-29 PROCEDURE — 99999 PR PBB SHADOW E&M-EST. PATIENT-LVL IV: ICD-10-PCS | Mod: PBBFAC,,, | Performed by: INTERNAL MEDICINE

## 2020-10-29 PROCEDURE — 3078F PR MOST RECENT DIASTOLIC BLOOD PRESSURE < 80 MM HG: ICD-10-PCS | Mod: CPTII,S$GLB,, | Performed by: INTERNAL MEDICINE

## 2020-10-29 PROCEDURE — 3008F BODY MASS INDEX DOCD: CPT | Mod: CPTII,S$GLB,, | Performed by: INTERNAL MEDICINE

## 2020-10-29 NOTE — LETTER
October 29, 2020      Alexandrea Bryant MD  [address]  [facility] [address]           [facility]  [address] [place] [address]  Phone: [phone]          Patient: Anneliese Rocha   MR Number: [id number]   YOB: 1975   Date of Visit: 10/29/2020       Dear Dr. Alexandrea Bryant:    Thank you for referring Anneliese Rocha to me for evaluation. Attached you will find relevant portions of my assessment and plan of care.    If you have questions, please do not hesitate to call me. I look forward to following Anneliese Rocha along with you.    Sincerely,    Nba Cassidy Jr., MD    Enclosure  CC:  No Recipients    If you would like to receive this communication electronically, please contact [email or web address] or [phone] to request more information on "[employer] Link access.    For providers and/or their staff who would like to refer a patient to [facility], please contact us through our one-stop-shop provider referral line, [facility] , at [phone].    If you feel you have received this communication in error or would no longer like to receive these types of communications, please e-mail [email or web address]

## 2020-10-29 NOTE — PROGRESS NOTES
Subjective:    Patient ID:  Anneliese Rocha is a 45 y.o. female who presents for evaluation of new pt (new pt)      HPI45 yo WF with HTN, obesity, and anxiety/depressive disorder. Had repeat EKG because of medicine changes and has minor T wave differences but still normal. Denies palpitations, weak spells, and syncope    Review of Systems   Constitution: Negative for decreased appetite, fever, malaise/fatigue, weight gain and weight loss.   HENT: Negative for hearing loss and nosebleeds.    Eyes: Negative for visual disturbance.   Cardiovascular: Negative for chest pain, claudication, cyanosis, dyspnea on exertion, irregular heartbeat, leg swelling, near-syncope, orthopnea, palpitations, paroxysmal nocturnal dyspnea and syncope.   Respiratory: Negative for cough, hemoptysis, shortness of breath, sleep disturbances due to breathing, snoring and wheezing.    Endocrine: Negative for cold intolerance, heat intolerance, polydipsia and polyuria.   Hematologic/Lymphatic: Negative for adenopathy and bleeding problem. Does not bruise/bleed easily.   Skin: Negative for color change, itching, poor wound healing, rash and suspicious lesions.   Musculoskeletal: Negative for arthritis, back pain, falls, joint pain, joint swelling, muscle cramps, muscle weakness and myalgias.   Gastrointestinal: Negative for bloating, abdominal pain, change in bowel habit, constipation, flatus, heartburn, hematemesis, hematochezia, hemorrhoids, jaundice, melena, nausea and vomiting.   Genitourinary: Negative for bladder incontinence, decreased libido, frequency, hematuria, hesitancy and urgency.   Neurological: Negative for brief paralysis, difficulty with concentration, excessive daytime sleepiness, dizziness, focal weakness, headaches, light-headedness, loss of balance, numbness, vertigo and weakness.   Psychiatric/Behavioral: Positive for depression. Negative for altered mental status and memory loss. The patient is nervous/anxious. The  "patient does not have insomnia.    Allergic/Immunologic: Negative for environmental allergies, hives and persistent infections.        Objective:    Physical Exam   Constitutional: She is oriented to person, place, and time. She appears well-developed and well-nourished.   /79   Pulse 67   Ht 5' 10" (1.778 m)   Wt 124.1 kg (273 lb 9.5 oz)   BMI 39.26 kg/m²      HENT:   Head: Normocephalic and atraumatic.   Right Ear: External ear normal.   Left Ear: External ear normal.   Nose: Nose normal.   Mouth/Throat: Oropharynx is clear and moist.   Eyes: Pupils are equal, round, and reactive to light. Conjunctivae, EOM and lids are normal. Right eye exhibits no discharge. Left eye exhibits no discharge. Right conjunctiva has no hemorrhage. No scleral icterus.   Neck: Normal range of motion. Neck supple. No JVD present. No tracheal deviation present. No thyromegaly present.   Cardiovascular: Normal rate, regular rhythm, normal heart sounds and intact distal pulses. Exam reveals no gallop and no friction rub.   No murmur heard.  Pulmonary/Chest: Effort normal and breath sounds normal. No respiratory distress. She has no wheezes. She has no rales. She exhibits no tenderness. Breasts are symmetrical.   Abdominal: Soft. Bowel sounds are normal. She exhibits no distension and no mass. There is no hepatosplenomegaly or hepatomegaly. There is no abdominal tenderness. There is no rebound and no guarding.   Musculoskeletal: Normal range of motion.         General: No tenderness or edema.   Lymphadenopathy:     She has no cervical adenopathy.   Neurological: She is alert and oriented to person, place, and time. She displays normal reflexes. No cranial nerve deficit. Coordination normal.   Skin: Skin is warm and dry. No rash noted. No erythema. No pallor.   Psychiatric: She has a normal mood and affect. Her behavior is normal. Judgment and thought content normal.   Nursing note and vitals reviewed.        Assessment:       1. " Hypertension, unspecified type    2. High risk medications (not anticoagulants) long-term use    3. Obesity (BMI 30-39.9)    4. MDD (major depressive disorder), recurrent severe, without psychosis         Plan:     No obvious cardiac conditions    BP controlled    Modest HLD    Patient advised to modify risk factors such as weight, exercise, diet,  tobacco and alcohol exposure    No orders of the defined types were placed in this encounter.    Follow up if symptoms worsen or fail to improve.

## 2020-10-30 ENCOUNTER — LAB VISIT (OUTPATIENT)
Dept: LAB | Facility: HOSPITAL | Age: 45
End: 2020-10-30
Attending: PSYCHIATRY & NEUROLOGY
Payer: COMMERCIAL

## 2020-10-30 DIAGNOSIS — Z79.899 HIGH RISK MEDICATIONS (NOT ANTICOAGULANTS) LONG-TERM USE: ICD-10-CM

## 2020-10-30 LAB — LITHIUM SERPL-SCNC: 0.2 MMOL/L (ref 0.6–1.2)

## 2020-10-30 PROCEDURE — 80178 ASSAY OF LITHIUM: CPT

## 2020-10-30 PROCEDURE — 36415 COLL VENOUS BLD VENIPUNCTURE: CPT | Mod: PO

## 2020-11-02 ENCOUNTER — PATIENT MESSAGE (OUTPATIENT)
Dept: PSYCHIATRY | Facility: CLINIC | Age: 45
End: 2020-11-02

## 2020-11-02 NOTE — TELEPHONE ENCOUNTER
----- Message from Alexandrea Bryant MD sent at 11/2/2020  4:37 PM CST -----  Please let the pt know that as expected, her Li level is low which left us plenty of room for the recent increase in dose. Thanks, aw

## 2020-11-13 ENCOUNTER — TELEPHONE (OUTPATIENT)
Dept: PHARMACY | Facility: CLINIC | Age: 45
End: 2020-11-13

## 2020-11-16 ENCOUNTER — SPECIALTY PHARMACY (OUTPATIENT)
Dept: PHARMACY | Facility: CLINIC | Age: 45
End: 2020-11-16

## 2020-11-16 NOTE — TELEPHONE ENCOUNTER
Specialty Pharmacy - Refill Coordination    Specialty Medication Orders Linked to Encounter      Most Recent Value   Medication #1  galcanezumab-gnlm (EMGALITY PEN) 120 mg/mL PnIj (Order#[id number], Rx#[id number])          Refill Questions - Documented Responses      Most Recent Value   Relationship to patient of person spoken to?  Self   HIPAA/medical authority confirmed?  Yes   Any changes in contact preferences or allowed representatives?  No   Has the patient had any insurance changes?  No   Has the patient had any changes to specialty medication, dose, or instructions?  No   Has the patient started taking any new medications, herbals, or supplements?  No   Has the patient been diagnosed with any new medical conditions?  No   Does the patient have any new allergies to medications or foods?  No   Does the patient have any concerns about side effects?  No   Can the patient store medication/sharps container properly (at the correct temperature, away from children/pets, etc.)?  Yes   Can the patient call emergency services (911) in the event of an emergency?  Yes   Does the patient have any concerns or questions about taking or administering this medication as prescribed?  No   How many doses did the patient miss in the past 4 weeks or since the last fill?  0   How many doses does the patient have on hand?  0   How many days does the patient report on hand quantity will last?  0   Does the number of doses/days supply remaining match pharmacy expected amounts?  Yes   Does the patient feel that this medication is effective?  Yes   During the past 4 weeks, has patient missed any activities due to condition or medication?  No   During the past 4 weeks, did patient have any of the following urgent care visits?  None   How will the patient receive the medication?  Mail   When does the patient need to receive the medication?  11/17/20   Shipping Address  Home   Address in [facility] confirmed and updated if  neccessary?  Yes   Expected Copay ($)  15   Is the patient able to afford the medication copay?  Yes   Payment Method  CC on file   Days supply of Refill  28   Would patient like to speak to a pharmacist?  No   Do you want to trigger an intervention?  No   Do you want to trigger an additional referral task?  No   Refill activity completed?  Yes   Refill activity plan  Refill scheduled   Shipment/Pickup Date:  11/16/20          Current Outpatient Medications   Medication Sig    busPIRone (BUSPAR) 15 MG tablet TAKE 1 TABLET BY MOUTH THREE TIMES DAILY    butalbital-acetaminophen-caffeine -40 mg (FIORICET, ESGIC) -40 mg per tablet 1 tab PO q6 hr PRN severe migraine. No more than 10 tab per 30 days.    candesartan (ATACAND) 32 MG tablet Take 1 tablet (32 mg total) by mouth once daily.    clonazePAM (KLONOPIN) 0.5 MG tablet TAKE 1/2 TABLET(0.25 MG) BY MOUTH DAILY AS NEEDED FOR ANXIETY    EMGALITY  mg/mL PnIj     ferrous sulfate (IRON) 325 mg (65 mg iron) Tab tablet iron    frovatriptan (FROVA) 2.5 MG tablet Take 1 tablet (2.5 mg total) by mouth as needed.    galcanezumab-gnlm (EMGALITY PEN) 120 mg/mL PnIj Inject 120 mg into the skin every 28 days.    ketorolac (SPRIX) 15.75 mg/spray Spry 15.75 mg by Nasal route every 6 (six) hours.    lithium carbonate 150 MG capsule Take 1 capsule (150 mg total) by mouth 2 (two) times a day.    metoprolol succinate (TOPROL-XL) 200 MG 24 hr tablet TAKE 1 TABLET BY MOUTH EVERY DAY    nystatin (MYCOSTATIN) cream Apply topically 2 (two) times daily.    onabotulinumtoxina (BOTOX) 200 unit SolR Botox 200 unit injection   Take 155 units as needed by injection route.    ondansetron (ZOFRAN) 4 MG tablet TK 1 T PO TID PRN    rimegepant (NURTEC) ODT 75 mg One dissolving tablet on the tongue daily as needed for migraine. No more than once per day.    UNABLE TO FIND Take 15 mg by mouth once daily. Methylfolate    venlafaxine (EFFEXOR-XR) 150 MG Cp24 TAKE 1  CAPSULE(150 MG) BY MOUTH EVERY DAY    ZEMBRACE SYMTOUCH 3 mg/0.5 mL PnIj Inject 3 mg into the skin every 72 hours as needed.   Last reviewed on 10/29/2020  8:34 AM by Nba Cassidy Jr., MD    Review of patient's allergies indicates:   Allergen Reactions    Lisinopril      Dizziness, nausea    Last reviewed on  10/29/2020 8:18 AM by Seda Beaulieu      Tasks added this encounter   11/16/2020 - Refill Call   Tasks due within next 3 months   No tasks due.     Tamica Murray  [facility] - Specialty Pharmacy  [address] [address]  Phone: [phone]  Fax: [phone]

## 2020-11-18 ENCOUNTER — TELEPHONE (OUTPATIENT)
Dept: PHARMACY | Facility: CLINIC | Age: 45
End: 2020-11-18

## 2020-11-19 ENCOUNTER — OFFICE VISIT (OUTPATIENT)
Dept: PSYCHIATRY | Facility: CLINIC | Age: 45
End: 2020-11-19
Payer: COMMERCIAL

## 2020-11-19 VITALS
HEART RATE: 73 BPM | HEIGHT: 70 IN | BODY MASS INDEX: 38.87 KG/M2 | WEIGHT: 271.5 LBS | SYSTOLIC BLOOD PRESSURE: 137 MMHG | DIASTOLIC BLOOD PRESSURE: 88 MMHG

## 2020-11-19 DIAGNOSIS — F32.A FATIGUE DUE TO DEPRESSION: ICD-10-CM

## 2020-11-19 DIAGNOSIS — R53.83 FATIGUE DUE TO DEPRESSION: ICD-10-CM

## 2020-11-19 DIAGNOSIS — Z79.899 HIGH RISK MEDICATIONS (NOT ANTICOAGULANTS) LONG-TERM USE: ICD-10-CM

## 2020-11-19 DIAGNOSIS — I10 HYPERTENSION, UNSPECIFIED TYPE: ICD-10-CM

## 2020-11-19 DIAGNOSIS — F41.1 GAD (GENERALIZED ANXIETY DISORDER): ICD-10-CM

## 2020-11-19 DIAGNOSIS — F33.2 MDD (MAJOR DEPRESSIVE DISORDER), RECURRENT SEVERE, WITHOUT PSYCHOSIS: Primary | ICD-10-CM

## 2020-11-19 DIAGNOSIS — E66.9 OBESITY (BMI 30-39.9): ICD-10-CM

## 2020-11-19 PROCEDURE — 3075F SYST BP GE 130 - 139MM HG: CPT | Mod: CPTII,S$GLB,, | Performed by: PSYCHIATRY & NEUROLOGY

## 2020-11-19 PROCEDURE — 99214 PR OFFICE/OUTPT VISIT, EST, LEVL IV, 30-39 MIN: ICD-10-PCS | Mod: S$GLB,,, | Performed by: PSYCHIATRY & NEUROLOGY

## 2020-11-19 PROCEDURE — 3079F DIAST BP 80-89 MM HG: CPT | Mod: CPTII,S$GLB,, | Performed by: PSYCHIATRY & NEUROLOGY

## 2020-11-19 PROCEDURE — 3079F PR MOST RECENT DIASTOLIC BLOOD PRESSURE 80-89 MM HG: ICD-10-PCS | Mod: CPTII,S$GLB,, | Performed by: PSYCHIATRY & NEUROLOGY

## 2020-11-19 PROCEDURE — 99999 PR PBB SHADOW E&M-EST. PATIENT-LVL III: CPT | Mod: PBBFAC,,, | Performed by: PSYCHIATRY & NEUROLOGY

## 2020-11-19 PROCEDURE — 90833 PR PSYCHOTHERAPY W/PATIENT W/E&M, 30 MIN (ADD ON): ICD-10-PCS | Mod: S$GLB,,, | Performed by: PSYCHIATRY & NEUROLOGY

## 2020-11-19 PROCEDURE — 3075F PR MOST RECENT SYSTOLIC BLOOD PRESS GE 130-139MM HG: ICD-10-PCS | Mod: CPTII,S$GLB,, | Performed by: PSYCHIATRY & NEUROLOGY

## 2020-11-19 PROCEDURE — 90833 PSYTX W PT W E/M 30 MIN: CPT | Mod: S$GLB,,, | Performed by: PSYCHIATRY & NEUROLOGY

## 2020-11-19 PROCEDURE — 1126F PR PAIN SEVERITY QUANTIFIED, NO PAIN PRESENT: ICD-10-PCS | Mod: S$GLB,,, | Performed by: PSYCHIATRY & NEUROLOGY

## 2020-11-19 PROCEDURE — 3008F PR BODY MASS INDEX (BMI) DOCUMENTED: ICD-10-PCS | Mod: CPTII,S$GLB,, | Performed by: PSYCHIATRY & NEUROLOGY

## 2020-11-19 PROCEDURE — 1126F AMNT PAIN NOTED NONE PRSNT: CPT | Mod: S$GLB,,, | Performed by: PSYCHIATRY & NEUROLOGY

## 2020-11-19 PROCEDURE — 99214 OFFICE O/P EST MOD 30 MIN: CPT | Mod: S$GLB,,, | Performed by: PSYCHIATRY & NEUROLOGY

## 2020-11-19 PROCEDURE — 3008F BODY MASS INDEX DOCD: CPT | Mod: CPTII,S$GLB,, | Performed by: PSYCHIATRY & NEUROLOGY

## 2020-11-19 PROCEDURE — 99999 PR PBB SHADOW E&M-EST. PATIENT-LVL III: ICD-10-PCS | Mod: PBBFAC,,, | Performed by: PSYCHIATRY & NEUROLOGY

## 2020-11-19 RX ORDER — CLONAZEPAM 0.5 MG/1
TABLET ORAL
Qty: 15 TABLET | Refills: 2 | Status: SHIPPED | OUTPATIENT
Start: 2020-11-19 | End: 2021-02-12

## 2020-11-19 RX ORDER — LITHIUM CARBONATE 150 MG/1
150 CAPSULE ORAL 2 TIMES DAILY
Qty: 60 CAPSULE | Refills: 2 | Status: SHIPPED | OUTPATIENT
Start: 2020-11-19 | End: 2021-02-11

## 2020-11-19 NOTE — PROGRESS NOTES
"ID: 43yo WF with a h/o depression and anxiety, no prior full psych eval w/i [facility] system. Pt is a current pt of Anneliese Cochran LCSW who referred her for med mgmt along with , PCP. Pt has been on wait list since 3/2017. The pt is currently on xanax and cymbalta through PCP. Also on topamax and elavil for migraine mgmt through pcp    CC: depression.     Interim Hx: presents on time. Chart reviewed.     At last appt 1mo ago we inc'd the lithium to 150mg po BID-  "i'm still tired. But mood feels better. i'm functioning. Nathan thinks so too." from an affect perspective the pt does seem lighter- less heavy- in appt.     Pt has not been taking klonopin- hasn't refilled in a couple months and had no more but has inc'd her buspar to TID (was previously BID)   "i'm anxious about all the things that everyone is anxious about right now so I don't know if that's just normal. Anxious about work, money, thanksgiving."     "There have been 10-12 layoffs at work in "the first round" so i'm not even sure what that means and I don't think i'm in jeopardy because my job is so personal but it's still a little worrisome, not sure what to do about thanksgiving and visiting my parents, just the stuff that everyone is concerned about."     Pt has not added any exercise nor is she in therapy- we have a clear discussion about this today. About her current med list and the need to engage in these beh mods prior to any additional changes. Pt expresses understanding.     On Psychiatric ROS:    Endorses somewhat improved sleep maintenance- no longer using trazodone, improved anhedonia, improved feelings of helpless/hopeless, dec energy- "none", stable concentration, dec PMA "i do feel tired thinking about doing things."     Denies thoughts of SI/intent/plan.     Endorses feeling +easily overwhelmed, +ruminative thinking, +feeling tense/"on edge"- "on edge" "all the time"- mildly improved on newly added lithium    PSYCHOTHERAPY " "ADD-ON   30 (16-37*) minutes    Time: 20 minutes  Participants: Met with patient    Therapeutic Intervention Type: insight oriented psychotherapy, behavior modifying psychotherapy, supportive psychotherapy  Why chosen therapy is appropriate versus another modality: relevant to diagnosis, patient responds to this modality, evidence based practice    Target symptoms: health and wellness  Primary focus: diet, exercise  Psychotherapeutic techniques: support, education, validation, reframing, motivational interviewing    Outcome monitoring methods: self-report, observation, wgt monitoring    Patient's response to intervention:  The patient's response to intervention is accepting, motivated.    Progress toward goals:  The patient's progress toward goals is not progressing.    PPHx: Denies h/o self injury, inpt psych hospitalization, denies h/o suicide attempt     Current Psych Meds: effexor xr 150mg po qam, buspar TID, trazodone 100mg po qhs PRN insomnia, xanax 0.5mg po daily PRN anxiety. Lithium 150mg po bid  Past Psych Meds: ritalin, prozac, buspar PRN ("it helped with the stress of planning the wedding"), cymbalta 120mg po daily (ineffective), zoloft 150mg (ineffective), prozac 20mg po qam, xanax 0.5mg po BID PRN anxiety, wellbutrin xl 150mg po qam, fetzima 40mg po qam, abilify 2mg, pristiq 100mg qam    PMHx: migraines (since childhood)- botox as txmt, monthly "they're well managed"    SubstHx:   T- none  E- very rare due to migraine  D- none  Caffeine- 2 dr.peppers/day    FamPHx: none    Musculoskeletal:  Muscle strength/Tone: no dyskinesia/ no tremor  Gait/Station- non antalgic, no assistance needed    MSE: appears stated age, well groomed, appropriate dress, engages well with examiner. Good e/c. Speech reg rate and vol, nonpressured. Mood is "I think I feel a little better." Affect congruent- low energy. Sensorium fully intact. Oriented to date/day/location, current events. Narrative memory intact. Intellectual " "function is avg based on vocab and basic fund of knowledge. Thought is c/l/gd. No tangentiality or circumstantiality. No FOI/SULTANA. Denies SI/HI. Denies A/VH. No evidence of delusions. Insight and Judgment intact.     Blood pressure 137/88, pulse 73, height 5' 10" (1.778 m), weight 123.1 kg (271 lb 7.9 oz), last menstrual period 10/26/2020.      Results for orders placed or performed in visit on 10/30/20   Lithium Level   Result Value Ref Range    Lithium Level 0.2 (L) 0.6 - 1.2 mmol/L       Suicide Risk Assessment:   Protective- age, gender, no prior attempts, no prior hospitalizations, no family h/o attempts, no ongoing substance abuse, no psychosis, , denies SI/intent/plan, seeking treatment, access to treatment, future oriented, good primary support, no access to firearms    Risk- race, ongoing Axis I sxs, no children    **Pt is at LOW imminent and long term risk of suicide given current risk factors.    Assessment:  43yo WF with a h/o depression and anxiety, no prior full psych eval w/i [facility] system. Pt is a current pt of Anneliese Cochran LCSW who referred her for med mgmt along with , PCP. Pt has been on wait list since 3/2017. The pt is currently on xanax and cymbalta through PCP. Also on topamax and elavil for migraine mgmt through pcp/neuro. On eval today she meets criteria for MDD, recurrent, severe w/o psychotic sxs despite a recent inc in cymbalta to 120mg po daily. Pt has been on cymbalta x 10yrs. Only recalls previous trial of prozac which was d/c'd for unknown reasons. Now requiring xanax daily- some days 2 tabs (ie:1mg total daily). Some room for behavioral interventions, as well and the pt is motivated. Has good ability to adhere to the new txmt plan. We tapered off cymbalta and started a trial of zoloft. Will discuss diet and exercise interventions more fully over course of txmt. today she has been on 100mg of zoloft x 1mo. Continues to state she does not feel "any different"- " "will inc one last titration to 150mg prior to discussing transition to alternate med. Have discussed trial of wellbutrin next as daytime lethargy and dec'd motivation are now the primary concerns to the pt, however current level of anxiety leading to xanax use 2x/d. May also consider mthfr mutation with some methyl folate supplement. Will add time released melatonin today for sleep. wgt loss would be an added benefit. Cont therapy as scheduled. No acute safety concerns. We added wellbutrin and today she reports improved energy and focus at work, but also perhaps inc'd anxiety? Tapered off zoloft to make room for trial of prozac. Will also add trial of buspar 10mg po BID which was helpful many years ago per pt. Last appt she reported  and therapist notice improvement on prozac 20mg po qam- buspar 10mg po TID also helpful with decreased xanax use and less variation in anxiety through day. Will cont both but inc prozac to 40mg to get at ongoing depressive and anxiety sxs- she then reported inc'd anxiety and I wondered about over activation on the prozac inc. not certain if she can afford genetic testing but is motivated for a trial of the methylfolate supplement. Dec'd the prozac back to 20mg po qam which today she reported did not affect her in either direction- started methylfolate supplementation- today reporting some improvement but con'd lethargy, lack of motivation, and reported anxiety. Agrees to do genetic testing. Genetic testing results returned and we started fetzima based on those results. Now reporting "maybe some improvement" but also reporting "no change in depression or anxiety"? Does report inc'd "anger"? May be overstimulation on fetzima (NE) and wellbutrin (dopa)- inc'd fetzima to more std dose of 40mg and within days the pt d/c'd the wellbutrin due to inc'd irritability. Now noticing "may some improvement" and the pt is more expansive with affect today in appt. Concern about bp but drinking a " "estella nitro at time of appt- will monitor at home for a few days and let me know via portal- we may inc dose in the future. Today pt reporting cont'd difficulty with anxiety and mood- started a trial of adjunct txmt with abilify 2mg- there has been weight gain but improved anxiety- will try for weight loss with beh interventions and we will re discuss in 4wks. Called the clinic one week ago with inc'd depressive sxs and wanting to make changes. Stopped abilify and here today to make some cont'd changes. "No different"- w/o the abilify. Will taper off fetzima and start trial of pristiq. On f/u of pristiq pt is feeling "a little better" but anxiety continues sleep a little improved on trazodone 100mg will inc to 150mg po qhs and likely add adjunct txmt at next appt. Last appt reported cont'd anxiety- transitioned xanax to klonopin and started a trial of vraylar 1.5mg po daily. Last appt reported some improvement which she attributed more to the klonopin but I find her mood improved and motivation/energy have also improved- likely a reflection of the vraylar. She wanted to cont w/o change through holidays and assess. Last appt reported "no better."- low motivation, energy/lasting lethargy. Tapered off pristiq and started a trial of effexor xr- today tolerating the 75mg effexor xr well- will titrate to 150mg po qam. Reviewed labs including thyroid panel. Pt feels "maybe a little better" on the inc'd effexor. Will cont- today reporting once again "anxiety through the roof"? I'm not certain how to best move forward. Reporting consistently ambiguous "maybe a little better" is the consistent phrase or "not sure"- will try without vraylar x 2wks and reeval- if "no different" without it, we may try seroquel HS for sleep, anxiolytic property. May also try lamictal titration? Off label but we have had med failures on most ssri/snri trials and now potentially on adjunct txmts. Have really encouraged exercise to this patient. " "Today pt reports no better, reaching a low 2 wks ago,  "I'll do anything." discuss prior med trials - discuss possibly adding low dose lithium?  Will get pre lithium labwork done.     We added lithium 150mg po qam following discussion with pcp and hctz has been d/c'd. Today on close f/u the pt reporting "maybe a little better"-  believes she's "50% better. Very noticeable." no s/e's. We inc'd to 150mg po bid and pt continues to report improved mood, but low energy/motivation. Encouraged her to please engage in some behavioral changes- exercise primarily but also may be able to consider therapy moving forward.     Axis I: MDD, recurrent, severe w/o psychotic sxs; PASTORA  Axis II: none at this time   Axis III: HTN, migraines  Axis IV: occupational, chronic mental health  Axis V: GAF 70    Plan:   1. Cont effexor xr 150mg po qam   2. Cont buspar 15mg po TID   3. Cont klonopin 0.25mg po daily PRN anxiety (rare use)   4. Cont lithium to 150mg po bid  5. Cont methylfolate (purchasing online)   6. Encouraged exercise and therapy  7. RTC 2mos  8. labwork ordered    -Spent 30min face to face with the pt; >50% time spent in counseling   -Supportive therapy and psychoeducation provided  -R/B/SE's of medications discussed with the pt who expresses understanding and chooses to take medications as prescribed.   -Pt instructed to call clinic, 911 or go to nearest emergency room if sxs worsen or pt is in   crisis. The pt expresses understanding.          "

## 2020-12-03 ENCOUNTER — LAB VISIT (OUTPATIENT)
Dept: LAB | Facility: HOSPITAL | Age: 45
End: 2020-12-03
Attending: PSYCHIATRY & NEUROLOGY
Payer: COMMERCIAL

## 2020-12-03 DIAGNOSIS — Z79.899 HIGH RISK MEDICATIONS (NOT ANTICOAGULANTS) LONG-TERM USE: ICD-10-CM

## 2020-12-03 LAB — LITHIUM SERPL-SCNC: <0.1 MMOL/L (ref 0.6–1.2)

## 2020-12-03 PROCEDURE — 80178 ASSAY OF LITHIUM: CPT

## 2020-12-03 PROCEDURE — 36415 COLL VENOUS BLD VENIPUNCTURE: CPT | Mod: PO

## 2020-12-04 ENCOUNTER — TELEPHONE (OUTPATIENT)
Dept: PSYCHIATRY | Facility: CLINIC | Age: 45
End: 2020-12-04

## 2020-12-04 NOTE — TELEPHONE ENCOUNTER
----- Message from Alexandrea Bryant MD sent at 12/4/2020  8:00 AM CST -----  Choco veronica, can you let this pt know that for some reason her li level was lower yesterday than it even was a month ago (when she was on a lower dose)? Not sure why. So there is no concern for toxicity, but i'm just wanting to check in that she's taking it twice a day and hydrating well without over hydrating? Thanks, aw

## 2020-12-07 NOTE — TELEPHONE ENCOUNTER
Pt states that she is taking her lithium twice daily and that she does not feel she is overhydrating but hydrating well enough.  Please advise.

## 2020-12-08 ENCOUNTER — TELEPHONE (OUTPATIENT)
Dept: PHARMACY | Facility: CLINIC | Age: 45
End: 2020-12-08

## 2020-12-10 ENCOUNTER — SPECIALTY PHARMACY (OUTPATIENT)
Dept: PHARMACY | Facility: CLINIC | Age: 45
End: 2020-12-10

## 2020-12-10 NOTE — TELEPHONE ENCOUNTER
Specialty Pharmacy - Refill Coordination    Specialty Medication Orders Linked to Encounter      Most Recent Value   Medication #1  galcanezumab-gnlm (EMGALITY PEN) 120 mg/mL PnIj (Order#[id number], Rx#[id number])          Refill Questions - Documented Responses      Most Recent Value   Relationship to patient of person spoken to?  Self   HIPAA/medical authority confirmed?  Yes   How many doses does the patient have on hand?  0   How many days does the patient report on hand quantity will last?  5   How will the patient receive the medication?  Mail   When does the patient need to receive the medication?  12/15/20   Shipping Address  Home   Address in [facility] confirmed and updated if neccessary?  Yes   Expected Copay ($)  15   Is the patient able to afford the medication copay?  Yes   Payment Method  CC on file   Days supply of Refill  28   Would patient like to speak to a pharmacist?  No   Do you want to trigger an intervention?  No   Do you want to trigger an additional referral task?  No   Refill activity completed?  Yes   Refill activity plan  Refill scheduled   Shipment/Pickup Date:  12/10/20          Current Outpatient Medications   Medication Sig    BOTOX 200 unit SolR INJECT 200 UNITS  INTRAMUSCULARLY EVERY 3  MONTHS (GIVEN AT MD OFFICE)    busPIRone (BUSPAR) 15 MG tablet TAKE 1 TABLET BY MOUTH THREE TIMES DAILY    butalbital-acetaminophen-caffeine -40 mg (FIORICET, ESGIC) -40 mg per tablet 1 tab PO q6 hr PRN severe migraine. No more than 10 tab per 30 days.    candesartan (ATACAND) 32 MG tablet Take 1 tablet (32 mg total) by mouth once daily.    clonazePAM (KLONOPIN) 0.5 MG tablet TAKE 1/2 TABLET(0.25 MG) BY MOUTH DAILY AS NEEDED FOR ANXIETY    EMGALITY  mg/mL PnIj     ferrous sulfate (IRON) 325 mg (65 mg iron) Tab tablet iron    frovatriptan (FROVA) 2.5 MG tablet Take 1 tablet (2.5 mg total) by mouth as needed.    galcanezumab-gnlm (EMGALITY PEN) 120 mg/mL PnIj Inject  120 mg into the skin every 28 days.    ketorolac (SPRIX) 15.75 mg/spray Spry 15.75 mg by Nasal route every 6 (six) hours.    lithium carbonate 150 MG capsule Take 1 capsule (150 mg total) by mouth 2 (two) times a day.    metoprolol succinate (TOPROL-XL) 200 MG 24 hr tablet TAKE 1 TABLET BY MOUTH EVERY DAY    nystatin (MYCOSTATIN) cream Apply topically 2 (two) times daily.    ondansetron (ZOFRAN) 4 MG tablet TK 1 T PO TID PRN    rimegepant (NURTEC) ODT 75 mg One dissolving tablet on the tongue daily as needed for migraine. No more than once per day.    UNABLE TO FIND Take 15 mg by mouth once daily. Methylfolate    venlafaxine (EFFEXOR-XR) 150 MG Cp24 TAKE 1 CAPSULE(150 MG) BY MOUTH EVERY DAY    ZEMBRACE SYMTOUCH 3 mg/0.5 mL PnIj Inject 3 mg into the skin every 72 hours as needed.   Last reviewed on 11/19/2020  1:37 PM by Rosalba Cr MA    Review of patient's allergies indicates:   Allergen Reactions    Lisinopril      Dizziness, nausea    Last reviewed on  11/19/2020 1:36 PM by Rosalba Cr      Tasks added this encounter   1/2/2021 - Refill Call (Auto Added)   Tasks due within next 3 months   No tasks due.     [facility] Pharm[facility]  [address] [place] [id number]  Phone: [phone]  Fax: [phone]

## 2020-12-22 ENCOUNTER — PATIENT OUTREACH (OUTPATIENT)
Dept: ADMINISTRATIVE | Facility: OTHER | Age: 45
End: 2020-12-22

## 2020-12-28 ENCOUNTER — TELEPHONE (OUTPATIENT)
Dept: NEUROLOGY | Facility: CLINIC | Age: 45
End: 2020-12-28

## 2020-12-28 NOTE — TELEPHONE ENCOUNTER
Notified patient that her Botox has not arrived as of this time. Patient stated that she has a tracking number and will contact them for time of delivery and call back.

## 2020-12-28 NOTE — TELEPHONE ENCOUNTER
Patient called back and stated that it should be here tomorrow by 10:30 which is before her scheduled appointment time however informed patient if there is an issue then we can see about doing the injection later in the afternoon. Verbalized understanding.

## 2020-12-29 ENCOUNTER — PROCEDURE VISIT (OUTPATIENT)
Dept: NEUROLOGY | Facility: CLINIC | Age: 45
End: 2020-12-29
Payer: COMMERCIAL

## 2020-12-29 VITALS
TEMPERATURE: 99 F | DIASTOLIC BLOOD PRESSURE: 76 MMHG | HEART RATE: 77 BPM | BODY MASS INDEX: 38.49 KG/M2 | RESPIRATION RATE: 17 BRPM | HEIGHT: 70 IN | SYSTOLIC BLOOD PRESSURE: 143 MMHG | WEIGHT: 268.88 LBS

## 2020-12-29 DIAGNOSIS — G43.719 INTRACTABLE CHRONIC MIGRAINE WITHOUT AURA AND WITHOUT STATUS MIGRAINOSUS: Primary | ICD-10-CM

## 2020-12-29 PROCEDURE — 64615 CHEMODENERV MUSC MIGRAINE: CPT | Mod: S$GLB,,, | Performed by: PSYCHIATRY & NEUROLOGY

## 2020-12-29 PROCEDURE — 64615 PR CHEMODENERVATION OF MUSCLE FOR CHRONIC MIGRAINE: ICD-10-PCS | Mod: S$GLB,,, | Performed by: PSYCHIATRY & NEUROLOGY

## 2020-12-29 NOTE — PROCEDURES
Procedures     BOTOX PROCEDURE    PROCEDURE PERFORMED: Botulinum toxin injection (29891)  [facility] DO NOT CHARGE FOR BOTOX  CLINICAL INDICATION: G43.719    A time out was conducted just before the start of the procedure to verify the correct patient and procedure, procedure location, and all relevant critical information.     Conventional methods of treatment but the patient has been unresponsive and refractory.The patient meets criteria for chronic headaches according to the ICHD-II, the patient has more than 15 headaches a month which last for more than 4 hours a day.    This is the first Botox injections and I am aiming for at least 50%  improvement in the patient's symptoms. Frequency of treatment is every 3 months unless no response to the treatments, at which time we will discontinue the injections.     DESCRIPTION OF PROCEDURE: After obtaining informed consent and under aseptic technique, a total of 155 units of botulinum toxin type A were injected in the following muscles: Procerus 5 units,  5 units bilaterally, frontalis 20 units, temporalis 20 units bilaterally, occipitalis 15 units, upper cervical paraspinals 10 units bilaterally and trapezius 15 units bilaterally. The patient was given a total of 155 units in 31 sites.The patient tolerated the procedure well. There were no complications. The patient was given a prescription for repeat treatment in 3 months.     Unavoidable waste 45 units  [facility], DO NOT CHARGE FOR BOTOX        Saúl Braswell M.D  Medical Director, Headache and Facial Pain  [facility]

## 2021-01-13 ENCOUNTER — TELEPHONE (OUTPATIENT)
Dept: PHARMACY | Facility: CLINIC | Age: 46
End: 2021-01-13

## 2021-01-19 ENCOUNTER — OFFICE VISIT (OUTPATIENT)
Dept: PSYCHIATRY | Facility: CLINIC | Age: 46
End: 2021-01-19
Payer: COMMERCIAL

## 2021-01-19 DIAGNOSIS — F41.1 GAD (GENERALIZED ANXIETY DISORDER): Primary | ICD-10-CM

## 2021-01-19 DIAGNOSIS — E66.9 OBESITY (BMI 30-39.9): ICD-10-CM

## 2021-01-19 DIAGNOSIS — F33.2 MDD (MAJOR DEPRESSIVE DISORDER), RECURRENT SEVERE, WITHOUT PSYCHOSIS: ICD-10-CM

## 2021-01-19 DIAGNOSIS — Z79.899 HIGH RISK MEDICATIONS (NOT ANTICOAGULANTS) LONG-TERM USE: ICD-10-CM

## 2021-01-19 DIAGNOSIS — I10 HYPERTENSION, UNSPECIFIED TYPE: ICD-10-CM

## 2021-01-19 PROCEDURE — 99214 PR OFFICE/OUTPT VISIT, EST, LEVL IV, 30-39 MIN: ICD-10-PCS | Mod: 95,,, | Performed by: PSYCHIATRY & NEUROLOGY

## 2021-01-19 PROCEDURE — 99214 OFFICE O/P EST MOD 30 MIN: CPT | Mod: 95,,, | Performed by: PSYCHIATRY & NEUROLOGY

## 2021-01-25 DIAGNOSIS — I10 ESSENTIAL HYPERTENSION: ICD-10-CM

## 2021-01-26 RX ORDER — METOPROLOL SUCCINATE 200 MG/1
TABLET, EXTENDED RELEASE ORAL
Qty: 90 TABLET | Refills: 1 | Status: SHIPPED | OUTPATIENT
Start: 2021-01-26 | End: 2021-08-13

## 2021-02-20 ENCOUNTER — LAB VISIT (OUTPATIENT)
Dept: LAB | Facility: HOSPITAL | Age: 46
End: 2021-02-20
Attending: PSYCHIATRY & NEUROLOGY
Payer: COMMERCIAL

## 2021-02-20 DIAGNOSIS — Z79.899 HIGH RISK MEDICATIONS (NOT ANTICOAGULANTS) LONG-TERM USE: ICD-10-CM

## 2021-02-20 LAB
ALBUMIN SERPL BCP-MCNC: 3.7 G/DL (ref 3.5–5.2)
ALP SERPL-CCNC: 55 U/L (ref 55–135)
ALT SERPL W/O P-5'-P-CCNC: 33 U/L (ref 10–44)
ANION GAP SERPL CALC-SCNC: 8 MMOL/L (ref 8–16)
AST SERPL-CCNC: 21 U/L (ref 10–40)
BILIRUB SERPL-MCNC: 0.4 MG/DL (ref 0.1–1)
BUN SERPL-MCNC: 9 MG/DL (ref 6–20)
CALCIUM SERPL-MCNC: 9.2 MG/DL (ref 8.7–10.5)
CHLORIDE SERPL-SCNC: 110 MMOL/L (ref 95–110)
CO2 SERPL-SCNC: 24 MMOL/L (ref 23–29)
CREAT SERPL-MCNC: 0.8 MG/DL (ref 0.5–1.4)
EST. GFR  (AFRICAN AMERICAN): >60 ML/MIN/1.73 M^2
EST. GFR  (NON AFRICAN AMERICAN): >60 ML/MIN/1.73 M^2
GLUCOSE SERPL-MCNC: 119 MG/DL (ref 70–110)
LITHIUM SERPL-SCNC: 0.2 MMOL/L (ref 0.6–1.2)
POTASSIUM SERPL-SCNC: 4.1 MMOL/L (ref 3.5–5.1)
PROT SERPL-MCNC: 6.8 G/DL (ref 6–8.4)
SODIUM SERPL-SCNC: 142 MMOL/L (ref 136–145)

## 2021-02-20 PROCEDURE — 36415 COLL VENOUS BLD VENIPUNCTURE: CPT | Mod: PO

## 2021-02-20 PROCEDURE — 80178 ASSAY OF LITHIUM: CPT

## 2021-02-20 PROCEDURE — 80053 COMPREHEN METABOLIC PANEL: CPT

## 2021-02-25 ENCOUNTER — TELEPHONE (OUTPATIENT)
Dept: PSYCHIATRY | Facility: CLINIC | Age: 46
End: 2021-02-25

## 2021-03-19 ENCOUNTER — PATIENT OUTREACH (OUTPATIENT)
Dept: ADMINISTRATIVE | Facility: HOSPITAL | Age: 46
End: 2021-03-19

## 2021-03-21 ENCOUNTER — PATIENT OUTREACH (OUTPATIENT)
Dept: ADMINISTRATIVE | Facility: OTHER | Age: 46
End: 2021-03-21

## 2021-03-22 ENCOUNTER — TELEPHONE (OUTPATIENT)
Dept: NEUROLOGY | Facility: CLINIC | Age: 46
End: 2021-03-22

## 2021-03-23 ENCOUNTER — PROCEDURE VISIT (OUTPATIENT)
Dept: NEUROLOGY | Facility: CLINIC | Age: 46
End: 2021-03-23
Payer: COMMERCIAL

## 2021-03-23 VITALS
BODY MASS INDEX: 39.99 KG/M2 | TEMPERATURE: 99 F | RESPIRATION RATE: 17 BRPM | HEIGHT: 70 IN | DIASTOLIC BLOOD PRESSURE: 87 MMHG | SYSTOLIC BLOOD PRESSURE: 151 MMHG | WEIGHT: 279.31 LBS | HEART RATE: 68 BPM

## 2021-03-23 DIAGNOSIS — G43.719 INTRACTABLE CHRONIC MIGRAINE WITHOUT AURA AND WITHOUT STATUS MIGRAINOSUS: Primary | ICD-10-CM

## 2021-03-23 PROCEDURE — 64615 CHEMODENERV MUSC MIGRAINE: CPT | Mod: S$GLB,,, | Performed by: PSYCHIATRY & NEUROLOGY

## 2021-03-23 PROCEDURE — 64615 PR CHEMODENERVATION OF MUSCLE FOR CHRONIC MIGRAINE: ICD-10-PCS | Mod: S$GLB,,, | Performed by: PSYCHIATRY & NEUROLOGY

## 2021-04-12 ENCOUNTER — TELEPHONE (OUTPATIENT)
Dept: PHARMACY | Facility: CLINIC | Age: 46
End: 2021-04-12

## 2021-04-15 ENCOUNTER — OFFICE VISIT (OUTPATIENT)
Dept: PSYCHIATRY | Facility: CLINIC | Age: 46
End: 2021-04-15
Payer: COMMERCIAL

## 2021-04-15 VITALS
SYSTOLIC BLOOD PRESSURE: 136 MMHG | WEIGHT: 273.06 LBS | DIASTOLIC BLOOD PRESSURE: 88 MMHG | BODY MASS INDEX: 39.09 KG/M2 | HEIGHT: 70 IN | HEART RATE: 71 BPM

## 2021-04-15 DIAGNOSIS — I10 HYPERTENSION, UNSPECIFIED TYPE: ICD-10-CM

## 2021-04-15 DIAGNOSIS — E66.9 OBESITY (BMI 30-39.9): ICD-10-CM

## 2021-04-15 DIAGNOSIS — G43.001 MIGRAINE WITHOUT AURA AND WITH STATUS MIGRAINOSUS, NOT INTRACTABLE: ICD-10-CM

## 2021-04-15 DIAGNOSIS — F33.2 MDD (MAJOR DEPRESSIVE DISORDER), RECURRENT SEVERE, WITHOUT PSYCHOSIS: Primary | ICD-10-CM

## 2021-04-15 DIAGNOSIS — F41.1 GAD (GENERALIZED ANXIETY DISORDER): ICD-10-CM

## 2021-04-15 DIAGNOSIS — R53.82 CHRONIC FATIGUE: ICD-10-CM

## 2021-04-15 PROCEDURE — 99215 OFFICE O/P EST HI 40 MIN: CPT | Mod: S$GLB,,, | Performed by: PSYCHIATRY & NEUROLOGY

## 2021-04-15 PROCEDURE — 99999 PR PBB SHADOW E&M-EST. PATIENT-LVL IV: CPT | Mod: PBBFAC,,, | Performed by: PSYCHIATRY & NEUROLOGY

## 2021-04-15 PROCEDURE — 1126F AMNT PAIN NOTED NONE PRSNT: CPT | Mod: S$GLB,,, | Performed by: PSYCHIATRY & NEUROLOGY

## 2021-04-15 PROCEDURE — 3008F PR BODY MASS INDEX (BMI) DOCUMENTED: ICD-10-PCS | Mod: CPTII,S$GLB,, | Performed by: PSYCHIATRY & NEUROLOGY

## 2021-04-15 PROCEDURE — 99215 PR OFFICE/OUTPT VISIT, EST, LEVL V, 40-54 MIN: ICD-10-PCS | Mod: S$GLB,,, | Performed by: PSYCHIATRY & NEUROLOGY

## 2021-04-15 PROCEDURE — 99999 PR PBB SHADOW E&M-EST. PATIENT-LVL IV: ICD-10-PCS | Mod: PBBFAC,,, | Performed by: PSYCHIATRY & NEUROLOGY

## 2021-04-15 PROCEDURE — 3008F BODY MASS INDEX DOCD: CPT | Mod: CPTII,S$GLB,, | Performed by: PSYCHIATRY & NEUROLOGY

## 2021-04-15 PROCEDURE — 1126F PR PAIN SEVERITY QUANTIFIED, NO PAIN PRESENT: ICD-10-PCS | Mod: S$GLB,,, | Performed by: PSYCHIATRY & NEUROLOGY

## 2021-04-15 RX ORDER — DEXTROAMPHETAMINE SACCHARATE, AMPHETAMINE ASPARTATE, DEXTROAMPHETAMINE SULFATE AND AMPHETAMINE SULFATE 1.25; 1.25; 1.25; 1.25 MG/1; MG/1; MG/1; MG/1
5 TABLET ORAL 2 TIMES DAILY
Qty: 60 TABLET | Refills: 0 | Status: SHIPPED | OUTPATIENT
Start: 2021-04-15 | End: 2021-04-30 | Stop reason: DRUGHIGH

## 2021-04-30 RX ORDER — DEXTROAMPHETAMINE SACCHARATE, AMPHETAMINE ASPARTATE, DEXTROAMPHETAMINE SULFATE AND AMPHETAMINE SULFATE 5; 5; 5; 5 MG/1; MG/1; MG/1; MG/1
1 TABLET ORAL 2 TIMES DAILY
Qty: 60 TABLET | Refills: 0 | Status: SHIPPED | OUTPATIENT
Start: 2021-04-30 | End: 2021-06-11 | Stop reason: SDUPTHER

## 2021-05-07 ENCOUNTER — TELEPHONE (OUTPATIENT)
Dept: NEUROLOGY | Facility: CLINIC | Age: 46
End: 2021-05-07

## 2021-05-07 ENCOUNTER — TELEPHONE (OUTPATIENT)
Dept: PHARMACY | Facility: CLINIC | Age: 46
End: 2021-05-07

## 2021-05-18 ENCOUNTER — OFFICE VISIT (OUTPATIENT)
Dept: PSYCHIATRY | Facility: CLINIC | Age: 46
End: 2021-05-18
Payer: COMMERCIAL

## 2021-05-18 VITALS
HEIGHT: 70 IN | WEIGHT: 272.38 LBS | BODY MASS INDEX: 38.99 KG/M2 | SYSTOLIC BLOOD PRESSURE: 159 MMHG | HEART RATE: 74 BPM | DIASTOLIC BLOOD PRESSURE: 97 MMHG

## 2021-05-18 DIAGNOSIS — R53.82 CHRONIC FATIGUE: ICD-10-CM

## 2021-05-18 DIAGNOSIS — G43.001 MIGRAINE WITHOUT AURA AND WITH STATUS MIGRAINOSUS, NOT INTRACTABLE: ICD-10-CM

## 2021-05-18 DIAGNOSIS — E66.9 OBESITY (BMI 30-39.9): ICD-10-CM

## 2021-05-18 DIAGNOSIS — F41.1 GAD (GENERALIZED ANXIETY DISORDER): Primary | ICD-10-CM

## 2021-05-18 DIAGNOSIS — F33.2 MDD (MAJOR DEPRESSIVE DISORDER), RECURRENT SEVERE, WITHOUT PSYCHOSIS: ICD-10-CM

## 2021-05-18 DIAGNOSIS — I10 HYPERTENSION, UNSPECIFIED TYPE: ICD-10-CM

## 2021-05-18 PROCEDURE — 90833 PSYTX W PT W E/M 30 MIN: CPT | Mod: S$GLB,,, | Performed by: PSYCHIATRY & NEUROLOGY

## 2021-05-18 PROCEDURE — 1126F AMNT PAIN NOTED NONE PRSNT: CPT | Mod: S$GLB,,, | Performed by: PSYCHIATRY & NEUROLOGY

## 2021-05-18 PROCEDURE — 99999 PR PBB SHADOW E&M-EST. PATIENT-LVL III: ICD-10-PCS | Mod: PBBFAC,,, | Performed by: PSYCHIATRY & NEUROLOGY

## 2021-05-18 PROCEDURE — 3008F BODY MASS INDEX DOCD: CPT | Mod: CPTII,S$GLB,, | Performed by: PSYCHIATRY & NEUROLOGY

## 2021-05-18 PROCEDURE — 3008F PR BODY MASS INDEX (BMI) DOCUMENTED: ICD-10-PCS | Mod: CPTII,S$GLB,, | Performed by: PSYCHIATRY & NEUROLOGY

## 2021-05-18 PROCEDURE — 99999 PR PBB SHADOW E&M-EST. PATIENT-LVL III: CPT | Mod: PBBFAC,,, | Performed by: PSYCHIATRY & NEUROLOGY

## 2021-05-18 PROCEDURE — 99214 OFFICE O/P EST MOD 30 MIN: CPT | Mod: S$GLB,,, | Performed by: PSYCHIATRY & NEUROLOGY

## 2021-05-18 PROCEDURE — 90833 PR PSYCHOTHERAPY W/PATIENT W/E&M, 30 MIN (ADD ON): ICD-10-PCS | Mod: S$GLB,,, | Performed by: PSYCHIATRY & NEUROLOGY

## 2021-05-18 PROCEDURE — 1126F PR PAIN SEVERITY QUANTIFIED, NO PAIN PRESENT: ICD-10-PCS | Mod: S$GLB,,, | Performed by: PSYCHIATRY & NEUROLOGY

## 2021-05-18 PROCEDURE — 99214 PR OFFICE/OUTPT VISIT, EST, LEVL IV, 30-39 MIN: ICD-10-PCS | Mod: S$GLB,,, | Performed by: PSYCHIATRY & NEUROLOGY

## 2021-05-19 RX ORDER — LITHIUM CARBONATE 150 MG/1
CAPSULE ORAL
Qty: 60 CAPSULE | Refills: 2 | Status: SHIPPED | OUTPATIENT
Start: 2021-05-19 | End: 2021-06-22

## 2021-06-12 NOTE — TELEPHONE ENCOUNTER
----- Message from Betty Bush MD sent at 11/2/2020 12:14 PM CST -----  Please let Caroline know that she DOES have alpha thalassemia trait. Jose F, her , should be checked for thalassemia, too, so we can see if they are at risk for having a baby with significant thalassemia. He can come in for a lab visit, or just to discuss, at her next prenatal.     The quad screen is NORMAL. NO sign of birth defects.   Left message for pt to call back to review labs.

## 2021-06-14 ENCOUNTER — PATIENT OUTREACH (OUTPATIENT)
Dept: ADMINISTRATIVE | Facility: OTHER | Age: 46
End: 2021-06-14

## 2021-06-15 ENCOUNTER — TELEPHONE (OUTPATIENT)
Dept: NEUROLOGY | Facility: CLINIC | Age: 46
End: 2021-06-15

## 2021-06-15 ENCOUNTER — PROCEDURE VISIT (OUTPATIENT)
Dept: NEUROLOGY | Facility: CLINIC | Age: 46
End: 2021-06-15
Payer: COMMERCIAL

## 2021-06-15 VITALS
WEIGHT: 268.06 LBS | SYSTOLIC BLOOD PRESSURE: 145 MMHG | BODY MASS INDEX: 38.38 KG/M2 | HEIGHT: 70 IN | TEMPERATURE: 98 F | DIASTOLIC BLOOD PRESSURE: 91 MMHG | RESPIRATION RATE: 17 BRPM | HEART RATE: 83 BPM

## 2021-06-15 DIAGNOSIS — G43.719 INTRACTABLE CHRONIC MIGRAINE WITHOUT AURA AND WITHOUT STATUS MIGRAINOSUS: Primary | ICD-10-CM

## 2021-06-15 PROCEDURE — 64615 PR CHEMODENERVATION OF MUSCLE FOR CHRONIC MIGRAINE: ICD-10-PCS | Mod: S$GLB,,, | Performed by: PSYCHIATRY & NEUROLOGY

## 2021-06-15 PROCEDURE — 64615 CHEMODENERV MUSC MIGRAINE: CPT | Mod: S$GLB,,, | Performed by: PSYCHIATRY & NEUROLOGY

## 2021-06-15 RX ORDER — RIMEGEPANT SULFATE 75 MG/75MG
TABLET, ORALLY DISINTEGRATING ORAL
Qty: 8 TABLET | Refills: 11 | Status: SHIPPED | OUTPATIENT
Start: 2021-06-15 | End: 2022-07-01 | Stop reason: SDUPTHER

## 2021-06-16 ENCOUNTER — TELEPHONE (OUTPATIENT)
Dept: NEUROLOGY | Facility: CLINIC | Age: 46
End: 2021-06-16

## 2021-06-16 ENCOUNTER — TELEPHONE (OUTPATIENT)
Dept: PHARMACY | Facility: CLINIC | Age: 46
End: 2021-06-16

## 2021-06-22 ENCOUNTER — OFFICE VISIT (OUTPATIENT)
Dept: PSYCHIATRY | Facility: CLINIC | Age: 46
End: 2021-06-22
Payer: COMMERCIAL

## 2021-06-22 DIAGNOSIS — E66.9 OBESITY (BMI 30-39.9): ICD-10-CM

## 2021-06-22 DIAGNOSIS — F33.2 MDD (MAJOR DEPRESSIVE DISORDER), RECURRENT SEVERE, WITHOUT PSYCHOSIS: ICD-10-CM

## 2021-06-22 DIAGNOSIS — R53.82 CHRONIC FATIGUE: ICD-10-CM

## 2021-06-22 DIAGNOSIS — F41.1 GAD (GENERALIZED ANXIETY DISORDER): Primary | ICD-10-CM

## 2021-06-22 PROCEDURE — 99214 PR OFFICE/OUTPT VISIT, EST, LEVL IV, 30-39 MIN: ICD-10-PCS | Mod: 95,,, | Performed by: PSYCHIATRY & NEUROLOGY

## 2021-06-22 PROCEDURE — 90833 PR PSYCHOTHERAPY W/PATIENT W/E&M, 30 MIN (ADD ON): ICD-10-PCS | Mod: 95,,, | Performed by: PSYCHIATRY & NEUROLOGY

## 2021-06-22 PROCEDURE — 90833 PSYTX W PT W E/M 30 MIN: CPT | Mod: 95,,, | Performed by: PSYCHIATRY & NEUROLOGY

## 2021-06-22 PROCEDURE — 99214 OFFICE O/P EST MOD 30 MIN: CPT | Mod: 95,,, | Performed by: PSYCHIATRY & NEUROLOGY

## 2021-06-23 ENCOUNTER — TELEPHONE (OUTPATIENT)
Dept: PSYCHIATRY | Facility: CLINIC | Age: 46
End: 2021-06-23

## 2021-07-07 ENCOUNTER — PATIENT MESSAGE (OUTPATIENT)
Dept: ADMINISTRATIVE | Facility: HOSPITAL | Age: 46
End: 2021-07-07

## 2021-08-02 ENCOUNTER — OFFICE VISIT (OUTPATIENT)
Dept: FAMILY MEDICINE | Facility: CLINIC | Age: 46
End: 2021-08-02
Payer: COMMERCIAL

## 2021-08-02 VITALS
BODY MASS INDEX: 38.25 KG/M2 | HEIGHT: 70 IN | OXYGEN SATURATION: 97 % | WEIGHT: 267.19 LBS | TEMPERATURE: 99 F | SYSTOLIC BLOOD PRESSURE: 124 MMHG | RESPIRATION RATE: 18 BRPM | HEART RATE: 85 BPM | DIASTOLIC BLOOD PRESSURE: 70 MMHG

## 2021-08-02 DIAGNOSIS — H26.9 CATARACT, UNSPECIFIED CATARACT TYPE, UNSPECIFIED LATERALITY: Primary | ICD-10-CM

## 2021-08-02 PROCEDURE — 1160F PR REVIEW ALL MEDS BY PRESCRIBER/CLIN PHARMACIST DOCUMENTED: ICD-10-PCS | Mod: CPTII,S$GLB,, | Performed by: NURSE PRACTITIONER

## 2021-08-02 PROCEDURE — 99999 PR PBB SHADOW E&M-EST. PATIENT-LVL III: ICD-10-PCS | Mod: PBBFAC,,, | Performed by: NURSE PRACTITIONER

## 2021-08-02 PROCEDURE — 1160F RVW MEDS BY RX/DR IN RCRD: CPT | Mod: CPTII,S$GLB,, | Performed by: NURSE PRACTITIONER

## 2021-08-02 PROCEDURE — 3078F DIAST BP <80 MM HG: CPT | Mod: CPTII,S$GLB,, | Performed by: NURSE PRACTITIONER

## 2021-08-02 PROCEDURE — 3078F PR MOST RECENT DIASTOLIC BLOOD PRESSURE < 80 MM HG: ICD-10-PCS | Mod: CPTII,S$GLB,, | Performed by: NURSE PRACTITIONER

## 2021-08-02 PROCEDURE — 99213 PR OFFICE/OUTPT VISIT, EST, LEVL III, 20-29 MIN: ICD-10-PCS | Mod: S$GLB,,, | Performed by: NURSE PRACTITIONER

## 2021-08-02 PROCEDURE — 1126F PR PAIN SEVERITY QUANTIFIED, NO PAIN PRESENT: ICD-10-PCS | Mod: CPTII,S$GLB,, | Performed by: NURSE PRACTITIONER

## 2021-08-02 PROCEDURE — 3008F BODY MASS INDEX DOCD: CPT | Mod: CPTII,S$GLB,, | Performed by: NURSE PRACTITIONER

## 2021-08-02 PROCEDURE — 3074F PR MOST RECENT SYSTOLIC BLOOD PRESSURE < 130 MM HG: ICD-10-PCS | Mod: CPTII,S$GLB,, | Performed by: NURSE PRACTITIONER

## 2021-08-02 PROCEDURE — 1159F PR MEDICATION LIST DOCUMENTED IN MEDICAL RECORD: ICD-10-PCS | Mod: CPTII,S$GLB,, | Performed by: NURSE PRACTITIONER

## 2021-08-02 PROCEDURE — 1159F MED LIST DOCD IN RCRD: CPT | Mod: CPTII,S$GLB,, | Performed by: NURSE PRACTITIONER

## 2021-08-02 PROCEDURE — 3008F PR BODY MASS INDEX (BMI) DOCUMENTED: ICD-10-PCS | Mod: CPTII,S$GLB,, | Performed by: NURSE PRACTITIONER

## 2021-08-02 PROCEDURE — 1126F AMNT PAIN NOTED NONE PRSNT: CPT | Mod: CPTII,S$GLB,, | Performed by: NURSE PRACTITIONER

## 2021-08-02 PROCEDURE — 3074F SYST BP LT 130 MM HG: CPT | Mod: CPTII,S$GLB,, | Performed by: NURSE PRACTITIONER

## 2021-08-02 PROCEDURE — 99213 OFFICE O/P EST LOW 20 MIN: CPT | Mod: S$GLB,,, | Performed by: NURSE PRACTITIONER

## 2021-08-02 PROCEDURE — 99999 PR PBB SHADOW E&M-EST. PATIENT-LVL III: CPT | Mod: PBBFAC,,, | Performed by: NURSE PRACTITIONER

## 2021-08-02 RX ORDER — LOTEPREDNOL ETABONATE 3.8 MG/G
1 GEL OPHTHALMIC 2 TIMES DAILY
COMMUNITY
Start: 2021-07-29 | End: 2021-11-23

## 2021-08-02 RX ORDER — PEG 400/HYPROMELLOSE/GLYCERIN 1 %-0.36 %
DROPS OPHTHALMIC (EYE)
COMMUNITY
Start: 2021-07-29 | End: 2021-11-23

## 2021-08-02 RX ORDER — BROMFENAC SODIUM 0.7 MG/ML
1 SOLUTION/ DROPS OPHTHALMIC DAILY
COMMUNITY
Start: 2021-07-29 | End: 2021-11-23

## 2021-08-02 RX ORDER — OFLOXACIN 3 MG/ML
SOLUTION/ DROPS OPHTHALMIC
COMMUNITY
Start: 2021-07-29 | End: 2021-11-23

## 2021-08-12 DIAGNOSIS — I10 ESSENTIAL HYPERTENSION: ICD-10-CM

## 2021-08-13 RX ORDER — METOPROLOL SUCCINATE 200 MG/1
TABLET, EXTENDED RELEASE ORAL
Qty: 90 TABLET | Refills: 0 | Status: SHIPPED | OUTPATIENT
Start: 2021-08-13 | End: 2021-11-10

## 2021-08-19 ENCOUNTER — PATIENT MESSAGE (OUTPATIENT)
Dept: PSYCHIATRY | Facility: CLINIC | Age: 46
End: 2021-08-19

## 2021-08-19 DIAGNOSIS — I10 ESSENTIAL HYPERTENSION: ICD-10-CM

## 2021-08-19 RX ORDER — METOPROLOL SUCCINATE 200 MG/1
200 TABLET, EXTENDED RELEASE ORAL DAILY
Qty: 90 TABLET | Refills: 0 | Status: CANCELLED | OUTPATIENT
Start: 2021-08-19

## 2021-08-20 RX ORDER — DEXTROAMPHETAMINE SACCHARATE, AMPHETAMINE ASPARTATE, DEXTROAMPHETAMINE SULFATE AND AMPHETAMINE SULFATE 5; 5; 5; 5 MG/1; MG/1; MG/1; MG/1
1 TABLET ORAL 2 TIMES DAILY
Qty: 60 TABLET | Refills: 0 | Status: SHIPPED | OUTPATIENT
Start: 2021-08-20 | End: 2021-09-28 | Stop reason: SDUPTHER

## 2021-08-27 DIAGNOSIS — G43.719 INTRACTABLE CHRONIC MIGRAINE WITHOUT AURA AND WITHOUT STATUS MIGRAINOSUS: ICD-10-CM

## 2021-08-27 RX ORDER — GALCANEZUMAB 120 MG/ML
120 INJECTION, SOLUTION SUBCUTANEOUS
Qty: 1 ML | Refills: 11 | Status: SHIPPED | OUTPATIENT
Start: 2021-08-27 | End: 2022-08-09 | Stop reason: SDUPTHER

## 2021-09-01 ENCOUNTER — PATIENT OUTREACH (OUTPATIENT)
Dept: ADMINISTRATIVE | Facility: OTHER | Age: 46
End: 2021-09-01

## 2021-09-01 DIAGNOSIS — Z12.31 ENCOUNTER FOR SCREENING MAMMOGRAM FOR MALIGNANT NEOPLASM OF BREAST: Primary | ICD-10-CM

## 2021-09-07 ENCOUNTER — PROCEDURE VISIT (OUTPATIENT)
Dept: NEUROLOGY | Facility: CLINIC | Age: 46
End: 2021-09-07
Payer: COMMERCIAL

## 2021-09-07 VITALS
RESPIRATION RATE: 16 BRPM | BODY MASS INDEX: 37.26 KG/M2 | WEIGHT: 266.13 LBS | SYSTOLIC BLOOD PRESSURE: 148 MMHG | HEIGHT: 71 IN | DIASTOLIC BLOOD PRESSURE: 96 MMHG | TEMPERATURE: 98 F | HEART RATE: 70 BPM

## 2021-09-07 DIAGNOSIS — G43.719 INTRACTABLE CHRONIC MIGRAINE WITHOUT AURA AND WITHOUT STATUS MIGRAINOSUS: Primary | ICD-10-CM

## 2021-09-07 PROCEDURE — 64615 CHEMODENERV MUSC MIGRAINE: CPT | Mod: S$GLB,,, | Performed by: PSYCHIATRY & NEUROLOGY

## 2021-09-07 PROCEDURE — 64615 PR CHEMODENERVATION OF MUSCLE FOR CHRONIC MIGRAINE: ICD-10-PCS | Mod: S$GLB,,, | Performed by: PSYCHIATRY & NEUROLOGY

## 2021-09-29 ENCOUNTER — TELEPHONE (OUTPATIENT)
Dept: PHARMACY | Facility: CLINIC | Age: 46
End: 2021-09-29

## 2021-09-29 ENCOUNTER — PATIENT MESSAGE (OUTPATIENT)
Dept: NEUROLOGY | Facility: CLINIC | Age: 46
End: 2021-09-29

## 2021-10-11 DIAGNOSIS — I10 HYPERTENSION, UNSPECIFIED TYPE: ICD-10-CM

## 2021-10-11 RX ORDER — CANDESARTAN 32 MG/1
TABLET ORAL
Qty: 90 TABLET | Refills: 3 | Status: SHIPPED | OUTPATIENT
Start: 2021-10-11 | End: 2022-04-19 | Stop reason: ALTCHOICE

## 2021-10-13 ENCOUNTER — OFFICE VISIT (OUTPATIENT)
Dept: PSYCHIATRY | Facility: CLINIC | Age: 46
End: 2021-10-13
Payer: COMMERCIAL

## 2021-10-13 VITALS
SYSTOLIC BLOOD PRESSURE: 139 MMHG | HEART RATE: 79 BPM | WEIGHT: 269.5 LBS | HEIGHT: 71 IN | DIASTOLIC BLOOD PRESSURE: 90 MMHG | BODY MASS INDEX: 37.73 KG/M2

## 2021-10-13 DIAGNOSIS — E66.9 OBESITY (BMI 30-39.9): ICD-10-CM

## 2021-10-13 DIAGNOSIS — F33.2 MDD (MAJOR DEPRESSIVE DISORDER), RECURRENT SEVERE, WITHOUT PSYCHOSIS: Primary | ICD-10-CM

## 2021-10-13 DIAGNOSIS — Z00.00 ANNUAL PHYSICAL EXAM: Primary | ICD-10-CM

## 2021-10-13 DIAGNOSIS — F41.1 GAD (GENERALIZED ANXIETY DISORDER): ICD-10-CM

## 2021-10-13 PROCEDURE — 3080F DIAST BP >= 90 MM HG: CPT | Mod: CPTII,S$GLB,, | Performed by: PSYCHIATRY & NEUROLOGY

## 2021-10-13 PROCEDURE — 3080F PR MOST RECENT DIASTOLIC BLOOD PRESSURE >= 90 MM HG: ICD-10-PCS | Mod: CPTII,S$GLB,, | Performed by: PSYCHIATRY & NEUROLOGY

## 2021-10-13 PROCEDURE — 99999 PR PBB SHADOW E&M-EST. PATIENT-LVL II: CPT | Mod: PBBFAC,,, | Performed by: PSYCHIATRY & NEUROLOGY

## 2021-10-13 PROCEDURE — 3008F PR BODY MASS INDEX (BMI) DOCUMENTED: ICD-10-PCS | Mod: CPTII,S$GLB,, | Performed by: PSYCHIATRY & NEUROLOGY

## 2021-10-13 PROCEDURE — 99214 PR OFFICE/OUTPT VISIT, EST, LEVL IV, 30-39 MIN: ICD-10-PCS | Mod: S$GLB,,, | Performed by: PSYCHIATRY & NEUROLOGY

## 2021-10-13 PROCEDURE — 4010F PR ACE/ARB THEARPY RXD/TAKEN: ICD-10-PCS | Mod: CPTII,S$GLB,, | Performed by: PSYCHIATRY & NEUROLOGY

## 2021-10-13 PROCEDURE — 4010F ACE/ARB THERAPY RXD/TAKEN: CPT | Mod: CPTII,S$GLB,, | Performed by: PSYCHIATRY & NEUROLOGY

## 2021-10-13 PROCEDURE — 3075F PR MOST RECENT SYSTOLIC BLOOD PRESS GE 130-139MM HG: ICD-10-PCS | Mod: CPTII,S$GLB,, | Performed by: PSYCHIATRY & NEUROLOGY

## 2021-10-13 PROCEDURE — 3008F BODY MASS INDEX DOCD: CPT | Mod: CPTII,S$GLB,, | Performed by: PSYCHIATRY & NEUROLOGY

## 2021-10-13 PROCEDURE — 99999 PR PBB SHADOW E&M-EST. PATIENT-LVL II: ICD-10-PCS | Mod: PBBFAC,,, | Performed by: PSYCHIATRY & NEUROLOGY

## 2021-10-13 PROCEDURE — 3075F SYST BP GE 130 - 139MM HG: CPT | Mod: CPTII,S$GLB,, | Performed by: PSYCHIATRY & NEUROLOGY

## 2021-10-13 PROCEDURE — 99214 OFFICE O/P EST MOD 30 MIN: CPT | Mod: S$GLB,,, | Performed by: PSYCHIATRY & NEUROLOGY

## 2021-10-13 RX ORDER — VENLAFAXINE HYDROCHLORIDE 150 MG/1
CAPSULE, EXTENDED RELEASE ORAL
Qty: 30 CAPSULE | Refills: 2 | Status: SHIPPED | OUTPATIENT
Start: 2021-10-13 | End: 2022-02-09

## 2021-10-13 RX ORDER — CLONAZEPAM 0.5 MG/1
TABLET ORAL
Qty: 15 TABLET | Refills: 2 | Status: SHIPPED | OUTPATIENT
Start: 2021-10-13 | End: 2022-04-12 | Stop reason: SDUPTHER

## 2021-10-13 RX ORDER — BUSPIRONE HYDROCHLORIDE 15 MG/1
15 TABLET ORAL 3 TIMES DAILY
Qty: 90 TABLET | Refills: 2 | Status: SHIPPED | OUTPATIENT
Start: 2021-10-13 | End: 2022-02-14

## 2021-10-15 ENCOUNTER — PATIENT MESSAGE (OUTPATIENT)
Dept: ADMINISTRATIVE | Facility: OTHER | Age: 46
End: 2021-10-15
Payer: COMMERCIAL

## 2021-10-15 ENCOUNTER — PATIENT MESSAGE (OUTPATIENT)
Dept: ADMINISTRATIVE | Facility: OTHER | Age: 46
End: 2021-10-15

## 2021-11-09 DIAGNOSIS — I10 ESSENTIAL HYPERTENSION: ICD-10-CM

## 2021-11-10 RX ORDER — METOPROLOL SUCCINATE 200 MG/1
TABLET, EXTENDED RELEASE ORAL
Qty: 90 TABLET | Refills: 3 | Status: SHIPPED | OUTPATIENT
Start: 2021-11-10 | End: 2022-05-17

## 2021-11-24 ENCOUNTER — CLINICAL SUPPORT (OUTPATIENT)
Dept: INTERNAL MEDICINE | Facility: CLINIC | Age: 46
End: 2021-11-24
Attending: FAMILY MEDICINE
Payer: COMMERCIAL

## 2021-11-24 ENCOUNTER — HOSPITAL ENCOUNTER (OUTPATIENT)
Dept: RADIOLOGY | Facility: HOSPITAL | Age: 46
Discharge: HOME | End: 2021-11-24
Attending: FAMILY MEDICINE
Payer: COMMERCIAL

## 2021-11-24 ENCOUNTER — PATIENT MESSAGE (OUTPATIENT)
Dept: FAMILY MEDICINE | Facility: CLINIC | Age: 46
End: 2021-11-24

## 2021-11-24 ENCOUNTER — CLINICAL SUPPORT (OUTPATIENT)
Dept: CARDIOLOGY | Facility: HOSPITAL | Age: 46
End: 2021-11-24
Attending: FAMILY MEDICINE
Payer: COMMERCIAL

## 2021-11-24 ENCOUNTER — OFFICE VISIT (OUTPATIENT)
Dept: FAMILY MEDICINE | Facility: CLINIC | Age: 46
End: 2021-11-24
Attending: FAMILY MEDICINE
Payer: COMMERCIAL

## 2021-11-24 VITALS
WEIGHT: 269 LBS | OXYGEN SATURATION: 97 % | TEMPERATURE: 98 F | HEIGHT: 71 IN | SYSTOLIC BLOOD PRESSURE: 124 MMHG | RESPIRATION RATE: 18 BRPM | WEIGHT: 271.81 LBS | HEIGHT: 71 IN | BODY MASS INDEX: 38.05 KG/M2 | BODY MASS INDEX: 37.66 KG/M2 | HEART RATE: 77 BPM | DIASTOLIC BLOOD PRESSURE: 70 MMHG

## 2021-11-24 DIAGNOSIS — Z00.00 PREVENTATIVE HEALTH CARE: Primary | ICD-10-CM

## 2021-11-24 DIAGNOSIS — Z12.31 ENCOUNTER FOR SCREENING MAMMOGRAM FOR MALIGNANT NEOPLASM OF BREAST: ICD-10-CM

## 2021-11-24 DIAGNOSIS — Z00.00 ANNUAL PHYSICAL EXAM: ICD-10-CM

## 2021-11-24 DIAGNOSIS — Z00.00 ANNUAL PHYSICAL EXAM: Primary | ICD-10-CM

## 2021-11-24 LAB
ALBUMIN SERPL BCP-MCNC: 4.1 G/DL (ref 3.5–5.2)
ALP SERPL-CCNC: 54 U/L (ref 55–135)
ALT SERPL W/O P-5'-P-CCNC: 40 U/L (ref 10–44)
ANION GAP SERPL CALC-SCNC: 12 MMOL/L (ref 8–16)
AST SERPL-CCNC: 23 U/L (ref 10–40)
BILIRUB SERPL-MCNC: 0.6 MG/DL (ref 0.1–1)
BUN SERPL-MCNC: 9 MG/DL (ref 6–20)
CALCIUM SERPL-MCNC: 10 MG/DL (ref 8.7–10.5)
CHLORIDE SERPL-SCNC: 105 MMOL/L (ref 95–110)
CHOLEST SERPL-MCNC: 237 MG/DL (ref 120–199)
CHOLEST/HDLC SERPL: 4 {RATIO} (ref 2–5)
CO2 SERPL-SCNC: 24 MMOL/L (ref 23–29)
CREAT SERPL-MCNC: 0.9 MG/DL (ref 0.5–1.4)
CV STRESS BASE HR: 84 BPM
DIASTOLIC BLOOD PRESSURE: 94 MMHG
ERYTHROCYTE [DISTWIDTH] IN BLOOD BY AUTOMATED COUNT: 12.7 % (ref 11.5–14.5)
EST. GFR  (AFRICAN AMERICAN): >60 ML/MIN/1.73 M^2
EST. GFR  (NON AFRICAN AMERICAN): >60 ML/MIN/1.73 M^2
ESTIMATED AVG GLUCOSE: 105 MG/DL (ref 68–131)
GLUCOSE SERPL-MCNC: 123 MG/DL (ref 70–110)
HBA1C MFR BLD: 5.3 % (ref 4–5.6)
HCT VFR BLD AUTO: 40.3 % (ref 37–48.5)
HDLC SERPL-MCNC: 59 MG/DL (ref 40–75)
HDLC SERPL: 24.9 % (ref 20–50)
HGB BLD-MCNC: 13.4 G/DL (ref 12–16)
LDLC SERPL CALC-MCNC: 150 MG/DL (ref 63–159)
MCH RBC QN AUTO: 30.1 PG (ref 27–31)
MCHC RBC AUTO-ENTMCNC: 33.3 G/DL (ref 32–36)
MCV RBC AUTO: 91 FL (ref 82–98)
NONHDLC SERPL-MCNC: 178 MG/DL
OHS CV CPX 1 MINUTE RECOVERY HEART RATE: 116 BPM
OHS CV CPX 85 PERCENT MAX PREDICTED HEART RATE MALE: 139
OHS CV CPX ESTIMATED METS: 9
OHS CV CPX MAX PREDICTED HEART RATE: 164
OHS CV CPX PATIENT IS FEMALE: 1
OHS CV CPX PATIENT IS MALE: 0
OHS CV CPX PEAK DIASTOLIC BLOOD PRESSURE: 66 MMHG
OHS CV CPX PEAK HEAR RATE: 142 BPM
OHS CV CPX PEAK RATE PRESSURE PRODUCT: NORMAL
OHS CV CPX PEAK SYSTOLIC BLOOD PRESSURE: 173 MMHG
OHS CV CPX PERCENT MAX PREDICTED HEART RATE ACHIEVED: 87
OHS CV CPX RATE PRESSURE PRODUCT PRESENTING: NORMAL
PLATELET # BLD AUTO: 261 K/UL (ref 150–450)
PMV BLD AUTO: 11.2 FL (ref 9.2–12.9)
POTASSIUM SERPL-SCNC: 4.3 MMOL/L (ref 3.5–5.1)
PROT SERPL-MCNC: 6.7 G/DL (ref 6–8.4)
RBC # BLD AUTO: 4.45 M/UL (ref 4–5.4)
SODIUM SERPL-SCNC: 141 MMOL/L (ref 136–145)
STRESS ECHO POST EXERCISE DUR MIN: 5 MINUTES
STRESS ECHO POST EXERCISE DUR SEC: 1 SECONDS
SYSTOLIC BLOOD PRESSURE: 145 MMHG
T4 FREE SERPL-MCNC: 0.77 NG/DL (ref 0.71–1.51)
TRIGL SERPL-MCNC: 140 MG/DL (ref 30–150)
TSH SERPL DL<=0.005 MIU/L-ACNC: 4.59 UIU/ML (ref 0.4–4)
WBC # BLD AUTO: 7.66 K/UL (ref 3.9–12.7)

## 2021-11-24 PROCEDURE — 80061 LIPID PANEL: CPT | Performed by: FAMILY MEDICINE

## 2021-11-24 PROCEDURE — 99999 PR PBB SHADOW E&M-EST. PATIENT-LVL III: ICD-10-PCS | Mod: PBBFAC,,, | Performed by: FAMILY MEDICINE

## 2021-11-24 PROCEDURE — 4010F PR ACE/ARB THEARPY RXD/TAKEN: ICD-10-PCS | Mod: CPTII,S$GLB,, | Performed by: FAMILY MEDICINE

## 2021-11-24 PROCEDURE — 77067 SCR MAMMO BI INCL CAD: CPT | Mod: 26,,, | Performed by: RADIOLOGY

## 2021-11-24 PROCEDURE — 93018 EXERCISE STRESS - EKG (CUPID ONLY): ICD-10-PCS | Mod: ,,, | Performed by: INTERNAL MEDICINE

## 2021-11-24 PROCEDURE — 93016 CV STRESS TEST SUPVJ ONLY: CPT | Mod: ,,, | Performed by: INTERNAL MEDICINE

## 2021-11-24 PROCEDURE — 84439 ASSAY OF FREE THYROXINE: CPT | Performed by: FAMILY MEDICINE

## 2021-11-24 PROCEDURE — 83036 HEMOGLOBIN GLYCOSYLATED A1C: CPT | Performed by: FAMILY MEDICINE

## 2021-11-24 PROCEDURE — 99999 PR PBB SHADOW E&M-EST. PATIENT-LVL III: CPT | Mod: PBBFAC,,, | Performed by: FAMILY MEDICINE

## 2021-11-24 PROCEDURE — 99396 PREV VISIT EST AGE 40-64: CPT | Mod: S$GLB,,, | Performed by: FAMILY MEDICINE

## 2021-11-24 PROCEDURE — 93018 CV STRESS TEST I&R ONLY: CPT | Mod: ,,, | Performed by: INTERNAL MEDICINE

## 2021-11-24 PROCEDURE — 99396 PR PREVENTIVE VISIT,EST,40-64: ICD-10-PCS | Mod: S$GLB,,, | Performed by: FAMILY MEDICINE

## 2021-11-24 PROCEDURE — 77063 MAMMO DIGITAL SCREENING BILAT WITH TOMO: ICD-10-PCS | Mod: 26,,, | Performed by: RADIOLOGY

## 2021-11-24 PROCEDURE — 80053 COMPREHEN METABOLIC PANEL: CPT | Mod: PO | Performed by: FAMILY MEDICINE

## 2021-11-24 PROCEDURE — 85027 COMPLETE CBC AUTOMATED: CPT | Mod: PO | Performed by: FAMILY MEDICINE

## 2021-11-24 PROCEDURE — 93016 EXERCISE STRESS - EKG (CUPID ONLY): ICD-10-PCS | Mod: ,,, | Performed by: INTERNAL MEDICINE

## 2021-11-24 PROCEDURE — 93017 CV STRESS TEST TRACING ONLY: CPT | Mod: PO

## 2021-11-24 PROCEDURE — 77063 BREAST TOMOSYNTHESIS BI: CPT | Mod: 26,,, | Performed by: RADIOLOGY

## 2021-11-24 PROCEDURE — 77067 MAMMO DIGITAL SCREENING BILAT WITH TOMO: ICD-10-PCS | Mod: 26,,, | Performed by: RADIOLOGY

## 2021-11-24 PROCEDURE — 77067 SCR MAMMO BI INCL CAD: CPT | Mod: TC,PO

## 2021-11-24 PROCEDURE — 84443 ASSAY THYROID STIM HORMONE: CPT | Performed by: FAMILY MEDICINE

## 2021-11-24 PROCEDURE — 4010F ACE/ARB THERAPY RXD/TAKEN: CPT | Mod: CPTII,S$GLB,, | Performed by: FAMILY MEDICINE

## 2021-11-30 PROCEDURE — 99457 RPM TX MGMT 1ST 20 MIN: CPT | Mod: S$GLB,,, | Performed by: FAMILY MEDICINE

## 2021-11-30 PROCEDURE — 99457 PR MONITORING, PHYSIOL PARAM, REMOTE, 1ST 20 MINS, PER MONTH: ICD-10-PCS | Mod: S$GLB,,, | Performed by: FAMILY MEDICINE

## 2021-12-03 RX ORDER — DEXTROAMPHETAMINE SACCHARATE, AMPHETAMINE ASPARTATE, DEXTROAMPHETAMINE SULFATE AND AMPHETAMINE SULFATE 5; 5; 5; 5 MG/1; MG/1; MG/1; MG/1
1 TABLET ORAL 2 TIMES DAILY
Qty: 60 TABLET | Refills: 0 | Status: SHIPPED | OUTPATIENT
Start: 2021-12-03 | End: 2022-01-13 | Stop reason: SDUPTHER

## 2021-12-07 ENCOUNTER — PROCEDURE VISIT (OUTPATIENT)
Dept: NEUROLOGY | Facility: CLINIC | Age: 46
End: 2021-12-07
Payer: COMMERCIAL

## 2021-12-07 VITALS
SYSTOLIC BLOOD PRESSURE: 164 MMHG | HEART RATE: 74 BPM | BODY MASS INDEX: 37.93 KG/M2 | WEIGHT: 270.94 LBS | HEIGHT: 71 IN | TEMPERATURE: 98 F | DIASTOLIC BLOOD PRESSURE: 90 MMHG | RESPIRATION RATE: 17 BRPM

## 2021-12-07 DIAGNOSIS — G43.719 INTRACTABLE CHRONIC MIGRAINE WITHOUT AURA AND WITHOUT STATUS MIGRAINOSUS: Primary | ICD-10-CM

## 2021-12-07 PROCEDURE — 64615 PR CHEMODENERVATION OF MUSCLE FOR CHRONIC MIGRAINE: ICD-10-PCS | Mod: S$GLB,,, | Performed by: PSYCHIATRY & NEUROLOGY

## 2021-12-07 PROCEDURE — 64615 CHEMODENERV MUSC MIGRAINE: CPT | Mod: S$GLB,,, | Performed by: PSYCHIATRY & NEUROLOGY

## 2022-01-13 ENCOUNTER — OFFICE VISIT (OUTPATIENT)
Dept: PSYCHIATRY | Facility: CLINIC | Age: 47
End: 2022-01-13
Payer: COMMERCIAL

## 2022-01-13 DIAGNOSIS — F33.2 MDD (MAJOR DEPRESSIVE DISORDER), RECURRENT SEVERE, WITHOUT PSYCHOSIS: Primary | ICD-10-CM

## 2022-01-13 DIAGNOSIS — F41.1 GAD (GENERALIZED ANXIETY DISORDER): ICD-10-CM

## 2022-01-13 DIAGNOSIS — E66.9 OBESITY (BMI 30-39.9): ICD-10-CM

## 2022-01-13 PROCEDURE — 4010F ACE/ARB THERAPY RXD/TAKEN: CPT | Mod: CPTII,95,, | Performed by: PSYCHIATRY & NEUROLOGY

## 2022-01-13 PROCEDURE — 4010F PR ACE/ARB THEARPY RXD/TAKEN: ICD-10-PCS | Mod: CPTII,95,, | Performed by: PSYCHIATRY & NEUROLOGY

## 2022-01-13 PROCEDURE — 99214 PR OFFICE/OUTPT VISIT, EST, LEVL IV, 30-39 MIN: ICD-10-PCS | Mod: 95,,, | Performed by: PSYCHIATRY & NEUROLOGY

## 2022-01-13 PROCEDURE — 99214 OFFICE O/P EST MOD 30 MIN: CPT | Mod: 95,,, | Performed by: PSYCHIATRY & NEUROLOGY

## 2022-01-13 RX ORDER — DEXTROAMPHETAMINE SACCHARATE, AMPHETAMINE ASPARTATE, DEXTROAMPHETAMINE SULFATE AND AMPHETAMINE SULFATE 5; 5; 5; 5 MG/1; MG/1; MG/1; MG/1
1 TABLET ORAL 2 TIMES DAILY
Qty: 60 TABLET | Refills: 0 | Status: SHIPPED | OUTPATIENT
Start: 2022-01-13 | End: 2022-02-22 | Stop reason: SDUPTHER

## 2022-01-13 NOTE — PROGRESS NOTES
"The patient location is: [facility]  The chief complaint leading to consultation is: anxiety/mood f/u    Visit type: audiovisual    Face to Face time with patient: 20-30 mins  25 minutes of total time spent on the encounter, which includes face to face time and non-face to face time preparing to see the patient (eg, review of tests), Obtaining and/or reviewing separately obtained history, Documenting clinical information in the electronic or other health record, Independently interpreting results (not separately reported) and communicating results to the patient/family/caregiver, or Care coordination (not separately reported).     Each patient to whom he or she provides medical services by telemedicine is:  (1) informed of the relationship between the physician and patient and the respective role of any other health care provider with respect to management of the patient; and (2) notified that he or she may decline to receive medical services by telemedicine and may withdraw from such care at any time.    ID: 43yo WF with a h/o depression and anxiety, no prior full psych eval w/i [facility]. Pt is a current pt of Anneliese Cochran LCSW who referred her for med mgmt along with , PCP. Pt has been on wait list since 3/2017. The pt is currently on xanax and cymbalta through PCP. Also on topamax and elavil for migraine mgmt through pcp    CC: depression.     Interim Hx: presents on time. Chart reviewed.     "i'm ok. Work is busy. 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 But i'm ok. We had a good holiday."     " wanted me to tell you that i'm doing really good. So that's good, but i'm having to check my blood pressure and it's really high and they have asked that I ask you about the stimulant." pt then shares that she is supposed to calibrate her machine because there is some evidence that it is not taking accurate pressures.     This is difficult because " "it has taken us YEARS to get this pt feeling well. Numerous med trials. She is now on stimulant AND effexor both could be contributing to the bp concerns but i'd like to be dealing with real data. I also would like her to engage in lifestyle modifications so that we may have the ability to cont the meds we have finally stabilized.     Pt expresses understanding- will calibrate machine, take some bps before and after meds and let me know if bp continues to be truly elevated. We may dec the stimulant dose and then cont to monitor bp- assess for impact on bp.    On Psychiatric ROS:    Endorses somewhat improved sleep maintenance- no longer using trazodone, improved anhedonia, improved feelings of helpless/hopeless, dec energy- "none", stable concentration, dec PMA "i do feel tired thinking about doing things."     Denies thoughts of SI/intent/plan.     Endorses feeling +easily overwhelmed, +ruminative thinking, +feeling tense/"on edge"- "on edge" "all the time"- mildly improved on newly added lithium    PPHx: Denies h/o self injury, inpt psych hospitalization, denies h/o suicide attempt     Current Psych Meds: effexor xr 150mg po qam, buspar TID, trazodone 100mg po qhs PRN insomnia, xanax 0.5mg po daily PRN anxiety. Lithium 150mg po bid  Past Psych Meds: ritalin, prozac, buspar PRN ("it helped with the stress of planning the wedding"), cymbalta 120mg po daily (ineffective), zoloft 150mg (ineffective), prozac 20mg po qam, xanax 0.5mg po BID PRN anxiety, wellbutrin xl 150mg po qam, fetzima 40mg po qam, abilify 2mg, pristiq 100mg qam     PMHx: migraines (since childhood)- botox as txmt, monthly "they're well managed"    SubstHx:   T- none  E- very rare due to migraine  D- none  Caffeine- 2 dr.peppers/day    FamPHx: none    Musculoskeletal:  Muscle strength/Tone: no dyskinesia/ no tremor  Gait/Station- non antalgic, no assistance needed    MSE: appears stated age, well groomed, appropriate dress, engages well with " "examiner. Good e/c. Speech reg rate and vol, nonpressured. Mood is "i'm ok and tiff thinks i'm doing really good." Affect congruent. Sensorium fully intact. Oriented to date/day/location, current events. Narrative memory intact. Intellectual function is avg based on vocab and basic fund of knowledge. Thought is c/l/gd. No tangentiality or circumstantiality. No FOI/SULTANA. Denies SI/HI. Denies A/VH. No evidence of delusions. Insight and Judgment intact.     There were no vitals taken for this visit.    Results for orders placed or performed in visit on 11/24/21   Exercise Stress - EKG   Result Value Ref Range    85% Max Predicted      Max Predicted      [facility] CV CPX PATIENT IS MALE 0.0     [facility] CV CPX PATIENT IS FEMALE 1.0     HR at rest 84 bpm    Systolic blood pressure 145 mmHg    Diastolic blood pressure 94 mmHg    RPP 12,180     Exercise duration (min) 5 minutes    Exercise duration (sec) 1 seconds    Peak  bpm    Peak Systolic  mmHg    Peak Diatolic BP 66 mmHg    Peak RPP 24,566     Estimated METs 9.0     % Max HR Achieved 87     1 Minute Recovery  bpm       Suicide Risk Assessment:   Protective- age, gender, no prior attempts, no prior hospitalizations, no family h/o attempts, no ongoing substance abuse, no psychosis, , denies SI/intent/plan, seeking treatment, access to treatment, future oriented, good primary support, no access to firearms    Risk- race, ongoing Axis I sxs, no children    **Pt is at LOW imminent and long term risk of suicide given current risk factors.    Assessment:  43yo WF with a h/o depression and anxiety, no prior full psych eval w/i [facility]. Pt is a current pt of CYRUS BarraganW who referred her for med mgmt along with , PCP. Pt has been on wait list since 3/2017. The pt is currently on xanax and cymbalta through PCP. Also on topamax and elavil for migraine mgmt through pcp/neuro. On eval today she meets criteria for MDD, " "recurrent, severe w/o psychotic sxs despite a recent inc in cymbalta to 120mg po daily. Pt has been on cymbalta x 10yrs. Only recalls previous trial of prozac which was d/c'd for unknown reasons. Now requiring xanax daily- some days 2 tabs (ie:1mg total daily). Some room for behavioral interventions, as well and the pt is motivated. Has good ability to adhere to the new txmt plan. We tapered off cymbalta and started a trial of zoloft. Will discuss diet and exercise interventions more fully over course of txmt. today she has been on 100mg of zoloft x 1mo. Continues to state she does not feel "any different"- will inc one last titration to 150mg prior to discussing transition to alternate med. Have discussed trial of wellbutrin next as daytime lethargy and dec'd motivation are now the primary concerns to the pt, however current level of anxiety leading to xanax use 2x/d. May also consider mthfr mutation with some methyl folate supplement. Will add time released melatonin today for sleep. wgt loss would be an added benefit. Cont therapy as scheduled. No acute safety concerns. We added wellbutrin and today she reports improved energy and focus at work, but also perhaps inc'd anxiety? Tapered off zoloft to make room for trial of prozac. Will also add trial of buspar 10mg po BID which was helpful many years ago per pt. Last appt she reported  and therapist notice improvement on prozac 20mg po qam- buspar 10mg po TID also helpful with decreased xanax use and less variation in anxiety through day. Will cont both but inc prozac to 40mg to get at ongoing depressive and anxiety sxs- she then reported inc'd anxiety and I wondered about over activation on the prozac inc. not certain if she can afford genetic testing but is motivated for a trial of the methylfolate supplement. Dec'd the prozac back to 20mg po qam which today she reported did not affect her in either direction- started methylfolate supplementation- today " "reporting some improvement but con'd lethargy, lack of motivation, and reported anxiety. Agrees to do genetic testing. Genetic testing results returned and we started fetzima based on those results. Now reporting "maybe some improvement" but also reporting "no change in depression or anxiety"? Does report inc'd "anger"? May be overstimulation on fetzima (NE) and wellbutrin (dopa)- inc'd fetzima to more std dose of 40mg and within days the pt d/c'd the wellbutrin due to inc'd irritability. Now noticing "may some improvement" and the pt is more expansive with affect today in appt. Concern about bp but drinking a starbucks nitro at time of appt- will monitor at home for a few days and let me know via portal- we may inc dose in the future. Today pt reporting cont'd difficulty with anxiety and mood- started a trial of adjunct txmt with abilify 2mg- there has been weight gain but improved anxiety- will try for weight loss with beh interventions and we will re discuss in 4wks. Called the clinic one week ago with inc'd depressive sxs and wanting to make changes. Stopped abilify and here today to make some cont'd changes. "No different"- w/o the abilify. Will taper off fetzima and start trial of pristiq. On f/u of pristiq pt is feeling "a little better" but anxiety continues sleep a little improved on trazodone 100mg will inc to 150mg po qhs and likely add adjunct txmt at next appt. Last appt reported cont'd anxiety- transitioned xanax to klonopin and started a trial of vraylar 1.5mg po daily. Last appt reported some improvement which she attributed more to the klonopin but I find her mood improved and motivation/energy have also improved- likely a reflection of the vraylar. She wanted to cont w/o change through holidays and assess. Last appt reported "no better."- low motivation, energy/lasting lethargy. Tapered off pristiq and started a trial of effexor xr- today tolerating the 75mg effexor xr well- will titrate to 150mg " "po qam. Reviewed labs including thyroid panel. Pt feels "maybe a little better" on the inc'd effexor. Will cont- today reporting once again "anxiety through the roof"? I'm not certain how to best move forward. Reporting consistently ambiguous "maybe a little better" is the consistent phrase or "not sure"- will try without vraylar x 2wks and reeval- if "no different" without it, we may try seroquel HS for sleep, anxiolytic property. May also try lamictal titration? Off label but we have had med failures on most ssri/snri trials and now potentially on adjunct txmts. Have really encouraged exercise to this patient. Today pt reports no better, reaching a low 2 wks ago,  "I'll do anything." discuss prior med trials - discuss possibly adding low dose lithium?  Will get pre lithium labwork done.     We added lithium 150mg po qam following discussion with pcp and hctz has been d/c'd. Today on close f/u the pt reporting "maybe a little better"-  believes she's "50% better. Very noticeable." no s/e's. We inc'd to 150mg po bid and pt continues to report improved mood, but low energy/motivation. Encouraged her to please engage in some behavioral changes- exercise primarily but also may be able to consider therapy moving forward.     While she acknowledges some benefit of lithium and klonopin, she also Has hx/o htn- now beter managed on atacand- also with migraines- now better on nurtec (although she had 3 migraines last menstrual cycle)   Pt had a hx/o ritalin trial as a child. "it did nothing". Will try adderall (in green column of previous genetic testing results). Will monitor s/e/vitals closely. The hope is that if this is effective for anxiety and energy component of depression we can d/c lithium and perhaps buspar, maybe dec effexor, as well. Tolerating well with some improvements in focus/attn, AND anxiety- started taper of lithium, buspar from tid to bid, make morning klonopin a PRN and document frequency of use. " "Pt MUCH improved today. No more lithium. buspar bid. Hasn't used klonopin frequently "maybe twice" since last appt. Will f/u in 3mos. Really encouraged an exercise intervention.     Axis I: MDD, recurrent, severe w/o psychotic sxs; PASTORA; adhd- ct  Axis II: none at this time   Axis III: HTN, migraines  Axis IV: occupational, chronic mental health  Axis V: GAF 70    Plan:   1. Cont effexor xr 150mg po qam   2. Cont buspar 15mg po TID PRN anxiety  3. Cont klonopin 0.25mg po daily PRN anxiety (rare use)   4. Cont adderall 20mg po bid   6. Encouraged exercise and therapy  7. RTC 3mos  8. labwork in a few weeks.    -Supportive therapy and psychoeducation provided  -R/B/SE's of medications discussed with the pt who expresses understanding and chooses to take medications as prescribed.   -Pt instructed to call clinic, 911 or go to nearest emergency room if sxs worsen or pt is in   crisis. The pt expresses understanding.                  "

## 2022-03-02 ENCOUNTER — PROCEDURE VISIT (OUTPATIENT)
Dept: NEUROLOGY | Facility: CLINIC | Age: 47
End: 2022-03-02
Payer: COMMERCIAL

## 2022-03-02 VITALS
SYSTOLIC BLOOD PRESSURE: 147 MMHG | RESPIRATION RATE: 17 BRPM | TEMPERATURE: 98 F | WEIGHT: 272.38 LBS | HEART RATE: 71 BPM | BODY MASS INDEX: 38.13 KG/M2 | HEIGHT: 71 IN | DIASTOLIC BLOOD PRESSURE: 92 MMHG

## 2022-03-02 DIAGNOSIS — G43.719 INTRACTABLE CHRONIC MIGRAINE WITHOUT AURA AND WITHOUT STATUS MIGRAINOSUS: Primary | ICD-10-CM

## 2022-03-02 PROCEDURE — 64615 PR CHEMODENERVATION OF MUSCLE FOR CHRONIC MIGRAINE: ICD-10-PCS | Mod: S$GLB,,, | Performed by: PSYCHIATRY & NEUROLOGY

## 2022-03-02 PROCEDURE — 64615 CHEMODENERV MUSC MIGRAINE: CPT | Mod: S$GLB,,, | Performed by: PSYCHIATRY & NEUROLOGY

## 2022-03-02 NOTE — PROCEDURES
Procedures     BOTOX PROCEDURE    PROCEDURE PERFORMED: Botulinum toxin injection (61449)    CLINICAL INDICATION: G43.719    A time out was conducted just before the start of the procedure to verify the correct patient and procedure, procedure location, and all relevant critical information.     Conventional methods of treatment but the patient has been unresponsive and refractory.The patient meets criteria for chronic headaches according to the ICHD-II, the patient has more than 15 headaches a month which last for more than 4 hours a day.    This is the first Botox injections and I am aiming for at least 50%  improvement in the patient's symptoms. Frequency of treatment is every 3 months unless no response to the treatments, at which time we will discontinue the injections.     DESCRIPTION OF PROCEDURE: After obtaining informed consent and under aseptic technique, a total of 155 units of botulinum toxin type A were injected in the following muscles: Procerus 5 units,  5 units bilaterally, frontalis 20 units, temporalis 20 units bilaterally, occipitalis 15 units, upper cervical paraspinals 10 units bilaterally and trapezius 15 units bilaterally. The patient was given a total of 155 units in 31 sites.The patient tolerated the procedure well. There were no complications. The patient was given a prescription for repeat treatment in 3 months.     Unavoidable waste 45 units          Saúl Braswell M.D  Medical Director, Headache and Facial Pain  [facility]

## 2022-03-21 ENCOUNTER — PATIENT MESSAGE (OUTPATIENT)
Dept: ADMINISTRATIVE | Facility: HOSPITAL | Age: 47
End: 2022-03-21
Payer: COMMERCIAL

## 2022-04-01 DIAGNOSIS — F33.2 MDD (MAJOR DEPRESSIVE DISORDER), RECURRENT SEVERE, WITHOUT PSYCHOSIS: ICD-10-CM

## 2022-04-01 RX ORDER — DEXTROAMPHETAMINE SACCHARATE, AMPHETAMINE ASPARTATE, DEXTROAMPHETAMINE SULFATE AND AMPHETAMINE SULFATE 5; 5; 5; 5 MG/1; MG/1; MG/1; MG/1
1 TABLET ORAL 2 TIMES DAILY
Qty: 60 TABLET | Refills: 0 | Status: SHIPPED | OUTPATIENT
Start: 2022-04-01 | End: 2022-04-12 | Stop reason: DRUGHIGH

## 2022-04-12 ENCOUNTER — OFFICE VISIT (OUTPATIENT)
Dept: PSYCHIATRY | Facility: CLINIC | Age: 47
End: 2022-04-12
Payer: COMMERCIAL

## 2022-04-12 VITALS
DIASTOLIC BLOOD PRESSURE: 80 MMHG | WEIGHT: 276.25 LBS | SYSTOLIC BLOOD PRESSURE: 127 MMHG | BODY MASS INDEX: 38.67 KG/M2 | HEIGHT: 71 IN | HEART RATE: 80 BPM

## 2022-04-12 DIAGNOSIS — F33.2 MDD (MAJOR DEPRESSIVE DISORDER), RECURRENT SEVERE, WITHOUT PSYCHOSIS: Primary | ICD-10-CM

## 2022-04-12 DIAGNOSIS — F41.1 GAD (GENERALIZED ANXIETY DISORDER): ICD-10-CM

## 2022-04-12 DIAGNOSIS — E66.9 OBESITY (BMI 30-39.9): ICD-10-CM

## 2022-04-12 PROCEDURE — 99214 PR OFFICE/OUTPT VISIT, EST, LEVL IV, 30-39 MIN: ICD-10-PCS | Mod: S$GLB,,, | Performed by: PSYCHIATRY & NEUROLOGY

## 2022-04-12 PROCEDURE — 4010F ACE/ARB THERAPY RXD/TAKEN: CPT | Mod: CPTII,S$GLB,, | Performed by: PSYCHIATRY & NEUROLOGY

## 2022-04-12 PROCEDURE — 3008F PR BODY MASS INDEX (BMI) DOCUMENTED: ICD-10-PCS | Mod: CPTII,S$GLB,, | Performed by: PSYCHIATRY & NEUROLOGY

## 2022-04-12 PROCEDURE — 99999 PR PBB SHADOW E&M-EST. PATIENT-LVL III: ICD-10-PCS | Mod: PBBFAC,,, | Performed by: PSYCHIATRY & NEUROLOGY

## 2022-04-12 PROCEDURE — 4010F PR ACE/ARB THEARPY RXD/TAKEN: ICD-10-PCS | Mod: CPTII,S$GLB,, | Performed by: PSYCHIATRY & NEUROLOGY

## 2022-04-12 PROCEDURE — 3079F PR MOST RECENT DIASTOLIC BLOOD PRESSURE 80-89 MM HG: ICD-10-PCS | Mod: CPTII,S$GLB,, | Performed by: PSYCHIATRY & NEUROLOGY

## 2022-04-12 PROCEDURE — 3079F DIAST BP 80-89 MM HG: CPT | Mod: CPTII,S$GLB,, | Performed by: PSYCHIATRY & NEUROLOGY

## 2022-04-12 PROCEDURE — 1159F PR MEDICATION LIST DOCUMENTED IN MEDICAL RECORD: ICD-10-PCS | Mod: CPTII,S$GLB,, | Performed by: PSYCHIATRY & NEUROLOGY

## 2022-04-12 PROCEDURE — 99214 OFFICE O/P EST MOD 30 MIN: CPT | Mod: S$GLB,,, | Performed by: PSYCHIATRY & NEUROLOGY

## 2022-04-12 PROCEDURE — 3074F PR MOST RECENT SYSTOLIC BLOOD PRESSURE < 130 MM HG: ICD-10-PCS | Mod: CPTII,S$GLB,, | Performed by: PSYCHIATRY & NEUROLOGY

## 2022-04-12 PROCEDURE — 99999 PR PBB SHADOW E&M-EST. PATIENT-LVL III: CPT | Mod: PBBFAC,,, | Performed by: PSYCHIATRY & NEUROLOGY

## 2022-04-12 PROCEDURE — 3074F SYST BP LT 130 MM HG: CPT | Mod: CPTII,S$GLB,, | Performed by: PSYCHIATRY & NEUROLOGY

## 2022-04-12 PROCEDURE — 3008F BODY MASS INDEX DOCD: CPT | Mod: CPTII,S$GLB,, | Performed by: PSYCHIATRY & NEUROLOGY

## 2022-04-12 PROCEDURE — 1159F MED LIST DOCD IN RCRD: CPT | Mod: CPTII,S$GLB,, | Performed by: PSYCHIATRY & NEUROLOGY

## 2022-04-12 RX ORDER — BUSPIRONE HYDROCHLORIDE 15 MG/1
TABLET ORAL
Qty: 90 TABLET | Refills: 2 | Status: SHIPPED | OUTPATIENT
Start: 2022-04-12 | End: 2022-06-15 | Stop reason: SDUPTHER

## 2022-04-12 RX ORDER — DEXTROAMPHETAMINE SACCHARATE, AMPHETAMINE ASPARTATE, DEXTROAMPHETAMINE SULFATE AND AMPHETAMINE SULFATE 3.75; 3.75; 3.75; 3.75 MG/1; MG/1; MG/1; MG/1
15 TABLET ORAL 2 TIMES DAILY
Qty: 60 TABLET | Refills: 0 | Status: SHIPPED | OUTPATIENT
Start: 2022-04-12 | End: 2022-06-15 | Stop reason: SDUPTHER

## 2022-04-12 RX ORDER — CLONAZEPAM 0.5 MG/1
TABLET ORAL
Qty: 15 TABLET | Refills: 2 | Status: SHIPPED | OUTPATIENT
Start: 2022-04-12 | End: 2022-06-15 | Stop reason: SDUPTHER

## 2022-04-12 RX ORDER — VENLAFAXINE HYDROCHLORIDE 150 MG/1
150 CAPSULE, EXTENDED RELEASE ORAL DAILY
Qty: 30 CAPSULE | Refills: 2 | Status: SHIPPED | OUTPATIENT
Start: 2022-04-12 | End: 2022-06-15 | Stop reason: SDUPTHER

## 2022-04-12 NOTE — PROGRESS NOTES
"ID: 41yo WF with a h/o depression and anxiety, no prior full psych eval w/i [facility] [employer] system. Pt is a current pt of Anneliese Cochran LCSW who referred her for med mgmt along with , PCP. Pt has been on wait list since 3/2017. The pt is currently on xanax and cymbalta through PCP. Also on topamax and elavil for migraine mgmt through pcp    CC: depression.     Interim Hx: presents on time. Chart reviewed.     Reports that her  feels she's doing well. "and I know that's funny that I always report that but i'm sometimes not as aware as he is."     Stable but we've had concerns about her bp. She's open to a trial of dec'd stimulant today.     On Psychiatric ROS:    Endorses somewhat improved sleep maintenance- no longer using trazodone, improved anhedonia, improved feelings of helpless/hopeless, dec energy- "none", stable concentration, dec PMA "i do feel tired thinking about doing things."     Denies thoughts of SI/intent/plan.     Endorses feeling +easily overwhelmed, +ruminative thinking, +feeling tense/"on edge"- "on edge" "all the time"- mildly improved on newly added lithium    PPHx: Denies h/o self injury, inpt psych hospitalization, denies h/o suicide attempt     Current Psych Meds: effexor xr 150mg po qam, buspar TID, trazodone 100mg po qhs PRN insomnia, xanax 0.5mg po daily PRN anxiety. Lithium 150mg po bid  Past Psych Meds: ritalin, prozac, buspar PRN ("it helped with the stress of planning the wedding"), cymbalta 120mg po daily (ineffective), zoloft 150mg (ineffective), prozac 20mg po qam, xanax 0.5mg po BID PRN anxiety, wellbutrin xl 150mg po qam, fetzima 40mg po qam, abilify 2mg, pristiq 100mg qam     PMHx: migraines (since childhood)- botox as txmt, monthly "they're well managed"    SubstHx:   T- none  E- very rare due to migraine  D- none  Caffeine- 2 dr.peppers/day    FamPHx: none    Musculoskeletal:  Muscle strength/Tone: no dyskinesia/ no tremor  Gait/Station- non antalgic, no " "assistance needed    MSE: appears stated age, well groomed, appropriate dress, engages well with examiner. Good e/c. Speech reg rate and vol, nonpressured. Mood is "i've been doing ok." Affect congruent. Sensorium fully intact. Oriented to date/day/location, current events. Narrative memory intact. Intellectual function is avg based on vocab and basic fund of knowledge. Thought is c/l/gd. No tangentiality or circumstantiality. No FOI/SULTANA. Denies SI/HI. Denies A/VH. No evidence of delusions. Insight and Judgment intact.     Blood pressure 127/80, pulse 80, height 5' 11" (1.803 m), weight 125.3 kg (276 lb 3.8 oz), last menstrual period 03/25/2022.    Results for orders placed or performed in visit on 11/24/21   Exercise Stress - EKG   Result Value Ref Range    85% Max Predicted      Max Predicted      OHS CV CPX PATIENT IS MALE 0.0     OHS CV CPX PATIENT IS FEMALE 1.0     HR at rest 84 bpm    Systolic blood pressure 145 mmHg    Diastolic blood pressure 94 mmHg    RPP 12,180     Exercise duration (min) 5 minutes    Exercise duration (sec) 1 seconds    Peak  bpm    Peak Systolic  mmHg    Peak Diatolic BP 66 mmHg    Peak RPP 24,566     Estimated METs 9.0     % Max HR Achieved 87     1 Minute Recovery  bpm       Suicide Risk Assessment:   Protective- age, gender, no prior attempts, no prior hospitalizations, no family h/o attempts, no ongoing substance abuse, no psychosis, , denies SI/intent/plan, seeking treatment, access to treatment, future oriented, good primary support, no access to firearms    Risk- race, ongoing Axis I sxs, no children    **Pt is at LOW imminent and long term risk of suicide given current risk factors.    Assessment:  45yo WF with a h/o depression and anxiety, no prior full psych eval w/i [facility] system. Pt is a current pt of Anneliese Cochran LCSW who referred her for med mgmt along with , PCP. Pt has been on wait list since 3/2017. The pt is " "currently on xanax and cymbalta through PCP. Also on topamax and elavil for migraine mgmt through pcp/neuro. On eval today she meets criteria for MDD, recurrent, severe w/o psychotic sxs despite a recent inc in cymbalta to 120mg po daily. Pt has been on cymbalta x 10yrs. Only recalls previous trial of prozac which was d/c'd for unknown reasons. Now requiring xanax daily- some days 2 tabs (ie:1mg total daily). Some room for behavioral interventions, as well and the pt is motivated. Has good ability to adhere to the new txmt plan. We tapered off cymbalta and started a trial of zoloft. Will discuss diet and exercise interventions more fully over course of txmt. today she has been on 100mg of zoloft x 1mo. Continues to state she does not feel "any different"- will inc one last titration to 150mg prior to discussing transition to alternate med. Have discussed trial of wellbutrin next as daytime lethargy and dec'd motivation are now the primary concerns to the pt, however current level of anxiety leading to xanax use 2x/d. May also consider mthfr mutation with some methyl folate supplement. Will add time released melatonin today for sleep. wgt loss would be an added benefit. Cont therapy as scheduled. No acute safety concerns. We added wellbutrin and today she reports improved energy and focus at work, but also perhaps inc'd anxiety? Tapered off zoloft to make room for trial of prozac. Will also add trial of buspar 10mg po BID which was helpful many years ago per pt. Last appt she reported  and therapist notice improvement on prozac 20mg po qam- buspar 10mg po TID also helpful with decreased xanax use and less variation in anxiety through day. Will cont both but inc prozac to 40mg to get at ongoing depressive and anxiety sxs- she then reported inc'd anxiety and I wondered about over activation on the prozac inc. not certain if she can afford genetic testing but is motivated for a trial of the methylfolate " "supplement. Dec'd the prozac back to 20mg po qam which today she reported did not affect her in either direction- started methylfolate supplementation- today reporting some improvement but con'd lethargy, lack of motivation, and reported anxiety. Agrees to do genetic testing. Genetic testing results returned and we started fetzima based on those results. Now reporting "maybe some improvement" but also reporting "no change in depression or anxiety"? Does report inc'd "anger"? May be overstimulation on fetzima (NE) and wellbutrin (dopa)- inc'd fetzima to more std dose of 40mg and within days the pt d/c'd the wellbutrin due to inc'd irritability. Now noticing "may some improvement" and the pt is more expansive with affect today in appt. Concern about bp but drinking a star[employer] nitro at time of appt- will monitor at home for a few days and let me know via portal- we may inc dose in the future. Today pt reporting cont'd difficulty with anxiety and mood- started a trial of adjunct txmt with abilify 2mg- there has been weight gain but improved anxiety- will try for weight loss with beh interventions and we will re discuss in 4wks. Called the clinic one week ago with inc'd depressive sxs and wanting to make changes. Stopped abilify and here today to make some cont'd changes. "No different"- w/o the abilify. Will taper off fetzima and start trial of pristiq. On f/u of pristiq pt is feeling "a little better" but anxiety continues sleep a little improved on trazodone 100mg will inc to 150mg po qhs and likely add adjunct txmt at next appt. Last appt reported cont'd anxiety- transitioned xanax to klonopin and started a trial of vraylar 1.5mg po daily. Last appt reported some improvement which she attributed more to the klonopin but I find her mood improved and motivation/energy have also improved- likely a reflection of the vraylar. She wanted to cont w/o change through holidays and assess. Last appt reported "no better."- low " "motivation, energy/lasting lethargy. Tapered off pristiq and started a trial of effexor xr- today tolerating the 75mg effexor xr well- will titrate to 150mg po qam. Reviewed labs including thyroid panel. Pt feels "maybe a little better" on the inc'd effexor. Will cont- today reporting once again "anxiety through the roof"? I'm not certain how to best move forward. Reporting consistently ambiguous "maybe a little better" is the consistent phrase or "not sure"- will try without vraylar x 2wks and reeval- if "no different" without it, we may try seroquel HS for sleep, anxiolytic property. May also try lamictal titration? Off label but we have had med failures on most ssri/snri trials and now potentially on adjunct txmts. Have really encouraged exercise to this patient. Today pt reports no better, reaching a low 2 wks ago,  "I'll do anything." discuss prior med trials - discuss possibly adding low dose lithium?  Will get pre lithium labwork done.     We added lithium 150mg po qam following discussion with pcp and hctz has been d/c'd. Today on close f/u the pt reporting "maybe a little better"-  believes she's "50% better. Very noticeable." no s/e's. We inc'd to 150mg po bid and pt continues to report improved mood, but low energy/motivation. Encouraged her to please engage in some behavioral changes- exercise primarily but also may be able to consider therapy moving forward.     While she acknowledges some benefit of lithium and klonopin, she also Has hx/o htn- now beter managed on atacand- also with migraines- now better on nurtec (although she had 3 migraines last menstrual cycle)   Pt had a hx/o ritalin trial as a child. "it did nothing". Will try adderall (in green column of previous genetic testing results). Will monitor s/e/vitals closely. The hope is that if this is effective for anxiety and energy component of depression we can d/c lithium and perhaps buspar, maybe dec effexor, as well. Tolerating well " "with some improvements in focus/attn, AND anxiety- started taper of lithium, buspar from tid to bid, make morning klonopin a PRN and document frequency of use. Pt MUCH improved today. No more lithium. buspar bid. Hasn't used klonopin frequently "maybe twice" since last appt. We've cont'd to have concerns about bp and pt is open to a trial of decreased stimulant dose today- trying to find lowest possible dose that still gives us the gains we've seen since starting.     Axis I: MDD, recurrent, severe w/o psychotic sxs; PASTORA; adhd- ct  Axis II: none at this time   Axis III: HTN, migraines  Axis IV: occupational, chronic mental health  Axis V: GAF 70    Plan:   1. Cont effexor xr 150mg po qam   2. Cont buspar 15mg po TID PRN anxiety  3. Cont klonopin 0.25mg po daily PRN anxiety (rare use)   4. Dec adderall to 15mg po bid   6. Encouraged exercise and therapy  7. RTC 1mo    -Supportive therapy and psychoeducation provided  -R/B/SE's of medications discussed with the pt who expresses understanding and chooses to take medications as prescribed.   -Pt instructed to call clinic, 911 or go to nearest emergency room if sxs worsen or pt is in   crisis. The pt expresses understanding.                  "

## 2022-04-13 ENCOUNTER — PATIENT MESSAGE (OUTPATIENT)
Dept: ADMINISTRATIVE | Facility: HOSPITAL | Age: 47
End: 2022-04-13
Payer: COMMERCIAL

## 2022-04-13 ENCOUNTER — PATIENT OUTREACH (OUTPATIENT)
Dept: ADMINISTRATIVE | Facility: HOSPITAL | Age: 47
End: 2022-04-13
Payer: COMMERCIAL

## 2022-04-13 NOTE — PROGRESS NOTES
Non-compliant [facility] report chart review - Chart review completed for the following HM test if overdue  (Mammogram, Colonoscopy, Cervical Cancer Screening,  Diabetic lab testing, and/or Dilated EYE EXAM)  04/13/2022    Care Everywhere and Media reports - updates requested and reviewed.        [facility] and [facility] reviewed.  Pap Smear and/or  LABS             Health Maintenance Due   Topic Date Due    Hepatitis C Screening  Never done    Cervical Cancer Screening  Never done    HIV Screening  Never done    Colorectal Cancer Screening  Never done

## 2022-05-09 ENCOUNTER — PATIENT MESSAGE (OUTPATIENT)
Dept: SMOKING CESSATION | Facility: CLINIC | Age: 47
End: 2022-05-09
Payer: COMMERCIAL

## 2022-05-11 ENCOUNTER — TELEPHONE (OUTPATIENT)
Dept: PHARMACY | Facility: CLINIC | Age: 47
End: 2022-05-11
Payer: COMMERCIAL

## 2022-05-25 ENCOUNTER — LAB VISIT (OUTPATIENT)
Dept: LAB | Facility: HOSPITAL | Age: 47
End: 2022-05-25
Attending: FAMILY MEDICINE
Payer: COMMERCIAL

## 2022-05-25 ENCOUNTER — PROCEDURE VISIT (OUTPATIENT)
Dept: NEUROLOGY | Facility: CLINIC | Age: 47
End: 2022-05-25
Payer: COMMERCIAL

## 2022-05-25 VITALS
HEART RATE: 95 BPM | BODY MASS INDEX: 38.4 KG/M2 | SYSTOLIC BLOOD PRESSURE: 115 MMHG | WEIGHT: 274.25 LBS | HEIGHT: 71 IN | DIASTOLIC BLOOD PRESSURE: 81 MMHG

## 2022-05-25 DIAGNOSIS — G43.719 INTRACTABLE CHRONIC MIGRAINE WITHOUT AURA AND WITHOUT STATUS MIGRAINOSUS: Primary | ICD-10-CM

## 2022-05-25 DIAGNOSIS — I10 HYPERTENSION, UNSPECIFIED TYPE: ICD-10-CM

## 2022-05-25 LAB
ANION GAP SERPL CALC-SCNC: 11 MMOL/L (ref 8–16)
BUN SERPL-MCNC: 16 MG/DL (ref 6–20)
CALCIUM SERPL-MCNC: 11 MG/DL (ref 8.7–10.5)
CHLORIDE SERPL-SCNC: 99 MMOL/L (ref 95–110)
CO2 SERPL-SCNC: 31 MMOL/L (ref 23–29)
CREAT SERPL-MCNC: 1.1 MG/DL (ref 0.5–1.4)
EST. GFR  (AFRICAN AMERICAN): >60 ML/MIN/1.73 M^2
EST. GFR  (NON AFRICAN AMERICAN): 59.1 ML/MIN/1.73 M^2
GLUCOSE SERPL-MCNC: 142 MG/DL (ref 70–110)
POTASSIUM SERPL-SCNC: 4.2 MMOL/L (ref 3.5–5.1)
SODIUM SERPL-SCNC: 141 MMOL/L (ref 136–145)

## 2022-05-25 PROCEDURE — 64615 PR CHEMODENERVATION OF MUSCLE FOR CHRONIC MIGRAINE: ICD-10-PCS | Mod: S$GLB,,, | Performed by: PSYCHIATRY & NEUROLOGY

## 2022-05-25 PROCEDURE — 64615 CHEMODENERV MUSC MIGRAINE: CPT | Mod: S$GLB,,, | Performed by: PSYCHIATRY & NEUROLOGY

## 2022-05-25 PROCEDURE — 36415 COLL VENOUS BLD VENIPUNCTURE: CPT | Mod: PO | Performed by: FAMILY MEDICINE

## 2022-05-25 PROCEDURE — 80048 BASIC METABOLIC PNL TOTAL CA: CPT | Performed by: FAMILY MEDICINE

## 2022-05-25 NOTE — PROCEDURES
Procedures   BOTOX PROCEDURE    PROCEDURE PERFORMED: Botulinum toxin injection (13700)    CLINICAL INDICATION: G43.719    A time out was conducted just before the start of the procedure to verify the correct patient and procedure, procedure location, and all relevant critical information.     Conventional methods of treatment but the patient has been unresponsive and refractory.The patient meets criteria for chronic headaches according to the ICHD-II, the patient has more than 15 headaches a month which last for more than 4 hours a day.    This is the first Botox injections and I am aiming for at least 50%  improvement in the patient's symptoms. Frequency of treatment is every 3 months unless no response to the treatments, at which time we will discontinue the injections.     DESCRIPTION OF PROCEDURE: After obtaining informed consent and under aseptic technique, a total of 155 units of botulinum toxin type A were injected in the following muscles: Procerus 5 units,  5 units bilaterally, frontalis 20 units, temporalis 20 units bilaterally, occipitalis 15 units, upper cervical paraspinals 10 units bilaterally and trapezius 15 units bilaterally. The patient was given a total of 155 units in 31 sites.The patient tolerated the procedure well. There were no complications. The patient was given a prescription for repeat treatment in 3 months.     Unavoidable waste 45 units          Saúl Braswell M.D  Medical Director, Headache and Facial Pain  [facility]

## 2022-06-15 ENCOUNTER — OFFICE VISIT (OUTPATIENT)
Dept: PSYCHIATRY | Facility: CLINIC | Age: 47
End: 2022-06-15
Payer: COMMERCIAL

## 2022-06-15 DIAGNOSIS — F41.1 GAD (GENERALIZED ANXIETY DISORDER): ICD-10-CM

## 2022-06-15 DIAGNOSIS — F33.2 MDD (MAJOR DEPRESSIVE DISORDER), RECURRENT SEVERE, WITHOUT PSYCHOSIS: Primary | ICD-10-CM

## 2022-06-15 DIAGNOSIS — E66.9 OBESITY (BMI 30-39.9): ICD-10-CM

## 2022-06-15 DIAGNOSIS — I10 PRIMARY HYPERTENSION: ICD-10-CM

## 2022-06-15 PROCEDURE — 4010F ACE/ARB THERAPY RXD/TAKEN: CPT | Mod: CPTII,95,, | Performed by: PSYCHIATRY & NEUROLOGY

## 2022-06-15 PROCEDURE — 99214 PR OFFICE/OUTPT VISIT, EST, LEVL IV, 30-39 MIN: ICD-10-PCS | Mod: 95,,, | Performed by: PSYCHIATRY & NEUROLOGY

## 2022-06-15 PROCEDURE — 4010F PR ACE/ARB THEARPY RXD/TAKEN: ICD-10-PCS | Mod: CPTII,95,, | Performed by: PSYCHIATRY & NEUROLOGY

## 2022-06-15 PROCEDURE — 99214 OFFICE O/P EST MOD 30 MIN: CPT | Mod: 95,,, | Performed by: PSYCHIATRY & NEUROLOGY

## 2022-06-15 RX ORDER — VENLAFAXINE HYDROCHLORIDE 150 MG/1
150 CAPSULE, EXTENDED RELEASE ORAL DAILY
Qty: 30 CAPSULE | Refills: 5 | Status: SHIPPED | OUTPATIENT
Start: 2022-06-15 | End: 2022-10-31

## 2022-06-15 RX ORDER — CLONAZEPAM 0.5 MG/1
TABLET ORAL
Qty: 15 TABLET | Refills: 2 | Status: SHIPPED | OUTPATIENT
Start: 2022-06-15 | End: 2022-10-03 | Stop reason: SDUPTHER

## 2022-06-15 RX ORDER — BUSPIRONE HYDROCHLORIDE 15 MG/1
TABLET ORAL
Qty: 90 TABLET | Refills: 5 | Status: SHIPPED | OUTPATIENT
Start: 2022-06-15 | End: 2022-12-12 | Stop reason: SDUPTHER

## 2022-06-15 RX ORDER — DEXTROAMPHETAMINE SACCHARATE, AMPHETAMINE ASPARTATE, DEXTROAMPHETAMINE SULFATE AND AMPHETAMINE SULFATE 3.75; 3.75; 3.75; 3.75 MG/1; MG/1; MG/1; MG/1
15 TABLET ORAL 2 TIMES DAILY
Qty: 60 TABLET | Refills: 0 | Status: SHIPPED | OUTPATIENT
Start: 2022-06-15 | End: 2022-07-12 | Stop reason: SDUPTHER

## 2022-06-15 NOTE — PROGRESS NOTES
"The patient location is: [facility]  The chief complaint leading to consultation is: anxiety/mood f/u    Visit type: audiovisual    Face to Face time with patient: 20 mins  20 minutes of total time spent on the encounter, which includes face to face time and non-face to face time preparing to see the patient (eg, review of tests), Obtaining and/or reviewing separately obtained history, Documenting clinical information in the electronic or other health record, Independently interpreting results (not separately reported) and communicating results to the patient/family/caregiver, or Care coordination (not separately reported).     Each patient to whom he or she provides medical services by telemedicine is:  (1) informed of the relationship between the physician and patient and the respective role of any other health care provider with respect to management of the patient; and (2) notified that he or she may decline to receive medical services by telemedicine and may withdraw from such care at any time.    ID: 43yo WF with a h/o depression and anxiety, no prior full psych eval w/i [facility]. Pt is a current pt of Anneliese Cochran LCSW who referred her for med mgmt along with , PCP. Pt has been on wait list since 3/2017. The pt is currently on xanax and cymbalta through PCP. Also on topamax and elavil for migraine mgmt through pcp    CC: depression.     Interim Hx: presents on time. Chart reviewed.     Pt reports she's been doing well and stable- has possibly figured out some of her chronic fatigue- was on metoprolol 200mg for the last year and just asked to have it decreased to 100mg and she feels "much much better"- blood pressure being managed by the htn team and they continue to work to get it stabilized but this has been an improvement.     Has some upcoming vacation time but plans were changed due to a family member with covid, but otherwise looking forward to time out of office.     Has " "done well on dec'd adderall dose- dec'd due to bp concerns- will cont meds without change today. Transition to another provider.     On Psychiatric ROS:    Endorses somewhat improved sleep maintenance- no longer using trazodone, improved anhedonia, improved feelings of helpless/hopeless, dec energy- "none", stable concentration, dec PMA "i do feel tired thinking about doing things."     Denies thoughts of SI/intent/plan.     Endorses feeling +easily overwhelmed, +ruminative thinking, +feeling tense/"on edge"- "on edge" "all the time"- mildly improved on newly added lithium    PPHx: Denies h/o self injury, inpt psych hospitalization, denies h/o suicide attempt     Current Psych Meds: effexor xr 150mg po qam, buspar TID, trazodone 100mg po qhs PRN insomnia, xanax 0.5mg po daily PRN anxiety. Lithium 150mg po bid  Past Psych Meds: ritalin, prozac, buspar PRN ("it helped with the stress of planning the wedding"), cymbalta 120mg po daily (ineffective), zoloft 150mg (ineffective), prozac 20mg po qam, xanax 0.5mg po BID PRN anxiety, wellbutrin xl 150mg po qam, fetzima 40mg po qam, abilify 2mg, pristiq 100mg qam     PMHx: migraines (since childhood)- botox as txmt, monthly "they're well managed"    SubstHx:   T- none  E- very rare due to migraine  D- none  Caffeine- 2 dr.peppers/day    FamPHx: none    Musculoskeletal:  Muscle strength/Tone: no dyskinesia/ no tremor  Gait/Station- non antalgic, no assistance needed    MSE: appears stated age, well groomed, appropriate dress, engages well with examiner. Good e/c. Speech reg rate and vol, nonpressured. Mood is "i've been doing ok." Affect congruent. Sensorium fully intact. Oriented to date/day/location, current events. Narrative memory intact. Intellectual function is avg based on vocab and basic fund of knowledge. Thought is c/l/gd. No tangentiality or circumstantiality. No FOI/SULTANA. Denies SI/HI. Denies A/VH. No evidence of delusions. Insight and Judgment intact.     There " were no vitals taken for this visit.    Results for orders placed or performed in visit on 05/25/22   Basic Metabolic Panel   Result Value Ref Range    Sodium 141 136 - 145 mmol/L    Potassium 4.2 3.5 - 5.1 mmol/L    Chloride 99 95 - 110 mmol/L    CO2 31 (H) 23 - 29 mmol/L    Glucose 142 (H) 70 - 110 mg/dL    BUN 16 6 - 20 mg/dL    Creatinine 1.1 0.5 - 1.4 mg/dL    Calcium 11.0 (H) 8.7 - 10.5 mg/dL    Anion Gap 11 8 - 16 mmol/L    eGFR if African American >60.0 >60 mL/min/1.73 m^2    eGFR if non  59.1 (A) >60 mL/min/1.73 m^2       Suicide Risk Assessment:   Protective- age, gender, no prior attempts, no prior hospitalizations, no family h/o attempts, no ongoing substance abuse, no psychosis, , denies SI/intent/plan, seeking treatment, access to treatment, future oriented, good primary support, no access to firearms    Risk- race, ongoing Axis I sxs, no children    **Pt is at LOW imminent and long term risk of suicide given current risk factors.    Assessment:  43yo WF with a h/o depression and anxiety, no prior full psych eval w/i [facility]. Pt is a current pt of CYRUS BarraganW who referred her for med mgmt along with , PCP. Pt has been on wait list since 3/2017. The pt is currently on xanax and cymbalta through PCP. Also on topamax and elavil for migraine mgmt through pcp/neuro. On eval today she meets criteria for MDD, recurrent, severe w/o psychotic sxs despite a recent inc in cymbalta to 120mg po daily. Pt has been on cymbalta x 10yrs. Only recalls previous trial of prozac which was d/c'd for unknown reasons. Now requiring xanax daily- some days 2 tabs (ie:1mg total daily). Some room for behavioral interventions, as well and the pt is motivated. Has good ability to adhere to the new txmt plan. We tapered off cymbalta and started a trial of zoloft. Will discuss diet and exercise interventions more fully over course of txmt. today she has been on 100mg of zoloft x  "1mo. Continues to state she does not feel "any different"- will inc one last titration to 150mg prior to discussing transition to alternate med. Have discussed trial of wellbutrin next as daytime lethargy and dec'd motivation are now the primary concerns to the pt, however current level of anxiety leading to xanax use 2x/d. May also consider mthfr mutation with some methyl folate supplement. Will add time released melatonin today for sleep. wgt loss would be an added benefit. Cont therapy as scheduled. No acute safety concerns. We added wellbutrin and today she reports improved energy and focus at work, but also perhaps inc'd anxiety? Tapered off zoloft to make room for trial of prozac. Will also add trial of buspar 10mg po BID which was helpful many years ago per pt. Last appt she reported  and therapist notice improvement on prozac 20mg po qam- buspar 10mg po TID also helpful with decreased xanax use and less variation in anxiety through day. Will cont both but inc prozac to 40mg to get at ongoing depressive and anxiety sxs- she then reported inc'd anxiety and I wondered about over activation on the prozac inc. not certain if she can afford genetic testing but is motivated for a trial of the methylfolate supplement. Dec'd the prozac back to 20mg po qam which today she reported did not affect her in either direction- started methylfolate supplementation- today reporting some improvement but con'd lethargy, lack of motivation, and reported anxiety. Agrees to do genetic testing. Genetic testing results returned and we started fetzima based on those results. Now reporting "maybe some improvement" but also reporting "no change in depression or anxiety"? Does report inc'd "anger"? May be overstimulation on fetzima (NE) and wellbutrin (dopa)- inc'd fetzima to more std dose of 40mg and within days the pt d/c'd the wellbutrin due to inc'd irritability. Now noticing "may some improvement" and the pt is more expansive " "with affect today in appt. Concern about bp but drinking a star[employer] nitro at time of appt- will monitor at home for a few days and let me know via portal- we may inc dose in the future. Today pt reporting cont'd difficulty with anxiety and mood- started a trial of adjunct txmt with abilify 2mg- there has been weight gain but improved anxiety- will try for weight loss with beh interventions and we will re discuss in 4wks. Called the clinic one week ago with inc'd depressive sxs and wanting to make changes. Stopped abilify and here today to make some cont'd changes. "No different"- w/o the abilify. Will taper off fetzima and start trial of pristiq. On f/u of pristiq pt is feeling "a little better" but anxiety continues sleep a little improved on trazodone 100mg will inc to 150mg po qhs and likely add adjunct txmt at next appt. Last appt reported cont'd anxiety- transitioned xanax to klonopin and started a trial of vraylar 1.5mg po daily. Last appt reported some improvement which she attributed more to the klonopin but I find her mood improved and motivation/energy have also improved- likely a reflection of the vraylar. She wanted to cont w/o change through holidays and assess. Last appt reported "no better."- low motivation, energy/lasting lethargy. Tapered off pristiq and started a trial of effexor xr- today tolerating the 75mg effexor xr well- will titrate to 150mg po qam. Reviewed labs including thyroid panel. Pt feels "maybe a little better" on the inc'd effexor. Will cont- today reporting once again "anxiety through the roof"? I'm not certain how to best move forward. Reporting consistently ambiguous "maybe a little better" is the consistent phrase or "not sure"- will try without vraylar x 2wks and reeval- if "no different" without it, we may try seroquel HS for sleep, anxiolytic property. May also try lamictal titration? Off label but we have had med failures on most ssri/snri trials and now potentially on " "adjunct txmts. Have really encouraged exercise to this patient. Today pt reports no better, reaching a low 2 wks ago,  "I'll do anything." discuss prior med trials - discuss possibly adding low dose lithium?  Will get pre lithium labwork done.     We added lithium 150mg po qam following discussion with pcp and hctz has been d/c'd. Today on close f/u the pt reporting "maybe a little better"-  believes she's "50% better. Very noticeable." no s/e's. We inc'd to 150mg po bid and pt continues to report improved mood, but low energy/motivation. Encouraged her to please engage in some behavioral changes- exercise primarily but also may be able to consider therapy moving forward.     While she acknowledges some benefit of lithium and klonopin, she also Has hx/o htn- now beter managed on atacand- also with migraines- now better on nurtec (although she had 3 migraines last menstrual cycle)   Pt had a hx/o ritalin trial as a child. "it did nothing". Will try adderall (in green column of previous genetic testing results). Will monitor s/e/vitals closely. The hope is that if this is effective for anxiety and energy component of depression we can d/c lithium and perhaps buspar, maybe dec effexor, as well. Tolerating well with some improvements in focus/attn, AND anxiety- started taper of lithium, buspar from tid to bid, make morning klonopin a PRN and document frequency of use. Pt MUCH improved today. No more lithium. buspar bid. Hasn't used klonopin frequently "maybe twice" since last appt. We've cont'd to have concerns about bp and pt is open to a trial of decreased stimulant dose today- trying to find lowest possible dose that still gives us the gains we've seen since starting. Doing well and dec'd metoprolol and has more energy- now considering how to dec further while controlling bp. Working with htn team on this- will transition txmt to alt provider.    Axis I: MDD, recurrent, severe w/o psychotic sxs; PASTORA; adhd- " ct  Axis II: none at this time   Axis III: HTN, migraines  Axis IV: occupational, chronic mental health  Axis V: GAF 70    Plan:   1. Cont effexor xr 150mg po qam   2. Cont buspar 15mg po TID PRN anxiety  3. Cont klonopin 0.25mg po daily PRN anxiety (rare use)   4. Cont adderall 15mg po bid   6. Encouraged exercise and therapy  7. RTC 3mos with new provider    -Supportive therapy and psychoeducation provided  -R/B/SE's of medications discussed with the pt who expresses understanding and chooses to take medications as prescribed.   -Pt instructed to call clinic, 911 or go to nearest emergency room if sxs worsen or pt is in   crisis. The pt expresses understanding.

## 2022-07-01 DIAGNOSIS — G43.719 INTRACTABLE CHRONIC MIGRAINE WITHOUT AURA AND WITHOUT STATUS MIGRAINOSUS: ICD-10-CM

## 2022-07-01 RX ORDER — RIMEGEPANT SULFATE 75 MG/75MG
TABLET, ORALLY DISINTEGRATING ORAL
Qty: 8 TABLET | Refills: 11 | Status: SHIPPED | OUTPATIENT
Start: 2022-07-01 | End: 2023-10-04 | Stop reason: SDUPTHER

## 2022-07-11 ENCOUNTER — TELEPHONE (OUTPATIENT)
Dept: NEUROLOGY | Facility: CLINIC | Age: 47
End: 2022-07-11
Payer: COMMERCIAL

## 2022-07-11 NOTE — TELEPHONE ENCOUNTER
Called patient and rescheduled Botox appointment. Date/Time confirmed. Verbalized understanding. Referral attached.

## 2022-07-22 ENCOUNTER — PATIENT MESSAGE (OUTPATIENT)
Dept: PSYCHIATRY | Facility: CLINIC | Age: 47
End: 2022-07-22
Payer: COMMERCIAL

## 2022-07-25 ENCOUNTER — PATIENT MESSAGE (OUTPATIENT)
Dept: ADMINISTRATIVE | Facility: HOSPITAL | Age: 47
End: 2022-07-25
Payer: COMMERCIAL

## 2022-07-25 ENCOUNTER — PATIENT OUTREACH (OUTPATIENT)
Dept: ADMINISTRATIVE | Facility: HOSPITAL | Age: 47
End: 2022-07-25
Payer: COMMERCIAL

## 2022-07-25 NOTE — PROGRESS NOTES
CERVICAL CANCER SCREENING  Non-compliant report chart audits for CERVICAL CANCER SCREENING     Outreach to patient in reference to a routine PAP SMEAR EXAM    Chart review completed - Care Everywhere and Media reports - updates requested and reviewed.        ACTIVE PORTAL MESSAGE SENT OR LETTER COMMUNICATION MAILED    COLON CANCER SCREENING  Non-compliant report chart audits for COLON CANCER SCREENING for Patients     Outreach to patient in reference to a COLON CANCER SCREENING  Chart review completed - Care Everywhere and Media reports - updates requested and reviewed.      ACTIVE PORTAL MESSAGE SENT OR LETTER COMMUNICATION MAILED

## 2022-08-04 ENCOUNTER — TELEPHONE (OUTPATIENT)
Dept: RESEARCH | Facility: HOSPITAL | Age: 47
End: 2022-08-04
Payer: COMMERCIAL

## 2022-08-04 NOTE — TELEPHONE ENCOUNTER
"Study Title:  The "RADIANCE TRIO CAP" Study A study of the [place] System in Clinical Hypertension   Protocol: [id number]  IRB #/ Consent Approval Date: 2021.286 / Approved 17-Feb-2022  Sponsor: [employer]  : Dr. GRICELDA Morfin    I attempted to contact Ms. Rocha about the RADIANCE TRIO CAP clinical trial. No answer. Voicemail left. Will attempt again at a future date.  "

## 2022-08-09 DIAGNOSIS — G43.719 INTRACTABLE CHRONIC MIGRAINE WITHOUT AURA AND WITHOUT STATUS MIGRAINOSUS: ICD-10-CM

## 2022-08-09 RX ORDER — GALCANEZUMAB 120 MG/ML
120 INJECTION, SOLUTION SUBCUTANEOUS
Qty: 1 ML | Refills: 11 | Status: SHIPPED | OUTPATIENT
Start: 2022-08-09 | End: 2023-09-06 | Stop reason: SDUPTHER

## 2022-08-23 ENCOUNTER — PROCEDURE VISIT (OUTPATIENT)
Dept: NEUROLOGY | Facility: CLINIC | Age: 47
End: 2022-08-23
Payer: COMMERCIAL

## 2022-08-23 ENCOUNTER — LAB VISIT (OUTPATIENT)
Dept: LAB | Facility: HOSPITAL | Age: 47
End: 2022-08-23
Payer: COMMERCIAL

## 2022-08-23 VITALS
HEIGHT: 71 IN | SYSTOLIC BLOOD PRESSURE: 122 MMHG | WEIGHT: 274 LBS | BODY MASS INDEX: 38.36 KG/M2 | HEART RATE: 79 BPM | RESPIRATION RATE: 17 BRPM | DIASTOLIC BLOOD PRESSURE: 78 MMHG

## 2022-08-23 DIAGNOSIS — I10 ESSENTIAL HYPERTENSION: ICD-10-CM

## 2022-08-23 DIAGNOSIS — G43.719 INTRACTABLE CHRONIC MIGRAINE WITHOUT AURA AND WITHOUT STATUS MIGRAINOSUS: Primary | ICD-10-CM

## 2022-08-23 DIAGNOSIS — I10 PRIMARY HYPERTENSION: ICD-10-CM

## 2022-08-23 LAB
ANION GAP SERPL CALC-SCNC: 8 MMOL/L (ref 8–16)
BUN SERPL-MCNC: 11 MG/DL (ref 6–20)
CALCIUM SERPL-MCNC: 10 MG/DL (ref 8.7–10.5)
CHLORIDE SERPL-SCNC: 102 MMOL/L (ref 95–110)
CHOLEST SERPL-MCNC: 220 MG/DL (ref 120–199)
CHOLEST/HDLC SERPL: 3.9 {RATIO} (ref 2–5)
CO2 SERPL-SCNC: 27 MMOL/L (ref 23–29)
CREAT SERPL-MCNC: 0.9 MG/DL (ref 0.5–1.4)
EST. GFR  (NO RACE VARIABLE): >60 ML/MIN/1.73 M^2
GLUCOSE SERPL-MCNC: 93 MG/DL (ref 70–110)
HDLC SERPL-MCNC: 56 MG/DL (ref 40–75)
HDLC SERPL: 25.5 % (ref 20–50)
LDLC SERPL CALC-MCNC: 124.4 MG/DL (ref 63–159)
NONHDLC SERPL-MCNC: 164 MG/DL
POTASSIUM SERPL-SCNC: 3.9 MMOL/L (ref 3.5–5.1)
SODIUM SERPL-SCNC: 137 MMOL/L (ref 136–145)
TRIGL SERPL-MCNC: 198 MG/DL (ref 30–150)

## 2022-08-23 PROCEDURE — 64615 CHEMODENERV MUSC MIGRAINE: CPT | Mod: S$GLB,,, | Performed by: PSYCHIATRY & NEUROLOGY

## 2022-08-23 PROCEDURE — 80048 BASIC METABOLIC PNL TOTAL CA: CPT | Performed by: FAMILY MEDICINE

## 2022-08-23 PROCEDURE — 80061 LIPID PANEL: CPT | Performed by: FAMILY MEDICINE

## 2022-08-23 PROCEDURE — 36415 COLL VENOUS BLD VENIPUNCTURE: CPT | Mod: PO | Performed by: FAMILY MEDICINE

## 2022-08-23 PROCEDURE — 64615 PR CHEMODENERVATION OF MUSCLE FOR CHRONIC MIGRAINE: ICD-10-PCS | Mod: S$GLB,,, | Performed by: PSYCHIATRY & NEUROLOGY

## 2022-08-23 NOTE — PROCEDURES
Procedures     BOTOX PROCEDURE    PROCEDURE PERFORMED: Botulinum toxin injection (07396)    CLINICAL INDICATION: G43.719    A time out was conducted just before the start of the procedure to verify the correct patient and procedure, procedure location, and all relevant critical information.     Conventional methods of treatment but the patient has been unresponsive and refractory.The patient meets criteria for chronic headaches according to the ICHD-II, the patient has more than 15 headaches a month which last for more than 4 hours a day.    This is the first Botox injections and I am aiming for at least 50%  improvement in the patient's symptoms. Frequency of treatment is every 3 months unless no response to the treatments, at which time we will discontinue the injections.     DESCRIPTION OF PROCEDURE: After obtaining informed consent and under aseptic technique, a total of 155 units of botulinum toxin type A were injected in the following muscles: Procerus 5 units,  5 units bilaterally, frontalis 20 units, temporalis 20 units bilaterally, occipitalis 15 units, upper cervical paraspinals 10 units bilaterally and trapezius 15 units bilaterally. The patient was given a total of 155 units in 31 sites.The patient tolerated the procedure well. There were no complications. The patient was given a prescription for repeat treatment in 3 months.     Unavoidable waste 45 units          Saúl Braswell M.D  Medical Director, Headache and Facial Pain  [facility]

## 2022-10-03 ENCOUNTER — OFFICE VISIT (OUTPATIENT)
Dept: PSYCHIATRY | Facility: CLINIC | Age: 47
End: 2022-10-03
Payer: COMMERCIAL

## 2022-10-03 VITALS
HEART RATE: 72 BPM | HEIGHT: 71 IN | WEIGHT: 276.25 LBS | BODY MASS INDEX: 38.67 KG/M2 | DIASTOLIC BLOOD PRESSURE: 79 MMHG | SYSTOLIC BLOOD PRESSURE: 115 MMHG

## 2022-10-03 DIAGNOSIS — F33.2 MAJOR DEPRESSIVE DISORDER, RECURRENT SEVERE WITHOUT PSYCHOTIC FEATURES: Primary | ICD-10-CM

## 2022-10-03 DIAGNOSIS — F90.2 ATTENTION DEFICIT HYPERACTIVITY DISORDER, COMBINED TYPE: ICD-10-CM

## 2022-10-03 DIAGNOSIS — F41.1 GAD (GENERALIZED ANXIETY DISORDER): ICD-10-CM

## 2022-10-03 PROCEDURE — 99999 PR PBB SHADOW E&M-EST. PATIENT-LVL IV: ICD-10-PCS | Mod: PBBFAC,,, | Performed by: PSYCHOLOGIST

## 2022-10-03 PROCEDURE — 3078F DIAST BP <80 MM HG: CPT | Mod: CPTII,S$GLB,, | Performed by: PSYCHOLOGIST

## 2022-10-03 PROCEDURE — 4010F PR ACE/ARB THEARPY RXD/TAKEN: ICD-10-PCS | Mod: CPTII,S$GLB,, | Performed by: PSYCHOLOGIST

## 2022-10-03 PROCEDURE — 1159F PR MEDICATION LIST DOCUMENTED IN MEDICAL RECORD: ICD-10-PCS | Mod: CPTII,S$GLB,, | Performed by: PSYCHOLOGIST

## 2022-10-03 PROCEDURE — 1159F MED LIST DOCD IN RCRD: CPT | Mod: CPTII,S$GLB,, | Performed by: PSYCHOLOGIST

## 2022-10-03 PROCEDURE — 4010F ACE/ARB THERAPY RXD/TAKEN: CPT | Mod: CPTII,S$GLB,, | Performed by: PSYCHOLOGIST

## 2022-10-03 PROCEDURE — 90832 PR PSYCHOTHERAPY W/PATIENT, 30 MIN: ICD-10-PCS | Mod: S$GLB,,, | Performed by: PSYCHOLOGIST

## 2022-10-03 PROCEDURE — 3008F BODY MASS INDEX DOCD: CPT | Mod: CPTII,S$GLB,, | Performed by: PSYCHOLOGIST

## 2022-10-03 PROCEDURE — 3074F SYST BP LT 130 MM HG: CPT | Mod: CPTII,S$GLB,, | Performed by: PSYCHOLOGIST

## 2022-10-03 PROCEDURE — 99999 PR PBB SHADOW E&M-EST. PATIENT-LVL IV: CPT | Mod: PBBFAC,,, | Performed by: PSYCHOLOGIST

## 2022-10-03 PROCEDURE — 3078F PR MOST RECENT DIASTOLIC BLOOD PRESSURE < 80 MM HG: ICD-10-PCS | Mod: CPTII,S$GLB,, | Performed by: PSYCHOLOGIST

## 2022-10-03 PROCEDURE — 90832 PSYTX W PT 30 MINUTES: CPT | Mod: S$GLB,,, | Performed by: PSYCHOLOGIST

## 2022-10-03 PROCEDURE — 3074F PR MOST RECENT SYSTOLIC BLOOD PRESSURE < 130 MM HG: ICD-10-PCS | Mod: CPTII,S$GLB,, | Performed by: PSYCHOLOGIST

## 2022-10-03 PROCEDURE — 90863 PR PHARMACOLOGIC MANAGEMENT: ICD-10-PCS | Mod: S$GLB,,, | Performed by: PSYCHOLOGIST

## 2022-10-03 PROCEDURE — 3008F PR BODY MASS INDEX (BMI) DOCUMENTED: ICD-10-PCS | Mod: CPTII,S$GLB,, | Performed by: PSYCHOLOGIST

## 2022-10-03 PROCEDURE — 90863 PHARMACOLOGIC MGMT W/PSYTX: CPT | Mod: S$GLB,,, | Performed by: PSYCHOLOGIST

## 2022-10-03 RX ORDER — CLONAZEPAM 0.5 MG/1
TABLET ORAL
Qty: 30 TABLET | Refills: 0 | Status: SHIPPED | OUTPATIENT
Start: 2022-10-03 | End: 2022-12-12 | Stop reason: SDUPTHER

## 2022-10-03 RX ORDER — VENLAFAXINE HYDROCHLORIDE 75 MG/1
75 CAPSULE, EXTENDED RELEASE ORAL DAILY
Qty: 30 CAPSULE | Refills: 1 | Status: SHIPPED | OUTPATIENT
Start: 2022-10-03 | End: 2022-10-31

## 2022-10-03 RX ORDER — DEXTROAMPHETAMINE SACCHARATE, AMPHETAMINE ASPARTATE MONOHYDRATE, DEXTROAMPHETAMINE SULFATE AND AMPHETAMINE SULFATE 5; 5; 5; 5 MG/1; MG/1; MG/1; MG/1
20 CAPSULE, EXTENDED RELEASE ORAL EVERY MORNING
Qty: 30 CAPSULE | Refills: 0 | Status: SHIPPED | OUTPATIENT
Start: 2022-10-03 | End: 2022-10-31 | Stop reason: SDUPTHER

## 2022-10-03 RX ORDER — KETOCONAZOLE 20 MG/G
CREAM TOPICAL
COMMUNITY
Start: 2022-08-31

## 2022-10-03 RX ORDER — HYDROCORTISONE 25 MG/G
CREAM TOPICAL
COMMUNITY
Start: 2022-08-31

## 2022-10-03 NOTE — PROGRESS NOTES
"Outpatient Psychiatry Follow-Up Visit    Clinical Status of Patient: Outpatient (Ambulatory)  10/03/2022     Chief Complaint: Depression, anxiety, ADHD     Interval History and Content of Current Session:  Interim Events/Subjective Report/Content of Current Session:  follow-up appointment.    Pt is a 46 y/o female with past psychiatric hx of depression, anxiety, ADHD who presents for follow-up treatment. Pt reported that she continues to have symptoms of depression and anxiety. She stated that Adderall helps her think clearer at work and maintain attention on tasks. She also noted that it helps decrease her worry and reduce symptoms of depression. Pt stated that previous provider had discussed extended release as an option but they never tried. Pt reported having a good support system around her and noted a history of therapy. We discussed the benefits of therapy and discussed the option of referral to therapy in the future. She agreed to consider this option.     Past Psychiatric hx: ritalin, prozac, buspar PRN ("it helped with the stress of planning the wedding"), cymbalta 120mg po daily (ineffective), zoloft 150mg (ineffective), prozac 20mg po qam, xanax 0.5mg po BID PRN anxiety, wellbutrin xl 150mg po qam, fetzima 40mg po qam, abilify 2mg, pristiq 100mg qam       Past Medical hx:   Past Medical History:   Diagnosis Date    Depression with anxiety     Fatigue     HTN (hypertension)     Iron deficiency anemia     Migraines     PCOS (polycystic ovarian syndrome)         Interim hx:  Medication changes last visit: First visit with this provider  Anxiety: continues  Depression: continues     Denies suicidal/homicidal ideations.  Denies hopelessness/worthlessness.    Denies auditory/visual hallucinations      Alcohol: Very infrequent due to migraines  Drug: Pt denied  Caffeine: 2 sodas per day  Tobacco: Pt denied      Review of Systems   PSYCHIATRIC: Pertinent items are noted in the narrative.        CONSTITUTIONAL: " weight stable    Past Medical, Family and Social History: The patient's past medical, family and social history have been reviewed and updated as appropriate within the electronic medical record. See encounter notes.     Current Psychiatric Medication: effexor xr 150mg po qam, buspar 15mg po TID PRN anxiety, klonopin 0.25mg po daily PRN anxiety (once per week), adderall 15mg po bid      Compliance: yes      Side effects: Pt denied     Risk Parameters:  Patient reports no suicidal ideation  Patient reports no homicidal ideation  Patient reports no self-injurious behavior  Patient reports no violent behavior     Exam (detailed: at least 9 elements; comprehensive: all 15 elements)   Constitutional  Vitals:  Most recent vital signs, dated less than 90 days prior to this appointment, were reviewed. BP: ()/()   Arterial Line BP: ()/()       General:  unremarkable, age appropriate, casual attire, good eye contact, good rapport       Musculoskeletal  Muscle Strength/Tone:  no flaccidity, no tremor    Gait & Station:  normal      Psychiatric                       Speech:  normal tone, normal rate, rhythm, and volume   Mood & Affect:   Depressed, anxious         Thought Process:   Goal directed; Linear    Associations:   intact   Thought Content:   No SI/HI, delusions, or paranoia, no AV/VH   Insight & Judgement:   Good, adequate to circumstances   Orientation:   grossly intact; alert and oriented x 4    Memory:  intact for content of interview    Language:  grossly intact, can repeat    Attention Span  : Grossly intact for content of interview   Fund of Knowledge:   intact and appropriate to age and level of education        Assessment and Diagnosis   Status/Progress: depression and anxiety      Impression: Pt presents with depression, anxiety, and ADHD. Pt continues to experience sxs of depression. Will increase effexor to 225 mg. Pt reported that the adderall has helped reduce anxiety and maintain focus throughout the day.  She is not taking over the weekend and skips Friday afternoon at times due to access. She denied any negative side effects. Will trial extended release Adderall.     Diagnosis:   Major Depressive Disorder, recurrent, severe w/o psychotic sxs  Generalized Anxiety Disorder  Attention Deficit Hyperactivity Disorder, Combined Type  Intervention/Counseling/Treatment Plan   Medication Management:      1. Increase effexor xr to 225 mg po qam    2. Cont buspar 15mg po TID PRN anxiety    3. Cont klonopin 0.25mg po daily PRN anxiety (once per week)     4. D/C adderall 15mg po bid and switch to Adderall XR 20 mg po qam      5. Call to report any worsening of symptoms or problems with the medication. Pt instructed to go to ER with thoughts of harming self, others.     6. Patient given contact # for psychotherapists at [facility] and also instructed to check with insurance for list of providers.     Psychotherapy: 20 minutes  Target symptoms: depression, ADHD  Why chosen therapy is appropriate versus another modality: CBT used; relevant to diagnosis, patient responds to this modality  Outcome monitoring methods: self-report, observation  Therapeutic intervention type: Cognitive Behavioral Therapy  Topics discussed/themes: building skills sets for symptom management, symptom recognition, nutrition, exercise  The patient's response to the intervention is accepting  Patient's response to treatment is: good.   The patient's progress toward treatment goals: improving     Return to clinic: 1 month    -Cognitive-Behavioral/Supportive therapy and psychoeducation provided  -R/B/SE's of medications discussed with the pt who expresses understanding and chooses to take medications as prescribed.   -Pt instructed to call clinic, 911 or go to nearest emergency room if sxs worsen or pt is in   crisis. The pt expresses understanding.    Wade Rodriguez, PhD, MP     Antidepressant/Antianxiety Medication Initiation:  Patient informed of  risks, benefits, and potential side effects of medication and accepts informed consent.  Common side effects include nausea, fatigue, headache, insomnia., Specifically discussed the possibility of new or worsening suicidal thoughts/depression.  Patient instructed to stop the medication immediately and seek urgent treatment if this occurs. Patient instructed not to abruptly discontinue medication without physician guidance except in cases of sudden onset or worsening of SI.       Stimulant Medication Initiation:  Patient advised of risks, benefits, and side effects of medication and accepts informed consent.  Common side effects include insomnia, irritability, jittery feeling, dry mouth, and agitation/hostility., Patient advised of potential addictive nature of medication and controlled substance classification.  Instructed to safeguard medication as no early refills can be given for lost or stolen medications.       Benzodiazepine Initiation:  Patient advised of the risks, benefits, and common side effects of medication and has accepted informed consent.  Common side effects include drowsiness, impaired coordination, possible memory loss., Patient advised NOT to operate a vehicle or machinery untiil they are sure how the medication will affec them.  Client also advised of danger of mixing this medication with alcohol., Patient advised of potential addictive nature of medication and need to safeguard medication as no early refills for lost or stolen medications can be authorized.       Pregnancy Warning:  Patient denies current pregnancy possibility.  Patient made aware that medications have not been proven safe in pregnancy and that she must maintain adequate birth control.  Patient instructed to alert us immediately if she becomes pregnant.

## 2022-10-06 ENCOUNTER — TELEPHONE (OUTPATIENT)
Dept: PHARMACY | Facility: CLINIC | Age: 47
End: 2022-10-06
Payer: COMMERCIAL

## 2022-10-28 ENCOUNTER — PATIENT MESSAGE (OUTPATIENT)
Dept: PSYCHIATRY | Facility: CLINIC | Age: 47
End: 2022-10-28
Payer: COMMERCIAL

## 2022-10-31 ENCOUNTER — OFFICE VISIT (OUTPATIENT)
Dept: PSYCHIATRY | Facility: CLINIC | Age: 47
End: 2022-10-31
Payer: COMMERCIAL

## 2022-10-31 ENCOUNTER — TELEPHONE (OUTPATIENT)
Dept: PSYCHIATRY | Facility: CLINIC | Age: 47
End: 2022-10-31
Payer: COMMERCIAL

## 2022-10-31 DIAGNOSIS — F41.1 GAD (GENERALIZED ANXIETY DISORDER): ICD-10-CM

## 2022-10-31 DIAGNOSIS — F90.2 ATTENTION DEFICIT HYPERACTIVITY DISORDER, COMBINED TYPE: Primary | ICD-10-CM

## 2022-10-31 DIAGNOSIS — F33.2 MDD (MAJOR DEPRESSIVE DISORDER), RECURRENT SEVERE, WITHOUT PSYCHOSIS: ICD-10-CM

## 2022-10-31 PROCEDURE — 99999 PR PBB SHADOW E&M-EST. PATIENT-LVL II: ICD-10-PCS | Mod: PBBFAC,,, | Performed by: PSYCHOLOGIST

## 2022-10-31 PROCEDURE — 4010F ACE/ARB THERAPY RXD/TAKEN: CPT | Mod: CPTII,95,, | Performed by: PSYCHOLOGIST

## 2022-10-31 PROCEDURE — 1159F MED LIST DOCD IN RCRD: CPT | Mod: CPTII,95,, | Performed by: PSYCHOLOGIST

## 2022-10-31 PROCEDURE — 90863 PHARMACOLOGIC MGMT W/PSYTX: CPT | Mod: 95,,, | Performed by: PSYCHOLOGIST

## 2022-10-31 PROCEDURE — 90863 PR PHARMACOLOGIC MANAGEMENT: ICD-10-PCS | Mod: 95,,, | Performed by: PSYCHOLOGIST

## 2022-10-31 PROCEDURE — 1159F PR MEDICATION LIST DOCUMENTED IN MEDICAL RECORD: ICD-10-PCS | Mod: CPTII,95,, | Performed by: PSYCHOLOGIST

## 2022-10-31 PROCEDURE — 99999 PR PBB SHADOW E&M-EST. PATIENT-LVL II: CPT | Mod: PBBFAC,,, | Performed by: PSYCHOLOGIST

## 2022-10-31 PROCEDURE — 4010F PR ACE/ARB THEARPY RXD/TAKEN: ICD-10-PCS | Mod: CPTII,95,, | Performed by: PSYCHOLOGIST

## 2022-10-31 PROCEDURE — 90832 PSYTX W PT 30 MINUTES: CPT | Mod: 95,,, | Performed by: PSYCHOLOGIST

## 2022-10-31 PROCEDURE — 90832 PR PSYCHOTHERAPY W/PATIENT, 30 MIN: ICD-10-PCS | Mod: 95,,, | Performed by: PSYCHOLOGIST

## 2022-10-31 RX ORDER — VENLAFAXINE HYDROCHLORIDE 150 MG/1
300 CAPSULE, EXTENDED RELEASE ORAL DAILY
Qty: 60 CAPSULE | Refills: 5 | Status: SHIPPED | OUTPATIENT
Start: 2022-10-31 | End: 2023-03-21 | Stop reason: SDUPTHER

## 2022-10-31 RX ORDER — DEXTROAMPHETAMINE SACCHARATE, AMPHETAMINE ASPARTATE MONOHYDRATE, DEXTROAMPHETAMINE SULFATE AND AMPHETAMINE SULFATE 5; 5; 5; 5 MG/1; MG/1; MG/1; MG/1
20 CAPSULE, EXTENDED RELEASE ORAL EVERY MORNING
Qty: 30 CAPSULE | Refills: 0 | Status: SHIPPED | OUTPATIENT
Start: 2022-10-31 | End: 2022-12-07 | Stop reason: SDUPTHER

## 2022-10-31 NOTE — PROGRESS NOTES
"The patient location is:  [place], [place]  The patient location [place] is: [place]  The patient phone number is: [phone]  Visit type: Virtual visit with synchronous audio and video  Each patient to whom he or she provides medical services by telemedicine is:  (1) informed of the relationship between the physician and patient and the respective role of any other health care provider with respect to management of the patient; and (2) notified that he or she may decline to receive medical services by telemedicine and may withdraw from such care at any time.     Outpatient Psychiatry Follow-Up Visit    Clinical Status of Patient: Outpatient (Ambulatory)  10/30/2022     Chief Complaint: Depression, anxiety, ADHD     Interval History and Content of Current Session:  Interim Events/Subjective Report/Content of Current Session:  follow-up appointment.    Pt is a 46 y/o female with past psychiatric hx of depression, anxiety, ADHD who presents for follow-up treatment. Pt reported that she continues to have symptoms of depression, describing herself as "always been low" and "my baseline is a little depressed." Pt reported that she has not noticed much of a change after increasing Effexor to 225 mg. Pt stated that she does have some low days but is able to manage when needed. [facility] (6/12/18) indicates that pt may require higher doses of venlafaxine. Pt denied any negative effects from switching to Adderall XR from Adderall IR. She stated that this change has removed a cue to take the after dose of buspirone, which has led to increased anxiety, however. We discussed behavioral strategies and mnemonic devices to remember this.     Past Psychiatric hx: ritalin, prozac, buspar PRN ("it helped with the stress of planning the wedding"), cymbalta 120mg po daily (ineffective), zoloft 150mg (ineffective), prozac 20mg po qam, xanax 0.5mg po BID PRN anxiety, wellbutrin xl 150mg po qam, fetzima 40mg po qam, " abilify 2mg, pristiq 100mg qam       Past Medical hx:   Past Medical History:   Diagnosis Date    Depression with anxiety     Fatigue     HTN (hypertension)     Iron deficiency anemia     Migraines     PCOS (polycystic ovarian syndrome)         Interim hx:  Medication changes last visit: Switch to Adderall XR from Adderall IR, Increase Effexor to 225 mg  Anxiety: continues  Depression: continues     Denies suicidal/homicidal ideations.  Denies hopelessness/worthlessness.    Denies auditory/visual hallucinations      Alcohol: Very infrequent due to migraines  Drug: Pt denied  Caffeine: 2 sodas per day  Tobacco: Pt denied      Review of Systems   PSYCHIATRIC: Pertinent items are noted in the narrative.        CONSTITUTIONAL: weight stable    Past Medical, Family and Social History: The patient's past medical, family and social history have been reviewed and updated as appropriate within the electronic medical record. See encounter notes.     Current Psychiatric Medication: effexor xr 225mg po qam, buspar 15mg po TID PRN anxiety, klonopin 0.25mg po daily PRN anxiety (once per week), adderall XR 20 mg po qam     Compliance: yes      Side effects: Pt denied     Risk Parameters:  Patient reports no suicidal ideation  Patient reports no homicidal ideation  Patient reports no self-injurious behavior  Patient reports no violent behavior     Exam (detailed: at least 9 elements; comprehensive: all 15 elements)   Constitutional  Vitals:  Most recent vital signs, dated less than 90 days prior to this appointment, were reviewed. BP: ()/()   Arterial Line BP: ()/()       General:  unremarkable, age appropriate, casual attire, good eye contact, good rapport       Musculoskeletal  Muscle Strength/Tone:  no flaccidity, no tremor    Gait & Station:  normal      Psychiatric                       Speech:  normal tone, normal rate, rhythm, and volume   Mood & Affect:   Depressed, anxious         Thought Process:   Goal directed; Linear     Associations:   intact   Thought Content:   No SI/HI, delusions, or paranoia, no AV/VH   Insight & Judgement:   Good, adequate to circumstances   Orientation:   grossly intact; alert and oriented x 4    Memory:  intact for content of interview    Language:  grossly intact, can repeat    Attention Span  : Grossly intact for content of interview   Fund of Knowledge:   intact and appropriate to age and level of education        Assessment and Diagnosis   Status/Progress: Based on the examination today, the patient's problem(s) is/are not adequately controlled.  New problems have not been presented today. Co-morbidities are not complicating management of the primary condition. There are no active rule-out diagnoses for this patient at this time.      Impression:  Pt continues to experience sxs of depression despite the increase in effexor to 225 mg. [facility] results indicate that higher doses may be needed for this pt. Will trial increase to 300 mg and monitor symptoms. Pt reported positive results to the extended release Adderall and denies any decrease in attention/concentration throughout the day.     Diagnosis:   Major Depressive Disorder, recurrent, severe w/o psychotic sxs  Generalized Anxiety Disorder  Attention Deficit Hyperactivity Disorder, Combined Type  Intervention/Counseling/Treatment Plan   Medication Management:      1. Increase effexor xr to 300 mg po qam     2. Cont buspar 15mg po TID PRN anxiety    3. Cont klonopin 0.25mg po daily PRN anxiety (once per week)     4. Cont Adderall XR 20 mg po qam      5. Call to report any worsening of symptoms or problems with the medication. Pt instructed to go to ER with thoughts of harming self, others.     6. Patient given contact # for psychotherapists at [facility] and also instructed to check with insurance for list of providers.     Psychotherapy: 20 minutes  Target symptoms: depression, ADHD  Why chosen therapy is appropriate versus another modality:  CBT used; relevant to diagnosis, patient responds to this modality  Outcome monitoring methods: self-report, observation  Therapeutic intervention type: Cognitive Behavioral Therapy  Topics discussed/themes: building skills sets for symptom management, symptom recognition, nutrition, exercise  The patient's response to the intervention is accepting  Patient's response to treatment is: good.   The patient's progress toward treatment goals: improving     Return to clinic: 1 month    -Cognitive-Behavioral/Supportive therapy and psychoeducation provided  -R/B/SE's of medications discussed with the pt who expresses understanding and chooses to take medications as prescribed.   -Pt instructed to call clinic, 911 or go to nearest emergency room if sxs worsen or pt is in   crisis. The pt expresses understanding.    Wade Rodriguez, PhD, MP     Antidepressant/Antianxiety Medication Initiation:  Patient informed of risks, benefits, and potential side effects of medication and accepts informed consent.  Common side effects include nausea, fatigue, headache, insomnia., Specifically discussed the possibility of new or worsening suicidal thoughts/depression.  Patient instructed to stop the medication immediately and seek urgent treatment if this occurs. Patient instructed not to abruptly discontinue medication without physician guidance except in cases of sudden onset or worsening of SI.       Stimulant Medication Initiation:  Patient advised of risks, benefits, and side effects of medication and accepts informed consent.  Common side effects include insomnia, irritability, jittery feeling, dry mouth, and agitation/hostility., Patient advised of potential addictive nature of medication and controlled substance classification.  Instructed to safeguard medication as no early refills can be given for lost or stolen medications.       Benzodiazepine Initiation:  Patient advised of the risks, benefits, and common side effects of  medication and has accepted informed consent.  Common side effects include drowsiness, impaired coordination, possible memory loss., Patient advised NOT to operate a vehicle or machinery untiil they are sure how the medication will affec them.  Client also advised of danger of mixing this medication with alcohol., Patient advised of potential addictive nature of medication and need to safeguard medication as no early refills for lost or stolen medications can be authorized.       Pregnancy Warning:  Patient denies current pregnancy possibility.  Patient made aware that medications have not been proven safe in pregnancy and that she must maintain adequate birth control.  Patient instructed to alert us immediately if she becomes pregnant.

## 2022-10-31 NOTE — TELEPHONE ENCOUNTER
Reached out to pt to schedule f/u appt with Dr Rodriguez. I called pt. No answer. I left a voicemail requesting a call back to set up next appt

## 2022-11-15 ENCOUNTER — PROCEDURE VISIT (OUTPATIENT)
Dept: NEUROLOGY | Facility: CLINIC | Age: 47
End: 2022-11-15
Payer: COMMERCIAL

## 2022-11-15 VITALS
WEIGHT: 275.44 LBS | DIASTOLIC BLOOD PRESSURE: 91 MMHG | BODY MASS INDEX: 38.56 KG/M2 | RESPIRATION RATE: 16 BRPM | HEART RATE: 79 BPM | TEMPERATURE: 98 F | SYSTOLIC BLOOD PRESSURE: 135 MMHG | HEIGHT: 71 IN

## 2022-11-15 DIAGNOSIS — G43.719 INTRACTABLE CHRONIC MIGRAINE WITHOUT AURA AND WITHOUT STATUS MIGRAINOSUS: Primary | ICD-10-CM

## 2022-11-15 PROCEDURE — 64615 PR CHEMODENERVATION OF MUSCLE FOR CHRONIC MIGRAINE: ICD-10-PCS | Mod: S$GLB,,, | Performed by: PSYCHIATRY & NEUROLOGY

## 2022-11-15 PROCEDURE — 64615 CHEMODENERV MUSC MIGRAINE: CPT | Mod: S$GLB,,, | Performed by: PSYCHIATRY & NEUROLOGY

## 2022-11-15 NOTE — PROCEDURES
Procedures   BOTOX PROCEDURE    PROCEDURE PERFORMED: Botulinum toxin injection (43884)    CLINICAL INDICATION: G43.719    A time out was conducted just before the start of the procedure to verify the correct patient and procedure, procedure location, and all relevant critical information.     Conventional methods of treatment but the patient has been unresponsive and refractory.The patient meets criteria for chronic headaches according to the ICHD-II, the patient has more than 15 headaches a month which last for more than 4 hours a day.    This is the first Botox injections and I am aiming for at least 50%  improvement in the patient's symptoms. Frequency of treatment is every 3 months unless no response to the treatments, at which time we will discontinue the injections.     DESCRIPTION OF PROCEDURE: After obtaining informed consent and under aseptic technique, a total of 155 units of botulinum toxin type A were injected in the following muscles: Procerus 5 units,  5 units bilaterally, frontalis 20 units, temporalis 20 units bilaterally, occipitalis 15 units, upper cervical paraspinals 10 units bilaterally and trapezius 15 units bilaterally. The patient was given a total of 155 units in 31 sites.The patient tolerated the procedure well. There were no complications. The patient was given a prescription for repeat treatment in 3 months.     Unavoidable waste 45 units          Saúl Braswell M.D  Medical Director, Headache and Facial Pain  [facility]

## 2022-11-16 ENCOUNTER — PATIENT OUTREACH (OUTPATIENT)
Dept: ADMINISTRATIVE | Facility: HOSPITAL | Age: 47
End: 2022-11-16
Payer: COMMERCIAL

## 2022-11-16 DIAGNOSIS — Z12.11 COLON CANCER SCREENING: Primary | ICD-10-CM

## 2022-11-18 ENCOUNTER — TELEPHONE (OUTPATIENT)
Dept: RESEARCH | Facility: HOSPITAL | Age: 47
End: 2022-11-18
Payer: COMMERCIAL

## 2022-11-18 NOTE — TELEPHONE ENCOUNTER
"Study Title:  The "RADIANCE CAP" Study A study of the [employer] [place] System in Clinical Hypertension   Protocol: [id number]  IRB #/ Consent Approval Date: 2021.286 / Approved 20-Oct-2022  Sponsor: [employer]  : Dr. GRICELDA Morfin     I called the patient to introduce the Radiance CAP clinical trial for hypertension. lvm  "

## 2022-12-12 ENCOUNTER — OFFICE VISIT (OUTPATIENT)
Dept: PSYCHIATRY | Facility: CLINIC | Age: 47
End: 2022-12-12
Payer: COMMERCIAL

## 2022-12-12 DIAGNOSIS — F41.1 GAD (GENERALIZED ANXIETY DISORDER): ICD-10-CM

## 2022-12-12 DIAGNOSIS — F90.2 ATTENTION DEFICIT HYPERACTIVITY DISORDER, COMBINED TYPE: ICD-10-CM

## 2022-12-12 DIAGNOSIS — F33.2 MAJOR DEPRESSIVE DISORDER, RECURRENT SEVERE WITHOUT PSYCHOTIC FEATURES: Primary | ICD-10-CM

## 2022-12-12 PROCEDURE — 90832 PSYTX W PT 30 MINUTES: CPT | Mod: 95,,, | Performed by: PSYCHOLOGIST

## 2022-12-12 PROCEDURE — 90832 PR PSYCHOTHERAPY W/PATIENT, 30 MIN: ICD-10-PCS | Mod: 95,,, | Performed by: PSYCHOLOGIST

## 2022-12-12 PROCEDURE — 4010F PR ACE/ARB THEARPY RXD/TAKEN: ICD-10-PCS | Mod: CPTII,95,, | Performed by: PSYCHOLOGIST

## 2022-12-12 PROCEDURE — 1159F PR MEDICATION LIST DOCUMENTED IN MEDICAL RECORD: ICD-10-PCS | Mod: CPTII,95,, | Performed by: PSYCHOLOGIST

## 2022-12-12 PROCEDURE — 90863 PR PHARMACOLOGIC MANAGEMENT: ICD-10-PCS | Mod: 95,,, | Performed by: PSYCHOLOGIST

## 2022-12-12 PROCEDURE — 1159F MED LIST DOCD IN RCRD: CPT | Mod: CPTII,95,, | Performed by: PSYCHOLOGIST

## 2022-12-12 PROCEDURE — 4010F ACE/ARB THERAPY RXD/TAKEN: CPT | Mod: CPTII,95,, | Performed by: PSYCHOLOGIST

## 2022-12-12 PROCEDURE — 90863 PHARMACOLOGIC MGMT W/PSYTX: CPT | Mod: 95,,, | Performed by: PSYCHOLOGIST

## 2022-12-12 RX ORDER — BUSPIRONE HYDROCHLORIDE 15 MG/1
TABLET ORAL
Qty: 90 TABLET | Refills: 5 | Status: SHIPPED | OUTPATIENT
Start: 2022-12-12 | End: 2023-06-19 | Stop reason: SDUPTHER

## 2022-12-12 RX ORDER — CLONAZEPAM 0.5 MG/1
TABLET ORAL
Qty: 30 TABLET | Refills: 0 | Status: SHIPPED | OUTPATIENT
Start: 2022-12-12 | End: 2023-03-21 | Stop reason: SDUPTHER

## 2022-12-12 RX ORDER — DEXTROAMPHETAMINE SACCHARATE, AMPHETAMINE ASPARTATE MONOHYDRATE, DEXTROAMPHETAMINE SULFATE AND AMPHETAMINE SULFATE 5; 5; 5; 5 MG/1; MG/1; MG/1; MG/1
20 CAPSULE, EXTENDED RELEASE ORAL EVERY MORNING
Qty: 30 CAPSULE | Refills: 0 | Status: SHIPPED | OUTPATIENT
Start: 2023-01-06 | End: 2023-01-13 | Stop reason: SDUPTHER

## 2022-12-12 NOTE — PROGRESS NOTES
"The patient location is:  [place], [place]  The patient location [place] is: [place]  The patient phone number is: [phone]  Visit type: Virtual visit with synchronous audio and video  Each patient to whom he or she provides medical services by telemedicine is:  (1) informed of the relationship between the physician and patient and the respective role of any other health care provider with respect to management of the patient; and (2) notified that he or she may decline to receive medical services by telemedicine and may withdraw from such care at any time.     Outpatient Psychiatry Follow-Up Visit    Clinical Status of Patient: Outpatient (Ambulatory)  12/12/2022     Chief Complaint: Depression, anxiety, ADHD     Interval History and Content of Current Session:  Interim Events/Subjective Report/Content of Current Session:  follow-up appointment.    Pt is a 48 y/o female with past psychiatric hx of depression, anxiety, ADHD who presents for follow-up treatment. Pt reported that she does not notice a big difference with the increase in venlafaxine but noted that her mood seems fine. She stated that he aunt  her hip and has been trying to care for her. Pr reported that things overall are "ok".    Past Psychiatric hx: ritalin, prozac, buspar PRN ("it helped with the stress of planning the wedding"), cymbalta 120mg po daily (ineffective), zoloft 150mg (ineffective), prozac 20mg po qam, xanax 0.5mg po BID PRN anxiety, wellbutrin xl 150mg po qam, fetzima 40mg po qam, abilify 2mg, pristiq 100mg qam       Past Medical hx:   Past Medical History:   Diagnosis Date    Depression with anxiety     Fatigue     HTN (hypertension)     Iron deficiency anemia     Migraines     PCOS (polycystic ovarian syndrome)         Interim hx:  Medication changes last visit: Increase Effexor to 300 mg  Anxiety: mild  Depression: mild     Denies suicidal/homicidal ideations.  Denies hopelessness/worthlessness.    Denies auditory/visual " hallucinations      Alcohol: Very infrequent due to migraines  Drug: Pt denied  Caffeine: 2 sodas per day  Tobacco: Pt denied      Review of Systems   PSYCHIATRIC: Pertinent items are noted in the narrative.        CONSTITUTIONAL: weight stable    Past Medical, Family and Social History: The patient's past medical, family and social history have been reviewed and updated as appropriate within the electronic medical record. See encounter notes.     Current Psychiatric Medication: effexor xr 300 mg po qam, buspar 15mg po TID PRN anxiety, klonopin 0.25mg po daily PRN anxiety (once per week), adderall XR 20 mg po qam     Compliance: yes      Side effects: Pt denied     Risk Parameters:  Patient reports no suicidal ideation  Patient reports no homicidal ideation  Patient reports no self-injurious behavior  Patient reports no violent behavior     Exam (detailed: at least 9 elements; comprehensive: all 15 elements)   Constitutional  Vitals:  Most recent vital signs, dated less than 90 days prior to this appointment, were reviewed.        General:  unremarkable, age appropriate, casual attire, good eye contact, good rapport       Musculoskeletal  Muscle Strength/Tone:  no flaccidity, no tremor    Gait & Station:  normal      Psychiatric                       Speech:  normal tone, normal rate, rhythm, and volume   Mood & Affect:   Depressed, anxious         Thought Process:   Goal directed; Linear    Associations:   intact   Thought Content:   No SI/HI, delusions, or paranoia, no AV/VH   Insight & Judgement:   Good, adequate to circumstances   Orientation:   grossly intact; alert and oriented x 4    Memory:  intact for content of interview    Language:  grossly intact, can repeat    Attention Span  : Grossly intact for content of interview   Fund of Knowledge:   intact and appropriate to age and level of education        Assessment and Diagnosis   Status/Progress: Based on the examination today, the patient's problem(s)  is/are not adequately controlled.  New problems have not been presented today. Co-morbidities are not complicating management of the primary condition. There are no active rule-out diagnoses for this patient at this time.      Impression:  Pt's mood appears to have improved some with the increased dose of venlafaxine. Attention and concentration continue to be managed well with Adderall XR. Will continue with medication plan as is and monitor moving forward.     Diagnosis:   Major Depressive Disorder, recurrent, severe w/o psychotic sxs  Generalized Anxiety Disorder  Attention Deficit Hyperactivity Disorder, Combined Type  Intervention/Counseling/Treatment Plan   Medication Management:      1. Cont effexor xr 300 mg po qam     2. Cont buspar 15mg po TID PRN anxiety    3. Cont klonopin 0.25mg po daily PRN anxiety (once per week)     4. Cont Adderall XR 20 mg po qam      5. Call to report any worsening of symptoms or problems with the medication. Pt instructed to go to ER with thoughts of harming self, others.     6. Patient given contact # for psychotherapists at [facility] and also instructed to check with insurance for list of providers.     Psychotherapy: 20 minutes  Target symptoms: depression, ADHD  Why chosen therapy is appropriate versus another modality: CBT used; relevant to diagnosis, patient responds to this modality  Outcome monitoring methods: self-report, observation  Therapeutic intervention type: Cognitive Behavioral Therapy  Topics discussed/themes: building skills sets for symptom management, symptom recognition, nutrition, exercise  The patient's response to the intervention is accepting  Patient's response to treatment is: good.   The patient's progress toward treatment goals: improving     Return to clinic: 3 months    -Cognitive-Behavioral/Supportive therapy and psychoeducation provided  -R/B/SE's of medications discussed with the pt who expresses understanding and chooses to take  medications as prescribed.   -Pt instructed to call clinic, 911 or go to nearest emergency room if sxs worsen or pt is in   crisis. The pt expresses understanding.    Wade Rodriguez, PhD, MP     Antidepressant/Antianxiety Medication Initiation:  Patient informed of risks, benefits, and potential side effects of medication and accepts informed consent.  Common side effects include nausea, fatigue, headache, insomnia., Specifically discussed the possibility of new or worsening suicidal thoughts/depression.  Patient instructed to stop the medication immediately and seek urgent treatment if this occurs. Patient instructed not to abruptly discontinue medication without physician guidance except in cases of sudden onset or worsening of SI.       Stimulant Medication Initiation:  Patient advised of risks, benefits, and side effects of medication and accepts informed consent.  Common side effects include insomnia, irritability, jittery feeling, dry mouth, and agitation/hostility., Patient advised of potential addictive nature of medication and controlled substance classification.  Instructed to safeguard medication as no early refills can be given for lost or stolen medications.       Benzodiazepine Initiation:  Patient advised of the risks, benefits, and common side effects of medication and has accepted informed consent.  Common side effects include drowsiness, impaired coordination, possible memory loss., Patient advised NOT to operate a vehicle or machinery untiil they are sure how the medication will affec them.  Client also advised of danger of mixing this medication with alcohol., Patient advised of potential addictive nature of medication and need to safeguard medication as no early refills for lost or stolen medications can be authorized.       Pregnancy Warning:  Patient denies current pregnancy possibility.  Patient made aware that medications have not been proven safe in pregnancy and that she must maintain  adequate birth control.  Patient instructed to alert us immediately if she becomes pregnant.

## 2022-12-14 ENCOUNTER — PATIENT MESSAGE (OUTPATIENT)
Dept: ADMINISTRATIVE | Facility: HOSPITAL | Age: 47
End: 2022-12-14
Payer: COMMERCIAL

## 2022-12-14 ENCOUNTER — PATIENT OUTREACH (OUTPATIENT)
Dept: ADMINISTRATIVE | Facility: HOSPITAL | Age: 47
End: 2022-12-14
Payer: COMMERCIAL

## 2022-12-14 NOTE — PROGRESS NOTES
Uncontrolled BP REPORT  BP Readings from Last 3 Encounters:   11/15/22 (!) 135/91   08/23/22 122/78   05/25/22 115/81       Non-compliant report chart audits for HYPERTENSION MANAGEMENT     Outreach to patient in reference to hypertension management       BLOOD PRESSURE FOLLOW UP NEEDED WITH A CARE TEAM MEMBER    ACTIVE PORTAL MESSAGE OR LETTER SENT

## 2022-12-15 ENCOUNTER — OFFICE VISIT (OUTPATIENT)
Dept: NEUROLOGY | Facility: CLINIC | Age: 47
End: 2022-12-15
Payer: COMMERCIAL

## 2022-12-15 DIAGNOSIS — F41.1 GAD (GENERALIZED ANXIETY DISORDER): ICD-10-CM

## 2022-12-15 DIAGNOSIS — I10 PRIMARY HYPERTENSION: ICD-10-CM

## 2022-12-15 DIAGNOSIS — G43.719 INTRACTABLE CHRONIC MIGRAINE WITHOUT AURA AND WITHOUT STATUS MIGRAINOSUS: Primary | ICD-10-CM

## 2022-12-15 DIAGNOSIS — E66.9 OBESITY (BMI 30-39.9): ICD-10-CM

## 2022-12-15 DIAGNOSIS — F33.2 MDD (MAJOR DEPRESSIVE DISORDER), RECURRENT SEVERE, WITHOUT PSYCHOSIS: ICD-10-CM

## 2022-12-15 DIAGNOSIS — R53.82 CHRONIC FATIGUE: ICD-10-CM

## 2022-12-15 PROCEDURE — 4010F PR ACE/ARB THEARPY RXD/TAKEN: ICD-10-PCS | Mod: CPTII,95,, | Performed by: PSYCHIATRY & NEUROLOGY

## 2022-12-15 PROCEDURE — 99214 PR OFFICE/OUTPT VISIT, EST, LEVL IV, 30-39 MIN: ICD-10-PCS | Mod: 95,,, | Performed by: PSYCHIATRY & NEUROLOGY

## 2022-12-15 PROCEDURE — 99214 OFFICE O/P EST MOD 30 MIN: CPT | Mod: 95,,, | Performed by: PSYCHIATRY & NEUROLOGY

## 2022-12-15 PROCEDURE — 4010F ACE/ARB THERAPY RXD/TAKEN: CPT | Mod: CPTII,95,, | Performed by: PSYCHIATRY & NEUROLOGY

## 2022-12-15 NOTE — PROGRESS NOTES
DOS: 12/15/2022  The patient location is: Home  The chief complaint leading to consultation is: Migraine  Visit type: Virtual visit with synchronous audio and video  Total time spent with patient: 15 minutes  The patient was informed of the relationship between the physician and patient and notified that he or she may decline to receive medical services by telemedicine and may withdraw from such care at any time.    The patient presents for follow up. She is on Toprol 100 mg daily, Effexor, and Botox for prevention and Nurtec for acute attacks. She is having about 1 severe attack per month and 1 to 2 at the most, moderate headaches per week. Milder headaches are treated with Excedrin, seldom use. The Botox injections have achieved well over 50%  improvement in the patient's symptoms. Migraines have been reduced at least 7 days per month and the number of cumulative hours suffering with headaches has been reduced at least 100 total hours per month. Frequency of treatment is every 3 months unless no response to the treatments, at which time we will discontinue the injections.        ROS: Positive for photophobia, phonophobia, nausea, insomnia with attacks     Past Medical History:   Diagnosis Date    Depression with anxiety     Fatigue     HTN (hypertension)     Iron deficiency anemia     Migraines     PCOS (polycystic ovarian syndrome)          Current Outpatient Medications   Medication Sig    BOTOX 200 unit SolR INJECT 200 UNITS  INTRAMUSCULARLY EVERY 3  MONTHS (GIVEN AT MD OFFICE)    busPIRone (BUSPAR) 15 MG tablet TAKE 1 TABLET(15 MG) BY MOUTH THREE TIMES DAILY    butalbital-acetaminophen-caffeine -40 mg (FIORICET, ESGIC) -40 mg per tablet 1 tab PO q6 hr PRN severe migraine. No more than 10 tab per 30 days.    clonazePAM (KLONOPIN) 0.5 MG tablet TAKE 1/2 TABLET(0.25 MG) BY MOUTH DAILY AS NEEDED FOR ANXIETY    [START ON 1/6/2023] dextroamphetamine-amphetamine (ADDERALL XR) 20 MG 24 hr capsule Take 1  capsule (20 mg total) by mouth every morning.    EMGALITY  mg/mL PnIj     ferrous sulfate (FEOSOL) 325 mg (65 mg iron) Tab tablet iron    galcanezumab-gnlm (EMGALITY PEN) 120 mg/mL PnIj Inject 120 mg into the skin every 28 days.    hydrocortisone 2.5 % cream SMARTSI Topical Daily PRN    ketoconazole (NIZORAL) 2 % cream SMARTSI Application Topical 1 to 2 Times Daily    metoprolol succinate (TOPROL-XL) 100 MG 24 hr tablet TAKE 1 TABLET(100 MG) BY MOUTH EVERY DAY    olmesartan-hydrochlorothiazide (BENICAR HCT) 40-25 mg per tablet TAKE 1 TABLET BY MOUTH EVERY DAY    ondansetron (ZOFRAN) 4 MG tablet TK 1 T PO TID PRN    rimegepant (NURTEC) 75 mg odt Place one dissolving tablet on the tongue daily as needed for migraine. No more than once per day.    UNABLE TO FIND Take 15 mg by mouth once daily. Methylfolate    venlafaxine (EFFEXOR-XR) 150 MG Cp24 Take 2 capsules (300 mg total) by mouth once daily.     Current Facility-Administered Medications   Medication    onabotulinumtoxina injection 200 Units    onabotulinumtoxina injection 200 Units    onabotulinumtoxina injection 200 Units    onabotulinumtoxina injection 200 Units          There were no vitals filed for this visit.  There is no height or weight on file to calculate BMI.    Physical Exam:  Alert and fully oriented   Lungs CTA  Heart RRR  CN II-XII intact  Motor normal bulk and tone, symmetric strength  Coordination intact FTN  Sensory in tact to LT  Gait: normal, pace and base     Data review:    Lab Results   Component Value Date     2022    K 3.9 2022     2022    CO2 27 2022    BUN 11 2022    CREATININE 0.9 2022    GLU 93 2022    HGBA1C 5.3 2021    AST 23 2021    ALT 40 2021    ALBUMIN 4.1 2021    PROT 6.7 2021    BILITOT 0.6 2021    CHOL 220 (H) 2022    HDL 56 2022    LDLCALC 124.4 2022    TRIG 198 (H) 2022       Lab Results   Component  Value Date    WBC 7.66 11/24/2021    HGB 13.4 11/24/2021    HCT 40.3 11/24/2021    MCV 91 11/24/2021     11/24/2021       Lab Results   Component Value Date    TSH 4.588 (H) 11/24/2021     No results found for this or any previous visit.    A/P:     Chronic Migraine responding to Botox as expected. Continue treatment until patient in true remission, meaning that the patient stays headache free when Botox wears off in 10 to 12 weeks. That will be the time to stop.  Encouraged the patient to comply with with early acute intervention for escalations.  Continue Toprol 100 mg and Effexor daily  Continue Nurtec. For severe attacks, about 1 per month, combine Nurtec with Excedrin for added benefit. She is aware of medication overuse headache and is far from being at risk.    RTC as scheduled      Saúl Braswell M.D  Medical Director, Headache and Facial Pain  [facility]

## 2022-12-28 ENCOUNTER — TELEPHONE (OUTPATIENT)
Dept: GASTROENTEROLOGY | Facility: CLINIC | Age: 47
End: 2022-12-28
Payer: COMMERCIAL

## 2022-12-28 NOTE — TELEPHONE ENCOUNTER
Scheduled colonoscopy on 3/14/23 with Dr. Delacruz. Patient agreed to procedure date. Mailed confirmation letter and prep instructions to patient address.

## 2023-01-19 DIAGNOSIS — Z00.00 ANNUAL PHYSICAL EXAM: Primary | ICD-10-CM

## 2023-02-07 ENCOUNTER — PROCEDURE VISIT (OUTPATIENT)
Dept: NEUROLOGY | Facility: CLINIC | Age: 48
End: 2023-02-07
Payer: COMMERCIAL

## 2023-02-07 VITALS
HEIGHT: 71 IN | RESPIRATION RATE: 17 BRPM | TEMPERATURE: 98 F | DIASTOLIC BLOOD PRESSURE: 84 MMHG | BODY MASS INDEX: 39.58 KG/M2 | HEART RATE: 87 BPM | SYSTOLIC BLOOD PRESSURE: 147 MMHG | WEIGHT: 282.75 LBS

## 2023-02-07 DIAGNOSIS — G43.719 INTRACTABLE CHRONIC MIGRAINE WITHOUT AURA AND WITHOUT STATUS MIGRAINOSUS: Primary | ICD-10-CM

## 2023-02-07 PROCEDURE — 64615 PR CHEMODENERVATION OF MUSCLE FOR CHRONIC MIGRAINE: ICD-10-PCS | Mod: S$GLB,,, | Performed by: PSYCHIATRY & NEUROLOGY

## 2023-02-07 PROCEDURE — 64615 CHEMODENERV MUSC MIGRAINE: CPT | Mod: S$GLB,,, | Performed by: PSYCHIATRY & NEUROLOGY

## 2023-02-07 NOTE — PROCEDURES
Procedures   BOTOX PROCEDURE    PROCEDURE PERFORMED: Botulinum toxin injection (93590)    CLINICAL INDICATION: G43.719    A time out was conducted just before the start of the procedure to verify the correct patient and procedure, procedure location, and all relevant critical information.     Conventional methods of treatment but the patient has been unresponsive and refractory.The patient meets criteria for chronic headaches according to the ICHD-II, the patient has more than 15 headaches a month which last for more than 4 hours a day.    This is the first Botox injections and I am aiming for at least 50%  improvement in the patient's symptoms. Frequency of treatment is every 3 months unless no response to the treatments, at which time we will discontinue the injections.     DESCRIPTION OF PROCEDURE: After obtaining informed consent and under aseptic technique, a total of 155 units of botulinum toxin type A were injected in the following muscles: Procerus 5 units,  5 units bilaterally, frontalis 20 units, temporalis 20 units bilaterally, occipitalis 15 units, upper cervical paraspinals 10 units bilaterally and trapezius 15 units bilaterally. The patient was given a total of 155 units in 31 sites.The patient tolerated the procedure well. There were no complications. The patient was given a prescription for repeat treatment in 3 months.     Unavoidable waste 45 units          Saúl Braswell M.D  Medical Director, Headache and Facial Pain  [facility]

## 2023-02-15 ENCOUNTER — PATIENT OUTREACH (OUTPATIENT)
Dept: ADMINISTRATIVE | Facility: HOSPITAL | Age: 48
End: 2023-02-15
Payer: COMMERCIAL

## 2023-02-15 ENCOUNTER — PATIENT MESSAGE (OUTPATIENT)
Dept: ADMINISTRATIVE | Facility: HOSPITAL | Age: 48
End: 2023-02-15
Payer: COMMERCIAL

## 2023-02-15 NOTE — PROGRESS NOTES
02/15/2023 Care Everywhere updates requested and reviewed.  Immunizations reconciled. Media reports reviewed.  Duplicate HM overrides and  orders removed.  Overdue HM topic chart audit and/or requested.  Overdue lab testing linked to upcoming lab appointments if applies.  Colonoscopy and Mammogram scheduled  Future Appointments   Date Time Provider Department Center   3/1/2023  7:40 AM [facility] MAMMO1 [facility] MAMMO [place]   3/1/2023  8:00 AM KASSANDRA,  HEALTH NURSE [facility] EXCHLTH [place]   3/1/2023  8:40 AM Steve Holcomb MD [facility] FAM MED [place]   3/14/2023  2:00 PM Wade Rodriguez, PhD, MP [facility] PSYCH [place]   5/3/2023  8:15 AM Mira Braswell MD [facility] HEADACH [place]       Health Maintenance Due   Topic Date Due    Hepatitis C Screening  Never done    Cervical Cancer Screening  Never done    HIV Screening  Never done    Colorectal Cancer Screening  Never done    COVID-19 Vaccine (4 - Booster for Moderna series) 2022    Mammogram  2022    Shingles Vaccine (1 of 2) Never done

## 2023-02-27 ENCOUNTER — TELEPHONE (OUTPATIENT)
Dept: NEUROLOGY | Facility: CLINIC | Age: 48
End: 2023-02-27
Payer: COMMERCIAL

## 2023-02-27 NOTE — TELEPHONE ENCOUNTER
----- Message from Tatiana Joseph sent at 2/27/2023 11:15 AM CST -----  Regarding: Patient advice  Contact: Kathie [phone] Ref [id number]  [facility] requesting dosage of Botox used for patient Please call to discuss further

## 2023-03-01 ENCOUNTER — DOCUMENTATION ONLY (OUTPATIENT)
Dept: FAMILY MEDICINE | Facility: CLINIC | Age: 48
End: 2023-03-01
Payer: COMMERCIAL

## 2023-03-01 ENCOUNTER — OFFICE VISIT (OUTPATIENT)
Dept: FAMILY MEDICINE | Facility: CLINIC | Age: 48
End: 2023-03-01
Payer: COMMERCIAL

## 2023-03-01 ENCOUNTER — HOSPITAL ENCOUNTER (OUTPATIENT)
Dept: RADIOLOGY | Facility: HOSPITAL | Age: 48
Discharge: HOME | End: 2023-03-01
Attending: FAMILY MEDICINE
Payer: COMMERCIAL

## 2023-03-01 ENCOUNTER — CLINICAL SUPPORT (OUTPATIENT)
Dept: INTERNAL MEDICINE | Facility: CLINIC | Age: 48
End: 2023-03-01
Attending: FAMILY MEDICINE
Payer: COMMERCIAL

## 2023-03-01 VITALS
SYSTOLIC BLOOD PRESSURE: 120 MMHG | OXYGEN SATURATION: 96 % | WEIGHT: 283.31 LBS | HEART RATE: 84 BPM | TEMPERATURE: 98 F | DIASTOLIC BLOOD PRESSURE: 70 MMHG | HEIGHT: 71 IN | RESPIRATION RATE: 18 BRPM | BODY MASS INDEX: 39.66 KG/M2

## 2023-03-01 DIAGNOSIS — F33.2 MDD (MAJOR DEPRESSIVE DISORDER), RECURRENT SEVERE, WITHOUT PSYCHOSIS: ICD-10-CM

## 2023-03-01 DIAGNOSIS — Z00.00 ANNUAL PHYSICAL EXAM: ICD-10-CM

## 2023-03-01 DIAGNOSIS — E66.01 CLASS 2 SEVERE OBESITY WITH SERIOUS COMORBIDITY AND BODY MASS INDEX (BMI) OF 39.0 TO 39.9 IN ADULT, UNSPECIFIED OBESITY TYPE: ICD-10-CM

## 2023-03-01 DIAGNOSIS — I10 HYPERTENSION, UNSPECIFIED TYPE: ICD-10-CM

## 2023-03-01 DIAGNOSIS — Z00.00 PREVENTATIVE HEALTH CARE: Primary | ICD-10-CM

## 2023-03-01 DIAGNOSIS — Z00.00 ANNUAL PHYSICAL EXAM: Primary | ICD-10-CM

## 2023-03-01 DIAGNOSIS — R73.03 PREDIABETES: ICD-10-CM

## 2023-03-01 DIAGNOSIS — E66.01 MORBID OBESITY: ICD-10-CM

## 2023-03-01 DIAGNOSIS — M25.50 ARTHRALGIA, UNSPECIFIED JOINT: ICD-10-CM

## 2023-03-01 LAB
ALBUMIN SERPL BCP-MCNC: 4.2 G/DL (ref 3.5–5.2)
ALP SERPL-CCNC: 36 U/L (ref 55–135)
ALT SERPL W/O P-5'-P-CCNC: 41 U/L (ref 10–44)
ANION GAP SERPL CALC-SCNC: 12 MMOL/L (ref 8–16)
AST SERPL-CCNC: 25 U/L (ref 10–40)
BILIRUB SERPL-MCNC: 0.6 MG/DL (ref 0.1–1)
BUN SERPL-MCNC: 13 MG/DL (ref 6–20)
CALCIUM SERPL-MCNC: 10.2 MG/DL (ref 8.7–10.5)
CHLORIDE SERPL-SCNC: 102 MMOL/L (ref 95–110)
CHOLEST SERPL-MCNC: 242 MG/DL (ref 120–199)
CHOLEST/HDLC SERPL: 3.9 {RATIO} (ref 2–5)
CO2 SERPL-SCNC: 25 MMOL/L (ref 23–29)
CREAT SERPL-MCNC: 0.9 MG/DL (ref 0.5–1.4)
ERYTHROCYTE [DISTWIDTH] IN BLOOD BY AUTOMATED COUNT: 13.6 % (ref 11.5–14.5)
EST. GFR  (NO RACE VARIABLE): >60 ML/MIN/1.73 M^2
ESTIMATED AVG GLUCOSE: 128 MG/DL (ref 68–131)
GLUCOSE SERPL-MCNC: 155 MG/DL (ref 70–110)
HBA1C MFR BLD: 6.1 % (ref 4–5.6)
HCT VFR BLD AUTO: 40.1 % (ref 37–48.5)
HDLC SERPL-MCNC: 62 MG/DL (ref 40–75)
HDLC SERPL: 25.6 % (ref 20–50)
HGB BLD-MCNC: 13.3 G/DL (ref 12–16)
LDLC SERPL CALC-MCNC: 153.4 MG/DL (ref 63–159)
MCH RBC QN AUTO: 30 PG (ref 27–31)
MCHC RBC AUTO-ENTMCNC: 33.2 G/DL (ref 32–36)
MCV RBC AUTO: 90 FL (ref 82–98)
NONHDLC SERPL-MCNC: 180 MG/DL
PLATELET # BLD AUTO: 291 K/UL (ref 150–450)
PMV BLD AUTO: 11.6 FL (ref 9.2–12.9)
POTASSIUM SERPL-SCNC: 3.5 MMOL/L (ref 3.5–5.1)
PROT SERPL-MCNC: 7.4 G/DL (ref 6–8.4)
RBC # BLD AUTO: 4.44 M/UL (ref 4–5.4)
SODIUM SERPL-SCNC: 139 MMOL/L (ref 136–145)
TRIGL SERPL-MCNC: 133 MG/DL (ref 30–150)
TSH SERPL DL<=0.005 MIU/L-ACNC: 2.65 UIU/ML (ref 0.4–4)
WBC # BLD AUTO: 7.58 K/UL (ref 3.9–12.7)

## 2023-03-01 PROCEDURE — 1160F PR REVIEW ALL MEDS BY PRESCRIBER/CLIN PHARMACIST DOCUMENTED: ICD-10-PCS | Mod: CPTII,S$GLB,, | Performed by: FAMILY MEDICINE

## 2023-03-01 PROCEDURE — 83036 HEMOGLOBIN GLYCOSYLATED A1C: CPT | Performed by: FAMILY MEDICINE

## 2023-03-01 PROCEDURE — 77067 MAMMO DIGITAL SCREENING BILAT WITH TOMO: ICD-10-PCS | Mod: 26,,, | Performed by: RADIOLOGY

## 2023-03-01 PROCEDURE — 77063 MAMMO DIGITAL SCREENING BILAT WITH TOMO: ICD-10-PCS | Mod: 26,,, | Performed by: RADIOLOGY

## 2023-03-01 PROCEDURE — 85027 COMPLETE CBC AUTOMATED: CPT | Mod: PO | Performed by: FAMILY MEDICINE

## 2023-03-01 PROCEDURE — 80053 COMPREHEN METABOLIC PANEL: CPT | Mod: PO | Performed by: FAMILY MEDICINE

## 2023-03-01 PROCEDURE — 3008F BODY MASS INDEX DOCD: CPT | Mod: CPTII,S$GLB,, | Performed by: FAMILY MEDICINE

## 2023-03-01 PROCEDURE — 1159F PR MEDICATION LIST DOCUMENTED IN MEDICAL RECORD: ICD-10-PCS | Mod: CPTII,S$GLB,, | Performed by: FAMILY MEDICINE

## 2023-03-01 PROCEDURE — 99999 PR PBB SHADOW E&M-EST. PATIENT-LVL III: ICD-10-PCS | Mod: PBBFAC,,, | Performed by: FAMILY MEDICINE

## 2023-03-01 PROCEDURE — 3078F PR MOST RECENT DIASTOLIC BLOOD PRESSURE < 80 MM HG: ICD-10-PCS | Mod: CPTII,S$GLB,, | Performed by: FAMILY MEDICINE

## 2023-03-01 PROCEDURE — 77067 SCR MAMMO BI INCL CAD: CPT | Mod: TC,PO

## 2023-03-01 PROCEDURE — 77067 SCR MAMMO BI INCL CAD: CPT | Mod: 26,,, | Performed by: RADIOLOGY

## 2023-03-01 PROCEDURE — 3074F SYST BP LT 130 MM HG: CPT | Mod: CPTII,S$GLB,, | Performed by: FAMILY MEDICINE

## 2023-03-01 PROCEDURE — 84443 ASSAY THYROID STIM HORMONE: CPT | Performed by: FAMILY MEDICINE

## 2023-03-01 PROCEDURE — 99396 PR PREVENTIVE VISIT,EST,40-64: ICD-10-PCS | Mod: S$GLB,,, | Performed by: FAMILY MEDICINE

## 2023-03-01 PROCEDURE — 1160F RVW MEDS BY RX/DR IN RCRD: CPT | Mod: CPTII,S$GLB,, | Performed by: FAMILY MEDICINE

## 2023-03-01 PROCEDURE — 3074F PR MOST RECENT SYSTOLIC BLOOD PRESSURE < 130 MM HG: ICD-10-PCS | Mod: CPTII,S$GLB,, | Performed by: FAMILY MEDICINE

## 2023-03-01 PROCEDURE — 3044F HG A1C LEVEL LT 7.0%: CPT | Mod: CPTII,S$GLB,, | Performed by: FAMILY MEDICINE

## 2023-03-01 PROCEDURE — 80061 LIPID PANEL: CPT | Performed by: FAMILY MEDICINE

## 2023-03-01 PROCEDURE — 77063 BREAST TOMOSYNTHESIS BI: CPT | Mod: 26,,, | Performed by: RADIOLOGY

## 2023-03-01 PROCEDURE — 3008F PR BODY MASS INDEX (BMI) DOCUMENTED: ICD-10-PCS | Mod: CPTII,S$GLB,, | Performed by: FAMILY MEDICINE

## 2023-03-01 PROCEDURE — 3078F DIAST BP <80 MM HG: CPT | Mod: CPTII,S$GLB,, | Performed by: FAMILY MEDICINE

## 2023-03-01 PROCEDURE — 3044F PR MOST RECENT HEMOGLOBIN A1C LEVEL <7.0%: ICD-10-PCS | Mod: CPTII,S$GLB,, | Performed by: FAMILY MEDICINE

## 2023-03-01 PROCEDURE — 1159F MED LIST DOCD IN RCRD: CPT | Mod: CPTII,S$GLB,, | Performed by: FAMILY MEDICINE

## 2023-03-01 PROCEDURE — 99999 PR PBB SHADOW E&M-EST. PATIENT-LVL III: CPT | Mod: PBBFAC,,, | Performed by: FAMILY MEDICINE

## 2023-03-01 PROCEDURE — 99396 PREV VISIT EST AGE 40-64: CPT | Mod: S$GLB,,, | Performed by: FAMILY MEDICINE

## 2023-03-01 RX ORDER — SEMAGLUTIDE 1.34 MG/ML
0.25 INJECTION, SOLUTION SUBCUTANEOUS
Qty: 1 PEN | Refills: 3 | Status: SHIPPED | OUTPATIENT
Start: 2023-03-01 | End: 2023-06-01

## 2023-03-01 NOTE — PROGRESS NOTES
March 1, 2023                                                                                                                                                                                                                                                                                      Anneliese Rocha  [address]  [place] [address]                                                                                                                                                                                                                                                                                                RE: Anneliese Berna Rocha                                                        Clinic #:[id number]                                                                                                                                   Dear  Anneliese Rocha,                                                                                                                                           Thank you for allowing me to serve you and perform your Executive Health exam on March 1, 2023.   This letter will serve a brief summary of the history, physical findings, and laboratory/studies performed and recommendations at that time.      Reports some left SI joint pain. This is worse in the morning.  Also reports some intermittent pain in bilateral arms which radiate down to the hands.  Some aching in hands and feet in the morning.  Interested in assistance with weight loss.                                                                               REASON FOR VISIT: Executive Health Preventive Physical Examination    Past Medical History:   Diagnosis Date    Depression with anxiety     Fatigue     HTN (hypertension)     Iron deficiency anemia     Migraines     PCOS (polycystic ovarian syndrome)        Past Surgical History:   Procedure Laterality Date    BREAST BIOPSY Right     core  neg    CATARACT  "EXTRACTION, BILATERAL      oophrectomy  1994    left       Family History   Problem Relation Age of Onset    Diabetes Father     Hypertension Father     Coronary artery disease Father 55    Thyroid disease Mother     Migraines Mother     Hypertension Mother     Melanoma Paternal Uncle     Breast cancer Maternal Grandmother        Social History     Social History    Marital status:      Spouse name: N/A     Occupational History    accounting [employer]     Social History Main Topics    Smoking status: Never Smoker    Smokeless tobacco: Never Used    Alcohol use Yes      Comment: rarely     Social History Narrative    From [place]        Exercise: none regularly       Allergies: Lisinopril      REVIEW OF SYSTEMS:  No recent changes in weight, or complaints of fatigue. No recent changes in vision, or hearing. Denies frequent headaches.No recent changes in voice. No new or changing skin lesions. Denies abnormal bruising or bleeding.  Denies chest pain or sensation of skipped beats. No new onset of shortness of breath, or dyspnea on exertion. Denies abdominal discomfort, constipation, diarrhea,or blood in stool. Denies difficulty with urination.   No recent joint swelling or muscle discomfort. Denies pain or weakness in extremeties. No recent loss of balance. Denies problems with sleep or depression.        Remainder of the review of systems without pertinent positves at this time.  Some regular sweating.                                                                              PHYSICAL EXAM:   VITAL SIGNS: /70   Pulse 84   Temp 98.2 °F (36.8 °C) (Oral)   Resp 18   Ht 5' 11" (1.803 m)   Wt 128.5 kg (283 lb 4.7 oz)   LMP 01/05/2023 (Approximate)   SpO2 96%   BMI 39.51 kg/m²   GENERAL APPEARANCE:  Well nourished and normally developed,  pleasant 48 y.o. female, in good spirits.  SKIN: Without rashes or overt lesions.  HEENT: Head normacephalic. There was no scleral icterus. Mucous membranes " were moist. Dentition. Neck is supple, no thyromegally, or carotid bruits.  LUNGS: Clear to auscultation bilaterally. Normal respiratory effort.  HEART: Exam reveals regular rate and rhythm. First and second heart sounds normal. No murmurs, rubs or gallops.   ABDOMEN: Soft, non-tender, non-distended. Exam reveals normal bowl sounds, no masses, no organomegaly and no aortic enlargement.    EXTREMITIES:  Nonedematous, both femoral and pedal pulses are normal. No joint stiffness or tenderness. Full range of motion and strength, upper and lower bilaterally.    LAB DATA/STUDIES REVIEWED:  LABS: reviewed (prediabetes and elevated cholesterol)  Mammogram:normal    ASSESSMENT/RECOMMENDATIONS :    At this time,  you appear to be in good medical condition.    Basic stretching of affected areas; inflammatory markers were normal  Trial of Ozempic to assist with prediabetes and weight loss; follow-up 3 months with labs  Continue to work on regular exercise, maintenance of a healthy weight, balanced diet rich in fruits/vegetables and lean protein, and avoidance of unhealthy habits like smoking and excessive alcohol intake.  I look forward to seeing you again next year.    Please contact me should you have any questions or concerns regarding physical findings, or my recommendations.       Sincerely yours,          Steve Holcomb M.D.  Department of Family Practice  [facility]

## 2023-03-03 ENCOUNTER — PATIENT MESSAGE (OUTPATIENT)
Dept: FAMILY MEDICINE | Facility: CLINIC | Age: 48
End: 2023-03-03
Payer: COMMERCIAL

## 2023-03-12 ENCOUNTER — PATIENT MESSAGE (OUTPATIENT)
Dept: PSYCHIATRY | Facility: CLINIC | Age: 48
End: 2023-03-12
Payer: COMMERCIAL

## 2023-03-14 ENCOUNTER — ANESTHESIA EVENT (OUTPATIENT)
Dept: ENDOSCOPY | Facility: HOSPITAL | Age: 48
End: 2023-03-14
Payer: COMMERCIAL

## 2023-03-14 ENCOUNTER — ANESTHESIA (OUTPATIENT)
Dept: ENDOSCOPY | Facility: HOSPITAL | Age: 48
End: 2023-03-14
Payer: COMMERCIAL

## 2023-03-14 ENCOUNTER — HOSPITAL ENCOUNTER (OUTPATIENT)
Facility: HOSPITAL | Age: 48
Discharge: HOME | End: 2023-03-14
Attending: SURGERY | Admitting: SURGERY
Payer: COMMERCIAL

## 2023-03-14 VITALS
BODY MASS INDEX: 39.62 KG/M2 | TEMPERATURE: 98 F | HEIGHT: 71 IN | DIASTOLIC BLOOD PRESSURE: 68 MMHG | RESPIRATION RATE: 17 BRPM | OXYGEN SATURATION: 97 % | WEIGHT: 283 LBS | SYSTOLIC BLOOD PRESSURE: 124 MMHG | HEART RATE: 68 BPM

## 2023-03-14 DIAGNOSIS — Z12.11 ENCOUNTER FOR SCREENING COLONOSCOPY: ICD-10-CM

## 2023-03-14 LAB
B-HCG UR QL: NEGATIVE
CTP QC/QA: YES

## 2023-03-14 PROCEDURE — 37000008 HC ANESTHESIA 1ST 15 MINUTES: Mod: PO | Performed by: SURGERY

## 2023-03-14 PROCEDURE — 25000003 PHARM REV CODE 250: Mod: PO | Performed by: NURSE ANESTHETIST, CERTIFIED REGISTERED

## 2023-03-14 PROCEDURE — D9220A PRA ANESTHESIA: ICD-10-PCS | Mod: ANES,,, | Performed by: ANESTHESIOLOGY

## 2023-03-14 PROCEDURE — G0121 COLON CA SCRN NOT HI RSK IND: HCPCS | Mod: ,,, | Performed by: SURGERY

## 2023-03-14 PROCEDURE — 63600175 PHARM REV CODE 636 W HCPCS: Mod: PO | Performed by: NURSE ANESTHETIST, CERTIFIED REGISTERED

## 2023-03-14 PROCEDURE — 81025 URINE PREGNANCY TEST: CPT | Mod: PO | Performed by: SURGERY

## 2023-03-14 PROCEDURE — G0121 COLON CA SCRN NOT HI RSK IND: HCPCS | Mod: PO | Performed by: SURGERY

## 2023-03-14 PROCEDURE — D9220A PRA ANESTHESIA: Mod: CRNA,,, | Performed by: NURSE ANESTHETIST, CERTIFIED REGISTERED

## 2023-03-14 PROCEDURE — D9220A PRA ANESTHESIA: Mod: ANES,,, | Performed by: ANESTHESIOLOGY

## 2023-03-14 PROCEDURE — G0121 COLON CA SCRN NOT HI RSK IND: ICD-10-PCS | Mod: ,,, | Performed by: SURGERY

## 2023-03-14 PROCEDURE — 63600175 PHARM REV CODE 636 W HCPCS: Mod: PO | Performed by: SURGERY

## 2023-03-14 PROCEDURE — D9220A PRA ANESTHESIA: ICD-10-PCS | Mod: CRNA,,, | Performed by: NURSE ANESTHETIST, CERTIFIED REGISTERED

## 2023-03-14 PROCEDURE — 37000009 HC ANESTHESIA EA ADD 15 MINS: Mod: PO | Performed by: SURGERY

## 2023-03-14 RX ORDER — LIDOCAINE HCL/PF 100 MG/5ML
SYRINGE (ML) INTRAVENOUS
Status: DISCONTINUED | OUTPATIENT
Start: 2023-03-14 | End: 2023-03-14

## 2023-03-14 RX ORDER — PROPOFOL 10 MG/ML
VIAL (ML) INTRAVENOUS
Status: DISCONTINUED | OUTPATIENT
Start: 2023-03-14 | End: 2023-03-14

## 2023-03-14 RX ORDER — SODIUM CHLORIDE, SODIUM LACTATE, POTASSIUM CHLORIDE, CALCIUM CHLORIDE 600; 310; 30; 20 MG/100ML; MG/100ML; MG/100ML; MG/100ML
INJECTION, SOLUTION INTRAVENOUS CONTINUOUS
Status: DISCONTINUED | OUTPATIENT
Start: 2023-03-14 | End: 2023-03-14 | Stop reason: HOSPADM

## 2023-03-14 RX ADMIN — PROPOFOL 50 MG: 10 INJECTION, EMULSION INTRAVENOUS at 11:03

## 2023-03-14 RX ADMIN — PROPOFOL 30 MG: 10 INJECTION, EMULSION INTRAVENOUS at 11:03

## 2023-03-14 RX ADMIN — PROPOFOL 150 MG: 10 INJECTION, EMULSION INTRAVENOUS at 11:03

## 2023-03-14 RX ADMIN — LIDOCAINE HYDROCHLORIDE 100 MG: 20 INJECTION, SOLUTION INTRAVENOUS at 11:03

## 2023-03-14 RX ADMIN — SODIUM CHLORIDE, POTASSIUM CHLORIDE, SODIUM LACTATE AND CALCIUM CHLORIDE: 600; 310; 30; 20 INJECTION, SOLUTION INTRAVENOUS at 11:03

## 2023-03-14 NOTE — PROVATION PATIENT INSTRUCTIONS
Discharge Summary/Instructions after an Endoscopic Procedure  Patient Name: Anneliese Rocha  Patient MRN: [id number]  Patient YOB: 1975 Tuesday, March 14, 2023  Raad Delacruz MD  Dear patient,  As a result of recent federal legislation (The Federal Cures Act), you may   receive lab or pathology results from your procedure in your [facility]   account before your physician is able to contact you. Your physician or   their representative will relay the results to you with their   recommendations at their soonest availability.  Thank you,  RESTRICTIONS:  During your procedure today, you received medications for sedation.  These   medications may affect your judgment, balance and coordination.  Therefore,   for 24 hours, you have the following restrictions:   - DO NOT drive a car, operate machinery, make legal/financial decisions,   sign important papers or drink alcohol.    ACTIVITY:  Today: no heavy lifting, straining or running due to procedural   sedation/anesthesia.  The following day: return to full activity including work.  DIET:  Eat and drink normally unless instructed otherwise.     TREATMENT FOR COMMON SIDE EFFECTS:  - Mild abdominal pain, nausea, belching, bloating or excessive gas:  rest,   eat lightly and use a heating pad.  - Sore Throat: treat with throat lozenges and/or gargle with warm salt   water.  - Because air was used during the procedure, expelling large amounts of air   from your rectum or belching is normal.  - If a bowel prep was taken, you may not have a bowel movement for 1-3 days.    This is normal.  SYMPTOMS TO WATCH FOR AND REPORT TO YOUR PHYSICIAN:  1. Abdominal pain or bloating, other than gas cramps.  2. Chest pain.  3. Back pain.  4. Signs of infection such as: chills or fever occurring within 24 hours   after the procedure.  5. Rectal bleeding, which would show as bright red, maroon, or black stools.   (A tablespoon of blood from the rectum is not serious, especially if    hemorrhoids are present.)  6. Vomiting.  7. Weakness or dizziness.  GO DIRECTLY TO THE NEAREST EMERGENCY ROOM IF YOU HAVE ANY OF THE FOLLOWING:      Difficulty breathing              Chills and/or fever over 101 F   Persistent vomiting and/or vomiting blood   Severe abdominal pain   Severe chest pain   Black, tarry stools   Bleeding- more than one tablespoon   Any other symptom or condition that you feel may need urgent attention  Your doctor recommends these additional instructions:  If any biopsies were taken, your doctors clinic will contact you in 1 to 2   weeks with any results.  You are being discharged to home.   Resume your regular diet.   Continue your present medications.   Your physician has recommended a repeat colonoscopy in 10 years for   screening purposes.   Return to my office as needed.  For questions, problems or results please call your physician - Raad Delacruz MD at Work:  [phone].  EMERGENCY PHONE NUMBER: [phone], LAB RESULTS: [phone]  IF A COMPLICATION OR EMERGENCY SITUATION ARISES AND YOU ARE UNABLE TO REACH   YOUR PHYSICIAN - GO DIRECTLY TO THE EMERGENCY ROOM.  ___________________________________________  Nurse Signature  ___________________________________________  Patient/Designated Responsible Party Signature  Raad Delacruz MD  3/14/2023 11:43:23 AM  This report has been verified and signed electronically.  Dear patient,  As a result of recent federal legislation (The Federal Cures Act), you may   receive lab or pathology results from your procedure in your [facility]   account before your physician is able to contact you. Your physician or   their representative will relay the results to you with their   recommendations at their soonest availability.  Thank you.  PROVATION

## 2023-03-14 NOTE — ANESTHESIA POSTPROCEDURE EVALUATION
Anesthesia Post Evaluation    Patient: Anneliese Rocha    Procedure(s) Performed: Procedure(s) (LRB):  COLONOSCOPY (N/A)    [facility] Anesthesia Post Op Evaluation      Vitals Value Taken Time   /51 03/14/23 1147   Temp  03/14/23 1152   Pulse 78 03/14/23 1152   Resp 16 03/14/23 1152   SpO2 97% 03/14/23 1152         No case tracking events are documented in the log.      Pain/Dav Score: Dav Score: 10 (3/14/2023 11:47 AM)

## 2023-03-14 NOTE — ANESTHESIA PREPROCEDURE EVALUATION
03/14/2023  Anneliese Rocha is a 48 y.o., female.      Pre-op Assessment    I have reviewed the Patient Summary Reports.     I have reviewed the Nursing Notes. I have reviewed the NPO Status.   I have reviewed the Medications.     Review of Systems  Cardiovascular:   Hypertension    Pulmonary:  Pulmonary Normal    Renal/:  Renal/ Normal     Hepatic/GI:   Bowel Prep.    OB/GYN/PEDS:  PCOS   Neurological:   Headaches migraines   Endocrine:  Morbid Obesity / BMI > 40  Psych:   Psychiatric History anxiety depression          Physical Exam  General: Well nourished    Airway:  Mallampati: III   Neck ROM: Normal ROM    Dental:  Intact    Chest/Lungs:  Clear to auscultation, Normal Respiratory Rate    Heart:  Rate: Normal  Rhythm: Regular Rhythm        Anesthesia Plan  Type of Anesthesia, risks & benefits discussed:    Anesthesia Type: Gen Natural Airway  Intra-op Monitoring Plan: Standard ASA Monitors  Induction:  IV  Informed Consent: Informed consent signed with the Patient and all parties understand the risks and agree with anesthesia plan.  All questions answered.   ASA Score: 3    Ready For Surgery From Anesthesia Perspective.     .

## 2023-03-14 NOTE — PLAN OF CARE
Plan of care discussed with patient. Procedure education provided. All questions and concerns answered. Patient verbalizes understanding.     Patient gave purse to  once brought back in to the preop room.

## 2023-03-14 NOTE — ANESTHESIA POSTPROCEDURE EVALUATION
Anesthesia Post Evaluation    Patient: Anneliese Rocha    Procedure(s) Performed: Procedure(s) (LRB):  COLONOSCOPY (N/A)    Final Anesthesia Type: general      Patient location during evaluation: PACU  Patient participation: Yes- Able to Participate  Level of consciousness: sedated and awake  Post-procedure vital signs: reviewed and stable  Pain management: adequate  Airway patency: patent    PONV status at discharge: No PONV  Anesthetic complications: no      Cardiovascular status: blood pressure returned to baseline  Respiratory status: spontaneous ventilation  Hydration status: euvolemic  Follow-up not needed.          Vitals Value Taken Time   /51 03/14/23 1147   Temp  03/14/23 1153   Pulse 78 03/14/23 1153   Resp 16 03/14/23 1153   SpO2 97% 03/14/23 1153         No case tracking events are documented in the log.      Pain/Dav Score: Dav Score: 10 (3/14/2023 11:47 AM)

## 2023-03-14 NOTE — H&P
[facility] - Endoscopy  History & Physical     Subjective:     Chief Complaint/Reason for Admission: screening colonoscopy    History of Present Illness:   Patient 48 y.o. female presents  for screening colonoscoy.  Pt has never had a cscope.  She has PMhx significant for HTN.  NO significant PSHx.       Patient Active Problem List    Diagnosis Date Noted    Obesity (BMI 30-39.9) 05/17/2019    Morbid obesity     MDD (major depressive disorder), recurrent severe, without psychosis 09/26/2017    PASTORA (generalized anxiety disorder) 09/26/2017    HTN (hypertension)     Fatigue     Migraines         Facility-Administered Medications Prior to Admission   Medication Dose Route Frequency Provider Last Rate Last Admin    onabotulinumtoxina injection 200 Units  200 Units Intramuscular Q90 Days Mira Braswell MD   200 Units at 05/25/22 1034    onabotulinumtoxina injection 200 Units  200 Units Intramuscular Q90 Days Mira Braswell MD   200 Units at 08/23/22 1323    onabotulinumtoxina injection 200 Units  200 Units Intramuscular Q90 Days Mira Braswell MD   200 Units at 11/15/22 1109    onabotulinumtoxina injection 200 Units  200 Units Intramuscular Q90 Days Mira Braswell MD   200 Units at 02/07/23 1529     Medications Prior to Admission   Medication Sig Dispense Refill Last Dose    busPIRone (BUSPAR) 15 MG tablet TAKE 1 TABLET(15 MG) BY MOUTH THREE TIMES DAILY 90 tablet 5 3/14/2023    dextroamphetamine-amphetamine (ADDERALL XR) 20 MG 24 hr capsule Take 1 capsule (20 mg total) by mouth every morning. 30 capsule 0 3/14/2023    EMGALITY  mg/mL PnIj    Past Month    metoprolol succinate (TOPROL-XL) 100 MG 24 hr tablet TAKE 1 TABLET(100 MG) BY MOUTH EVERY DAY 30 tablet 5 3/13/2023    olmesartan-hydrochlorothiazide (BENICAR HCT) 40-25 mg per tablet TAKE 1 TABLET BY MOUTH EVERY DAY 90 tablet 1 3/13/2023    rimegepant (NURTEC) 75 mg odt Place one dissolving tablet on the tongue daily as needed for  migraine. No more than once per day. 8 tablet 11 Past Month    venlafaxine (EFFEXOR-XR) 150 MG Cp24 Take 2 capsules (300 mg total) by mouth once daily. 60 capsule 5 3/14/2023    BOTOX 200 unit SolR INJECT 200 UNITS  INTRAMUSCULARLY EVERY 3  MONTHS (GIVEN AT MD OFFICE) 1 each 3     butalbital-acetaminophen-caffeine -40 mg (FIORICET, ESGIC) -40 mg per tablet 1 tab PO q6 hr PRN severe migraine. No more than 10 tab per 30 days. (Patient not taking: Reported on 3/1/2023) 10 tablet 3 More than a month    clonazePAM (KLONOPIN) 0.5 MG tablet TAKE 1/2 TABLET(0.25 MG) BY MOUTH DAILY AS NEEDED FOR ANXIETY 30 tablet 0 More than a month    ferrous sulfate (FEOSOL) 325 mg (65 mg iron) Tab tablet iron   More than a month    galcanezumab-gnlm (EMGALITY PEN) 120 mg/mL PnIj Inject 120 mg into the skin every 28 days. 1 mL 11     hydrocortisone 2.5 % cream SMARTSI Topical Daily PRN       ketoconazole (NIZORAL) 2 % cream SMARTSI Application Topical 1 to 2 Times Daily       ondansetron (ZOFRAN) 4 MG tablet TK 1 T PO TID PRN  1 More than a month    semaglutide (OZEMPIC) 0.25 mg or 0.5 mg(2 mg/1.5 mL) pen injector Inject 0.25 mg into the skin every 7 days. For 1 month and then increase to 0.5mg weekly 1 pen 3     UNABLE TO FIND Take 15 mg by mouth once daily. Methylfolate   More than a month     Review of patient's allergies indicates:   Allergen Reactions    Lisinopril      Dizziness, nausea        Past Medical History:   Diagnosis Date    Depression with anxiety     Fatigue     HTN (hypertension)     Iron deficiency anemia     Migraines     PCOS (polycystic ovarian syndrome)       Past Surgical History:   Procedure Laterality Date    BREAST BIOPSY Right     core  neg    CATARACT EXTRACTION, BILATERAL      oophrectomy      left      Family History   Problem Relation Age of Onset    Diabetes Father     Hypertension Father     Coronary artery disease Father 55    Thyroid disease Mother     Migraines Mother      "Hypertension Mother     Melanoma Paternal Uncle     Breast cancer Maternal Grandmother       Social History     Tobacco Use    Smoking status: Never    Smokeless tobacco: Never   Substance Use Topics    Alcohol use: Yes     Comment: rarely        Review of Systems:  A comprehensive review of systems was negative.    OBJECTIVE:     Patient Vitals for the past 8 hrs:   BP Temp Temp src Pulse Resp SpO2 Height Weight   03/14/23 1103 (!) 143/74 98.1 °F (36.7 °C) Temporal 70 17 98 % 5' 11" (1.803 m) 128.4 kg (283 lb)     AAox3  CTA B  Soft/nt/nd    Data Review:      ASSESSMENT/PLAN:     There are no hospital problems to display for this patient.  Screening cscope    Plan:  To have cscope today    "

## 2023-03-14 NOTE — TRANSFER OF CARE
"Anesthesia Transfer of Care Note    Patient: Anneliese Rocha    Procedure(s) Performed: Procedure(s) (LRB):  COLONOSCOPY (N/A)    Patient location: PACU    Anesthesia Type: general    Transport from OR: Transported from OR on 6-10 L/min O2 by face mask with adequate spontaneous ventilation    Post pain: adequate analgesia    Post assessment: no apparent anesthetic complications and tolerated procedure well    Post vital signs: stable    Level of consciousness: awake, alert and oriented    Nausea/Vomiting: no nausea/vomiting    Complications: none    Transfer of care protocol was followed      Last vitals:   Visit Vitals  BP (!) 143/74 (BP Location: Right arm, Patient Position: Lying)   Pulse 70   Temp 36.7 °C (98.1 °F) (Temporal)   Resp 17   Ht 5' 11" (1.803 m)   Wt 128.4 kg (283 lb)   LMP 03/10/2023 (Approximate)   SpO2 98%   Breastfeeding No   BMI 39.47 kg/m²     "

## 2023-03-21 ENCOUNTER — OFFICE VISIT (OUTPATIENT)
Dept: PSYCHIATRY | Facility: CLINIC | Age: 48
End: 2023-03-21
Payer: COMMERCIAL

## 2023-03-21 DIAGNOSIS — F33.2 MDD (MAJOR DEPRESSIVE DISORDER), RECURRENT SEVERE, WITHOUT PSYCHOSIS: ICD-10-CM

## 2023-03-21 DIAGNOSIS — F90.2 ATTENTION DEFICIT HYPERACTIVITY DISORDER, COMBINED TYPE: Primary | ICD-10-CM

## 2023-03-21 DIAGNOSIS — F41.1 GAD (GENERALIZED ANXIETY DISORDER): ICD-10-CM

## 2023-03-21 PROCEDURE — 1159F PR MEDICATION LIST DOCUMENTED IN MEDICAL RECORD: ICD-10-PCS | Mod: CPTII,95,, | Performed by: PSYCHOLOGIST

## 2023-03-21 PROCEDURE — 3044F HG A1C LEVEL LT 7.0%: CPT | Mod: CPTII,95,, | Performed by: PSYCHOLOGIST

## 2023-03-21 PROCEDURE — 1159F MED LIST DOCD IN RCRD: CPT | Mod: CPTII,95,, | Performed by: PSYCHOLOGIST

## 2023-03-21 PROCEDURE — 90791 PR PSYCHIATRIC DIAGNOSTIC EVALUATION: ICD-10-PCS | Mod: 95,,, | Performed by: PSYCHOLOGIST

## 2023-03-21 PROCEDURE — 90791 PSYCH DIAGNOSTIC EVALUATION: CPT | Mod: 95,,, | Performed by: PSYCHOLOGIST

## 2023-03-21 PROCEDURE — 3044F PR MOST RECENT HEMOGLOBIN A1C LEVEL <7.0%: ICD-10-PCS | Mod: CPTII,95,, | Performed by: PSYCHOLOGIST

## 2023-03-21 RX ORDER — DEXTROAMPHETAMINE SACCHARATE, AMPHETAMINE ASPARTATE MONOHYDRATE, DEXTROAMPHETAMINE SULFATE AND AMPHETAMINE SULFATE 5; 5; 5; 5 MG/1; MG/1; MG/1; MG/1
20 CAPSULE, EXTENDED RELEASE ORAL EVERY MORNING
Qty: 30 CAPSULE | Refills: 0 | Status: SHIPPED | OUTPATIENT
Start: 2023-03-21 | End: 2023-05-03 | Stop reason: SDUPTHER

## 2023-03-21 RX ORDER — VENLAFAXINE HYDROCHLORIDE 150 MG/1
300 CAPSULE, EXTENDED RELEASE ORAL DAILY
Qty: 180 CAPSULE | Refills: 1 | Status: SHIPPED | OUTPATIENT
Start: 2023-03-21 | End: 2023-10-02

## 2023-03-21 RX ORDER — CLONAZEPAM 0.5 MG/1
TABLET ORAL
Qty: 30 TABLET | Refills: 0 | Status: SHIPPED | OUTPATIENT
Start: 2023-03-21

## 2023-03-21 NOTE — PROGRESS NOTES
"The patient location is:  [place], [place]  The patient location [place] is: [place]  The patient phone number is: [phone]  Visit type: Virtual visit with synchronous audio and video  Each patient to whom he or she provides medical services by telemedicine is:  (1) informed of the relationship between the physician and patient and the respective role of any other health care provider with respect to management of the patient; and (2) notified that he or she may decline to receive medical services by telemedicine and may withdraw from such care at any time.     Outpatient Psychiatry Follow-Up Visit    Clinical Status of Patient: Outpatient (Ambulatory)  2023     Chief Complaint: Depression, anxiety, ADHD     Interval History and Content of Current Session:  Interim Events/Subjective Report/Content of Current Session:  follow-up appointment.    Pt is a 46 y/o female with past psychiatric hx of depression, anxiety, ADHD who presents for follow-up treatment. Pt reported that her  broke his foot last weekend. Stated that he is unable to drive and perform many activities around the house. Pt also noted that their dog  and they got a new puppy. Pt described several family members that she is offering instrumental support for because of their health concerns. Pt stated that it is affecting her mood and having difficulty compartmentalizing at work. Pt is able to talk with a friend for emotional support as the friend is also a caregiver for family members.     Past Psychiatric hx: ritalin, prozac, buspar PRN ("it helped with the stress of planning the wedding"), cymbalta 120mg po daily (ineffective), zoloft 150mg (ineffective), prozac 20mg po qam, xanax 0.5mg po BID PRN anxiety, wellbutrin xl 150mg po qam, fetzima 40mg po qam, abilify 2mg, pristiq 100mg qam       Past Medical hx:   Past Medical History:   Diagnosis Date    Depression with anxiety     Fatigue     HTN (hypertension)     Iron deficiency " anemia     Migraines     PCOS (polycystic ovarian syndrome)         Interim hx:  Medication changes last visit: None  Anxiety: mild  Depression: mild     Denies suicidal/homicidal ideations.  Denies hopelessness/worthlessness.    Denies auditory/visual hallucinations      Alcohol: Very infrequent due to migraines  Drug: Pt denied  Caffeine: 2 sodas per day  Tobacco: Pt denied      Review of Systems   PSYCHIATRIC: Pertinent items are noted in the narrative.        CONSTITUTIONAL: weight stable    Past Medical, Family and Social History: The patient's past medical, family and social history have been reviewed and updated as appropriate within the electronic medical record. See encounter notes.     Current Psychiatric Medication: effexor xr 300 mg po qam, buspar 15mg po TID PRN anxiety, klonopin 0.25mg po daily PRN anxiety (once per week), adderall XR 20 mg po qam     Compliance: yes      Side effects: Pt denied     Risk Parameters:  Patient reports no suicidal ideation  Patient reports no homicidal ideation  Patient reports no self-injurious behavior  Patient reports no violent behavior     Exam (detailed: at least 9 elements; comprehensive: all 15 elements)   Constitutional  Vitals:  Most recent vital signs, dated less than 90 days prior to this appointment, were reviewed. BP: ()/()   Arterial Line BP: ()/()       General:  unremarkable, age appropriate, casual attire, good eye contact, good rapport       Musculoskeletal  Muscle Strength/Tone:  no flaccidity, no tremor    Gait & Station:  normal      Psychiatric                       Speech:  normal tone, normal rate, rhythm, and volume   Mood & Affect:   Depressed, anxious         Thought Process:   Goal directed; Linear    Associations:   intact   Thought Content:   No SI/HI, delusions, or paranoia, no AV/VH   Insight & Judgement:   Good, adequate to circumstances   Orientation:   grossly intact; alert and oriented x 4    Memory:  intact for content of interview     Language:  grossly intact, can repeat    Attention Span  : Grossly intact for content of interview   Fund of Knowledge:   intact and appropriate to age and level of education        Assessment and Diagnosis   Status/Progress: Based on the examination today, the patient's problem(s) is/are not adequately controlled.  New problems have not been presented today. Co-morbidities are not complicating management of the primary condition. There are no active rule-out diagnoses for this patient at this time.      Impression:  Pt's appears to continue experiencing psychosocial stressors/caregiver fatigue related to the health concerns of family members. Pt experiencing some decreased mood with focus on coping. Denies need to medication adjustment at this time. Will continue with medication plan as is and monitor moving forward.     Diagnosis:   Major Depressive Disorder, recurrent, severe w/o psychotic sxs  Generalized Anxiety Disorder  Attention Deficit Hyperactivity Disorder, Combined Type  Intervention/Counseling/Treatment Plan   Medication Management:      1. Cont effexor xr 300 mg po qam     2. Cont buspar 15mg po TID PRN anxiety    3. Cont klonopin 0.25mg po daily PRN anxiety (once per week)     4. Cont Adderall XR 20 mg po qam      5. Call to report any worsening of symptoms or problems with the medication. Pt instructed to go to ER with thoughts of harming self, others.     6. Patient given contact # for psychotherapists at [facility] and also instructed to check with insurance for list of providers.     Psychotherapy: 20 minutes  Target symptoms: depression, ADHD  Why chosen therapy is appropriate versus another modality: CBT used; relevant to diagnosis, patient responds to this modality  Outcome monitoring methods: self-report, observation  Therapeutic intervention type: Cognitive Behavioral Therapy  Topics discussed/themes: building skills sets for symptom management, symptom recognition, nutrition,  exercise  The patient's response to the intervention is accepting  Patient's response to treatment is: good.   The patient's progress toward treatment goals: improving     Return to clinic: 3 months    -Cognitive-Behavioral/Supportive therapy and psychoeducation provided  -R/B/SE's of medications discussed with the pt who expresses understanding and chooses to take medications as prescribed.   -Pt instructed to call clinic, 911 or go to nearest emergency room if sxs worsen or pt is in   crisis. The pt expresses understanding.    Wade Rodriguez, PhD, MP     Antidepressant/Antianxiety Medication Initiation:  Patient informed of risks, benefits, and potential side effects of medication and accepts informed consent.  Common side effects include nausea, fatigue, headache, insomnia., Specifically discussed the possibility of new or worsening suicidal thoughts/depression.  Patient instructed to stop the medication immediately and seek urgent treatment if this occurs. Patient instructed not to abruptly discontinue medication without physician guidance except in cases of sudden onset or worsening of SI.       Stimulant Medication Initiation:  Patient advised of risks, benefits, and side effects of medication and accepts informed consent.  Common side effects include insomnia, irritability, jittery feeling, dry mouth, and agitation/hostility., Patient advised of potential addictive nature of medication and controlled substance classification.  Instructed to safeguard medication as no early refills can be given for lost or stolen medications.       Benzodiazepine Initiation:  Patient advised of the risks, benefits, and common side effects of medication and has accepted informed consent.  Common side effects include drowsiness, impaired coordination, possible memory loss., Patient advised NOT to operate a vehicle or machinery untiil they are sure how the medication will affec them.  Client also advised of danger of mixing this  medication with alcohol., Patient advised of potential addictive nature of medication and need to safeguard medication as no early refills for lost or stolen medications can be authorized.       Pregnancy Warning:  Patient denies current pregnancy possibility.  Patient made aware that medications have not been proven safe in pregnancy and that she must maintain adequate birth control.  Patient instructed to alert us immediately if she becomes pregnant.

## 2023-04-01 DIAGNOSIS — I10 HYPERTENSION, UNSPECIFIED TYPE: ICD-10-CM

## 2023-04-02 RX ORDER — OLMESARTAN MEDOXOMIL AND HYDROCHLOROTHIAZIDE 40/25 40; 25 MG/1; MG/1
TABLET ORAL
Qty: 90 TABLET | Refills: 3 | Status: SHIPPED | OUTPATIENT
Start: 2023-04-02

## 2023-04-02 NOTE — TELEPHONE ENCOUNTER
Refill Decision Note   Anneliese Aj  is requesting a refill authorization.  Brief Assessment and Rationale for Refill:  Approve     Medication Therapy Plan:       Medication Reconciliation Completed: No   Comments:     No Care Gaps recommended.     Note composed:3:50 PM 04/02/2023

## 2023-04-02 NOTE — TELEPHONE ENCOUNTER
No new care gaps identified.  [facility] Embedded Care Gaps. Reference number: [id number]. 4/02/2023   3:46:28 PM CDT

## 2023-04-26 ENCOUNTER — PATIENT OUTREACH (OUTPATIENT)
Dept: ADMINISTRATIVE | Facility: HOSPITAL | Age: 48
End: 2023-04-26
Payer: COMMERCIAL

## 2023-04-26 NOTE — PROGRESS NOTES
[facility] Report:     These are non-compliant patients with claims paid through February 28, 2023.This contract ends June 30, 2023, so patients need to be scheduled / completed for the gaps by that date.     Pap smear  LEFT MESSAGE TO RETURN CALL

## 2023-05-03 ENCOUNTER — TELEPHONE (OUTPATIENT)
Dept: FAMILY MEDICINE | Facility: CLINIC | Age: 48
End: 2023-05-03
Payer: COMMERCIAL

## 2023-05-03 ENCOUNTER — PROCEDURE VISIT (OUTPATIENT)
Dept: NEUROLOGY | Facility: CLINIC | Age: 48
End: 2023-05-03
Payer: COMMERCIAL

## 2023-05-03 VITALS
HEART RATE: 72 BPM | WEIGHT: 282.63 LBS | DIASTOLIC BLOOD PRESSURE: 77 MMHG | SYSTOLIC BLOOD PRESSURE: 127 MMHG | RESPIRATION RATE: 20 BRPM | BODY MASS INDEX: 39.42 KG/M2

## 2023-05-03 DIAGNOSIS — G43.719 INTRACTABLE CHRONIC MIGRAINE WITHOUT AURA AND WITHOUT STATUS MIGRAINOSUS: Primary | ICD-10-CM

## 2023-05-03 PROCEDURE — 64615 PR CHEMODENERVATION OF MUSCLE FOR CHRONIC MIGRAINE: ICD-10-PCS | Mod: S$GLB,,, | Performed by: PSYCHIATRY & NEUROLOGY

## 2023-05-03 PROCEDURE — 64615 CHEMODENERV MUSC MIGRAINE: CPT | Mod: S$GLB,,, | Performed by: PSYCHIATRY & NEUROLOGY

## 2023-05-03 RX ORDER — DEXTROAMPHETAMINE SACCHARATE, AMPHETAMINE ASPARTATE MONOHYDRATE, DEXTROAMPHETAMINE SULFATE AND AMPHETAMINE SULFATE 5; 5; 5; 5 MG/1; MG/1; MG/1; MG/1
20 CAPSULE, EXTENDED RELEASE ORAL EVERY MORNING
Qty: 30 CAPSULE | Refills: 0 | Status: SHIPPED | OUTPATIENT
Start: 2023-05-03 | End: 2023-06-19 | Stop reason: SDUPTHER

## 2023-05-03 NOTE — TELEPHONE ENCOUNTER
Spoke to pt returning her call/ pt states that she was returning a call. No documentation noted. Pt verbalized understanding and will call if she needs anything.

## 2023-05-03 NOTE — TELEPHONE ENCOUNTER
----- Message from Vicky Crook sent at 5/3/2023  3:32 PM CDT -----  Regarding: pt called  Name of Who is Calling: DEVONTE SMITH [[id number]]      What is the request in detail: pt is requesting to speak with the office . Please advise       Can the clinic reply by [facility]: No      What Number [facility]: Telephone Information:  Mobile          [phone]

## 2023-05-03 NOTE — PROCEDURES
Procedures   BOTOX PROCEDURE    PROCEDURE PERFORMED: Botulinum toxin injection (98635)    CLINICAL INDICATION: G43.719    A time out was conducted just before the start of the procedure to verify the correct patient and procedure, procedure location, and all relevant critical information.     Conventional methods of treatment but the patient has been unresponsive and refractory.The patient meets criteria for chronic headaches according to the ICHD-II, the patient has more than 15 headaches a month which last for more than 4 hours a day.    This is the first Botox injections and I am aiming for at least 50%  improvement in the patient's symptoms. Frequency of treatment is every 3 months unless no response to the treatments, at which time we will discontinue the injections.     DESCRIPTION OF PROCEDURE: After obtaining informed consent and under aseptic technique, a total of 155 units of botulinum toxin type A were injected in the following muscles: Procerus 5 units,  5 units bilaterally, frontalis 20 units, temporalis 20 units bilaterally, occipitalis 15 units, upper cervical paraspinals 10 units bilaterally and trapezius 15 units bilaterally. The patient was given a total of 155 units in 31 sites.The patient tolerated the procedure well. There were no complications. The patient was given a prescription for repeat treatment in 3 months.     Unavoidable waste 45 units          Saúl Braswell M.D  Medical Director, Headache and Facial Pain  [facility]

## 2023-05-23 DIAGNOSIS — I10 ESSENTIAL HYPERTENSION: ICD-10-CM

## 2023-05-23 RX ORDER — METOPROLOL SUCCINATE 100 MG/1
TABLET, EXTENDED RELEASE ORAL
Qty: 30 TABLET | Refills: 3 | Status: SHIPPED | OUTPATIENT
Start: 2023-05-23

## 2023-05-23 NOTE — TELEPHONE ENCOUNTER
No care due was identified.  [facility] Embedded Care Due Messages. Reference number: [id number].   5/23/2023 11:38:41 AM CDT

## 2023-05-23 NOTE — TELEPHONE ENCOUNTER
Refill Decision Note   Anneliese Rocha  is requesting a refill authorization.  Brief Assessment and Rationale for Refill:  Approve     Medication Therapy Plan:         Comments:     Note composed:12:20 PM 05/23/2023             Appointments     Last Visit   3/1/2023 Steve Holcomb MD   Next Visit   6/1/2023 Steve Holcomb MD

## 2023-05-25 ENCOUNTER — LAB VISIT (OUTPATIENT)
Dept: LAB | Facility: HOSPITAL | Age: 48
End: 2023-05-25
Attending: FAMILY MEDICINE
Payer: COMMERCIAL

## 2023-05-25 DIAGNOSIS — R73.03 PREDIABETES: ICD-10-CM

## 2023-05-25 LAB
ALBUMIN SERPL BCP-MCNC: 3.8 G/DL (ref 3.5–5.2)
ALP SERPL-CCNC: 36 U/L (ref 55–135)
ALT SERPL W/O P-5'-P-CCNC: 33 U/L (ref 10–44)
ANION GAP SERPL CALC-SCNC: 7 MMOL/L (ref 8–16)
AST SERPL-CCNC: 22 U/L (ref 10–40)
BILIRUB SERPL-MCNC: 0.4 MG/DL (ref 0.1–1)
BUN SERPL-MCNC: 11 MG/DL (ref 6–20)
CALCIUM SERPL-MCNC: 9.9 MG/DL (ref 8.7–10.5)
CHLORIDE SERPL-SCNC: 103 MMOL/L (ref 95–110)
CO2 SERPL-SCNC: 28 MMOL/L (ref 23–29)
CREAT SERPL-MCNC: 0.8 MG/DL (ref 0.5–1.4)
EST. GFR  (NO RACE VARIABLE): >60 ML/MIN/1.73 M^2
ESTIMATED AVG GLUCOSE: 120 MG/DL (ref 68–131)
GLUCOSE SERPL-MCNC: 140 MG/DL (ref 70–110)
HBA1C MFR BLD: 5.8 % (ref 4–5.6)
POTASSIUM SERPL-SCNC: 3.7 MMOL/L (ref 3.5–5.1)
PROT SERPL-MCNC: 6.8 G/DL (ref 6–8.4)
SODIUM SERPL-SCNC: 138 MMOL/L (ref 136–145)

## 2023-05-25 PROCEDURE — 36415 COLL VENOUS BLD VENIPUNCTURE: CPT | Mod: PO | Performed by: FAMILY MEDICINE

## 2023-05-25 PROCEDURE — 80053 COMPREHEN METABOLIC PANEL: CPT | Performed by: FAMILY MEDICINE

## 2023-05-25 PROCEDURE — 83036 HEMOGLOBIN GLYCOSYLATED A1C: CPT | Performed by: FAMILY MEDICINE

## 2023-06-01 ENCOUNTER — OFFICE VISIT (OUTPATIENT)
Dept: FAMILY MEDICINE | Facility: CLINIC | Age: 48
End: 2023-06-01
Payer: COMMERCIAL

## 2023-06-01 VITALS
DIASTOLIC BLOOD PRESSURE: 64 MMHG | RESPIRATION RATE: 18 BRPM | OXYGEN SATURATION: 97 % | HEIGHT: 71 IN | TEMPERATURE: 99 F | BODY MASS INDEX: 39.75 KG/M2 | HEART RATE: 89 BPM | SYSTOLIC BLOOD PRESSURE: 128 MMHG | WEIGHT: 283.94 LBS

## 2023-06-01 DIAGNOSIS — E66.01 CLASS 2 SEVERE OBESITY WITH SERIOUS COMORBIDITY AND BODY MASS INDEX (BMI) OF 39.0 TO 39.9 IN ADULT, UNSPECIFIED OBESITY TYPE: Primary | ICD-10-CM

## 2023-06-01 DIAGNOSIS — I10 PRIMARY HYPERTENSION: ICD-10-CM

## 2023-06-01 DIAGNOSIS — G56.22 ULNAR NEUROPATHY AT ELBOW, LEFT: ICD-10-CM

## 2023-06-01 PROCEDURE — 99214 PR OFFICE/OUTPT VISIT, EST, LEVL IV, 30-39 MIN: ICD-10-PCS | Mod: S$GLB,,, | Performed by: FAMILY MEDICINE

## 2023-06-01 PROCEDURE — 1159F MED LIST DOCD IN RCRD: CPT | Mod: CPTII,S$GLB,, | Performed by: FAMILY MEDICINE

## 2023-06-01 PROCEDURE — 3074F SYST BP LT 130 MM HG: CPT | Mod: CPTII,S$GLB,, | Performed by: FAMILY MEDICINE

## 2023-06-01 PROCEDURE — 1160F PR REVIEW ALL MEDS BY PRESCRIBER/CLIN PHARMACIST DOCUMENTED: ICD-10-PCS | Mod: CPTII,S$GLB,, | Performed by: FAMILY MEDICINE

## 2023-06-01 PROCEDURE — 99999 PR PBB SHADOW E&M-EST. PATIENT-LVL IV: CPT | Mod: PBBFAC,,, | Performed by: FAMILY MEDICINE

## 2023-06-01 PROCEDURE — 1160F RVW MEDS BY RX/DR IN RCRD: CPT | Mod: CPTII,S$GLB,, | Performed by: FAMILY MEDICINE

## 2023-06-01 PROCEDURE — 3078F PR MOST RECENT DIASTOLIC BLOOD PRESSURE < 80 MM HG: ICD-10-PCS | Mod: CPTII,S$GLB,, | Performed by: FAMILY MEDICINE

## 2023-06-01 PROCEDURE — 3074F PR MOST RECENT SYSTOLIC BLOOD PRESSURE < 130 MM HG: ICD-10-PCS | Mod: CPTII,S$GLB,, | Performed by: FAMILY MEDICINE

## 2023-06-01 PROCEDURE — 99999 PR PBB SHADOW E&M-EST. PATIENT-LVL IV: ICD-10-PCS | Mod: PBBFAC,,, | Performed by: FAMILY MEDICINE

## 2023-06-01 PROCEDURE — 3078F DIAST BP <80 MM HG: CPT | Mod: CPTII,S$GLB,, | Performed by: FAMILY MEDICINE

## 2023-06-01 PROCEDURE — 3044F PR MOST RECENT HEMOGLOBIN A1C LEVEL <7.0%: ICD-10-PCS | Mod: CPTII,S$GLB,, | Performed by: FAMILY MEDICINE

## 2023-06-01 PROCEDURE — 3044F HG A1C LEVEL LT 7.0%: CPT | Mod: CPTII,S$GLB,, | Performed by: FAMILY MEDICINE

## 2023-06-01 PROCEDURE — 3008F BODY MASS INDEX DOCD: CPT | Mod: CPTII,S$GLB,, | Performed by: FAMILY MEDICINE

## 2023-06-01 PROCEDURE — 1159F PR MEDICATION LIST DOCUMENTED IN MEDICAL RECORD: ICD-10-PCS | Mod: CPTII,S$GLB,, | Performed by: FAMILY MEDICINE

## 2023-06-01 PROCEDURE — 99214 OFFICE O/P EST MOD 30 MIN: CPT | Mod: S$GLB,,, | Performed by: FAMILY MEDICINE

## 2023-06-01 PROCEDURE — 3008F PR BODY MASS INDEX (BMI) DOCUMENTED: ICD-10-PCS | Mod: CPTII,S$GLB,, | Performed by: FAMILY MEDICINE

## 2023-06-01 RX ORDER — SEMAGLUTIDE 0.25 MG/.5ML
0.25 INJECTION, SOLUTION SUBCUTANEOUS
Qty: 2 ML | Refills: 0 | Status: SHIPPED | OUTPATIENT
Start: 2023-06-01

## 2023-06-01 NOTE — PROGRESS NOTES
Subjective:       Patient ID: Anneliese Rocha is a 48 y.o. female.    Chief Complaint: Follow-up (3 month follow up)    Here for 3 month f/u.    Prediabetes - Insurance did not cover Ozempic.  She still would like to pursue weight loss medication.  She has tried metformin, but this caused some nausea.    C/o 2 months of persistent left elbow numbness. It is beginning to feel weak and is worse with certain wrist motions.    Past Medical History:   Diagnosis Date    Depression with anxiety     Fatigue     HTN (hypertension)     Iron deficiency anemia     Migraines     PCOS (polycystic ovarian syndrome)        Past Surgical History:   Procedure Laterality Date    BREAST BIOPSY Right     core  neg    CATARACT EXTRACTION, BILATERAL      COLONOSCOPY N/A 3/14/2023    Procedure: COLONOSCOPY;  Surgeon: Raad Delacruz MD;  Location: [facility];  Service: Endoscopy;  Laterality: N/A;    oophrectomy  1994    left       Review of patient's allergies indicates:   Allergen Reactions    Lisinopril      Dizziness, nausea       Social History     Socioeconomic History    Marital status:     Number of children: 0   Occupational History    Occupation: accounting     Employer: [employer]     Comment: [employer]   Tobacco Use    Smoking status: Never    Smokeless tobacco: Never   Substance and Sexual Activity    Alcohol use: Yes     Comment: rarely    Drug use: No   Social History Narrative    From [place]. Lives with .         Exercise: none regularly     Social Determinants of Health     Financial Resource Strain: High Risk    Difficulty of Paying Living Expenses: Very hard   Food Insecurity: No Food Insecurity    Worried About Running Out of Food in the Last Year: Never true    Ran Out of Food in the Last Year: Never true   Transportation Needs: No Transportation Needs    Lack of Transportation (Medical): No    Lack of Transportation (Non-Medical): No   Physical Activity: Unknown    Days of Exercise per Week: 0 days    Stress: Stress Concern Present    Feeling of Stress : Very much   Social Connections: Unknown    Frequency of Communication with Friends and Family: Once a week    Frequency of Social Gatherings with Friends and Family: Once a week    Active Member of Clubs or Organizations: No    Attends Club or Organization Meetings: Never    Marital Status:    Housing Stability: Low Risk     Unable to Pay for Housing in the Last Year: No    Number of Places Lived in the Last Year: 1    Unstable Housing in the Last Year: No       Current Outpatient Medications on File Prior to Visit   Medication Sig Dispense Refill    BOTOX 200 unit SolR INJECT 200 UNITS  INTRAMUSCULARLY EVERY 3  MONTHS (GIVEN AT MD OFFICE) 1 each 3    busPIRone (BUSPAR) 15 MG tablet TAKE 1 TABLET(15 MG) BY MOUTH THREE TIMES DAILY 90 tablet 5    clonazePAM (KLONOPIN) 0.5 MG tablet TAKE 1/2 TABLET(0.25 MG) BY MOUTH DAILY AS NEEDED FOR ANXIETY 30 tablet 0    dextroamphetamine-amphetamine (ADDERALL XR) 20 MG 24 hr capsule Take 1 capsule (20 mg total) by mouth every morning. 30 capsule 0    galcanezumab-gnlm (EMGALITY PEN) 120 mg/mL PnIj Inject 120 mg into the skin every 28 days. 1 mL 11    metoprolol succinate (TOPROL-XL) 100 MG 24 hr tablet TAKE 1 TABLET(100 MG) BY MOUTH EVERY DAY 30 tablet 3    olmesartan-hydrochlorothiazide (BENICAR HCT) 40-25 mg per tablet TAKE 1 TABLET BY MOUTH EVERY DAY 90 tablet 3    rimegepant (NURTEC) 75 mg odt Place one dissolving tablet on the tongue daily as needed for migraine. No more than once per day. 8 tablet 11    venlafaxine (EFFEXOR-XR) 150 MG Cp24 Take 2 capsules (300 mg total) by mouth once daily. 180 capsule 1    butalbital-acetaminophen-caffeine -40 mg (FIORICET, ESGIC) -40 mg per tablet 1 tab PO q6 hr PRN severe migraine. No more than 10 tab per 30 days. (Patient not taking: Reported on 6/1/2023) 10 tablet 3    ferrous sulfate (FEOSOL) 325 mg (65 mg iron) Tab tablet iron      hydrocortisone 2.5 % cream  SMARTSI Topical Daily PRN      ketoconazole (NIZORAL) 2 % cream SMARTSI Application Topical 1 to 2 Times Daily      ondansetron (ZOFRAN) 4 MG tablet TK 1 T PO TID PRN  1    UNABLE TO FIND Take 15 mg by mouth once daily. Methylfolate       Current Facility-Administered Medications on File Prior to Visit   Medication Dose Route Frequency Provider Last Rate Last Admin    onabotulinumtoxina injection 200 Units  200 Units Intramuscular Q90 Days Mira Braswell MD   200 Units at 22 1323    onabotulinumtoxina injection 200 Units  200 Units Intramuscular Q90 Days Mira Braswell MD   200 Units at 11/15/22 1109    onabotulinumtoxina injection 200 Units  200 Units Intramuscular Q90 Days Mira Braswell MD   200 Units at 23 1529    onabotulinumtoxina injection 200 Units  200 Units Intramuscular Q90 Days Mira Braswell MD   200 Units at 23 0843       Family History   Problem Relation Age of Onset    Diabetes Father     Hypertension Father     Coronary artery disease Father 55    Thyroid disease Mother     Migraines Mother     Hypertension Mother     Melanoma Paternal Uncle     Breast cancer Maternal Grandmother        Review of Systems   Constitutional:  Negative for appetite change, chills, fever and unexpected weight change.   HENT:  Negative for sore throat and trouble swallowing.    Eyes:  Negative for pain and visual disturbance.   Respiratory:  Negative for cough, shortness of breath and wheezing.    Cardiovascular:  Negative for chest pain and palpitations.   Gastrointestinal:  Negative for abdominal pain, blood in stool, diarrhea, nausea and vomiting.   Genitourinary:  Negative for difficulty urinating, dysuria and hematuria.   Musculoskeletal:  Positive for arthralgias (left forearm, left elbow). Negative for gait problem and neck pain.   Skin:  Negative for rash and wound.   Neurological:  Negative for dizziness, weakness, numbness and headaches.   Hematological:  Negative  "for adenopathy.   Psychiatric/Behavioral:  Negative for dysphoric mood.      Objective:      /64   Pulse 89   Temp 98.8 °F (37.1 °C) (Oral)   Resp 18   Ht 5' 11" (1.803 m)   Wt 128.8 kg (283 lb 15.2 oz)   LMP 04/05/2023 (Approximate)   SpO2 97%   BMI 39.60 kg/m²   Physical Exam  Constitutional:       Appearance: Normal appearance. She is well-developed.   HENT:      Head: Normocephalic.      Mouth/Throat:      Pharynx: No oropharyngeal exudate or posterior oropharyngeal erythema.   Eyes:      Conjunctiva/sclera: Conjunctivae normal.      Pupils: Pupils are equal, round, and reactive to light.   Neck:      Thyroid: No thyromegaly.   Cardiovascular:      Rate and Rhythm: Normal rate and regular rhythm.      Heart sounds: Normal heart sounds, S1 normal and S2 normal. No murmur heard.    No friction rub. No gallop.   Pulmonary:      Effort: Pulmonary effort is normal.      Breath sounds: Normal breath sounds. No wheezing or rales.   Musculoskeletal:      Cervical back: Normal range of motion and neck supple.      Right lower leg: No edema.      Left lower leg: No edema.   Lymphadenopathy:      Cervical: No cervical adenopathy.   Skin:     General: Skin is warm.      Findings: No rash.   Neurological:      Mental Status: She is alert and oriented to person, place, and time.      Cranial Nerves: No cranial nerve deficit.      Gait: Gait normal.       Results for orders placed or performed in visit on 05/25/23   Hemoglobin A1C   Result Value Ref Range    Hemoglobin A1C 5.8 (H) 4.0 - 5.6 %    Estimated Avg Glucose 120 68 - 131 mg/dL   Comprehensive Metabolic Panel   Result Value Ref Range    Sodium 138 136 - 145 mmol/L    Potassium 3.7 3.5 - 5.1 mmol/L    Chloride 103 95 - 110 mmol/L    CO2 28 23 - 29 mmol/L    Glucose 140 (H) 70 - 110 mg/dL    BUN 11 6 - 20 mg/dL    Creatinine 0.8 0.5 - 1.4 mg/dL    Calcium 9.9 8.7 - 10.5 mg/dL    Total Protein 6.8 6.0 - 8.4 g/dL    Albumin 3.8 3.5 - 5.2 g/dL    Total " Bilirubin 0.4 0.1 - 1.0 mg/dL    Alkaline Phosphatase 36 (L) 55 - 135 U/L    AST 22 10 - 40 U/L    ALT 33 10 - 44 U/L    Anion Gap 7 (L) 8 - 16 mmol/L    eGFR >60.0 >60 mL/min/1.73 m^2       Assessment:       1. Class 2 severe obesity with serious comorbidity and body mass index (BMI) of 39.0 to 39.9 in adult, unspecified obesity type    2. Ulnar neuropathy at elbow, left    3. Primary hypertension        Plan:       Class 2 severe obesity with serious comorbidity and body mass index (BMI) of 39.0 to 39.9 in adult, unspecified obesity type  -     semaglutide, weight loss, (WEGOVY) 0.25 mg/0.5 mL PnIj; Inject 0.25 mg into the skin every 7 days.  Dispense: 2 mL; Refill: 0    Ulnar neuropathy at elbow, left  -     Ambulatory referral/consult to Orthopedics; Future; Expected date: 06/08/2023    Primary hypertension        Trial of wegovy; print coupon from website  If not covered, consider [facility] eval for compounded semaglutide  Will refer to ortho due to persistent left elbow wrist concerns  BP controlled; continue present BP meds  Counseled on regular exercise, maintenance of a healthy weight, balanced diet rich in fruits/vegetables and lean protein, and avoidance of unhealthy habits like smoking and excessive alcohol intake.

## 2023-06-02 ENCOUNTER — TELEPHONE (OUTPATIENT)
Dept: PHARMACY | Facility: CLINIC | Age: 48
End: 2023-06-02
Payer: COMMERCIAL

## 2023-06-05 ENCOUNTER — OFFICE VISIT (OUTPATIENT)
Dept: ORTHOPEDICS | Facility: CLINIC | Age: 48
End: 2023-06-05
Payer: COMMERCIAL

## 2023-06-05 VITALS — WEIGHT: 283 LBS | HEIGHT: 71 IN | BODY MASS INDEX: 39.62 KG/M2

## 2023-06-05 DIAGNOSIS — M77.12 LEFT LATERAL EPICONDYLITIS: Primary | ICD-10-CM

## 2023-06-05 DIAGNOSIS — G56.22 ULNAR NEUROPATHY AT ELBOW, LEFT: ICD-10-CM

## 2023-06-05 PROCEDURE — 3044F HG A1C LEVEL LT 7.0%: CPT | Mod: CPTII,S$GLB,, | Performed by: ORTHOPAEDIC SURGERY

## 2023-06-05 PROCEDURE — 3008F BODY MASS INDEX DOCD: CPT | Mod: CPTII,S$GLB,, | Performed by: ORTHOPAEDIC SURGERY

## 2023-06-05 PROCEDURE — 99203 OFFICE O/P NEW LOW 30 MIN: CPT | Mod: S$GLB,,, | Performed by: ORTHOPAEDIC SURGERY

## 2023-06-05 PROCEDURE — 99999 PR PBB SHADOW E&M-EST. PATIENT-LVL IV: CPT | Mod: PBBFAC,,, | Performed by: ORTHOPAEDIC SURGERY

## 2023-06-05 PROCEDURE — 1159F PR MEDICATION LIST DOCUMENTED IN MEDICAL RECORD: ICD-10-PCS | Mod: CPTII,S$GLB,, | Performed by: ORTHOPAEDIC SURGERY

## 2023-06-05 PROCEDURE — 3008F PR BODY MASS INDEX (BMI) DOCUMENTED: ICD-10-PCS | Mod: CPTII,S$GLB,, | Performed by: ORTHOPAEDIC SURGERY

## 2023-06-05 PROCEDURE — 3044F PR MOST RECENT HEMOGLOBIN A1C LEVEL <7.0%: ICD-10-PCS | Mod: CPTII,S$GLB,, | Performed by: ORTHOPAEDIC SURGERY

## 2023-06-05 PROCEDURE — 99203 PR OFFICE/OUTPT VISIT, NEW, LEVL III, 30-44 MIN: ICD-10-PCS | Mod: S$GLB,,, | Performed by: ORTHOPAEDIC SURGERY

## 2023-06-05 PROCEDURE — 99999 PR PBB SHADOW E&M-EST. PATIENT-LVL IV: ICD-10-PCS | Mod: PBBFAC,,, | Performed by: ORTHOPAEDIC SURGERY

## 2023-06-05 PROCEDURE — 1159F MED LIST DOCD IN RCRD: CPT | Mod: CPTII,S$GLB,, | Performed by: ORTHOPAEDIC SURGERY

## 2023-06-05 RX ORDER — MELOXICAM 15 MG/1
15 TABLET ORAL DAILY
Qty: 30 TABLET | Refills: 0 | Status: SHIPPED | OUTPATIENT
Start: 2023-06-05

## 2023-06-05 NOTE — PROGRESS NOTES
6/5/2023    Chief Complaint:  Chief Complaint   Patient presents with    Left Elbow - Pain       HPI:  Anneliese Rocha is a 48 y.o. female, who presents to clinic today she has a history of left elbow pain.  She states that activities such as lifting or even holding a cup reproduce her pain.  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  She is here today for evaluation and treatment options.    PMHX:  Past Medical History:   Diagnosis Date    Depression with anxiety     Fatigue     HTN (hypertension)     Iron deficiency anemia     Migraines     PCOS (polycystic ovarian syndrome)        PSHX:  Past Surgical History:   Procedure Laterality Date    BREAST BIOPSY Right     core  neg    CATARACT EXTRACTION, BILATERAL      COLONOSCOPY N/A 3/14/2023    Procedure: COLONOSCOPY;  Surgeon: Raad Delacruz MD;  Location: [facility];  Service: Endoscopy;  Laterality: N/A;    oophrectomy  1994    left       FMHX:  Family History   Problem Relation Age of Onset    Diabetes Father     Hypertension Father     Coronary artery disease Father 55    Thyroid disease Mother     Migraines Mother     Hypertension Mother     Melanoma Paternal Uncle     Breast cancer Maternal Grandmother        SOCHX:  Social History     Tobacco Use    Smoking status: Never    Smokeless tobacco: Never   Substance Use Topics    Alcohol use: Yes     Comment: rarely       ALLERGIES:  Lisinopril    CURRENT MEDICATIONS:  Current Outpatient Medications on File Prior to Visit   Medication Sig Dispense Refill    BOTOX 200 unit SolR INJECT 200 UNITS  INTRAMUSCULARLY EVERY 3  MONTHS (GIVEN AT MD OFFICE) 1 each 3    busPIRone (BUSPAR) 15 MG tablet TAKE 1 TABLET(15 MG) BY MOUTH THREE TIMES DAILY 90 tablet 5    butalbital-acetaminophen-caffeine -40 mg (FIORICET, ESGIC) -40 mg per tablet 1 tab PO q6 hr PRN severe migraine. No more than 10 tab per 30 days. 10 tablet 3     clonazePAM (KLONOPIN) 0.5 MG tablet TAKE 1/2 TABLET(0.25 MG) BY MOUTH DAILY AS NEEDED FOR ANXIETY 30 tablet 0    dextroamphetamine-amphetamine (ADDERALL XR) 20 MG 24 hr capsule Take 1 capsule (20 mg total) by mouth every morning. 30 capsule 0    ferrous sulfate (FEOSOL) 325 mg (65 mg iron) Tab tablet iron      galcanezumab-gnlm (EMGALITY PEN) 120 mg/mL PnIj Inject 120 mg into the skin every 28 days. 1 mL 11    hydrocortisone 2.5 % cream SMARTSI Topical Daily PRN      ketoconazole (NIZORAL) 2 % cream SMARTSI Application Topical 1 to 2 Times Daily      metoprolol succinate (TOPROL-XL) 100 MG 24 hr tablet TAKE 1 TABLET(100 MG) BY MOUTH EVERY DAY 30 tablet 3    olmesartan-hydrochlorothiazide (BENICAR HCT) 40-25 mg per tablet TAKE 1 TABLET BY MOUTH EVERY DAY 90 tablet 3    ondansetron (ZOFRAN) 4 MG tablet TK 1 T PO TID PRN  1    rimegepant (NURTEC) 75 mg odt Place one dissolving tablet on the tongue daily as needed for migraine. No more than once per day. 8 tablet 11    semaglutide, weight loss, (WEGOVY) 0.25 mg/0.5 mL PnIj Inject 0.25 mg into the skin every 7 days. 2 mL 0    UNABLE TO FIND Take 15 mg by mouth once daily. Methylfolate      venlafaxine (EFFEXOR-XR) 150 MG Cp24 Take 2 capsules (300 mg total) by mouth once daily. 180 capsule 1     Current Facility-Administered Medications on File Prior to Visit   Medication Dose Route Frequency Provider Last Rate Last Admin    onabotulinumtoxina injection 200 Units  200 Units Intramuscular Q90 Days Mira Braswell MD   200 Units at 22 1323    onabotulinumtoxina injection 200 Units  200 Units Intramuscular Q90 Days Mira Braswell MD   200 Units at 11/15/22 1109    onabotulinumtoxina injection 200 Units  200 Units Intramuscular Q90 Days Mira Braswell MD   200 Units at 23 1529    onabotulinumtoxina injection 200 Units  200 Units Intramuscular Q90 Days Mira Braswell MD   200 Units at 23 0843       REVIEW OF SYSTEMS:  Review of  "Systems   Constitutional:  Positive for diaphoresis. Negative for fever.   HENT:  Negative for ear pain, hearing loss, nosebleeds and tinnitus.    Eyes:  Positive for redness. Negative for pain.   Respiratory:  Negative for cough and shortness of breath.    Cardiovascular:  Negative for chest pain and palpitations.   Gastrointestinal:  Negative for blood in stool, constipation, diarrhea, nausea and vomiting.   Genitourinary:  Negative for dysuria, frequency and hematuria.   Musculoskeletal:  Positive for back pain. Negative for myalgias.   Skin:  Negative for itching and rash.   Neurological:  Negative for dizziness, tingling, seizures, weakness and headaches.   Endo/Heme/Allergies:  Negative for environmental allergies.   Psychiatric/Behavioral:  The patient is nervous/anxious.      GENERAL PHYSICAL EXAM:   Ht 5' 11" (1.803 m)   Wt 128.4 kg (283 lb)   LMP 04/05/2023 (Approximate)   BMI 39.47 kg/m²    GEN: well developed, well nourished, no acute distress   HENT: Normocephalic, atraumatic   EYES: No discharge, conjunctiva normal   NECK: Supple, non-tender   PULM: No wheezing, no respiratory distress   CV: RRR   ABD: Soft, non-tender    ORTHO EXAM:   Examination of the left elbow reveals that there is no significant edema.  Palpation does produce tenderness directly over the lateral epicondyle and common extensor origin.  Range of motion is 0-150 degrees but the extremes of motion do reproduce pain.  She also has pain with forceful extension of the wrist.  She has no instability to varus or valgus stressing.  She has a 2+ radial pulse.  Sensation is grossly intact in the median, radial, and ulnar distributions    RADIOLOGY:   None    ASSESSMENT:   Left elbow lateral epicondylitis    PLAN:  1. I will start her on Mobic 15 mg per day    2.  Will provide her with a lateral epicondylitis strap to wear for all activity     3. We have discussed the possibility of therapy but we will hold off on that to see how she does " with the Mobic and the strap where    4. She will follow up with me as needed      Answers submitted by the patient for this visit:  Orthopedics Questionnaire (Submitted on 6/5/2023)  unexpected weight change: No  appetite change : No  sleep disturbance: No  IMMUNOCOMPROMISED: No  dysphoric mood: Yes  visual disturbance: No  sinus pressure : No  food allergies: No  difficulty urinating: No  painful intercourse: No  numbness: No  joint swelling: No   (Submitted on 6/5/2023)  Chief Complaint: Arm pain  Pain Chronicity: chronic  History of trauma: No  Onset: more than 1 year ago  Frequency: daily  Progression since onset: unchanged  injury location: at home  pain- numeric: 4/10  pain location: left elbow, left wrist, left hip  pain quality: aching  Radiating Pain: No  Aggravating factors: lifting  inability to bear weight: No  joint locking: No  limited range of motion: Yes  stiffness: Yes  Treatments tried: chiropractic manipulation, OTC ointments, OTC pain meds, rest  physical therapy: ineffective  Improvement on treatment: no relief

## 2023-06-15 ENCOUNTER — TELEPHONE (OUTPATIENT)
Dept: FAMILY MEDICINE | Facility: CLINIC | Age: 48
End: 2023-06-15
Payer: COMMERCIAL

## 2023-06-15 ENCOUNTER — TELEPHONE (OUTPATIENT)
Dept: PSYCHIATRY | Facility: CLINIC | Age: 48
End: 2023-06-15
Payer: COMMERCIAL

## 2023-06-15 ENCOUNTER — HOSPITAL ENCOUNTER (OUTPATIENT)
Dept: RADIOLOGY | Facility: HOSPITAL | Age: 48
Discharge: HOME | End: 2023-06-15
Attending: FAMILY MEDICINE
Payer: COMMERCIAL

## 2023-06-15 ENCOUNTER — OFFICE VISIT (OUTPATIENT)
Dept: FAMILY MEDICINE | Facility: CLINIC | Age: 48
End: 2023-06-15
Payer: COMMERCIAL

## 2023-06-15 VITALS
OXYGEN SATURATION: 98 % | DIASTOLIC BLOOD PRESSURE: 88 MMHG | HEART RATE: 84 BPM | HEIGHT: 71 IN | WEIGHT: 283.94 LBS | BODY MASS INDEX: 39.75 KG/M2 | SYSTOLIC BLOOD PRESSURE: 136 MMHG

## 2023-06-15 DIAGNOSIS — R10.12 LEFT UPPER QUADRANT ABDOMINAL PAIN: ICD-10-CM

## 2023-06-15 DIAGNOSIS — R10.12 LEFT UPPER QUADRANT ABDOMINAL PAIN: Primary | ICD-10-CM

## 2023-06-15 PROCEDURE — 1159F PR MEDICATION LIST DOCUMENTED IN MEDICAL RECORD: ICD-10-PCS | Mod: CPTII,S$GLB,, | Performed by: FAMILY MEDICINE

## 2023-06-15 PROCEDURE — 74019 XR ABDOMEN FLAT AND ERECT: ICD-10-PCS | Mod: 26,,, | Performed by: RADIOLOGY

## 2023-06-15 PROCEDURE — 3008F PR BODY MASS INDEX (BMI) DOCUMENTED: ICD-10-PCS | Mod: CPTII,S$GLB,, | Performed by: FAMILY MEDICINE

## 2023-06-15 PROCEDURE — 3075F SYST BP GE 130 - 139MM HG: CPT | Mod: CPTII,S$GLB,, | Performed by: FAMILY MEDICINE

## 2023-06-15 PROCEDURE — 99999 PR PBB SHADOW E&M-EST. PATIENT-LVL IV: CPT | Mod: PBBFAC,,, | Performed by: FAMILY MEDICINE

## 2023-06-15 PROCEDURE — 74019 RADEX ABDOMEN 2 VIEWS: CPT | Mod: 26,,, | Performed by: RADIOLOGY

## 2023-06-15 PROCEDURE — 3079F PR MOST RECENT DIASTOLIC BLOOD PRESSURE 80-89 MM HG: ICD-10-PCS | Mod: CPTII,S$GLB,, | Performed by: FAMILY MEDICINE

## 2023-06-15 PROCEDURE — 3075F PR MOST RECENT SYSTOLIC BLOOD PRESS GE 130-139MM HG: ICD-10-PCS | Mod: CPTII,S$GLB,, | Performed by: FAMILY MEDICINE

## 2023-06-15 PROCEDURE — 99214 OFFICE O/P EST MOD 30 MIN: CPT | Mod: S$GLB,,, | Performed by: FAMILY MEDICINE

## 2023-06-15 PROCEDURE — 3044F HG A1C LEVEL LT 7.0%: CPT | Mod: CPTII,S$GLB,, | Performed by: FAMILY MEDICINE

## 2023-06-15 PROCEDURE — 3008F BODY MASS INDEX DOCD: CPT | Mod: CPTII,S$GLB,, | Performed by: FAMILY MEDICINE

## 2023-06-15 PROCEDURE — 3079F DIAST BP 80-89 MM HG: CPT | Mod: CPTII,S$GLB,, | Performed by: FAMILY MEDICINE

## 2023-06-15 PROCEDURE — 99214 PR OFFICE/OUTPT VISIT, EST, LEVL IV, 30-39 MIN: ICD-10-PCS | Mod: S$GLB,,, | Performed by: FAMILY MEDICINE

## 2023-06-15 PROCEDURE — 74019 RADEX ABDOMEN 2 VIEWS: CPT | Mod: TC,FY,PO

## 2023-06-15 PROCEDURE — 1159F MED LIST DOCD IN RCRD: CPT | Mod: CPTII,S$GLB,, | Performed by: FAMILY MEDICINE

## 2023-06-15 PROCEDURE — 99999 PR PBB SHADOW E&M-EST. PATIENT-LVL IV: ICD-10-PCS | Mod: PBBFAC,,, | Performed by: FAMILY MEDICINE

## 2023-06-15 PROCEDURE — 3044F PR MOST RECENT HEMOGLOBIN A1C LEVEL <7.0%: ICD-10-PCS | Mod: CPTII,S$GLB,, | Performed by: FAMILY MEDICINE

## 2023-06-15 RX ORDER — DICYCLOMINE HYDROCHLORIDE 20 MG/1
20 TABLET ORAL EVERY 6 HOURS PRN
Qty: 30 TABLET | Refills: 0 | Status: SHIPPED | OUTPATIENT
Start: 2023-06-15 | End: 2023-07-15

## 2023-06-15 NOTE — PROGRESS NOTES
Subjective:       Patient ID: Anneliese Rocha is a 48 y.o. female.    Chief Complaint: Abdominal Pain (Left side worse when lying down on going for 4 days.  This morning had a burning sensation. Sharp constant. )    Here as new patient to me for acute visit.   Luq/left flank pain x 3 days.  Pain sharp in nature.  Fairly constant in nature.  Has not started wegovy.  New med is meloxicam from ortho daily and started about 2 wks ago.  Can be worse with deep breath or moving certain ways.    Abdominal Pain  This is a new problem. The current episode started in the past 7 days. Associated symptoms include nausea. Pertinent negatives include no constipation, diarrhea, fever or vomiting.   Review of Systems   Constitutional:  Negative for chills and fever.   Respiratory:  Negative for cough, chest tightness and shortness of breath.    Cardiovascular:  Negative for chest pain, palpitations and leg swelling.   Gastrointestinal:  Positive for abdominal pain and nausea. Negative for constipation, diarrhea, rectal pain and vomiting.   Endocrine: Negative for cold intolerance and heat intolerance.   Psychiatric/Behavioral:  Negative for decreased concentration. The patient is not nervous/anxious.      Objective:      Physical Exam  Vitals and nursing note reviewed.   Constitutional:       Appearance: She is well-developed.   HENT:      Head: Normocephalic and atraumatic.   Cardiovascular:      Rate and Rhythm: Normal rate and regular rhythm.      Heart sounds: Normal heart sounds.   Pulmonary:      Effort: Pulmonary effort is normal.      Breath sounds: Normal breath sounds.   Abdominal:      General: Abdomen is flat. Bowel sounds are normal. There is no distension.      Palpations: Abdomen is soft. There is no mass.      Tenderness: There is no abdominal tenderness. There is no right CVA tenderness, left CVA tenderness, guarding or rebound.   Psychiatric:         Mood and Affect: Mood normal.         Behavior: Behavior  normal.       Assessment:       1. Left upper quadrant abdominal pain        Plan:       Left upper quadrant abdominal pain  -     X-Ray Abdomen Flat And Erect; Future; Expected date: 06/15/2023    Other orders  -     dicyclomine (BENTYL) 20 mg tablet; Take 1 tablet (20 mg total) by mouth every 6 (six) hours as needed.  Dispense: 30 tablet; Refill: 0        Abd vs costochondritis?  Return/ed precautions given  Abd xray today  U/s if needed    Follow up if symptoms worsen or fail to improve.

## 2023-06-15 NOTE — TELEPHONE ENCOUNTER
----- Message from Елена Pang, Patient Care Assistant sent at 6/15/2023  7:33 AM CDT -----  Regarding: advice  Contact: pt  Type: Needs Medical Advice    Who Called:  pt     Symptoms (please be specific):  abdomen left side pain   How long has patient had these symptoms:  4 days     Pharmacy name and phone #:    [id number] [place] [id number] [place] [address] AT [facility] [address] [address]  Phone: [phone] Fax: [phone]      Best Call Back Number: [phone] (home)     Additional Information: please call pt to advise. Thanks!

## 2023-06-15 NOTE — TELEPHONE ENCOUNTER
SHAD in attempt to confirm pt in person appointment for 6/19/23 at 11 am with Dr Rodriguez. LM for pt to call clinic back whenever she gets a chance.

## 2023-06-19 ENCOUNTER — OFFICE VISIT (OUTPATIENT)
Dept: PSYCHIATRY | Facility: CLINIC | Age: 48
End: 2023-06-19
Payer: COMMERCIAL

## 2023-06-19 VITALS
BODY MASS INDEX: 40.33 KG/M2 | SYSTOLIC BLOOD PRESSURE: 145 MMHG | DIASTOLIC BLOOD PRESSURE: 81 MMHG | HEART RATE: 80 BPM | HEIGHT: 71 IN | WEIGHT: 288.06 LBS

## 2023-06-19 DIAGNOSIS — F41.1 GAD (GENERALIZED ANXIETY DISORDER): ICD-10-CM

## 2023-06-19 DIAGNOSIS — F90.2 ATTENTION DEFICIT HYPERACTIVITY DISORDER, COMBINED TYPE: ICD-10-CM

## 2023-06-19 DIAGNOSIS — F33.1 MAJOR DEPRESSIVE DISORDER, RECURRENT, MODERATE: Primary | ICD-10-CM

## 2023-06-19 PROCEDURE — 3079F DIAST BP 80-89 MM HG: CPT | Mod: CPTII,S$GLB,, | Performed by: PSYCHOLOGIST

## 2023-06-19 PROCEDURE — 1159F PR MEDICATION LIST DOCUMENTED IN MEDICAL RECORD: ICD-10-PCS | Mod: CPTII,S$GLB,, | Performed by: PSYCHOLOGIST

## 2023-06-19 PROCEDURE — 99999 PR PBB SHADOW E&M-EST. PATIENT-LVL IV: ICD-10-PCS | Mod: PBBFAC,,, | Performed by: PSYCHOLOGIST

## 2023-06-19 PROCEDURE — 3077F SYST BP >= 140 MM HG: CPT | Mod: CPTII,S$GLB,, | Performed by: PSYCHOLOGIST

## 2023-06-19 PROCEDURE — 3008F PR BODY MASS INDEX (BMI) DOCUMENTED: ICD-10-PCS | Mod: CPTII,S$GLB,, | Performed by: PSYCHOLOGIST

## 2023-06-19 PROCEDURE — 99214 PR OFFICE/OUTPT VISIT, EST, LEVL IV, 30-39 MIN: ICD-10-PCS | Mod: S$GLB,,, | Performed by: PSYCHOLOGIST

## 2023-06-19 PROCEDURE — 90833 PR PSYCHOTHERAPY W/PATIENT W/E&M, 30 MIN (ADD ON): ICD-10-PCS | Mod: S$GLB,,, | Performed by: PSYCHOLOGIST

## 2023-06-19 PROCEDURE — 3044F HG A1C LEVEL LT 7.0%: CPT | Mod: CPTII,S$GLB,, | Performed by: PSYCHOLOGIST

## 2023-06-19 PROCEDURE — 90833 PSYTX W PT W E/M 30 MIN: CPT | Mod: S$GLB,,, | Performed by: PSYCHOLOGIST

## 2023-06-19 PROCEDURE — 3008F BODY MASS INDEX DOCD: CPT | Mod: CPTII,S$GLB,, | Performed by: PSYCHOLOGIST

## 2023-06-19 PROCEDURE — 3077F PR MOST RECENT SYSTOLIC BLOOD PRESSURE >= 140 MM HG: ICD-10-PCS | Mod: CPTII,S$GLB,, | Performed by: PSYCHOLOGIST

## 2023-06-19 PROCEDURE — 99999 PR PBB SHADOW E&M-EST. PATIENT-LVL IV: CPT | Mod: PBBFAC,,, | Performed by: PSYCHOLOGIST

## 2023-06-19 PROCEDURE — 99214 OFFICE O/P EST MOD 30 MIN: CPT | Mod: S$GLB,,, | Performed by: PSYCHOLOGIST

## 2023-06-19 PROCEDURE — 3044F PR MOST RECENT HEMOGLOBIN A1C LEVEL <7.0%: ICD-10-PCS | Mod: CPTII,S$GLB,, | Performed by: PSYCHOLOGIST

## 2023-06-19 PROCEDURE — 3079F PR MOST RECENT DIASTOLIC BLOOD PRESSURE 80-89 MM HG: ICD-10-PCS | Mod: CPTII,S$GLB,, | Performed by: PSYCHOLOGIST

## 2023-06-19 PROCEDURE — 1159F MED LIST DOCD IN RCRD: CPT | Mod: CPTII,S$GLB,, | Performed by: PSYCHOLOGIST

## 2023-06-19 RX ORDER — BUSPIRONE HYDROCHLORIDE 10 MG/1
20 TABLET ORAL 3 TIMES DAILY
Qty: 180 TABLET | Refills: 2 | Status: SHIPPED | OUTPATIENT
Start: 2023-06-19 | End: 2024-02-05

## 2023-06-19 RX ORDER — DEXTROAMPHETAMINE SACCHARATE, AMPHETAMINE ASPARTATE MONOHYDRATE, DEXTROAMPHETAMINE SULFATE AND AMPHETAMINE SULFATE 5; 5; 5; 5 MG/1; MG/1; MG/1; MG/1
20 CAPSULE, EXTENDED RELEASE ORAL EVERY MORNING
Qty: 30 CAPSULE | Refills: 0 | Status: SHIPPED | OUTPATIENT
Start: 2023-06-19 | End: 2023-07-31 | Stop reason: SDUPTHER

## 2023-06-19 NOTE — PROGRESS NOTES
"Outpatient Psychiatry Follow-Up Visit    Clinical Status of Patient: Outpatient (Ambulatory)  06/19/2023     Chief Complaint: Depression, anxiety, ADHD     Interval History and Content of Current Session:  Interim Events/Subjective Report/Content of Current Session:  follow-up appointment.    Pt is a 48 y/o female with past psychiatric hx of depression, anxiety, ADHD who presents for follow-up treatment. Pt reported that her  thinks she is doing well; however, the pt reported difficulty managing anxiety. Described as mostly worries. Reported waking early and unable to fall back asleep. Getting enough sleep despite this. Pt noted a slight increase in frequency of clonazepam use to 1/2 tab every few days from once per week.     Past Psychiatric hx: ritalin, prozac, buspar PRN ("it helped with the stress of planning the wedding"), cymbalta 120mg po daily (ineffective), zoloft 150mg (ineffective), prozac 20mg po qam, xanax 0.5mg po BID PRN anxiety, wellbutrin xl 150mg po qam, fetzima 40mg po qam, abilify 2mg, pristiq 100mg qam       Past Medical hx:   Past Medical History:   Diagnosis Date    Depression with anxiety     Fatigue     HTN (hypertension)     Iron deficiency anemia     Migraines     PCOS (polycystic ovarian syndrome)         Interim hx:  Medication changes last visit: None  Anxiety: slight increase  Depression: mild     Denies suicidal/homicidal ideations.  Denies hopelessness/worthlessness.    Denies auditory/visual hallucinations      Alcohol: Very infrequent due to migraines  Drug: Pt denied  Caffeine: 2 sodas per day  Tobacco: Pt denied      Review of Systems   PSYCHIATRIC: Pertinent items are noted in the narrative.        CONSTITUTIONAL: weight stable    Past Medical, Family and Social History: The patient's past medical, family and social history have been reviewed and updated as appropriate within the electronic medical record. See encounter notes.     Current Psychiatric Medication: effexor " xr 300 mg po qam, buspar 15mg po TID PRN anxiety, klonopin 0.25mg po daily PRN anxiety (once per week), adderall XR 20 mg po qam     Compliance: yes      Side effects: Pt denied     Risk Parameters:  Patient reports no suicidal ideation  Patient reports no homicidal ideation  Patient reports no self-injurious behavior  Patient reports no violent behavior     Exam (detailed: at least 9 elements; comprehensive: all 15 elements)   Constitutional  Vitals:  Most recent vital signs, dated less than 90 days prior to this appointment, were reviewed. Pulse:  [80]   BP: (145)/(81)       General:  unremarkable, age appropriate, casual attire, good eye contact, good rapport       Musculoskeletal  Muscle Strength/Tone:  no flaccidity, no tremor    Gait & Station:  normal      Psychiatric                       Speech:  normal tone, normal rate, rhythm, and volume   Mood & Affect:   Depressed, anxious         Thought Process:   Goal directed; Linear    Associations:   intact   Thought Content:   No SI/HI, delusions, or paranoia, no AV/VH   Insight & Judgement:   Good, adequate to circumstances   Orientation:   grossly intact; alert and oriented x 4    Memory:  intact for content of interview    Language:  grossly intact, can repeat    Attention Span  : Grossly intact for content of interview   Fund of Knowledge:   intact and appropriate to age and level of education        Assessment and Diagnosis   Status/Progress: Based on the examination today, the patient's problem(s) is/are not adequately controlled.  New problems have not been presented today. Co-morbidities are not complicating management of the primary condition. There are no active rule-out diagnoses for this patient at this time.      Impression:  Pt appears to continue experiencing psychosocial stressors/caregiver fatigue related to the health concerns of family members. Noted increased anxiety with a desire to change medication. Reviewed [employer] results and discussed  various options. Will increase buspirone to 20 mg TID and evaluate. Discussed the possibility of changing antidepressant in the future if needed.  Pt reported that she is also interested in a therapy referral, which has been submitted. Will continue with medication plan and monitor moving forward.     Diagnosis:   Major Depressive Disorder, recurrent, moderate  Generalized Anxiety Disorder  Attention Deficit Hyperactivity Disorder, Combined Type  Intervention/Counseling/Treatment Plan   Medication Management:      1. Cont effexor xr 300 mg po qam     2. Increase buspar to 20 mg po TID PRN anxiety    3. Cont klonopin 0.25mg po daily PRN anxiety (once per week)     4. Cont Adderall XR 20 mg po qam      5. Call to report any worsening of symptoms or problems with the medication. Pt instructed to go to ER with thoughts of harming self, others.     6. Patient given contact # for psychotherapists at [facility] and also instructed to check with insurance for list of providers.     Psychotherapy: 20 minutes  Target symptoms: depression, anxiety  Why chosen therapy is appropriate versus another modality: CBT used; relevant to diagnosis, patient responds to this modality  Outcome monitoring methods: self-report, observation  Therapeutic intervention type: Cognitive Behavioral Therapy, coping identification  Topics discussed/themes: building skills sets for symptom management, symptom recognition, nutrition, exercise  The patient's response to the intervention is accepting  Patient's response to treatment is: good.   The patient's progress toward treatment goals: improving     Return to clinic: 6 weeks    -Cognitive-Behavioral/Supportive therapy and psychoeducation provided  -R/B/SE's of medications discussed with the pt who expresses understanding and chooses to take medications as prescribed.   -Pt instructed to call clinic, 911 or go to nearest emergency room if sxs worsen or pt is in   crisis. The pt expresses  understanding.    Wade Rodriguez, PhD, MP     Antidepressant/Antianxiety Medication Initiation:  Patient informed of risks, benefits, and potential side effects of medication and accepts informed consent.  Common side effects include nausea, fatigue, headache, insomnia., Specifically discussed the possibility of new or worsening suicidal thoughts/depression.  Patient instructed to stop the medication immediately and seek urgent treatment if this occurs. Patient instructed not to abruptly discontinue medication without physician guidance except in cases of sudden onset or worsening of SI.       Stimulant Medication Initiation:  Patient advised of risks, benefits, and side effects of medication and accepts informed consent.  Common side effects include insomnia, irritability, jittery feeling, dry mouth, and agitation/hostility., Patient advised of potential addictive nature of medication and controlled substance classification.  Instructed to safeguard medication as no early refills can be given for lost or stolen medications.       Benzodiazepine Initiation:  Patient advised of the risks, benefits, and common side effects of medication and has accepted informed consent.  Common side effects include drowsiness, impaired coordination, possible memory loss., Patient advised NOT to operate a vehicle or machinery untiil they are sure how the medication will affec them.  Client also advised of danger of mixing this medication with alcohol., Patient advised of potential addictive nature of medication and need to safeguard medication as no early refills for lost or stolen medications can be authorized.       Pregnancy Warning:  Patient denies current pregnancy possibility.  Patient made aware that medications have not been proven safe in pregnancy and that she must maintain adequate birth control.  Patient instructed to alert us immediately if she becomes pregnant.

## 2023-06-29 ENCOUNTER — PATIENT MESSAGE (OUTPATIENT)
Dept: FAMILY MEDICINE | Facility: CLINIC | Age: 48
End: 2023-06-29
Payer: COMMERCIAL

## 2023-07-28 ENCOUNTER — PROCEDURE VISIT (OUTPATIENT)
Dept: NEUROLOGY | Facility: CLINIC | Age: 48
End: 2023-07-28
Payer: COMMERCIAL

## 2023-07-28 VITALS
DIASTOLIC BLOOD PRESSURE: 56 MMHG | HEIGHT: 71 IN | HEART RATE: 93 BPM | SYSTOLIC BLOOD PRESSURE: 105 MMHG | BODY MASS INDEX: 40.17 KG/M2 | RESPIRATION RATE: 18 BRPM

## 2023-07-28 DIAGNOSIS — G43.719 INTRACTABLE CHRONIC MIGRAINE WITHOUT AURA AND WITHOUT STATUS MIGRAINOSUS: Primary | ICD-10-CM

## 2023-07-28 PROCEDURE — 64615 CHEMODENERV MUSC MIGRAINE: CPT | Mod: S$GLB,,, | Performed by: PSYCHIATRY & NEUROLOGY

## 2023-07-28 PROCEDURE — 64615 PR CHEMODENERVATION OF MUSCLE FOR CHRONIC MIGRAINE: ICD-10-PCS | Mod: S$GLB,,, | Performed by: PSYCHIATRY & NEUROLOGY

## 2023-07-28 NOTE — PROCEDURES
Procedures   BOTOX PROCEDURE    PROCEDURE PERFORMED: Botulinum toxin injection (88245)    CLINICAL INDICATION: G43.719    A time out was conducted just before the start of the procedure to verify the correct patient and procedure, procedure location, and all relevant critical information.     Conventional methods of treatment but the patient has been unresponsive and refractory.The patient meets criteria for chronic headaches according to the ICHD-II, the patient has more than 15 headaches a month which last for more than 4 hours a day.    This is the first Botox injections and I am aiming for at least 50%  improvement in the patient's symptoms. Frequency of treatment is every 3 months unless no response to the treatments, at which time we will discontinue the injections.     DESCRIPTION OF PROCEDURE: After obtaining informed consent and under aseptic technique, a total of 155 units of botulinum toxin type A were injected in the following muscles: Procerus 5 units,  5 units bilaterally, frontalis 20 units, temporalis 20 units bilaterally, occipitalis 15 units, upper cervical paraspinals 10 units bilaterally and trapezius 15 units bilaterally. The patient was given a total of 155 units in 31 sites.The patient tolerated the procedure well. There were no complications. The patient was given a prescription for repeat treatment in 3 months.     Unavoidable waste 45 units          Saúl Braswell M.D  Medical Director, Headache and Facial Pain  [facility]

## 2023-07-31 ENCOUNTER — OFFICE VISIT (OUTPATIENT)
Dept: PSYCHIATRY | Facility: CLINIC | Age: 48
End: 2023-07-31
Payer: COMMERCIAL

## 2023-07-31 DIAGNOSIS — F33.1 MAJOR DEPRESSIVE DISORDER, RECURRENT, MODERATE: Primary | ICD-10-CM

## 2023-07-31 DIAGNOSIS — F90.2 ATTENTION DEFICIT HYPERACTIVITY DISORDER, COMBINED TYPE: ICD-10-CM

## 2023-07-31 DIAGNOSIS — F41.1 GAD (GENERALIZED ANXIETY DISORDER): ICD-10-CM

## 2023-07-31 PROCEDURE — 1159F MED LIST DOCD IN RCRD: CPT | Mod: CPTII,95,, | Performed by: PSYCHOLOGIST

## 2023-07-31 PROCEDURE — 3044F PR MOST RECENT HEMOGLOBIN A1C LEVEL <7.0%: ICD-10-PCS | Mod: CPTII,95,, | Performed by: PSYCHOLOGIST

## 2023-07-31 PROCEDURE — 1159F PR MEDICATION LIST DOCUMENTED IN MEDICAL RECORD: ICD-10-PCS | Mod: CPTII,95,, | Performed by: PSYCHOLOGIST

## 2023-07-31 PROCEDURE — 3044F HG A1C LEVEL LT 7.0%: CPT | Mod: CPTII,95,, | Performed by: PSYCHOLOGIST

## 2023-07-31 PROCEDURE — 99214 PR OFFICE/OUTPT VISIT, EST, LEVL IV, 30-39 MIN: ICD-10-PCS | Mod: 95,,, | Performed by: PSYCHOLOGIST

## 2023-07-31 PROCEDURE — 99214 OFFICE O/P EST MOD 30 MIN: CPT | Mod: 95,,, | Performed by: PSYCHOLOGIST

## 2023-07-31 PROCEDURE — 90833 PR PSYCHOTHERAPY W/PATIENT W/E&M, 30 MIN (ADD ON): ICD-10-PCS | Mod: 95,,, | Performed by: PSYCHOLOGIST

## 2023-07-31 PROCEDURE — 90833 PSYTX W PT W E/M 30 MIN: CPT | Mod: 95,,, | Performed by: PSYCHOLOGIST

## 2023-07-31 RX ORDER — DEXTROAMPHETAMINE SACCHARATE, AMPHETAMINE ASPARTATE MONOHYDRATE, DEXTROAMPHETAMINE SULFATE AND AMPHETAMINE SULFATE 5; 5; 5; 5 MG/1; MG/1; MG/1; MG/1
20 CAPSULE, EXTENDED RELEASE ORAL EVERY MORNING
Qty: 30 CAPSULE | Refills: 0 | Status: SHIPPED | OUTPATIENT
Start: 2023-07-31

## 2023-07-31 NOTE — PROGRESS NOTES
"The patient location is:  [place], [place]  The patient location [place] is: [place]  The patient phone number is: [phone]  Visit type: Virtual visit with synchronous audio and video  Each patient to whom he or she provides medical services by telemedicine is:  (1) informed of the relationship between the physician and patient and the respective role of any other health care provider with respect to management of the patient; and (2) notified that he or she may decline to receive medical services by telemedicine and may withdraw from such care at any time.     Outpatient Psychiatry Follow-Up Visit    Clinical Status of Patient: Outpatient (Ambulatory)  07/31/2023     Chief Complaint: Depression, anxiety, ADHD     Interval History and Content of Current Session:  Interim Events/Subjective Report/Content of Current Session:  follow-up appointment.    Pt is a 46 y/o female with past psychiatric hx of depression, anxiety, ADHD who presents for follow-up treatment. Pt reported that the increase dose of buspirone had little impact on her anxiety. Despite this pt notes that she has reduced the frequency of clonazepam use. Continues to report work related stressors. Stated that she is unsure if therapy with help but willing to try. Has an intake apt scheduled with Steven Barry LCSW.      Past Psychiatric hx: ritalin, prozac, buspar PRN ("it helped with the stress of planning the wedding"), cymbalta 120mg po daily (ineffective), zoloft 150mg (ineffective), prozac 20mg po qam, xanax 0.5mg po BID PRN anxiety, wellbutrin xl 150mg po qam, fetzima 40mg po qam, abilify 2mg, pristiq 100mg qam       Past Medical hx:   Past Medical History:   Diagnosis Date    Depression with anxiety     Fatigue     HTN (hypertension)     Iron deficiency anemia     Migraines     PCOS (polycystic ovarian syndrome)         Interim hx:  Medication changes last visit:  Increase buspar to 20 mg po TID PRN anxiety  Anxiety: slight " increase  Depression: mild     Denies suicidal/homicidal ideations.  Denies hopelessness/worthlessness.    Denies auditory/visual hallucinations      Alcohol: Very infrequent due to migraines  Drug: Pt denied  Caffeine: 2 sodas per day  Tobacco: Pt denied      Review of Systems   PSYCHIATRIC: Pertinent items are noted in the narrative.        CONSTITUTIONAL: weight stable    Past Medical, Family and Social History: The patient's past medical, family and social history have been reviewed and updated as appropriate within the electronic medical record. See encounter notes.     Current Psychiatric Medication: effexor xr 300 mg po qam, buspar 20 mg po TID PRN anxiety, klonopin 0.25mg po daily PRN anxiety (once per week), adderall XR 20 mg po qam     Compliance: yes      Side effects: Pt denied     Risk Parameters:  Patient reports no suicidal ideation  Patient reports no homicidal ideation  Patient reports no self-injurious behavior  Patient reports no violent behavior     Exam (detailed: at least 9 elements; comprehensive: all 15 elements)   Constitutional  Vitals:  Most recent vital signs, dated less than 90 days prior to this appointment, were reviewed.        General:  unremarkable, age appropriate, casual attire, good eye contact, good rapport       Musculoskeletal  Muscle Strength/Tone:  no flaccidity, no tremor    Gait & Station:  normal      Psychiatric                       Speech:  normal tone, normal rate, rhythm, and volume   Mood & Affect:   Mildly Depressed, anxious         Thought Process:   Goal directed; Linear    Associations:   intact   Thought Content:   No SI/HI, delusions, or paranoia, no AV/VH   Insight & Judgement:   Good, adequate to circumstances   Orientation:   grossly intact; alert and oriented x 4    Memory:  intact for content of interview    Language:  grossly intact, can repeat    Attention Span  : Grossly intact for content of interview   Fund of Knowledge:   intact and appropriate to  age and level of education        Assessment and Diagnosis   Status/Progress: Based on the examination today, the patient's problem(s) is/are not adequately controlled.  New problems have not been presented today. Co-morbidities are not complicating management of the primary condition. There are no active rule-out diagnoses for this patient at this time.      Impression:  Pt appears to continue experiencing psychosocial stressors/caregiver fatigue related to the health concerns of family members and occupational stressors. Increase in buspirone appears to have been ineffective at reducing anxiety. Pt stated that she wants to try therapy before making other medication changes. Will continue with the medication plan as is for now.     Diagnosis:   Major Depressive Disorder, recurrent, moderate  Generalized Anxiety Disorder  Attention Deficit Hyperactivity Disorder, Combined Type  Intervention/Counseling/Treatment Plan   Medication Management:      1. Cont effexor xr 300 mg po qam     2. Increase buspar to 20 mg po TID PRN anxiety    3. Cont klonopin 0.25mg po daily PRN anxiety (once per week)     4. Cont Adderall XR 20 mg po qam      5. Call to report any worsening of symptoms or problems with the medication. Pt instructed to go to ER with thoughts of harming self, others.     6. Patient given contact # for psychotherapists at [facility] and also instructed to check with insurance for list of providers.     Psychotherapy: 20 minutes  Target symptoms: depression, anxiety  Why chosen therapy is appropriate versus another modality: CBT used; relevant to diagnosis, patient responds to this modality  Outcome monitoring methods: self-report, observation  Therapeutic intervention type: Cognitive Behavioral Therapy, coping identification  Topics discussed/themes: building skills sets for symptom management, symptom recognition, nutrition, exercise  The patient's response to the intervention is accepting  Patient's  response to treatment is: good.   The patient's progress toward treatment goals: improving     Return to clinic: 6 weeks    -Cognitive-Behavioral/Supportive therapy and psychoeducation provided  -R/B/SE's of medications discussed with the pt who expresses understanding and chooses to take medications as prescribed.   -Pt instructed to call clinic, 911 or go to nearest emergency room if sxs worsen or pt is in   crisis. The pt expresses understanding.    Wade Rodriguez, PhD, MP     Antidepressant/Antianxiety Medication Initiation:  Patient informed of risks, benefits, and potential side effects of medication and accepts informed consent.  Common side effects include nausea, fatigue, headache, insomnia., Specifically discussed the possibility of new or worsening suicidal thoughts/depression.  Patient instructed to stop the medication immediately and seek urgent treatment if this occurs. Patient instructed not to abruptly discontinue medication without physician guidance except in cases of sudden onset or worsening of SI.       Stimulant Medication Initiation:  Patient advised of risks, benefits, and side effects of medication and accepts informed consent.  Common side effects include insomnia, irritability, jittery feeling, dry mouth, and agitation/hostility., Patient advised of potential addictive nature of medication and controlled substance classification.  Instructed to safeguard medication as no early refills can be given for lost or stolen medications.       Benzodiazepine Initiation:  Patient advised of the risks, benefits, and common side effects of medication and has accepted informed consent.  Common side effects include drowsiness, impaired coordination, possible memory loss., Patient advised NOT to operate a vehicle or machinery untiil they are sure how the medication will affec them.  Client also advised of danger of mixing this medication with alcohol., Patient advised of potential addictive nature of  medication and need to safeguard medication as no early refills for lost or stolen medications can be authorized.

## 2023-08-02 ENCOUNTER — OFFICE VISIT (OUTPATIENT)
Dept: PSYCHIATRY | Facility: CLINIC | Age: 48
End: 2023-08-02
Payer: COMMERCIAL

## 2023-08-02 DIAGNOSIS — F41.1 GAD (GENERALIZED ANXIETY DISORDER): ICD-10-CM

## 2023-08-02 DIAGNOSIS — F90.2 ATTENTION DEFICIT HYPERACTIVITY DISORDER, COMBINED TYPE: ICD-10-CM

## 2023-08-02 DIAGNOSIS — F33.1 MAJOR DEPRESSIVE DISORDER, RECURRENT, MODERATE: Primary | ICD-10-CM

## 2023-08-02 PROCEDURE — 1159F MED LIST DOCD IN RCRD: CPT | Mod: CPTII,S$GLB,, | Performed by: SOCIAL WORKER

## 2023-08-02 PROCEDURE — 3044F HG A1C LEVEL LT 7.0%: CPT | Mod: CPTII,S$GLB,, | Performed by: SOCIAL WORKER

## 2023-08-02 PROCEDURE — 90791 PSYCH DIAGNOSTIC EVALUATION: CPT | Mod: S$GLB,,, | Performed by: SOCIAL WORKER

## 2023-08-02 PROCEDURE — 3044F PR MOST RECENT HEMOGLOBIN A1C LEVEL <7.0%: ICD-10-PCS | Mod: CPTII,S$GLB,, | Performed by: SOCIAL WORKER

## 2023-08-02 PROCEDURE — 90791 PR PSYCHIATRIC DIAGNOSTIC EVALUATION: ICD-10-PCS | Mod: S$GLB,,, | Performed by: SOCIAL WORKER

## 2023-08-02 PROCEDURE — 1159F PR MEDICATION LIST DOCUMENTED IN MEDICAL RECORD: ICD-10-PCS | Mod: CPTII,S$GLB,, | Performed by: SOCIAL WORKER
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
## 2023-08-16 ENCOUNTER — OFFICE VISIT (OUTPATIENT)
Dept: PSYCHIATRY | Facility: CLINIC | Age: 48
End: 2023-08-16
Payer: COMMERCIAL

## 2023-08-16 DIAGNOSIS — F90.2 ATTENTION DEFICIT HYPERACTIVITY DISORDER, COMBINED TYPE: ICD-10-CM

## 2023-08-16 DIAGNOSIS — F33.1 MAJOR DEPRESSIVE DISORDER, RECURRENT, MODERATE: Primary | ICD-10-CM

## 2023-08-16 DIAGNOSIS — F41.1 GAD (GENERALIZED ANXIETY DISORDER): ICD-10-CM

## 2023-08-16 PROCEDURE — 90834 PR PSYCHOTHERAPY W/PATIENT, 45 MIN: ICD-10-PCS | Mod: S$GLB,,, | Performed by: SOCIAL WORKER

## 2023-08-16 PROCEDURE — 1159F PR MEDICATION LIST DOCUMENTED IN MEDICAL RECORD: ICD-10-PCS | Mod: CPTII,S$GLB,, | Performed by: SOCIAL WORKER

## 2023-08-16 PROCEDURE — 3044F HG A1C LEVEL LT 7.0%: CPT | Mod: CPTII,S$GLB,, | Performed by: SOCIAL WORKER

## 2023-08-16 PROCEDURE — 90834 PSYTX W PT 45 MINUTES: CPT | Mod: S$GLB,,, | Performed by: SOCIAL WORKER

## 2023-08-16 PROCEDURE — 1159F MED LIST DOCD IN RCRD: CPT | Mod: CPTII,S$GLB,, | Performed by: SOCIAL WORKER

## 2023-08-16 PROCEDURE — 3044F PR MOST RECENT HEMOGLOBIN A1C LEVEL <7.0%: ICD-10-PCS | Mod: CPTII,S$GLB,, | Performed by: SOCIAL WORKER

## 2023-08-16 NOTE — PROGRESS NOTES
Individual Psychotherapy (PhD/LCSW)    8/16/2023    Site:  [facility]         Therapeutic Intervention: Met with patient.  Outpatient - Supportive psychotherapy 45 min - CPT Code 04123    Chief complaint/reason for encounter: depression and anxiety     Interval history and content of current session: Patient was seen this date. She was last seen on 8/2/23. Patient shared she has been feeling depressed. She shared her current level of depression is at an 8. Counselor asked on average what does she believe her baseline level of depression to be. Patient reported an 8-9. She indicated she was once told by a therapist that she is likely not going to get better than that since she has tried multiple medications and she has not had a positive outcome with therapy. Patient shared she does not know what to say in therapy. Patient was tearful throughout this session sharing that in addition to her chronic chemical depression, she also has life stressor which cause her to have situational depression. Counselor reminded Patient of resources and we reviewed deep breathing, thought stopping and the guided meditation was introduced and encouraged.  Patient agreed to try the tools and report back her response in an effort to determine what is helpful. Support, encouragement and empathy were offered throughout.  Treatment plan:  Target symptoms: depression, anxiety   Why chosen therapy is appropriate versus another modality: evidence based practice  Outcome monitoring methods: self-report, observation  Therapeutic intervention type: behavior modifying psychotherapy, supportive psychotherapy    Risk parameters:  Patient reports no suicidal ideation  Patient reports no homicidal ideation  Patient reports no self-injurious behavior  Patient reports no violent behavior    Verbal deficits: None    Patient's response to intervention:  The patient's response to intervention is accepting.    Progress toward goals and other mental  status changes:  The patient's progress toward goals is limited.    Diagnosis:     ICD-10-CM ICD-9-CM   1. Major depressive disorder, recurrent, moderate  F33.1 296.32   2. PASTORA (generalized anxiety disorder)  F41.1 300.02   3. Attention deficit hyperactivity disorder, combined type  F90.2 314.01       Plan:  individual psychotherapy and medication management by physician    Return to clinic: 2 weeks    Length of Service (minutes): 45

## 2023-08-31 ENCOUNTER — OFFICE VISIT (OUTPATIENT)
Dept: PSYCHIATRY | Facility: CLINIC | Age: 48
End: 2023-08-31
Payer: COMMERCIAL

## 2023-08-31 DIAGNOSIS — F90.2 ATTENTION DEFICIT HYPERACTIVITY DISORDER, COMBINED TYPE: ICD-10-CM

## 2023-08-31 DIAGNOSIS — F41.1 GAD (GENERALIZED ANXIETY DISORDER): ICD-10-CM

## 2023-08-31 DIAGNOSIS — F33.1 MAJOR DEPRESSIVE DISORDER, RECURRENT, MODERATE: Primary | ICD-10-CM

## 2023-08-31 PROCEDURE — 90834 PR PSYCHOTHERAPY W/PATIENT, 45 MIN: ICD-10-PCS | Mod: S$GLB,,, | Performed by: SOCIAL WORKER

## 2023-08-31 PROCEDURE — 3044F HG A1C LEVEL LT 7.0%: CPT | Mod: CPTII,S$GLB,, | Performed by: SOCIAL WORKER

## 2023-08-31 PROCEDURE — 3044F PR MOST RECENT HEMOGLOBIN A1C LEVEL <7.0%: ICD-10-PCS | Mod: CPTII,S$GLB,, | Performed by: SOCIAL WORKER

## 2023-08-31 PROCEDURE — 90834 PSYTX W PT 45 MINUTES: CPT | Mod: S$GLB,,, | Performed by: SOCIAL WORKER

## 2023-08-31 PROCEDURE — 1159F MED LIST DOCD IN RCRD: CPT | Mod: CPTII,S$GLB,, | Performed by: SOCIAL WORKER

## 2023-08-31 PROCEDURE — 1159F PR MEDICATION LIST DOCUMENTED IN MEDICAL RECORD: ICD-10-PCS | Mod: CPTII,S$GLB,, | Performed by: SOCIAL WORKER

## 2023-08-31 NOTE — PROGRESS NOTES
"  Individual Psychotherapy (PhD/LCSW)    8/31/2023    Site:  [facility]         Therapeutic Intervention: Met with patient.  Outpatient - Supportive psychotherapy 45 min - CPT Code 10036    Chief complaint/reason for encounter: depression and anxiety     Interval history and content of current session: Patient was seen this date. She was last seen on 8/16/23.  Patient reported her depression level has not improved and she has attempted techniques suggested, relaxation exercises, deep breathing, guided meditation, thought stopping, gratitude journal and she finds herself not able to fully relax and focus. Patient reports her brain continues to over-think and allowing herself 20 minutes to practice meditation her brain starts thinking she "should" be doing other things on her to-do list, which than prevents her from being able to relax. Counselor suggested a 5 minute guided meditation in an effort to see if she would allow her self to relax during this session for 5 minutes. Counselor showed her the script prior to reading it for her, indicated she did not have to close her eyes if she did not want. Patient agreed, and Counselor attempted the 5 minute guided meditation. Upon completion, Patient indicated she was unable to relax and was thinking throughout the process she was not "doing it right" which prevented her from relaxing. Patient was tearful. Discussion was had about her inner critic. Counselor encouraged Patient to redirect that voice when it is heard. She was encouraged to focus on progress and the idea that there is no need for perfection as she is learning a skill, tool to assist her in self-care. Counselor offered support, encouragement and empathy.     Treatment plan:  Target symptoms: depression, anxiety   Why chosen therapy is appropriate versus another modality: evidence based practice  Outcome monitoring methods: self-report, observation  Therapeutic intervention type: behavior modifying " psychotherapy, supportive psychotherapy    Risk parameters:  Patient reports no suicidal ideation  Patient reports no homicidal ideation  Patient reports no self-injurious behavior  Patient reports no violent behavior    Verbal deficits: None    Patient's response to intervention:  The patient's response to intervention is accepting, motivated.    Progress toward goals and other mental status changes:  The patient's progress toward goals is limited.    Diagnosis:     ICD-10-CM ICD-9-CM   1. Major depressive disorder, recurrent, moderate  F33.1 296.32   2. PASTORA (generalized anxiety disorder)  F41.1 300.02   3. Attention deficit hyperactivity disorder, combined type  F90.2 314.01       Plan:  individual psychotherapy and medication management by physician    Return to clinic: 2 weeks    Length of Service (minutes): 45

## 2023-09-06 DIAGNOSIS — G43.719 INTRACTABLE CHRONIC MIGRAINE WITHOUT AURA AND WITHOUT STATUS MIGRAINOSUS: ICD-10-CM

## 2023-09-06 RX ORDER — GALCANEZUMAB 120 MG/ML
120 INJECTION, SOLUTION SUBCUTANEOUS
Qty: 1 ML | Refills: 11 | Status: SHIPPED | OUTPATIENT
Start: 2023-09-06

## 2023-09-18 ENCOUNTER — OFFICE VISIT (OUTPATIENT)
Dept: PSYCHIATRY | Facility: CLINIC | Age: 48
End: 2023-09-18
Payer: COMMERCIAL

## 2023-09-18 DIAGNOSIS — F33.1 MAJOR DEPRESSIVE DISORDER, RECURRENT, MODERATE: Primary | ICD-10-CM

## 2023-09-18 DIAGNOSIS — F41.1 GAD (GENERALIZED ANXIETY DISORDER): ICD-10-CM

## 2023-09-18 DIAGNOSIS — F90.2 ATTENTION DEFICIT HYPERACTIVITY DISORDER, COMBINED TYPE: ICD-10-CM

## 2023-09-18 PROCEDURE — 3044F PR MOST RECENT HEMOGLOBIN A1C LEVEL <7.0%: ICD-10-PCS | Mod: CPTII,95,, | Performed by: PSYCHOLOGIST

## 2023-09-18 PROCEDURE — 90836 PSYTX W PT W E/M 45 MIN: CPT | Mod: 95,,, | Performed by: PSYCHOLOGIST

## 2023-09-18 PROCEDURE — 3044F HG A1C LEVEL LT 7.0%: CPT | Mod: CPTII,95,, | Performed by: PSYCHOLOGIST

## 2023-09-18 PROCEDURE — 1159F PR MEDICATION LIST DOCUMENTED IN MEDICAL RECORD: ICD-10-PCS | Mod: CPTII,95,, | Performed by: PSYCHOLOGIST

## 2023-09-18 PROCEDURE — 90836 PR PSYCHOTHERAPY W/PATIENT W/E&M, 45 MIN (ADD ON): ICD-10-PCS | Mod: 95,,, | Performed by: PSYCHOLOGIST

## 2023-09-18 PROCEDURE — 99214 PR OFFICE/OUTPT VISIT, EST, LEVL IV, 30-39 MIN: ICD-10-PCS | Mod: 95,,, | Performed by: PSYCHOLOGIST

## 2023-09-18 PROCEDURE — 99214 OFFICE O/P EST MOD 30 MIN: CPT | Mod: 95,,, | Performed by: PSYCHOLOGIST

## 2023-09-18 PROCEDURE — 1159F MED LIST DOCD IN RCRD: CPT | Mod: CPTII,95,, | Performed by: PSYCHOLOGIST

## 2023-09-18 NOTE — PROGRESS NOTES
"The patient location is:  [place], [place]  The patient location Bruce is: [place]  The patient phone number is: [phone]  Visit type: Virtual visit with synchronous audio and video  Each patient to whom he or she provides medical services by telemedicine is:  (1) informed of the relationship between the physician and patient and the respective role of any other health care provider with respect to management of the patient; and (2) notified that he or she may decline to receive medical services by telemedicine and may withdraw from such care at any time.     Outpatient Psychiatry Follow-Up Visit    Clinical Status of Patient: Outpatient (Ambulatory)  09/17/2023     Chief Complaint: Depression, anxiety, ADHD     Interval History and Content of Current Session:  Interim Events/Subjective Report/Content of Current Session:  follow-up appointment.    Pt is a 48 y/o female with past psychiatric hx of depression, anxiety, ADHD who presents for follow-up treatment. Pt reported still no change with increased dose of buspirone. Continues to have stress, feeling overwhelmed and sad. Pt identified caring for her , work and aging relatives as main stressors. Pt reported that her mood is very low. Has attended therapy sessions about 3x with no improvement. Has had difficulty getting the adderall due to national shortage. Pt noted that her depression has been long standing and fears that it may never improve with medications. Pt discussed wanting to start an adjunct antipsychotic and we explored various treatment options for treatment resistant depression.     Past Psychiatric hx: ritalin, prozac, buspar PRN ("it helped with the stress of planning the wedding"), cymbalta 120mg po daily (ineffective), zoloft 150mg (ineffective), prozac 20mg po qam, xanax 0.5mg po BID PRN anxiety, wellbutrin xl 150mg po qam (anger, agitation), fetzima 40mg po qam, abilify 2mg, pristiq 100mg qam, Vraylar 1.5 mg, lithium 150 mg BID   "     Past Medical hx:   Past Medical History:   Diagnosis Date    Depression with anxiety     Fatigue     HTN (hypertension)     Iron deficiency anemia     Migraines     PCOS (polycystic ovarian syndrome)         Interim hx:  Medication changes last visit: Increase buspar to 20 mg po TID PRN anxiety  Anxiety: slight increase  Depression: mild     Denies suicidal/homicidal ideations.  Denies hopelessness/worthlessness.    Denies auditory/visual hallucinations      Alcohol: Very infrequent due to migraines  Drug: Pt denied  Caffeine: 2 sodas per day  Tobacco: Pt denied      Review of Systems   PSYCHIATRIC: Pertinent items are noted in the narrative.        CONSTITUTIONAL: weight stable    Past Medical, Family and Social History: The patient's past medical, family and social history have been reviewed and updated as appropriate within the electronic medical record. See encounter notes.     Current Psychiatric Medication: effexor xr 300 mg po qam, buspar 20 mg po TID PRN anxiety, klonopin 0.25mg po daily PRN anxiety (once per week), adderall XR 20 mg po qam     Compliance: yes      Side effects: Pt denied     Risk Parameters:  Patient reports no suicidal ideation  Patient reports no homicidal ideation  Patient reports no self-injurious behavior  Patient reports no violent behavior     Exam (detailed: at least 9 elements; comprehensive: all 15 elements)   Constitutional  Vitals:  Most recent vital signs, dated less than 90 days prior to this appointment, were reviewed. BP: ()/()   Arterial Line BP: ()/()       General:  unremarkable, age appropriate, casual attire, good eye contact, good rapport       Musculoskeletal  Muscle Strength/Tone:  no flaccidity, no tremor    Gait & Station:  normal      Psychiatric                       Speech:  normal tone, normal rate, rhythm, and volume   Mood & Affect:   Mildly Depressed, anxious         Thought Process:   Goal directed; Linear    Associations:   intact   Thought Content:    No SI/HI, delusions, or paranoia, no AV/VH   Insight & Judgement:   Good, adequate to circumstances   Orientation:   grossly intact; alert and oriented x 4    Memory:  intact for content of interview    Language:  grossly intact, can repeat    Attention Span  : Grossly intact for content of interview   Fund of Knowledge:   intact and appropriate to age and level of education        Assessment and Diagnosis   Status/Progress: Based on the examination today, the patient's problem(s) is/are not adequately controlled.  New problems have not been presented today. Co-morbidities are not complicating management of the primary condition. There are no active rule-out diagnoses for this patient at this time.      Impression:  Pt appears to continue experiencing psychosocial stressors/caregiver fatigue related to the health concerns of family members and occupational stressors. Increase in buspirone appears to have been ineffective at reducing anxiety. Pt now requesting adjunct agent and open to discussing various treatment resistant options for mood. Rexulti may be an option given previous failed trials with other agents. Chart review also suggests improved mood when Adderall was dosed IR BID.    Diagnosis:   Major Depressive Disorder, recurrent, moderate  Generalized Anxiety Disorder  Attention Deficit Hyperactivity Disorder, Combined Type  Intervention/Counseling/Treatment Plan   Medication Management:      1. Cont effexor xr 300 mg po qam     2. Cont buspar to 20 mg po TID PRN anxiety    3. Cont klonopin 0.25mg po daily PRN anxiety (once per week)     4. Cont Adderall XR 20 mg po qam      5. Call to report any worsening of symptoms or problems with the medication. Pt instructed to go to ER with thoughts of harming self, others.     6. Patient given contact # for psychotherapists at [facility] and also instructed to check with insurance for list of providers.     Psychotherapy: 40 minutes  Target symptoms:  depression, anxiety  Why chosen therapy is appropriate versus another modality: CBT used; relevant to diagnosis, patient responds to this modality  Outcome monitoring methods: self-report, observation  Therapeutic intervention type: Cognitive Behavioral Therapy, coping identification  Topics discussed/themes: building skills sets for symptom management, symptom recognition, nutrition, exercise  The patient's response to the intervention is accepting  Patient's response to treatment is: good.   The patient's progress toward treatment goals: improving     Return to clinic: 6 weeks    -Cognitive-Behavioral/Supportive therapy and psychoeducation provided  -R/B/SE's of medications discussed with the pt who expresses understanding and chooses to take medications as prescribed.   -Pt instructed to call clinic, 911 or go to nearest emergency room if sxs worsen or pt is in   crisis. The pt expresses understanding.    Wade Rodriguez, PhD, MP     Antidepressant/Antianxiety Medication Initiation:  Patient informed of risks, benefits, and potential side effects of medication and accepts informed consent.  Common side effects include nausea, fatigue, headache, insomnia., Specifically discussed the possibility of new or worsening suicidal thoughts/depression.  Patient instructed to stop the medication immediately and seek urgent treatment if this occurs. Patient instructed not to abruptly discontinue medication without physician guidance except in cases of sudden onset or worsening of SI.       Stimulant Medication Initiation:  Patient advised of risks, benefits, and side effects of medication and accepts informed consent.  Common side effects include insomnia, irritability, jittery feeling, dry mouth, and agitation/hostility., Patient advised of potential addictive nature of medication and controlled substance classification.  Instructed to safeguard medication as no early refills can be given for lost or stolen medications.        Benzodiazepine Initiation:  Patient advised of the risks, benefits, and common side effects of medication and has accepted informed consent.  Common side effects include drowsiness, impaired coordination, possible memory loss., Patient advised NOT to operate a vehicle or machinery untiil they are sure how the medication will affec them.  Client also advised of danger of mixing this medication with alcohol., Patient advised of potential addictive nature of medication and need to safeguard medication as no early refills for lost or stolen medications can be authorized.

## 2023-10-01 DIAGNOSIS — F41.1 GAD (GENERALIZED ANXIETY DISORDER): ICD-10-CM

## 2023-10-01 DIAGNOSIS — F33.2 MDD (MAJOR DEPRESSIVE DISORDER), RECURRENT SEVERE, WITHOUT PSYCHOSIS: ICD-10-CM

## 2023-10-02 RX ORDER — VENLAFAXINE HYDROCHLORIDE 150 MG/1
CAPSULE, EXTENDED RELEASE ORAL
Qty: 180 CAPSULE | Refills: 1 | Status: SHIPPED | OUTPATIENT
Start: 2023-10-02

## 2023-10-04 DIAGNOSIS — G43.719 INTRACTABLE CHRONIC MIGRAINE WITHOUT AURA AND WITHOUT STATUS MIGRAINOSUS: ICD-10-CM

## 2023-10-05 RX ORDER — RIMEGEPANT SULFATE 75 MG/75MG
TABLET, ORALLY DISINTEGRATING ORAL
Qty: 8 TABLET | Refills: 11 | Status: SHIPPED | OUTPATIENT
Start: 2023-10-05

## 2023-10-20 ENCOUNTER — PROCEDURE VISIT (OUTPATIENT)
Dept: NEUROLOGY | Facility: CLINIC | Age: 48
End: 2023-10-20
Payer: COMMERCIAL

## 2023-10-20 VITALS — HEART RATE: 103 BPM | RESPIRATION RATE: 18 BRPM | DIASTOLIC BLOOD PRESSURE: 83 MMHG | SYSTOLIC BLOOD PRESSURE: 157 MMHG

## 2023-10-20 DIAGNOSIS — G43.719 INTRACTABLE CHRONIC MIGRAINE WITHOUT AURA AND WITHOUT STATUS MIGRAINOSUS: Primary | ICD-10-CM

## 2023-10-20 PROCEDURE — 64615 PR CHEMODENERVATION OF MUSCLE FOR CHRONIC MIGRAINE: ICD-10-PCS | Mod: S$GLB,,, | Performed by: PSYCHIATRY & NEUROLOGY

## 2023-10-20 PROCEDURE — 64615 CHEMODENERV MUSC MIGRAINE: CPT | Mod: S$GLB,,, | Performed by: PSYCHIATRY & NEUROLOGY

## 2023-10-20 NOTE — PROCEDURES
Procedures   BOTOX PROCEDURE    PROCEDURE PERFORMED: Botulinum toxin injection (83725)    CLINICAL INDICATION: G43.719    A time out was conducted just before the start of the procedure to verify the correct patient and procedure, procedure location, and all relevant critical information.     Conventional methods of treatment but the patient has been unresponsive and refractory.The patient meets criteria for chronic headaches according to the ICHD-II, the patient has more than 15 headaches a month which last for more than 4 hours a day.    This is the first Botox injections and I am aiming for at least 50%  improvement in the patient's symptoms. Frequency of treatment is every 3 months unless no response to the treatments, at which time we will discontinue the injections.     DESCRIPTION OF PROCEDURE: After obtaining informed consent and under aseptic technique, a total of 155 units of botulinum toxin type A were injected in the following muscles: Procerus 5 units,  5 units bilaterally, frontalis 20 units, temporalis 20 units bilaterally, occipitalis 15 units, upper cervical paraspinals 10 units bilaterally and trapezius 15 units bilaterally. The patient was given a total of 155 units in 31 sites.The patient tolerated the procedure well. There were no complications. The patient was given a prescription for repeat treatment in 3 months.     Unavoidable waste 45 units          Saúl Braswell M.D  Medical Director, Headache and Facial Pain  [facility]

## 2024-01-12 ENCOUNTER — PROCEDURE VISIT (OUTPATIENT)
Dept: NEUROLOGY | Facility: CLINIC | Age: 49
End: 2024-01-12
Payer: COMMERCIAL

## 2024-01-12 VITALS
DIASTOLIC BLOOD PRESSURE: 84 MMHG | HEIGHT: 71 IN | SYSTOLIC BLOOD PRESSURE: 143 MMHG | BODY MASS INDEX: 40.34 KG/M2 | HEART RATE: 97 BPM | WEIGHT: 288.13 LBS | TEMPERATURE: 97 F | RESPIRATION RATE: 17 BRPM

## 2024-01-12 DIAGNOSIS — G43.719 INTRACTABLE CHRONIC MIGRAINE WITHOUT AURA AND WITHOUT STATUS MIGRAINOSUS: Primary | ICD-10-CM

## 2024-01-12 PROCEDURE — 64615 CHEMODENERV MUSC MIGRAINE: CPT | Mod: S$GLB,,, | Performed by: PSYCHIATRY & NEUROLOGY

## 2024-01-12 NOTE — PROCEDURES
Procedures   BOTOX PROCEDURE    PROCEDURE PERFORMED: Botulinum toxin injection (70030)    CLINICAL INDICATION: G43.719    A time out was conducted just before the start of the procedure to verify the correct patient and procedure, procedure location, and all relevant critical information.     Conventional methods of treatment but the patient has been unresponsive and refractory.The patient meets criteria for chronic headaches according to the ICHD-II, the patient has more than 15 headaches a month which last for more than 4 hours a day.    This is the first Botox injections and I am aiming for at least 50%  improvement in the patient's symptoms. Frequency of treatment is every 3 months unless no response to the treatments, at which time we will discontinue the injections.     DESCRIPTION OF PROCEDURE: After obtaining informed consent and under aseptic technique, a total of 155 units of botulinum toxin type A were injected in the following muscles: Procerus 5 units,  5 units bilaterally, frontalis 20 units, temporalis 20 units bilaterally, occipitalis 15 units, upper cervical paraspinals 10 units bilaterally and trapezius 15 units bilaterally. The patient was given a total of 155 units in 31 sites.The patient tolerated the procedure well. There were no complications. The patient was given a prescription for repeat treatment in 3 months.     Unavoidable waste 45 units          Saúl Braswell M.D  Medical Director, Headache and Facial Pain  [facility]

## 2024-01-17 ENCOUNTER — OFFICE VISIT (OUTPATIENT)
Dept: NEUROLOGY | Facility: CLINIC | Age: 49
End: 2024-01-17
Payer: COMMERCIAL

## 2024-01-17 ENCOUNTER — TELEPHONE (OUTPATIENT)
Dept: NEUROLOGY | Facility: CLINIC | Age: 49
End: 2024-01-17
Payer: COMMERCIAL

## 2024-01-17 VITALS
SYSTOLIC BLOOD PRESSURE: 154 MMHG | BODY MASS INDEX: 39.65 KG/M2 | WEIGHT: 283.19 LBS | TEMPERATURE: 98 F | HEART RATE: 91 BPM | HEIGHT: 71 IN | RESPIRATION RATE: 17 BRPM | DIASTOLIC BLOOD PRESSURE: 81 MMHG

## 2024-01-17 DIAGNOSIS — E66.9 OBESITY (BMI 30-39.9): ICD-10-CM

## 2024-01-17 DIAGNOSIS — E66.01 CLASS 2 SEVERE OBESITY WITH SERIOUS COMORBIDITY AND BODY MASS INDEX (BMI) OF 39.0 TO 39.9 IN ADULT, UNSPECIFIED OBESITY TYPE: ICD-10-CM

## 2024-01-17 DIAGNOSIS — F33.2 MDD (MAJOR DEPRESSIVE DISORDER), RECURRENT SEVERE, WITHOUT PSYCHOSIS: ICD-10-CM

## 2024-01-17 DIAGNOSIS — R53.82 CHRONIC FATIGUE: ICD-10-CM

## 2024-01-17 DIAGNOSIS — I10 PRIMARY HYPERTENSION: ICD-10-CM

## 2024-01-17 DIAGNOSIS — F41.1 GAD (GENERALIZED ANXIETY DISORDER): ICD-10-CM

## 2024-01-17 DIAGNOSIS — G43.719 INTRACTABLE CHRONIC MIGRAINE WITHOUT AURA AND WITHOUT STATUS MIGRAINOSUS: Primary | ICD-10-CM

## 2024-01-17 PROCEDURE — 99214 OFFICE O/P EST MOD 30 MIN: CPT | Mod: 25,S$GLB,, | Performed by: PSYCHIATRY & NEUROLOGY

## 2024-01-17 PROCEDURE — 1159F MED LIST DOCD IN RCRD: CPT | Mod: CPTII,S$GLB,, | Performed by: PSYCHIATRY & NEUROLOGY

## 2024-01-17 PROCEDURE — 96372 THER/PROPH/DIAG INJ SC/IM: CPT | Mod: S$GLB,,, | Performed by: PSYCHIATRY & NEUROLOGY

## 2024-01-17 PROCEDURE — 99999 PR PBB SHADOW E&M-EST. PATIENT-LVL IV: CPT | Mod: PBBFAC,,, | Performed by: PSYCHIATRY & NEUROLOGY

## 2024-01-17 PROCEDURE — 3079F DIAST BP 80-89 MM HG: CPT | Mod: CPTII,S$GLB,, | Performed by: PSYCHIATRY & NEUROLOGY

## 2024-01-17 PROCEDURE — 3077F SYST BP >= 140 MM HG: CPT | Mod: CPTII,S$GLB,, | Performed by: PSYCHIATRY & NEUROLOGY

## 2024-01-17 PROCEDURE — 3008F BODY MASS INDEX DOCD: CPT | Mod: CPTII,S$GLB,, | Performed by: PSYCHIATRY & NEUROLOGY

## 2024-01-17 RX ORDER — ONDANSETRON 2 MG/ML
4 INJECTION INTRAMUSCULAR; INTRAVENOUS
Status: COMPLETED | OUTPATIENT
Start: 2024-01-17 | End: 2024-01-17

## 2024-01-17 RX ORDER — KETOROLAC TROMETHAMINE 30 MG/ML
30 INJECTION, SOLUTION INTRAMUSCULAR; INTRAVENOUS
Status: COMPLETED | OUTPATIENT
Start: 2024-01-17 | End: 2024-01-17

## 2024-01-17 RX ADMIN — KETOROLAC TROMETHAMINE 30 MG: 30 INJECTION, SOLUTION INTRAMUSCULAR; INTRAVENOUS at 01:01

## 2024-01-17 RX ADMIN — ONDANSETRON 4 MG: 2 INJECTION INTRAMUSCULAR; INTRAVENOUS at 01:01

## 2024-01-17 NOTE — PROGRESS NOTES
INTERVAL HISTORY 1/17/2024  The patient presents for follow up. The Botox injections have achieved well over 50%  improvement in the patient's symptoms. Migraines have been reduced at least 7 days per month and the number of cumulative hours suffering with headaches has been reduced at least 100 total hours per month. She is also on Emgality for added prevention. She states that she typicaly gets 2 to 3 attacks per month.She reports a severe one today associated with photophobia, phonophobia, nausea, sensitivity to motion. For acute attacks she takes Nurtec with pretty consistent relief.    DOS: 12/15/2022  The patient location is: Home  The chief complaint leading to consultation is: Migraine  Visit type: Virtual visit with synchronous audio and video  Total time spent with patient: 15 minutes  The patient was informed of the relationship between the physician and patient and notified that he or she may decline to receive medical services by telemedicine and may withdraw from such care at any time.    The patient presents for follow up. She is on Toprol 100 mg daily, Effexor, and Botox for prevention and Nurtec for acute attacks. She is having about 1 severe attack per month and 1 to 2 at the most, moderate headaches per week. Milder headaches are treated with Excedrin, seldom use. The Botox injections have achieved well over 50%  improvement in the patient's symptoms. Migraines have been reduced at least 7 days per month and the number of cumulative hours suffering with headaches has been reduced at least 100 total hours per month. Frequency of treatment is every 3 months unless no response to the treatments, at which time we will discontinue the injections.        ROS: Positive for photophobia, phonophobia, nausea, insomnia with attacks     Past Medical History:   Diagnosis Date    Depression with anxiety     Fatigue     HTN (hypertension)     Iron deficiency anemia     Migraines     PCOS (polycystic ovarian  syndrome)          Current Outpatient Medications   Medication Sig    BOTOX 200 unit SolR INJECT 200 UNITS  INTRAMUSCULARLY EVERY 3  MONTHS (GIVEN AT MD OFFICE)    clonazePAM (KLONOPIN) 0.5 MG tablet TAKE 1/2 TABLET(0.25 MG) BY MOUTH DAILY AS NEEDED FOR ANXIETY    dextroamphetamine-amphetamine (ADDERALL XR) 20 MG 24 hr capsule Take 1 capsule (20 mg total) by mouth every morning.    ferrous sulfate (FEOSOL) 325 mg (65 mg iron) Tab tablet iron    galcanezumab-gnlm (EMGALITY PEN) 120 mg/mL PnIj Inject 1 pen (120 mg) into the skin every 28 days.    hydrocortisone 2.5 % cream SMARTSI Topical Daily PRN    ketoconazole (NIZORAL) 2 % cream SMARTSI Application Topical 1 to 2 Times Daily    meloxicam (MOBIC) 15 MG tablet Take 1 tablet (15 mg total) by mouth once daily.    metoprolol succinate (TOPROL-XL) 100 MG 24 hr tablet TAKE 1 TABLET(100 MG) BY MOUTH EVERY DAY    olmesartan-hydrochlorothiazide (BENICAR HCT) 40-25 mg per tablet TAKE 1 TABLET BY MOUTH EVERY DAY    ondansetron (ZOFRAN) 4 MG tablet TK 1 T PO TID PRN    rimegepant (NURTEC) 75 mg odt Place one dissolving tablet on the tongue daily as needed for migraine. No more than once per day.    semaglutide, weight loss, (WEGOVY) 0.25 mg/0.5 mL PnIj Inject 0.25 mg into the skin every 7 days.    venlafaxine (EFFEXOR-XR) 150 MG Cp24 TAKE 2 CAPSULES(300 MG) BY MOUTH EVERY DAY    busPIRone (BUSPAR) 10 MG tablet Take 2 tablets (20 mg total) by mouth 3 (three) times daily.     Current Facility-Administered Medications   Medication    onabotulinumtoxina injection 200 Units    onabotulinumtoxina injection 200 Units    onabotulinumtoxina injection 200 Units    onabotulinumtoxina injection 200 Units    onabotulinumtoxina injection 200 Units         Current Facility-Administered Medications   Medication    onabotulinumtoxina injection 200 Units    onabotulinumtoxina injection 200 Units    onabotulinumtoxina injection 200 Units    onabotulinumtoxina injection 200 Units      Vitals:    01/17/24 0937   BP: (!) 154/81   Pulse: 91   Resp: 17   Temp: 97.8 °F (36.6 °C)     Body mass index is 39.5 kg/m².      Physical Exam:  Alert and fully oriented   Lungs CTA  Heart RRR  CN II-XII intact  Motor normal bulk and tone, symmetric strength  Coordination intact FTN  Sensory in tact to LT  Gait: normal, pace and base     Data review:    Lab Results   Component Value Date     05/25/2023    K 3.7 05/25/2023     05/25/2023    CO2 28 05/25/2023    BUN 11 05/25/2023    CREATININE 0.8 05/25/2023     (H) 05/25/2023    HGBA1C 5.8 (H) 05/25/2023    AST 22 05/25/2023    ALT 33 05/25/2023    ALBUMIN 3.8 05/25/2023    PROT 6.8 05/25/2023    BILITOT 0.4 05/25/2023    CHOL 242 (H) 03/01/2023    HDL 62 03/01/2023    LDLCALC 153.4 03/01/2023    TRIG 133 03/01/2023       Lab Results   Component Value Date    WBC 7.58 03/01/2023    HGB 13.3 03/01/2023    HCT 40.1 03/01/2023    MCV 90 03/01/2023     03/01/2023       Lab Results   Component Value Date    TSH 2.646 03/01/2023       The patient reported a severe headache. She admitted to nausea. I injected 30 mg of IV Toradol and 8 mg of IV Zofran very slow push.    A/P:     Chronic Migraine responding to Botox as expected. The Botox injections have achieved well over 50%  improvement in the patient's symptoms. Migraines have been reduced at least 7 days per month and the number of cumulative hours suffering with headaches has been reduced at least 100 total hours per month.   Continue Botox  Encouraged the patient to comply with with early acute intervention for escalations.  Continue Emgality  Continue Toprol 100 mg and Effexor daily  Continue Nurtec. For severe attacks, about 1 per month, combine Nurtec with Excedrin for added benefit. She is aware of medication overuse headache and is far from being at risk.    RTC as scheduled      Saúl Braswell M.D  Medical Director, Headache and Facial Pain  [facility]

## 2024-02-05 ENCOUNTER — OFFICE VISIT (OUTPATIENT)
Dept: OBSTETRICS AND GYNECOLOGY | Facility: CLINIC | Age: 49
End: 2024-02-05
Payer: COMMERCIAL

## 2024-02-05 VITALS
WEIGHT: 280.63 LBS | DIASTOLIC BLOOD PRESSURE: 76 MMHG | SYSTOLIC BLOOD PRESSURE: 128 MMHG | BODY MASS INDEX: 39.29 KG/M2 | HEIGHT: 71 IN

## 2024-02-05 DIAGNOSIS — N93.9 ABNORMAL UTERINE BLEEDING (AUB): Primary | ICD-10-CM

## 2024-02-05 DIAGNOSIS — Z12.4 CERVICAL CANCER SCREENING: ICD-10-CM

## 2024-02-05 LAB
B-HCG UR QL: NEGATIVE
CTP QC/QA: YES

## 2024-02-05 PROCEDURE — 3074F SYST BP LT 130 MM HG: CPT | Mod: CPTII,S$GLB,, | Performed by: OBSTETRICS & GYNECOLOGY

## 2024-02-05 PROCEDURE — 87624 HPV HI-RISK TYP POOLED RSLT: CPT | Performed by: OBSTETRICS & GYNECOLOGY

## 2024-02-05 PROCEDURE — 3008F BODY MASS INDEX DOCD: CPT | Mod: CPTII,S$GLB,, | Performed by: OBSTETRICS & GYNECOLOGY

## 2024-02-05 PROCEDURE — 88305 TISSUE EXAM BY PATHOLOGIST: CPT | Performed by: PATHOLOGY

## 2024-02-05 PROCEDURE — 99203 OFFICE O/P NEW LOW 30 MIN: CPT | Mod: 25,S$GLB,, | Performed by: OBSTETRICS & GYNECOLOGY

## 2024-02-05 PROCEDURE — 88141 CYTOPATH C/V INTERPRET: CPT | Mod: ,,, | Performed by: STUDENT IN AN ORGANIZED HEALTH CARE EDUCATION/TRAINING PROGRAM

## 2024-02-05 PROCEDURE — 3078F DIAST BP <80 MM HG: CPT | Mod: CPTII,S$GLB,, | Performed by: OBSTETRICS & GYNECOLOGY

## 2024-02-05 PROCEDURE — 88305 TISSUE EXAM BY PATHOLOGIST: CPT | Mod: 26,,, | Performed by: PATHOLOGY

## 2024-02-05 PROCEDURE — 81025 URINE PREGNANCY TEST: CPT | Mod: S$GLB,,, | Performed by: OBSTETRICS & GYNECOLOGY

## 2024-02-05 PROCEDURE — 99999 PR PBB SHADOW E&M-EST. PATIENT-LVL III: CPT | Mod: PBBFAC,,, | Performed by: OBSTETRICS & GYNECOLOGY

## 2024-02-05 PROCEDURE — 58100 BIOPSY OF UTERUS LINING: CPT | Mod: S$GLB,,, | Performed by: OBSTETRICS & GYNECOLOGY

## 2024-02-05 PROCEDURE — 88175 CYTOPATH C/V AUTO FLUID REDO: CPT | Performed by: STUDENT IN AN ORGANIZED HEALTH CARE EDUCATION/TRAINING PROGRAM

## 2024-02-05 NOTE — PROCEDURES
"Anneliese Rocha is a 49 y.o. female patient.    BP: 128/76 (02/05/24 1505)  Weight: 127.3 kg (280 lb 10.3 oz) (02/05/24 1505)  Height: 5' 10.5" (179.1 cm) (02/05/24 1505)       Procedures    2/5/2024    "

## 2024-02-05 NOTE — PROCEDURES
Endometrial biopsy    Date/Time: 2/5/2024 3:00 PM    Performed by: Elina Crespo MD  Authorized by: Elina Crespo MD    Consent:     Consent given by:  Patient    Patient questions answered: yes      Patient agrees, verbalizes understanding, and wants to proceed: yes      Instructions and paperwork completed: yes    Indication:     Indications: Menorrhagia    Pre-procedure:     Pre-procedure timeout performed: yes    Procedure:     Procedure: endometrial biopsy with Pipelle      Cervix cleaned and prepped: yes      A paracervical block was performed: no      An intracervical block was performed: no      Uterus sounded: yes      Uterus sound depth (cm):  8    Curettes used:  2    Specimen collected: specimen collected and sent to pathology      Patient tolerated procedure well with no complications: yes

## 2024-02-05 NOTE — PROGRESS NOTES
"Chief Complaint   Patient presents with    irregular cycles     Skips some months very heavy and long on other months--last one lasted a whole month       History of Present Illness   49 y.o. F patient presents today for abnormal uterine bleeding.     AUB:   Menarche 10  Cycles previously monthly, then in the last year became irregular and skipping  Last cycle lasted a month  Complains of fatigue and dizziness  Dysmenorrhea: none  Current Contraception: none  Prior Contraception: COCP in the past, no issues  Sexually Active: yes  No LMP recorded.   MedHx: Chronic hypertension     Past medical and surgical history reviewed.   I have reviewed the patient's medical history in detail and updated the computerized patient record.  Review of patient's allergies indicates:   Allergen Reactions    Lisinopril      Dizziness, nausea       Review of Systems  Constitutional: negative for chills and fatigue  Gastrointestinal: negative for abdominal pain, constipation, diarrhea, nausea and vomiting  Genitourinary:negative for abnormal menstrual periods, genital lesions and vaginal discharge, dysuria, frequency and hematuria    Physical Examination:  /76   Ht 5' 10.5" (1.791 m)   Wt 127.3 kg (280 lb 10.3 oz)   BMI 39.70 kg/m²    Physical Exam:   Constitutional: She appears well-developed and well-nourished. No distress.             Abdominal: Soft. There is no abdominal tenderness.     Genitourinary:    Inguinal canal, vagina, uterus, right adnexa and left adnexa normal.   The external female genitalia was normal.     Labial bartholins normal.Cervix is normal.                   Assessment/Plan:  Abnormal uterine bleeding (AUB)  -     CBC Auto Differential; Future; Expected date: 02/05/2024  -     TSH; Future; Expected date: 02/05/2024  -     HCG, Quantitative; Future; Expected date: 02/05/2024  -     US Pelvis Comp with Transvag NON-OB (xpd; Future; Expected date: 02/05/2024  -     POCT Urine Pregnancy  -     Endometrial " biopsy  -     Specimen to Pathology, Ob/Gyn    Cervical cancer screening  -     Liquid-Based Pap Smear, Screening  -     HPV High Risk Genotypes, PCR      Follow up in 4 weeks to discuss results and treatment options for AUB. Briefly discussed hormonal control of cycles vs surgical management. Avoid estrogen due to chronic hypertension.

## 2024-02-08 ENCOUNTER — HOSPITAL ENCOUNTER (OUTPATIENT)
Dept: RADIOLOGY | Facility: HOSPITAL | Age: 49
Discharge: HOME | End: 2024-02-08
Attending: OBSTETRICS & GYNECOLOGY
Payer: COMMERCIAL

## 2024-02-08 ENCOUNTER — TELEPHONE (OUTPATIENT)
Dept: OBSTETRICS AND GYNECOLOGY | Facility: CLINIC | Age: 49
End: 2024-02-08
Payer: COMMERCIAL

## 2024-02-08 DIAGNOSIS — Z00.00 ANNUAL PHYSICAL EXAM: Primary | ICD-10-CM

## 2024-02-08 DIAGNOSIS — N93.9 ABNORMAL UTERINE BLEEDING (AUB): ICD-10-CM

## 2024-02-08 LAB
FINAL PATHOLOGIC DIAGNOSIS: NORMAL
GROSS: NORMAL
Lab: NORMAL

## 2024-02-08 PROCEDURE — 76830 TRANSVAGINAL US NON-OB: CPT | Mod: TC,PN

## 2024-02-08 PROCEDURE — 76830 TRANSVAGINAL US NON-OB: CPT | Mod: 26,,, | Performed by: RADIOLOGY

## 2024-02-08 PROCEDURE — 76856 US EXAM PELVIC COMPLETE: CPT | Mod: 26,,, | Performed by: RADIOLOGY

## 2024-02-08 NOTE — TELEPHONE ENCOUNTER
----- Message from Elina Crespo MD sent at 2/8/2024  2:04 PM CST -----  Biopsy results are benign. No cancerous or pre-cancerous cells.

## 2024-02-12 LAB
FINAL PATHOLOGIC DIAGNOSIS: NORMAL
HPV HR 12 DNA SPEC QL NAA+PROBE: NEGATIVE
HPV16 AG SPEC QL: NEGATIVE
HPV18 DNA SPEC QL NAA+PROBE: NEGATIVE
Lab: NORMAL

## 2024-02-19 ENCOUNTER — TELEPHONE (OUTPATIENT)
Dept: OBSTETRICS AND GYNECOLOGY | Facility: CLINIC | Age: 49
End: 2024-02-19
Payer: COMMERCIAL

## 2024-02-19 NOTE — TELEPHONE ENCOUNTER
----- Message from Elina Crespo MD sent at 2/17/2024  7:31 PM CST -----  Your recent ultrasound study showed some findings that are outside of what is considered normal. The ultrasound showed uterine fibroids and a small ovarian csyt. We will discuss these findings and to develop a plan to address them at your next visit.    Will see you soon,    Elina Crespo MD

## 2024-03-07 ENCOUNTER — OFFICE VISIT (OUTPATIENT)
Dept: OBSTETRICS AND GYNECOLOGY | Facility: CLINIC | Age: 49
End: 2024-03-07
Payer: COMMERCIAL

## 2024-03-07 VITALS
BODY MASS INDEX: 39.69 KG/M2 | WEIGHT: 283.5 LBS | HEIGHT: 71 IN | SYSTOLIC BLOOD PRESSURE: 136 MMHG | DIASTOLIC BLOOD PRESSURE: 80 MMHG

## 2024-03-07 DIAGNOSIS — N93.9 ABNORMAL UTERINE BLEEDING (AUB): Primary | ICD-10-CM

## 2024-03-07 PROCEDURE — 99999 PR PBB SHADOW E&M-EST. PATIENT-LVL III: CPT | Mod: PBBFAC,,, | Performed by: OBSTETRICS & GYNECOLOGY

## 2024-03-07 PROCEDURE — 99214 OFFICE O/P EST MOD 30 MIN: CPT | Mod: S$GLB,,, | Performed by: OBSTETRICS & GYNECOLOGY

## 2024-03-07 PROCEDURE — 3079F DIAST BP 80-89 MM HG: CPT | Mod: CPTII,S$GLB,, | Performed by: OBSTETRICS & GYNECOLOGY

## 2024-03-07 PROCEDURE — 1159F MED LIST DOCD IN RCRD: CPT | Mod: CPTII,S$GLB,, | Performed by: OBSTETRICS & GYNECOLOGY

## 2024-03-07 PROCEDURE — 3075F SYST BP GE 130 - 139MM HG: CPT | Mod: CPTII,S$GLB,, | Performed by: OBSTETRICS & GYNECOLOGY

## 2024-03-07 PROCEDURE — 3008F BODY MASS INDEX DOCD: CPT | Mod: CPTII,S$GLB,, | Performed by: OBSTETRICS & GYNECOLOGY

## 2024-03-07 NOTE — PROGRESS NOTES
No chief complaint on file.      History of Present Illness   49 y.o. F patient presents today for abnormal uterine bleeding follow up.     AUB:   Menarche 10  Cycles previously monthly, then in the last year became irregular and skipping  Complains of very heavy bleeding.   Last cycle lasted a month  Complains of fatigue and dizziness  Dysmenorrhea: none  Current Contraception: none  Prior Contraception: COCP in the past, no issues  Sexually Active: yes  No LMP recorded.   MedHx: Chronic hypertension     Work up:  Labs:   Hemoglobin (g/dL)   Date Value   02/08/2024 10.1 (L)     Hematocrit (%)   Date Value   02/08/2024 31.7 (L)     TSH (uIU/mL)   Date Value   02/08/2024 3.097     HPV:  HPV High Risk type 16, PCR   Date Value Ref Range Status   02/05/2024 Negative Negative Final     HPV High Risk type 18, PCR   Date Value Ref Range Status   02/05/2024 Negative Negative Final     HPV other High Risk types, PCR   Date Value Ref Range Status   02/05/2024 Negative Negative Final     Comment:     Other HPV genotypes include:   31,33,35,39,45,51,52,56,58,59,66 and 68.       TVUS:  US Pelvis Comp with Transvag NON-OB (xpd 02/08/2024    Narrative  EXAMINATION:  US PELVIS COMP WITH TRANSVAG NON-OB (XPD)    CLINICAL HISTORY:  Abnormal uterine and vaginal bleeding, unspecified    TECHNIQUE:  Transabdominal sonography of the pelvis was performed, followed by transvaginal sonography to better evaluate the uterus and ovaries.    COMPARISON:  None.    FINDINGS:  The uterus measures 8.7 x 5.6 x 4.6 cm.  Three myometrial masses are seen measuring 2.2 x 1.9 x 1.5, 1.4 x 1.2 x 0.9 and 0.8 x 0.6 x 0.7 cm.  The endometrial stripe measures 6 mm on the midline sagittal image.  The myometrial masses distort the mucosal and serosal surfaces of the uterus.  Multiple nabothian cysts are seen in the region the cervix.  The right ovary measures 5.0 x 3.3 x 4.0 cm and contains a mildly complex cyst with a thin septation.  This measures 3.8 x 2.5  "x 2.6 cm.  Color flow seen to the right ovary.  The left ovary is not confidently seen.  There is no evidence of free fluid.    Impression  Mildly complex cyst in the right ovary measuring 3.8 x 2.5 x 2.6 cm.  Consideration of a 6-8 week follow-up is advised.    Myometrial masses are evident suggesting multiple small uterine fibroids.    There is no evidence of free fluid.      Electronically signed by: Zak Donahue MD  Date:    02/08/2024  Time:    17:24      Past medical and surgical history reviewed.   I have reviewed the patient's medical history in detail and updated the computerized patient record.  Review of patient's allergies indicates:   Allergen Reactions    Lisinopril      Dizziness, nausea       Review of Systems  Constitutional: negative for chills and fatigue  Gastrointestinal: negative for abdominal pain, constipation, diarrhea, nausea and vomiting  Genitourinary:negative for abnormal menstrual periods, genital lesions and vaginal discharge, dysuria, frequency and hematuria    Physical Examination:  /80   Ht 5' 10.5" (1.791 m)   Wt 128.6 kg (283 lb 8.2 oz)   LMP 12/24/2023 (Exact Date)   BMI 40.10 kg/m²    Deferred    Assessment/Plan:  Abnormal uterine bleeding (AUB)      Discussed results and treatment options for AUB. Discussed hormonal control of cycles vs surgical management. Avoid estrogen due to chronic hypertension. She desires and IUD             "

## 2024-03-08 DIAGNOSIS — N93.9 ABNORMAL UTERINE BLEEDING (AUB): Primary | ICD-10-CM

## 2024-03-21 ENCOUNTER — OFFICE VISIT (OUTPATIENT)
Dept: OBSTETRICS AND GYNECOLOGY | Facility: CLINIC | Age: 49
End: 2024-03-21
Payer: COMMERCIAL

## 2024-03-21 VITALS — HEIGHT: 71 IN | WEIGHT: 283.75 LBS | BODY MASS INDEX: 39.73 KG/M2

## 2024-03-21 DIAGNOSIS — N93.9 ABNORMAL UTERINE BLEEDING (AUB): ICD-10-CM

## 2024-03-21 DIAGNOSIS — Z30.430 ENCOUNTER FOR IUD INSERTION: Primary | ICD-10-CM

## 2024-03-21 LAB
B-HCG UR QL: NEGATIVE
CTP QC/QA: YES

## 2024-03-21 PROCEDURE — 99499 UNLISTED E&M SERVICE: CPT | Mod: S$GLB,,, | Performed by: OBSTETRICS & GYNECOLOGY

## 2024-03-21 PROCEDURE — 76830 TRANSVAGINAL US NON-OB: CPT | Mod: S$GLB,,, | Performed by: OBSTETRICS & GYNECOLOGY

## 2024-03-21 PROCEDURE — 81025 URINE PREGNANCY TEST: CPT | Mod: S$GLB,,, | Performed by: OBSTETRICS & GYNECOLOGY

## 2024-03-21 PROCEDURE — 99999 PR PBB SHADOW E&M-EST. PATIENT-LVL III: CPT | Mod: PBBFAC,,, | Performed by: OBSTETRICS & GYNECOLOGY

## 2024-03-21 PROCEDURE — 58300 INSERT INTRAUTERINE DEVICE: CPT | Mod: S$GLB,,, | Performed by: OBSTETRICS & GYNECOLOGY

## 2024-03-21 NOTE — PROCEDURES
Insertion of IUD    Date/Time: 3/21/2024 2:20 PM    Performed by: Elina Crespo MD  Authorized by: Elina Crespo MD    Consent:     Consent given by:  Patient    Procedure risks and benefits discussed: yes      Patient questions answered: yes      Patient agrees, verbalizes understanding, and wants to proceed: yes     Device to be inserted was verified by patient: yes    Educational handouts given: yes      Instructions and paperwork completed: yes    Insertion Procedure:   1 Intra Uterine Device levonorgestreL 21 mcg/24 hours (8 yrs) 52 mg       Pelvic exam performed: yes      Negative urine pregnancy test: yes      Cervix cleaned and prepped: yes      Speculum placed in vagina: yes      Tenaculum applied to cervix: yes      Uterus sounded: yes      Uterus sound depth (cm):  8    IUD inserted with no complications: yes      IUD type:  Mirena    Strings trimmed: yes    Post-procedure:     Patient tolerated procedure well: yes      Patient will follow up after next period: yes    Comments:      Transvaginal ultrasound performed before and after procedure to confirm placement location.

## 2024-04-04 ENCOUNTER — TELEPHONE (OUTPATIENT)
Dept: NEUROLOGY | Facility: CLINIC | Age: 49
End: 2024-04-04
Payer: COMMERCIAL

## 2024-04-04 NOTE — TELEPHONE ENCOUNTER
Called patient and rescheduled Botox appointment. Date/Time confirmed. Verbalized understanding. Referral attached.      No

## 2024-04-04 NOTE — TELEPHONE ENCOUNTER
----- Message from Yury Kc, Patient Care Assistant sent at 4/4/2024  8:58 AM CDT -----  Contact: Pt  Type: Needs Medical Advice    Who Called: Pt  Best Call Back Number: [phone]  Inquiry/Question: Pt is calling to reschedule her Botox appt due to a family emergency out of state. Please advise Thank you~

## 2024-04-08 ENCOUNTER — TELEPHONE (OUTPATIENT)
Dept: RADIOLOGY | Facility: HOSPITAL | Age: 49
End: 2024-04-08

## 2024-04-08 ENCOUNTER — OFFICE VISIT (OUTPATIENT)
Dept: FAMILY MEDICINE | Facility: CLINIC | Age: 49
End: 2024-04-08
Attending: FAMILY MEDICINE
Payer: COMMERCIAL

## 2024-04-08 ENCOUNTER — HOSPITAL ENCOUNTER (OUTPATIENT)
Dept: RADIOLOGY | Facility: HOSPITAL | Age: 49
Discharge: HOME | End: 2024-04-08
Attending: FAMILY MEDICINE
Payer: COMMERCIAL

## 2024-04-08 ENCOUNTER — CLINICAL SUPPORT (OUTPATIENT)
Dept: INTERNAL MEDICINE | Facility: CLINIC | Age: 49
End: 2024-04-08
Attending: FAMILY MEDICINE
Payer: COMMERCIAL

## 2024-04-08 ENCOUNTER — PATIENT MESSAGE (OUTPATIENT)
Dept: FAMILY MEDICINE | Facility: CLINIC | Age: 49
End: 2024-04-08

## 2024-04-08 VITALS
BODY MASS INDEX: 39.35 KG/M2 | HEIGHT: 71 IN | HEART RATE: 91 BPM | SYSTOLIC BLOOD PRESSURE: 138 MMHG | OXYGEN SATURATION: 98 % | WEIGHT: 281.06 LBS | DIASTOLIC BLOOD PRESSURE: 76 MMHG | RESPIRATION RATE: 18 BRPM

## 2024-04-08 DIAGNOSIS — Z00.00 ANNUAL PHYSICAL EXAM: ICD-10-CM

## 2024-04-08 DIAGNOSIS — F33.2 MDD (MAJOR DEPRESSIVE DISORDER), RECURRENT SEVERE, WITHOUT PSYCHOSIS: ICD-10-CM

## 2024-04-08 DIAGNOSIS — Z00.00 ANNUAL PHYSICAL EXAM: Primary | ICD-10-CM

## 2024-04-08 DIAGNOSIS — E66.01 CLASS 2 SEVERE OBESITY WITH SERIOUS COMORBIDITY AND BODY MASS INDEX (BMI) OF 39.0 TO 39.9 IN ADULT, UNSPECIFIED OBESITY TYPE: ICD-10-CM

## 2024-04-08 DIAGNOSIS — F41.1 GAD (GENERALIZED ANXIETY DISORDER): ICD-10-CM

## 2024-04-08 DIAGNOSIS — Z12.31 SCREENING MAMMOGRAM FOR HIGH-RISK PATIENT: ICD-10-CM

## 2024-04-08 DIAGNOSIS — Z00.00 PREVENTATIVE HEALTH CARE: Primary | ICD-10-CM

## 2024-04-08 LAB
ALBUMIN SERPL BCP-MCNC: 4.1 G/DL (ref 3.5–5.2)
ALP SERPL-CCNC: 40 U/L (ref 55–135)
ALT SERPL W/O P-5'-P-CCNC: 47 U/L (ref 10–44)
ANION GAP SERPL CALC-SCNC: 12 MMOL/L (ref 8–16)
AST SERPL-CCNC: 27 U/L (ref 10–40)
BILIRUB SERPL-MCNC: 0.4 MG/DL (ref 0.1–1)
BUN SERPL-MCNC: 12 MG/DL (ref 6–20)
CALCIUM SERPL-MCNC: 10.1 MG/DL (ref 8.7–10.5)
CHLORIDE SERPL-SCNC: 103 MMOL/L (ref 95–110)
CHOLEST SERPL-MCNC: 199 MG/DL (ref 120–199)
CHOLEST/HDLC SERPL: 3.4 {RATIO} (ref 2–5)
CO2 SERPL-SCNC: 25 MMOL/L (ref 23–29)
CREAT SERPL-MCNC: 0.9 MG/DL (ref 0.5–1.4)
ERYTHROCYTE [DISTWIDTH] IN BLOOD BY AUTOMATED COUNT: 14.5 % (ref 11.5–14.5)
EST. GFR  (NO RACE VARIABLE): >60 ML/MIN/1.73 M^2
ESTIMATED AVG GLUCOSE: 123 MG/DL (ref 68–131)
GLUCOSE SERPL-MCNC: 142 MG/DL (ref 70–110)
HBA1C MFR BLD: 5.9 % (ref 4–5.6)
HCT VFR BLD AUTO: 40.6 % (ref 37–48.5)
HDLC SERPL-MCNC: 58 MG/DL (ref 40–75)
HDLC SERPL: 29.1 % (ref 20–50)
HGB BLD-MCNC: 13.5 G/DL (ref 12–16)
LDLC SERPL CALC-MCNC: 117 MG/DL (ref 63–159)
MCH RBC QN AUTO: 29.6 PG (ref 27–31)
MCHC RBC AUTO-ENTMCNC: 33.3 G/DL (ref 32–36)
MCV RBC AUTO: 89 FL (ref 82–98)
NONHDLC SERPL-MCNC: 141 MG/DL
PLATELET # BLD AUTO: 283 K/UL (ref 150–450)
PMV BLD AUTO: 11.5 FL (ref 9.2–12.9)
POTASSIUM SERPL-SCNC: 3.4 MMOL/L (ref 3.5–5.1)
PROT SERPL-MCNC: 7.4 G/DL (ref 6–8.4)
RBC # BLD AUTO: 4.56 M/UL (ref 4–5.4)
SODIUM SERPL-SCNC: 140 MMOL/L (ref 136–145)
TRIGL SERPL-MCNC: 120 MG/DL (ref 30–150)
TSH SERPL DL<=0.005 MIU/L-ACNC: 2.92 UIU/ML (ref 0.4–4)
WBC # BLD AUTO: 6.91 K/UL (ref 3.9–12.7)

## 2024-04-08 PROCEDURE — 1160F RVW MEDS BY RX/DR IN RCRD: CPT | Mod: CPTII,S$GLB,, | Performed by: FAMILY MEDICINE

## 2024-04-08 PROCEDURE — 77067 SCR MAMMO BI INCL CAD: CPT | Mod: TC,PO

## 2024-04-08 PROCEDURE — 99999 PR PBB SHADOW E&M-EST. PATIENT-LVL III: CPT | Mod: PBBFAC,,, | Performed by: FAMILY MEDICINE

## 2024-04-08 PROCEDURE — 1159F MED LIST DOCD IN RCRD: CPT | Mod: CPTII,S$GLB,, | Performed by: FAMILY MEDICINE

## 2024-04-08 PROCEDURE — 3044F HG A1C LEVEL LT 7.0%: CPT | Mod: CPTII,S$GLB,, | Performed by: FAMILY MEDICINE

## 2024-04-08 PROCEDURE — 99396 PREV VISIT EST AGE 40-64: CPT | Mod: S$GLB,,, | Performed by: FAMILY MEDICINE

## 2024-04-08 PROCEDURE — 85027 COMPLETE CBC AUTOMATED: CPT | Mod: PO | Performed by: FAMILY MEDICINE

## 2024-04-08 PROCEDURE — 3075F SYST BP GE 130 - 139MM HG: CPT | Mod: CPTII,S$GLB,, | Performed by: FAMILY MEDICINE

## 2024-04-08 PROCEDURE — 84443 ASSAY THYROID STIM HORMONE: CPT | Performed by: FAMILY MEDICINE

## 2024-04-08 PROCEDURE — 80061 LIPID PANEL: CPT | Performed by: FAMILY MEDICINE

## 2024-04-08 PROCEDURE — 83036 HEMOGLOBIN GLYCOSYLATED A1C: CPT | Performed by: FAMILY MEDICINE

## 2024-04-08 PROCEDURE — 80053 COMPREHEN METABOLIC PANEL: CPT | Mod: PO | Performed by: FAMILY MEDICINE

## 2024-04-08 PROCEDURE — 3008F BODY MASS INDEX DOCD: CPT | Mod: CPTII,S$GLB,, | Performed by: FAMILY MEDICINE

## 2024-04-08 PROCEDURE — 77063 BREAST TOMOSYNTHESIS BI: CPT | Mod: 26,,, | Performed by: RADIOLOGY

## 2024-04-08 PROCEDURE — 3078F DIAST BP <80 MM HG: CPT | Mod: CPTII,S$GLB,, | Performed by: FAMILY MEDICINE

## 2024-04-08 PROCEDURE — 77067 SCR MAMMO BI INCL CAD: CPT | Mod: 26,,, | Performed by: RADIOLOGY

## 2024-04-08 RX ORDER — TIRZEPATIDE 2.5 MG/.5ML
2.5 INJECTION, SOLUTION SUBCUTANEOUS
Qty: 4 PEN | Refills: 0 | Status: SHIPPED | OUTPATIENT
Start: 2024-04-08

## 2024-04-08 RX ORDER — BUSPIRONE HYDROCHLORIDE 10 MG/1
20 TABLET ORAL 3 TIMES DAILY
Qty: 180 TABLET | Refills: 1 | Status: SHIPPED | OUTPATIENT
Start: 2024-04-08 | End: 2024-07-07

## 2024-04-08 RX ORDER — VENLAFAXINE HYDROCHLORIDE 150 MG/1
CAPSULE, EXTENDED RELEASE ORAL
Qty: 180 CAPSULE | Refills: 1 | Status: SHIPPED | OUTPATIENT
Start: 2024-04-08

## 2024-04-08 RX ORDER — DIGITAL THERAPEUTIC,REN DEVICE
MISCELLANEOUS MISCELLANEOUS
COMMUNITY
Start: 2024-01-23

## 2024-04-08 NOTE — TELEPHONE ENCOUNTER
I left a message for patient to call back to schedule diagnostic breast imaging. Patient is scheduled for diagnostic breast imaging on 4-9-24.

## 2024-04-08 NOTE — PROGRESS NOTES
April 8, 2024                                                                                                                                                                                                                                                                                      Anneliese Coronel Aj  [address]  [place] [address]                                                                                                                                                                                                                                                                                                RE: Anneliese Rocha                                                        Clinic #:[id number]                                                                                                                                   Dear  Annelieserj Coronel Aj,                                                                                                                                           Thank you for allowing me to serve you and perform your [facility] exam on April 8, 2024.   This letter will serve a brief summary of the history, physical findings, and laboratory/studies performed and recommendations at that time.      Taking iron for heavy periods and anemia. Following with GYN and has IUD in place.                                                                               REASON FOR VISIT: [facility] Preventive Physical Examination    Past Medical History:   Diagnosis Date    Depression with anxiety     Fatigue     HTN (hypertension)     Iron deficiency anemia     Migraines     PCOS (polycystic ovarian syndrome)        Past Surgical History:   Procedure Laterality Date    BREAST BIOPSY Right     core  neg    CATARACT EXTRACTION, BILATERAL      COLONOSCOPY N/A 03/14/2023    Procedure: COLONOSCOPY;  Surgeon: Raad Delacruz MD;  Location: [facility];  Service: Endoscopy;   "Laterality: N/A;    CYSTECTOMY Left 1994    left ovary       Family History   Problem Relation Age of Onset    Breast cancer Maternal Grandmother     Diabetes Father     Hypertension Father     Coronary artery disease Father 55    Thyroid disease Mother     Migraines Mother     Hypertension Mother     Melanoma Paternal Uncle     Ovarian cancer Neg Hx        Social History     Social History    Marital status:      Spouse name: N/A     Occupational History    accounting [employer]     Social History Main Topics    Smoking status: Never Smoker    Smokeless tobacco: Never Used    Alcohol use Yes      Comment: rarely     Social History Narrative    From [place]        Exercise: none regularly       Allergies: Lisinopril      REVIEW OF SYSTEMS:  No recent changes in weight, or complaints of fatigue. No recent changes in vision, or hearing. Denies frequent headaches.No recent changes in voice. No new or changing skin lesions. Denies abnormal bruising or bleeding.  Denies chest pain or sensation of skipped beats. No new onset of shortness of breath, or dyspnea on exertion. Denies abdominal discomfort, constipation, diarrhea,or blood in stool. Denies difficulty with urination.   No recent joint swelling or muscle discomfort. Denies pain or weakness in extremeties. No recent loss of balance. Denies problems with sleep or depression.        Remainder of the review of systems without pertinent positves at this time.  Some regular sweating.                                                                              PHYSICAL EXAM:   VITAL SIGNS: /76   Pulse 91   Resp 18   Ht 5' 11" (1.803 m)   Wt 127.5 kg (281 lb 1.4 oz)   LMP 12/26/2023   SpO2 98%   BMI 39.20 kg/m²   GENERAL APPEARANCE:  Well nourished and normally developed,  pleasant 49 y.o. female, in good spirits.  SKIN: Without rashes or overt lesions.  HEENT: Head normacephalic. There was no scleral icterus. Mucous membranes were moist. " Dentition. Neck is supple, no thyromegally, or carotid bruits.  LUNGS: Clear to auscultation bilaterally. Normal respiratory effort.  HEART: Exam reveals regular rate and rhythm. First and second heart sounds normal. No murmurs, rubs or gallops.   ABDOMEN: Soft, non-tender, non-distended. Exam reveals normal bowl sounds, no masses, no organomegaly and no aortic enlargement.    EXTREMITIES:  Nonedematous, both femoral and pedal pulses are normal. No joint stiffness or tenderness. Full range of motion and strength, upper and lower bilaterally.    LAB DATA/STUDIES REVIEWED:  LABS: reviewed (prediabetes and elevated cholesterol)  Mammogram:n benign right breast cyst    ASSESSMENT/RECOMMENDATIONS :    At this time,  you appear to be in good medical condition.    Trial of Zepbound  Refilled Buspar and Effexor  Psychiatry follow-up  Continue to work on regular exercise, maintenance of a healthy weight, balanced diet rich in fruits/vegetables and lean protein, and avoidance of unhealthy habits like smoking and excessive alcohol intake.  I look forward to seeing you again next year.    Please contact me should you have any questions or concerns regarding physical findings, or my recommendations.       Sincerely yours,          Steve Holcomb M.D.  Department of Family Practice  [facility]

## 2024-04-09 ENCOUNTER — HOSPITAL ENCOUNTER (OUTPATIENT)
Dept: RADIOLOGY | Facility: HOSPITAL | Age: 49
Discharge: HOME | End: 2024-04-09
Attending: FAMILY MEDICINE
Payer: COMMERCIAL

## 2024-04-09 ENCOUNTER — PROCEDURE VISIT (OUTPATIENT)
Dept: NEUROLOGY | Facility: CLINIC | Age: 49
End: 2024-04-09
Payer: COMMERCIAL

## 2024-04-09 VITALS
WEIGHT: 281.06 LBS | RESPIRATION RATE: 17 BRPM | SYSTOLIC BLOOD PRESSURE: 121 MMHG | HEART RATE: 60 BPM | TEMPERATURE: 98 F | DIASTOLIC BLOOD PRESSURE: 80 MMHG | HEIGHT: 71 IN | BODY MASS INDEX: 39.35 KG/M2

## 2024-04-09 DIAGNOSIS — R92.8 ABNORMALITY OF RIGHT BREAST ON SCREENING MAMMOGRAM: ICD-10-CM

## 2024-04-09 DIAGNOSIS — G43.719 INTRACTABLE CHRONIC MIGRAINE WITHOUT AURA AND WITHOUT STATUS MIGRAINOSUS: Primary | ICD-10-CM

## 2024-04-09 PROCEDURE — 76642 ULTRASOUND BREAST LIMITED: CPT | Mod: 26,RT,, | Performed by: RADIOLOGY

## 2024-04-09 PROCEDURE — 77061 BREAST TOMOSYNTHESIS UNI: CPT | Mod: TC,PO,RT

## 2024-04-09 PROCEDURE — 77065 DX MAMMO INCL CAD UNI: CPT | Mod: TC,PO,RT

## 2024-04-09 PROCEDURE — 77061 BREAST TOMOSYNTHESIS UNI: CPT | Mod: 26,RT,, | Performed by: RADIOLOGY

## 2024-04-09 PROCEDURE — 77065 DX MAMMO INCL CAD UNI: CPT | Mod: 26,RT,, | Performed by: RADIOLOGY

## 2024-04-09 PROCEDURE — 64615 CHEMODENERV MUSC MIGRAINE: CPT | Mod: S$GLB,,, | Performed by: PSYCHIATRY & NEUROLOGY

## 2024-04-09 PROCEDURE — 76642 ULTRASOUND BREAST LIMITED: CPT | Mod: TC,PO,RT

## 2024-04-09 NOTE — PROCEDURES
Procedures  INTERVAL HISTORY 1/17/2024  The patient presents for follow up. The Botox injections have achieved well over 50%  improvement in the patient's symptoms. Migraines have been reduced at least 7 days per month and the number of cumulative hours suffering with headaches has been reduced at least 100 total hours per month. She is also on Emgality for added prevention. She states that she typicaly gets 2 to 3 attacks per month.She reports a severe one today associated with photophobia, phonophobia, nausea, sensitivity to motion. For acute attacks she takes Nurtec with pretty consistent relief.    DOS: 12/15/2022  The patient location is: Home  The chief complaint leading to consultation is: Migraine  Visit type: Virtual visit with synchronous audio and video  Total time spent with patient: 15 minutes  The patient was informed of the relationship between the physician and patient and notified that he or she may decline to receive medical services by telemedicine and may withdraw from such care at any time.    The patient presents for follow up. She is on Toprol 100 mg daily, Effexor, and Botox for prevention and Nurtec for acute attacks. She is having about 1 severe attack per month and 1 to 2 at the most, moderate headaches per week. Milder headaches are treated with Excedrin, seldom use. The Botox injections have achieved well over 50%  improvement in the patient's symptoms. Migraines have been reduced at least 7 days per month and the number of cumulative hours suffering with headaches has been reduced at least 100 total hours per month. Frequency of treatment is every 3 months unless no response to the treatments, at which time we will discontinue the injections.        ROS: Positive for photophobia, phonophobia, nausea, insomnia with attacks     Past Medical History:   Diagnosis Date    Depression with anxiety     Fatigue     HTN (hypertension)     Iron deficiency anemia     Migraines     PCOS (polycystic  ovarian syndrome)          Current Outpatient Medications   Medication Sig    BOTOX 200 unit SolR INJECT 200 UNITS  INTRAMUSCULARLY EVERY 3  MONTHS (GIVEN AT MD OFFICE)    clonazePAM (KLONOPIN) 0.5 MG tablet TAKE 1/2 TABLET(0.25 MG) BY MOUTH DAILY AS NEEDED FOR ANXIETY    dextroamphetamine-amphetamine (ADDERALL XR) 20 MG 24 hr capsule Take 1 capsule (20 mg total) by mouth every morning.    ferrous sulfate (FEOSOL) 325 mg (65 mg iron) Tab tablet iron    galcanezumab-gnlm (EMGALITY PEN) 120 mg/mL PnIj Inject 1 pen (120 mg) into the skin every 28 days.    hydrocortisone 2.5 % cream SMARTSI Topical Daily PRN    ketoconazole (NIZORAL) 2 % cream SMARTSI Application Topical 1 to 2 Times Daily    meloxicam (MOBIC) 15 MG tablet Take 1 tablet (15 mg total) by mouth once daily.    metoprolol succinate (TOPROL-XL) 100 MG 24 hr tablet TAKE 1 TABLET(100 MG) BY MOUTH EVERY DAY    olmesartan-hydrochlorothiazide (BENICAR HCT) 40-25 mg per tablet TAKE 1 TABLET BY MOUTH EVERY DAY    ondansetron (ZOFRAN) 4 MG tablet TK 1 T PO TID PRN    rimegepant (NURTEC) 75 mg odt Place one dissolving tablet on the tongue daily as needed for migraine. No more than once per day.    semaglutide, weight loss, (WEGOVY) 0.25 mg/0.5 mL PnIj Inject 0.25 mg into the skin every 7 days.    venlafaxine (EFFEXOR-XR) 150 MG Cp24 TAKE 2 CAPSULES(300 MG) BY MOUTH EVERY DAY    busPIRone (BUSPAR) 10 MG tablet Take 2 tablets (20 mg total) by mouth 3 (three) times daily.     Current Facility-Administered Medications   Medication    onabotulinumtoxina injection 200 Units    onabotulinumtoxina injection 200 Units    onabotulinumtoxina injection 200 Units    onabotulinumtoxina injection 200 Units    onabotulinumtoxina injection 200 Units         Current Facility-Administered Medications   Medication    onabotulinumtoxina injection 200 Units    onabotulinumtoxina injection 200 Units    onabotulinumtoxina injection 200 Units    onabotulinumtoxina injection 200 Units      Vitals:    01/17/24 0937   BP: (!) 154/81   Pulse: 91   Resp: 17   Temp: 97.8 °F (36.6 °C)     Body mass index is 39.5 kg/m².      Physical Exam:  Alert and fully oriented   Lungs CTA  Heart RRR  CN II-XII intact  Motor normal bulk and tone, symmetric strength  Coordination intact FTN  Sensory in tact to LT  Gait: normal, pace and base     Data review:    Lab Results   Component Value Date     05/25/2023    K 3.7 05/25/2023     05/25/2023    CO2 28 05/25/2023    BUN 11 05/25/2023    CREATININE 0.8 05/25/2023     (H) 05/25/2023    HGBA1C 5.8 (H) 05/25/2023    AST 22 05/25/2023    ALT 33 05/25/2023    ALBUMIN 3.8 05/25/2023    PROT 6.8 05/25/2023    BILITOT 0.4 05/25/2023    CHOL 242 (H) 03/01/2023    HDL 62 03/01/2023    LDLCALC 153.4 03/01/2023    TRIG 133 03/01/2023       Lab Results   Component Value Date    WBC 7.58 03/01/2023    HGB 13.3 03/01/2023    HCT 40.1 03/01/2023    MCV 90 03/01/2023     03/01/2023       Lab Results   Component Value Date    TSH 2.646 03/01/2023       The patient reported a severe headache. She admitted to nausea. I injected 30 mg of IV Toradol and 8 mg of IV Zofran very slow push.    A/P:     Chronic Migraine responding to Botox as expected. The Botox injections have achieved well over 50%  improvement in the patient's symptoms. Migraines have been reduced at least 7 days per month and the number of cumulative hours suffering with headaches has been reduced at least 100 total hours per month.   Continue Botox  Encouraged the patient to comply with with early acute intervention for escalations.  Continue Emgality  Continue Toprol 100 mg and Effexor daily  Continue Nurtec. For severe attacks, about 1 per month, combine Nurtec with Excedrin for added benefit. She is aware of medication overuse headache and is far from being at risk.    RTC as scheduled      Saúl Braswell M.D  Medical Director, Headache and Facial Pain  [facility]

## 2024-04-11 NOTE — PROCEDURES
Procedures   BOTOX PROCEDURE    PROCEDURE PERFORMED: Botulinum toxin injection (48850)    CLINICAL INDICATION: G43.719    A time out was conducted just before the start of the procedure to verify the correct patient and procedure, procedure location, and all relevant critical information.     Conventional methods of treatment but the patient has been unresponsive and refractory.The patient meets criteria for chronic headaches according to the ICHD-II, the patient has more than 15 headaches a month which last for more than 4 hours a day.    This is the first Botox injections and I am aiming for at least 50%  improvement in the patient's symptoms. Frequency of treatment is every 3 months unless no response to the treatments, at which time we will discontinue the injections.     DESCRIPTION OF PROCEDURE: After obtaining informed consent and under aseptic technique, a total of 155 units of botulinum toxin type A were injected in the following muscles: Procerus 5 units,  5 units bilaterally, frontalis 20 units, temporalis 20 units bilaterally, occipitalis 15 units, upper cervical paraspinals 10 units bilaterally and trapezius 15 units bilaterally. The patient was given a total of 155 units in 31 sites.The patient tolerated the procedure well. There were no complications. The patient was given a prescription for repeat treatment in 3 months.     Unavoidable waste 45 units          Saúl Braswell M.D  Medical Director, Headache and Facial Pain  [facility]

## 2024-05-02 ENCOUNTER — OFFICE VISIT (OUTPATIENT)
Dept: OBSTETRICS AND GYNECOLOGY | Facility: CLINIC | Age: 49
End: 2024-05-02
Payer: COMMERCIAL

## 2024-05-02 VITALS
DIASTOLIC BLOOD PRESSURE: 76 MMHG | WEIGHT: 284.81 LBS | SYSTOLIC BLOOD PRESSURE: 128 MMHG | BODY MASS INDEX: 39.73 KG/M2

## 2024-05-02 DIAGNOSIS — Z30.431 IUD CHECK UP: Primary | ICD-10-CM

## 2024-05-02 DIAGNOSIS — I10 HYPERTENSION, UNSPECIFIED TYPE: ICD-10-CM

## 2024-05-02 PROCEDURE — 3074F SYST BP LT 130 MM HG: CPT | Mod: CPTII,S$GLB,, | Performed by: OBSTETRICS & GYNECOLOGY

## 2024-05-02 PROCEDURE — 3008F BODY MASS INDEX DOCD: CPT | Mod: CPTII,S$GLB,, | Performed by: OBSTETRICS & GYNECOLOGY

## 2024-05-02 PROCEDURE — 3078F DIAST BP <80 MM HG: CPT | Mod: CPTII,S$GLB,, | Performed by: OBSTETRICS & GYNECOLOGY

## 2024-05-02 PROCEDURE — 3044F HG A1C LEVEL LT 7.0%: CPT | Mod: CPTII,S$GLB,, | Performed by: OBSTETRICS & GYNECOLOGY

## 2024-05-02 PROCEDURE — 99999 PR PBB SHADOW E&M-EST. PATIENT-LVL III: CPT | Mod: PBBFAC,,, | Performed by: OBSTETRICS & GYNECOLOGY

## 2024-05-02 PROCEDURE — 1159F MED LIST DOCD IN RCRD: CPT | Mod: CPTII,S$GLB,, | Performed by: OBSTETRICS & GYNECOLOGY

## 2024-05-02 PROCEDURE — 99212 OFFICE O/P EST SF 10 MIN: CPT | Mod: S$GLB,,, | Performed by: OBSTETRICS & GYNECOLOGY

## 2024-05-02 RX ORDER — OLMESARTAN MEDOXOMIL AND HYDROCHLOROTHIAZIDE 40/25 40; 25 MG/1; MG/1
1 TABLET ORAL DAILY
Qty: 90 TABLET | Refills: 3 | Status: SHIPPED | OUTPATIENT
Start: 2024-05-02

## 2024-05-02 NOTE — PROGRESS NOTES
Chief Complaint   Patient presents with    IUD Check       Anneliese Rocha is a 49 y.o. female  presents for IUD check.  Patient had a  IUD placed  3/21/2024 for AUB  She is not having problems with bleeding, cramping, fever or discharge.        PHYSICAL EXAM:  Vitals:    24 1343   BP: 128/76       Abdomen:  Soft, non-tender, non-distended  Vulva:  No lesions  Vaginal:  No lesions, no abnormal discharge  Cervix: No discharge, no CMT, IUD strings visible at os.  Uterus:  Normal size, non-tender  Adnexa:  No masses, non-tender    ASSESSMENT AND PLAN:  1. IUD check up            Patient counseled on IUD danger signs and how to check the strings reviewed.  Return for annual GYN exam

## 2024-05-02 NOTE — TELEPHONE ENCOUNTER
No care due was identified.  [facility] Embedded Care Due Messages. Reference number: [id number].   5/02/2024 8:58:51 AM CDT

## 2024-06-19 DIAGNOSIS — F41.1 GAD (GENERALIZED ANXIETY DISORDER): ICD-10-CM

## 2024-06-19 NOTE — TELEPHONE ENCOUNTER
No care due was identified.  [facility] Embedded Care Due Messages. Reference number: [id number].   6/19/2024 3:21:01 PM CDT

## 2024-06-20 RX ORDER — BUSPIRONE HYDROCHLORIDE 10 MG/1
20 TABLET ORAL 3 TIMES DAILY
Qty: 180 TABLET | Refills: 3 | Status: SHIPPED | OUTPATIENT
Start: 2024-06-20

## 2024-07-05 ENCOUNTER — PROCEDURE VISIT (OUTPATIENT)
Dept: NEUROLOGY | Facility: CLINIC | Age: 49
End: 2024-07-05
Payer: COMMERCIAL

## 2024-07-05 VITALS
DIASTOLIC BLOOD PRESSURE: 85 MMHG | SYSTOLIC BLOOD PRESSURE: 155 MMHG | RESPIRATION RATE: 18 BRPM | TEMPERATURE: 98 F | BODY MASS INDEX: 39.85 KG/M2 | HEART RATE: 101 BPM | HEIGHT: 71 IN | WEIGHT: 284.63 LBS

## 2024-07-05 DIAGNOSIS — G43.719 INTRACTABLE CHRONIC MIGRAINE WITHOUT AURA AND WITHOUT STATUS MIGRAINOSUS: Primary | ICD-10-CM

## 2024-07-05 RX ORDER — FLUOCINOLONE ACETONIDE 0.11 MG/ML
OIL AURICULAR (OTIC)
COMMUNITY
Start: 2024-04-18

## 2024-07-05 RX ORDER — CLOBETASOL PROPIONATE 0.5 MG/G
1 CREAM TOPICAL 2 TIMES DAILY
COMMUNITY
Start: 2024-04-20

## 2024-07-05 NOTE — PROCEDURES
Procedures   BOTOX PROCEDURE    PROCEDURE PERFORMED: Botulinum toxin injection (21422)    CLINICAL INDICATION: G43.719    A time out was conducted just before the start of the procedure to verify the correct patient and procedure, procedure location, and all relevant critical information.     Conventional methods of treatment but the patient has been unresponsive and refractory.The patient meets criteria for chronic headaches according to the ICHD-II, the patient has more than 15 headaches a month which last for more than 4 hours a day.    This is the first Botox injections and I am aiming for at least 50%  improvement in the patient's symptoms. Frequency of treatment is every 3 months unless no response to the treatments, at which time we will discontinue the injections.     DESCRIPTION OF PROCEDURE: After obtaining informed consent and under aseptic technique, a total of 155 units of botulinum toxin type A were injected in the following muscles: Procerus 5 units,  5 units bilaterally, frontalis 20 units, temporalis 20 units bilaterally, occipitalis 15 units, upper cervical paraspinals 10 units bilaterally and trapezius 15 units bilaterally. The patient was given a total of 155 units in 31 sites.The patient tolerated the procedure well. There were no complications. The patient was given a prescription for repeat treatment in 3 months.     Unavoidable waste 45 units          Saúl Braswell M.D  Medical Director, Headache and Facial Pain  [facility]

## 2024-08-21 ENCOUNTER — OFFICE VISIT (OUTPATIENT)
Dept: FAMILY MEDICINE | Facility: CLINIC | Age: 49
End: 2024-08-21
Payer: COMMERCIAL

## 2024-08-21 VITALS
DIASTOLIC BLOOD PRESSURE: 82 MMHG | OXYGEN SATURATION: 98 % | WEIGHT: 283.38 LBS | BODY MASS INDEX: 39.67 KG/M2 | HEIGHT: 71 IN | TEMPERATURE: 99 F | HEART RATE: 96 BPM | SYSTOLIC BLOOD PRESSURE: 138 MMHG

## 2024-08-21 DIAGNOSIS — J01.90 ACUTE NON-RECURRENT SINUSITIS, UNSPECIFIED LOCATION: Primary | ICD-10-CM

## 2024-08-21 DIAGNOSIS — R05.2 SUBACUTE COUGH: ICD-10-CM

## 2024-08-21 LAB
CTP QC/QA: YES
SARS-COV-2 RDRP RESP QL NAA+PROBE: NEGATIVE

## 2024-08-21 PROCEDURE — 87635 SARS-COV-2 COVID-19 AMP PRB: CPT | Mod: QW,S$GLB,, | Performed by: NURSE PRACTITIONER

## 2024-08-21 PROCEDURE — 3044F HG A1C LEVEL LT 7.0%: CPT | Mod: CPTII,S$GLB,, | Performed by: NURSE PRACTITIONER

## 2024-08-21 PROCEDURE — 3075F SYST BP GE 130 - 139MM HG: CPT | Mod: CPTII,S$GLB,, | Performed by: NURSE PRACTITIONER

## 2024-08-21 PROCEDURE — 99999 PR PBB SHADOW E&M-EST. PATIENT-LVL V: CPT | Mod: PBBFAC,,, | Performed by: NURSE PRACTITIONER

## 2024-08-21 PROCEDURE — 1159F MED LIST DOCD IN RCRD: CPT | Mod: CPTII,S$GLB,, | Performed by: NURSE PRACTITIONER

## 2024-08-21 PROCEDURE — 3079F DIAST BP 80-89 MM HG: CPT | Mod: CPTII,S$GLB,, | Performed by: NURSE PRACTITIONER

## 2024-08-21 PROCEDURE — 3008F BODY MASS INDEX DOCD: CPT | Mod: CPTII,S$GLB,, | Performed by: NURSE PRACTITIONER

## 2024-08-21 PROCEDURE — 99213 OFFICE O/P EST LOW 20 MIN: CPT | Mod: S$GLB,,, | Performed by: NURSE PRACTITIONER

## 2024-08-21 RX ORDER — TACROLIMUS 1 MG/G
OINTMENT TOPICAL
COMMUNITY
Start: 2024-06-26

## 2024-08-21 RX ORDER — DOXYCYCLINE 100 MG/1
100 CAPSULE ORAL 2 TIMES DAILY
Qty: 14 CAPSULE | Refills: 0 | Status: SHIPPED | OUTPATIENT
Start: 2024-08-21 | End: 2024-08-28

## 2024-08-21 RX ORDER — PREDNISONE 20 MG/1
40 TABLET ORAL DAILY
Qty: 10 TABLET | Refills: 0 | Status: SHIPPED | OUTPATIENT
Start: 2024-08-21 | End: 2024-08-26

## 2024-08-21 NOTE — PROGRESS NOTES
"Subjective     Patient ID: Anneliese Rocha is a 49 y.o. female.    Chief Complaint: Cough (Pt states she has had a cough for weeks. She states she took a at home covid test and it came back negative.), Fatigue, Night Sweats, Dizziness, Nausea, and Sore Throat (Pt states she feels like she she has a scratching throat. She recalls it started the same time as her cough and is getting worse.)      HPI    Patient is new to me. PCP is Dr. Holcomb.     48 y/o F with migraines, MDD, PASTORA, HTN, obesity presents to clinic for c/o cough for several weeks, started feeling much worse yesterday, feeling fatigued and achy. No fever, no chills. Denies dysuria or urgency. Had an episode of dizziness with nausea and diarrhea over the weekend that only lasted one day. Denies CP or SOB, but does get winded from coughing fits.    Review of Systems       Objective   Vitals:    08/21/24 1418 08/21/24 1501   BP: (!) 148/90 138/82   Pulse: 96    Temp: 98.5 °F (36.9 °C)    TempSrc: Oral    SpO2: 98%    Weight: 128.5 kg (283 lb 6.4 oz)    Height: 5' 11" (1.803 m)       Physical Exam  Vitals and nursing note reviewed.   Constitutional:       General: She is not in acute distress.     Appearance: Normal appearance. She is obese. She is not ill-appearing, toxic-appearing or diaphoretic.   HENT:      Head: Normocephalic and atraumatic.      Right Ear: Tympanic membrane, ear canal and external ear normal. There is impacted cerumen.      Left Ear: Tympanic membrane, ear canal and external ear normal. There is no impacted cerumen.      Nose: Congestion and rhinorrhea present.      Right Sinus: Maxillary sinus tenderness present.      Mouth/Throat:      Pharynx: Posterior oropharyngeal erythema present. No oropharyngeal exudate.   Eyes:      General: No scleral icterus.        Right eye: No discharge.         Left eye: No discharge.      Conjunctiva/sclera: Conjunctivae normal.      Pupils: Pupils are equal, round, and reactive to light. "   Cardiovascular:      Rate and Rhythm: Normal rate and regular rhythm.      Pulses: Normal pulses.      Heart sounds: Normal heart sounds. No murmur heard.     No friction rub. No gallop.   Pulmonary:      Effort: Pulmonary effort is normal. No respiratory distress.      Breath sounds: Normal breath sounds. No stridor. No wheezing, rhonchi or rales.   Abdominal:      General: Abdomen is flat. Bowel sounds are normal.      Palpations: Abdomen is soft.   Musculoskeletal:         General: No deformity or signs of injury.      Cervical back: Normal range of motion and neck supple.      Right lower leg: No edema.      Left lower leg: No edema.   Lymphadenopathy:      Cervical: No cervical adenopathy.   Skin:     General: Skin is warm.      Coloration: Skin is not jaundiced or pale.      Findings: No lesion or rash.   Neurological:      General: No focal deficit present.      Mental Status: She is alert and oriented to person, place, and time. Mental status is at baseline.   Psychiatric:         Mood and Affect: Mood normal.         Behavior: Behavior normal.         Thought Content: Thought content normal.         Judgment: Judgment normal.         1. Acute non-recurrent sinusitis, unspecified location  - doxycycline (MONODOX) 100 MG capsule; Take 1 capsule (100 mg total) by mouth 2 (two) times daily. for 7 days  Dispense: 14 capsule; Refill: 0    2. Subacute cough  - POCT COVID-19 Rapid Screening  - predniSONE (DELTASONE) 20 MG tablet; Take 2 tablets (40 mg total) by mouth once daily. for 5 days  Dispense: 10 tablet; Refill: 0       RTC/ER precautions given. F/U prn if no improvement or new/worsening symptoms    Counseled on regular exercise, maintenance of a healthy weight, balanced diet rich in fruits/vegetables and lean protein, and avoidance of unhealthy habits like smoking and excessive alcohol intake.    BREN Elder

## 2024-09-17 ENCOUNTER — HOSPITAL ENCOUNTER (OUTPATIENT)
Dept: RADIOLOGY | Facility: HOSPITAL | Age: 49
Discharge: HOME | End: 2024-09-17
Attending: NURSE PRACTITIONER
Payer: COMMERCIAL

## 2024-09-17 ENCOUNTER — OFFICE VISIT (OUTPATIENT)
Dept: FAMILY MEDICINE | Facility: CLINIC | Age: 49
End: 2024-09-17
Payer: COMMERCIAL

## 2024-09-17 VITALS
BODY MASS INDEX: 40.25 KG/M2 | WEIGHT: 287.5 LBS | OXYGEN SATURATION: 99 % | DIASTOLIC BLOOD PRESSURE: 76 MMHG | HEIGHT: 71 IN | SYSTOLIC BLOOD PRESSURE: 130 MMHG | TEMPERATURE: 99 F | HEART RATE: 106 BPM

## 2024-09-17 DIAGNOSIS — R05.3 PERSISTENT COUGH FOR 3 WEEKS OR LONGER: Primary | ICD-10-CM

## 2024-09-17 DIAGNOSIS — I10 ESSENTIAL HYPERTENSION: ICD-10-CM

## 2024-09-17 DIAGNOSIS — E66.01 CLASS 3 SEVERE OBESITY DUE TO EXCESS CALORIES WITH SERIOUS COMORBIDITY AND BODY MASS INDEX (BMI) OF 40.0 TO 44.9 IN ADULT: ICD-10-CM

## 2024-09-17 DIAGNOSIS — R05.3 PERSISTENT COUGH FOR 3 WEEKS OR LONGER: ICD-10-CM

## 2024-09-17 PROCEDURE — 71046 X-RAY EXAM CHEST 2 VIEWS: CPT | Mod: TC,FY,PO

## 2024-09-17 PROCEDURE — 99213 OFFICE O/P EST LOW 20 MIN: CPT | Mod: S$GLB,,, | Performed by: NURSE PRACTITIONER

## 2024-09-17 PROCEDURE — 99999 PR PBB SHADOW E&M-EST. PATIENT-LVL V: CPT | Mod: PBBFAC,,, | Performed by: NURSE PRACTITIONER

## 2024-09-17 PROCEDURE — 3008F BODY MASS INDEX DOCD: CPT | Mod: CPTII,S$GLB,, | Performed by: NURSE PRACTITIONER

## 2024-09-17 PROCEDURE — 3075F SYST BP GE 130 - 139MM HG: CPT | Mod: CPTII,S$GLB,, | Performed by: NURSE PRACTITIONER

## 2024-09-17 PROCEDURE — 3044F HG A1C LEVEL LT 7.0%: CPT | Mod: CPTII,S$GLB,, | Performed by: NURSE PRACTITIONER

## 2024-09-17 PROCEDURE — 3078F DIAST BP <80 MM HG: CPT | Mod: CPTII,S$GLB,, | Performed by: NURSE PRACTITIONER

## 2024-09-17 PROCEDURE — 71046 X-RAY EXAM CHEST 2 VIEWS: CPT | Mod: 26,,, | Performed by: RADIOLOGY

## 2024-09-17 PROCEDURE — 1159F MED LIST DOCD IN RCRD: CPT | Mod: CPTII,S$GLB,, | Performed by: NURSE PRACTITIONER

## 2024-09-17 RX ORDER — FAMOTIDINE 40 MG/1
40 TABLET, FILM COATED ORAL NIGHTLY
Qty: 30 TABLET | Refills: 0 | Status: SHIPPED | OUTPATIENT
Start: 2024-09-17 | End: 2025-09-17

## 2024-09-17 RX ORDER — ALBUTEROL SULFATE 90 UG/1
2 INHALANT RESPIRATORY (INHALATION) EVERY 6 HOURS PRN
Qty: 18 G | Refills: 0 | Status: SHIPPED | OUTPATIENT
Start: 2024-09-17 | End: 2025-09-17

## 2024-09-17 NOTE — PROGRESS NOTES
"Patient ID: Anneliese Rocha is a 49 y.o. female.    Chief Complaint: Cough (Pt states she has been having symptoms since her last visit.), Sore Throat, Chest Pain (Pt states her chest feels heavy from coughing.), and Fatigue    History of Present Illness      CHIEF COMPLAINT:  Anneliese presents today for follow-up of persistent cough.    COUGH AND ASSOCIATED SYMPTOMS:  She reports a persistent dry cough that started about two months ago. The cough initially improved with treatment but then worsened, and has been progressively getting worse. It starts as a tickle, with no productive component. Associated symptoms include shortness of breath and chest heaviness. The cough is worse at night before bed. She has tried OTC cough syrup without relief. Previous treatment for sinusitis included doxycycline and a 5-day course of prednisone, which helped until the course was finished, after which symptoms returned.    RESPIRATORY HISTORY:  She denies a history of asthma but reports occasional wheezing. She has a history of possible bronchitis approximately 20 years ago, which lasted for about six months and was initially difficult to diagnose.    GASTROINTESTINAL:  She reports occasional heartburn since the onset of the cough.    ALLERGIES:  At work, exposure to paper causes sneezing and a runny nose, but these symptoms resolve at home. She denies sinus pressure, congestion, or feeling dripping in the back of the throat.    GENERAL:  She reports feeling tired, describing it as so severe that she could "lay her head on her desk at work." She denies feeling achy.    MEDICATIONS:  She takes Olmesartan 40/25 with HCTZ every morning.      ROS:  General: -fever, -chills, +fatigue, -weight gain, -weight loss  Eyes: -vision changes, -redness, -discharge  ENT: -ear pain, -nasal congestion, -sore throat  Cardiovascular: -chest pain, -palpitations, -lower extremity edema, +chest pressure  Respiratory: +cough, +shortness of breath, " +wheezing  Gastrointestinal: -abdominal pain, -nausea, -vomiting, -diarrhea, -constipation, -blood in stool, +heartburn  Genitourinary: -dysuria, -hematuria, -frequency  Musculoskeletal: -joint pain, -muscle pain  Skin: -rash, -lesion  Neurological: -headache, -dizziness, -numbness, -tingling  Psychiatric: -anxiety, -depression, -sleep difficulty  Allergic: +frequent sneezing         Physical Exam    Vitals: BP: Elevated. SpO2: 99%.  General: No acute distress. Well-developed. Obese. Well-nourished.  Eyes: EOMI. Sclerae anicteric.  HENT: Normocephalic. Atraumatic. Nares patent. Moist oral mucosa.  Ears: Bilateral TMs clear. Right auditory canal with mild cerumen, eardrum visible.  Cardiovascular: Tachycardia. Regular rhythm. No murmurs. No rubs. No gallops. Normal S1, S2.  Respiratory: Normal respiratory effort. Clear to auscultation bilaterally. No rales. No rhonchi. No wheezing.  Abdomen: Soft. Non-tender. Non-distended. Normoactive bowel sounds.  Musculoskeletal: No  obvious deformity.  Extremities: No lower extremity edema.  Neurological: Alert & oriented x3. No slurred speech. Normal gait.  Psychiatric: Normal mood. Normal affect. Good insight. Good judgment.  Skin: Warm. Dry. No rash.         Assessment & Plan    Considered persistent cough as possible bronchitis, reflux-induced cough, or asthma-like condition  Assessed previous treatment with doxycycline and prednisone had temporary effect  Evaluated for sinus involvement and ruled out based on patient's report  Auscultated lungs, found no significant abnormalities  Will treat with bronchodilator to address possible reactive airway component  Initiated acid suppression therapy to rule out reflux as underlying cause    PERSISTENT DRY COUGH:  - Noted patient's report of persistent dry cough for almost 2 months, worsening over time, with tickling sensation.  - Performed physical exam and auscultated the chest.  - Considered possible causes including bronchitis,  GERD, or recent viral infection.  - Ordered chest XR to rule out atypical pneumonia.  - Prescribed albuterol inhaler: 2 puffs every 6 hours as needed for cough.  - Consider prescribing steroid inhaler if albuterol is frequently needed.  - Educated patient about possibility of undiagnosed GERD causing persistent dry cough.  - Discussed that bronchitis can persist for extended periods.    HYPERTENSION:  - Noted elevated blood pressure during exam.  - Confirmed patient is currently taking Olmesartan HCTZ 40/25 daily.  - Plan to recheck blood pressure at next visit.    TACHYCARDIA:  - Noted elevated heart rate during exam.    GASTROESOPHAGEAL REFLUX DISEASE (GERD):  - Noted patient's report of occasional heartburn symptoms.  - Considered undiagnosed GERD as a possible cause of persistent cough.  - Prescribed famotidine: Take at nighttime for 30 days; discontinue if no improvement after 1 week.    SHORTNESS OF BREATH:  - Noted SpO2 of 99%.  - Prescribed albuterol inhaler to help with shortness of breath.    FATIGUE:  - Noted patient's report of feeling tired and fatigued.    LABS:  - Ordered chest XR to exclude atypical pneumonia or other pulmonary pathology.    FOLLOW UP:  - Follow up in 2 weeks.  - Contact the office if symptoms are not improving or worsening.  - Will call if chest XR is abnormal; if normal, results will be sent to patient portal.          1. Persistent cough for 3 weeks or longer  - famotidine (PEPCID) 40 MG tablet; Take 1 tablet (40 mg total) by mouth every evening.  Dispense: 30 tablet; Refill: 0  - albuterol (VENTOLIN HFA) 90 mcg/actuation inhaler; Inhale 2 puffs into the lungs every 6 (six) hours as needed for Wheezing or Shortness of Breath (cough). Rescue  Dispense: 18 g; Refill: 0  - X-Ray Chest PA And Lateral; Future    2. Essential hypertension  Continue to monitor, cont Olmesartan/HCTZ    3. Class 3 severe obesity due to excess calories with serious comorbidity and body mass index (BMI) of  40.0 to 44.9 in adult      Follow up if symptoms worsen or fail to improve.    This note was generated with the assistance of ambient listening technology. Verbal consent was obtained by the patient and accompanying visitor(s) for the recording of patient appointment to facilitate this note. I attest to having reviewed and edited the generated note for accuracy, though some syntax or spelling errors may persist. Please contact the author of this note for any clarification.

## 2024-09-27 ENCOUNTER — PROCEDURE VISIT (OUTPATIENT)
Dept: NEUROLOGY | Facility: CLINIC | Age: 49
End: 2024-09-27
Payer: COMMERCIAL

## 2024-09-27 VITALS
DIASTOLIC BLOOD PRESSURE: 80 MMHG | TEMPERATURE: 85 F | BODY MASS INDEX: 39.97 KG/M2 | WEIGHT: 286.63 LBS | RESPIRATION RATE: 20 BRPM | SYSTOLIC BLOOD PRESSURE: 135 MMHG

## 2024-09-27 DIAGNOSIS — G43.719 INTRACTABLE CHRONIC MIGRAINE WITHOUT AURA AND WITHOUT STATUS MIGRAINOSUS: Primary | ICD-10-CM

## 2024-09-27 NOTE — PROCEDURES
Procedures   BOTOX PROCEDURE    PROCEDURE PERFORMED: Botulinum toxin injection (48360)    CLINICAL INDICATION: G43.719    A time out was conducted just before the start of the procedure to verify the correct patient and procedure, procedure location, and all relevant critical information.     Conventional methods of treatment but the patient has been unresponsive and refractory.The patient meets criteria for chronic headaches according to the ICHD-II, the patient has more than 15 headaches a month which last for more than 4 hours a day.    This is the first Botox injections and I am aiming for at least 50%  improvement in the patient's symptoms. Frequency of treatment is every 3 months unless no response to the treatments, at which time we will discontinue the injections.     DESCRIPTION OF PROCEDURE: After obtaining informed consent and under aseptic technique, a total of 155 units of botulinum toxin type A were injected in the following muscles: Procerus 5 units,  5 units bilaterally, frontalis 20 units, temporalis 20 units bilaterally, occipitalis 15 units, upper cervical paraspinals 10 units bilaterally and trapezius 15 units bilaterally. The patient was given a total of 155 units in 31 sites.The patient tolerated the procedure well. There were no complications. The patient was given a prescription for repeat treatment in 3 months.     Unavoidable waste 45 units          Saúl Braswell M.D  Medical Director, Headache and Facial Pain  [facility]

## 2024-10-08 ENCOUNTER — PATIENT MESSAGE (OUTPATIENT)
Dept: FAMILY MEDICINE | Facility: CLINIC | Age: 49
End: 2024-10-08
Payer: COMMERCIAL

## 2024-10-08 DIAGNOSIS — R29.818 SUSPECTED SLEEP APNEA: Primary | ICD-10-CM

## 2024-10-13 DIAGNOSIS — F41.1 GAD (GENERALIZED ANXIETY DISORDER): ICD-10-CM

## 2024-10-13 RX ORDER — VENLAFAXINE HYDROCHLORIDE 150 MG/1
CAPSULE, EXTENDED RELEASE ORAL
Qty: 180 CAPSULE | Refills: 1 | Status: SHIPPED | OUTPATIENT
Start: 2024-10-13

## 2024-10-13 NOTE — TELEPHONE ENCOUNTER
No care due was identified.  [facility] Embedded Care Due Messages. Reference number: [id number].   10/13/2024 2:19:10 PM CDT

## 2024-10-14 DIAGNOSIS — G43.719 INTRACTABLE CHRONIC MIGRAINE WITHOUT AURA AND WITHOUT STATUS MIGRAINOSUS: ICD-10-CM

## 2024-10-14 RX ORDER — RIMEGEPANT SULFATE 75 MG/75MG
TABLET, ORALLY DISINTEGRATING ORAL
Qty: 8 TABLET | Refills: 11 | Status: SHIPPED | OUTPATIENT
Start: 2024-10-14

## 2024-10-14 RX ORDER — GALCANEZUMAB 120 MG/ML
120 INJECTION, SOLUTION SUBCUTANEOUS
Qty: 1 ML | Refills: 11 | Status: SHIPPED | OUTPATIENT
Start: 2024-10-14

## 2024-10-14 NOTE — TELEPHONE ENCOUNTER
Refill Decision Note   Anneliese Aj  is requesting a refill authorization.  Brief Assessment and Rationale for Refill:  Approve     Medication Therapy Plan:         Alert overridden per protocol: Yes   Comments:     Note composed:9:21 PM 10/13/2024

## 2024-10-17 ENCOUNTER — OFFICE VISIT (OUTPATIENT)
Dept: FAMILY MEDICINE | Facility: CLINIC | Age: 49
End: 2024-10-17
Payer: COMMERCIAL

## 2024-10-17 DIAGNOSIS — I10 ESSENTIAL HYPERTENSION: ICD-10-CM

## 2024-10-17 DIAGNOSIS — E66.01 CLASS 2 SEVERE OBESITY WITH SERIOUS COMORBIDITY AND BODY MASS INDEX (BMI) OF 39.0 TO 39.9 IN ADULT, UNSPECIFIED OBESITY TYPE: ICD-10-CM

## 2024-10-17 DIAGNOSIS — E66.812 CLASS 2 SEVERE OBESITY WITH SERIOUS COMORBIDITY AND BODY MASS INDEX (BMI) OF 39.0 TO 39.9 IN ADULT, UNSPECIFIED OBESITY TYPE: ICD-10-CM

## 2024-10-17 DIAGNOSIS — R29.818 SUSPECTED SLEEP APNEA: Primary | ICD-10-CM

## 2024-10-17 PROCEDURE — 3044F HG A1C LEVEL LT 7.0%: CPT | Mod: CPTII,95,, | Performed by: FAMILY MEDICINE

## 2024-10-17 PROCEDURE — 1160F RVW MEDS BY RX/DR IN RCRD: CPT | Mod: CPTII,95,, | Performed by: FAMILY MEDICINE

## 2024-10-17 PROCEDURE — 1159F MED LIST DOCD IN RCRD: CPT | Mod: CPTII,95,, | Performed by: FAMILY MEDICINE

## 2024-10-17 PROCEDURE — 99213 OFFICE O/P EST LOW 20 MIN: CPT | Mod: 95,,, | Performed by: FAMILY MEDICINE

## 2024-10-17 RX ORDER — SEMAGLUTIDE 0.25 MG/.5ML
0.25 INJECTION, SOLUTION SUBCUTANEOUS
Qty: 2 ML | Refills: 2 | Status: SHIPPED | OUTPATIENT
Start: 2024-10-17

## 2024-10-17 NOTE — PROGRESS NOTES
The patient location is: [place]  The chief complaint leading to consultation is: possible sleep apnea  Visit type: Virtual visit with synchronous audio and video  Total time spent with patient: 15 minutes  Each patient to whom he or she provides medical services by telemedicine is:  (1) informed of the relationship between the physician and patient and the respective role of any other health care provider with respect to management of the patient; and (2) notified that he or she may decline to receive medical services by telemedicine and may withdraw from such care at any time.    HPI:    Concerned about sleep apnea. She feels this has been present over the last year.  C/o extreme daytime fatigue, snoring, she also stated there her  told her that she does stop breathing in he sleep for a minute and then starts back.    Sleep Apnea  Patient presents with possible obstructive sleep apnea. Patent has a 1 year history of symptoms of daytime fatigue and morning fatigue. Patient generally gets 7 hours of sleep per night, and states they generally have nightime awakenings. Snoring of severe severity is present. Apneic episodes is present. Nasal obstruction is not present.  Patient has not had tonsillectomy.      Frustrated by inability to lose weight. Would like to again try Wegovy since it appears to be covered by insurance now.    Past Medical History:   Diagnosis Date    Depression with anxiety     Fatigue     HTN (hypertension)     Iron deficiency anemia     Migraines     PCOS (polycystic ovarian syndrome)        Past Surgical History:   Procedure Laterality Date    BREAST BIOPSY Right     core  neg    CATARACT EXTRACTION, BILATERAL      COLONOSCOPY N/A 03/14/2023    Procedure: COLONOSCOPY;  Surgeon: Raad Delacruz MD;  Location: [facility];  Service: Endoscopy;  Laterality: N/A;    CYSTECTOMY Left 1994    left ovary       Review of patient's allergies indicates:   Allergen Reactions    Lisinopril      Dizziness,  nausea       Social History     Socioeconomic History    Marital status:     Number of children: 0   Occupational History    Occupation: accounting     Employer: [employer]     Comment: [employer]   Tobacco Use    Smoking status: Never    Smokeless tobacco: Never   Substance and Sexual Activity    Alcohol use: Yes     Comment: rarely    Drug use: No    Sexual activity: Yes     Partners: Male     Birth control/protection: None   Social History Narrative    From [place]. Lives with .         Exercise: none regularly     Social Drivers of Health     Financial Resource Strain: Low Risk  (1/17/2024)    Overall Financial Resource Strain (CARDIA)     Difficulty of Paying Living Expenses: Not very hard   Food Insecurity: No Food Insecurity (1/17/2024)    Hunger Vital Sign     Worried About Running Out of Food in the Last Year: Never true     Ran Out of Food in the Last Year: Never true   Transportation Needs: No Transportation Needs (1/17/2024)    PRAPARE - Transportation     Lack of Transportation (Medical): No     Lack of Transportation (Non-Medical): No   Physical Activity: Unknown (1/17/2024)    Exercise Vital Sign     Days of Exercise per Week: 0 days   Stress: Stress Concern Present (1/17/2024)    [employer] [place] [employer] - Occupational Stress Questionnaire     Feeling of Stress : To some extent   Housing Stability: Low Risk  (1/17/2024)    Housing Stability Vital Sign     Unable to Pay for Housing in the Last Year: No     Number of Places Lived in the Last Year: 1     Unstable Housing in the Last Year: No       Current Outpatient Medications on File Prior to Visit   Medication Sig Dispense Refill    albuterol (VENTOLIN HFA) 90 mcg/actuation inhaler Inhale 2 puffs into the lungs every 6 (six) hours as needed for Wheezing or Shortness of Breath (cough). Rescue 18 g 0    BOTOX 200 unit SolR INJECT 200 UNITS  INTRAMUSCULARLY EVERY 3  MONTHS (GIVEN AT MD OFFICE) 1 each 3    busPIRone (BUSPAR) 10 MG  tablet Take 2 tablets (20 mg total) by mouth 3 (three) times daily. 180 tablet 3    clobetasoL (TEMOVATE) 0.05 % cream Apply 1 application  topically 2 (two) times daily.      famotidine (PEPCID) 40 MG tablet Take 1 tablet (40 mg total) by mouth every evening. 30 tablet 0    ferrous sulfate (FEOSOL) 325 mg (65 mg iron) Tab tablet iron      fluocinolone acetonide oiL 0.01 % Drop Place into both ears.      galcanezumab-gnlm (EMGALITY PEN) 120 mg/mL PnIj Inject 1 pen (120 mg) into the skin every 28 days. 1 mL 11    hydrocortisone 2.5 % cream SMARTSI Topical Daily PRN      ketoconazole (NIZORAL) 2 % cream       meloxicam (MOBIC) 15 MG tablet Take 1 tablet (15 mg total) by mouth once daily. 30 tablet 0    NERIVIO DIGITAL DENISE, MIGRAINE, Misc USE ONE 45 MINUTE TREATMENT EVERY OTHER DAY FOR PREVENTION OR AT ONSET OF MIGRAINE      olmesartan-hydrochlorothiazide (BENICAR HCT) 40-25 mg per tablet Take 1 tablet by mouth once daily. 90 tablet 3    ondansetron (ZOFRAN) 4 MG tablet   1    rimegepant (NURTEC) 75 mg odt Place one dissolving tablet on the tongue daily as needed for migraine. No more than once per day. 8 tablet 11    tacrolimus (PROTOPIC) 0.1 % ointment       venlafaxine (EFFEXOR-XR) 150 MG Cp24 TAKE 2 CAPSULES(300 MG) BY MOUTH EVERY  capsule 1     Current Facility-Administered Medications on File Prior to Visit   Medication Dose Route Frequency Provider Last Rate Last Admin    levonorgestreL (MIRENA) 21 mcg/24 hours (8 yrs) 52 mg IUD 1 Intra Uterine Device  1 Intra Uterine Device Intrauterine  Elina Crespo MD   1 Intra Uterine Device at 24 1420    onabotulinumtoxina injection 200 Units  200 Units Intramuscular Q90 Days Mira Braswell MD   200 Units at 24 1322    onabotulinumtoxina injection 200 Units  200 Units Intramuscular Q90 Days Mira Braswell MD   200 Units at 24 0943    onabotulinumtoxina injection 200 Units  200 Units Intramuscular Q90 Days    200 Units at  07/05/24 0757    onabotulinumtoxina injection 200 Units  200 Units Intramuscular Q90 Days    200 Units at 09/27/24 0845       Family History   Problem Relation Name Age of Onset    Breast cancer Maternal Grandmother      Diabetes Father      Hypertension Father      Coronary artery disease Father  55    Thyroid disease Mother      Migraines Mother      Hypertension Mother      Melanoma Paternal Uncle      Ovarian cancer Neg Hx          Review of Systems   Constitutional:  Positive for malaise/fatigue.   HENT:  Negative for hearing loss.    Eyes:  Negative for discharge.   Respiratory:  Negative for wheezing.    Cardiovascular:  Negative for chest pain and palpitations.   Gastrointestinal:  Negative for blood in stool, constipation, diarrhea and vomiting.   Genitourinary:  Negative for dysuria and hematuria.   Musculoskeletal:  Negative for neck pain.   Neurological:  Negative for weakness and headaches.   Endo/Heme/Allergies:  Negative for polydipsia.       Physical Exam  Constitutional:       Appearance: She is well-developed.   HENT:      Head: Normocephalic and atraumatic.   Eyes:      Pupils: Pupils are equal, round, and reactive to light.   Pulmonary:      Effort: Pulmonary effort is normal.   Musculoskeletal:      Cervical back: Neck supple.   Neurological:      Mental Status: She is alert and oriented to person, place, and time.   Psychiatric:         Behavior: Behavior normal.         Thought Content: Thought content normal.         Judgment: Judgment normal.          Suspected sleep apnea  -     Home Sleep Study; Future    Class 2 severe obesity with serious comorbidity and body mass index (BMI) of 39.0 to 39.9 in adult, unspecified obesity type  -     semaglutide, weight loss, (WEGOVY) 0.25 mg/0.5 mL PnIj; Inject 0.25 mg into the skin every 7 days.  Dispense: 2 mL; Refill: 2    Essential hypertension  -     semaglutide, weight loss, (WEGOVY) 0.25 mg/0.5 mL PnIj; Inject 0.25 mg into the skin every 7 days.   Dispense: 2 mL; Refill: 2       Home sleep study  Needs aggressive weight loss to help with multiple medical issues  Patient would significantly benefit from Wegovy  Counseled on regular exercise, maintenance of a healthy weight, balanced diet rich in fruits/vegetables and lean protein, and avoidance of unhealthy habits like smoking and excessive alcohol intake.     Answers submitted by the patient for this visit:  Review of Systems Questionnaire (Submitted on 10/17/2024)  activity change: No  unexpected weight change: No  rhinorrhea: No  trouble swallowing: No  visual disturbance: No  chest tightness: No  polyuria: No  difficulty urinating: No  menstrual problem: No  joint swelling: No  arthralgias: No  confusion: No  dysphoric mood: No

## 2024-10-22 ENCOUNTER — DOCUMENTATION ONLY (OUTPATIENT)
Dept: FAMILY MEDICINE | Facility: CLINIC | Age: 49
End: 2024-10-22
Payer: COMMERCIAL

## 2024-11-06 ENCOUNTER — DOCUMENTATION ONLY (OUTPATIENT)
Dept: FAMILY MEDICINE | Facility: CLINIC | Age: 49
End: 2024-11-06
Payer: COMMERCIAL

## 2024-11-06 NOTE — PROGRESS NOTES
PA re-initiated today and it was denied. See below.      Anneliese Rocha ([place]: [id number])  Wegovy 0.25MG/0.5ML auto-injectors  Form  [employer] Part D Electronic Prior Authorization Form (2017 NCPDP)  Created  3 hours ago  Sent to Plan  2 hours ago  Plan Response  2 hours ago  Submit Clinical Questions  2 hours ago  Determination  Unfavorable  2 hours ago  Message from Plan  Request Reference Number: [id number]. WEGOVY INJ 0.25MG is denied for not meeting the prior authorization requirement(s). Details of this decision are in the notice attached below or have been faxed to you.

## 2024-12-20 ENCOUNTER — PROCEDURE VISIT (OUTPATIENT)
Dept: NEUROLOGY | Facility: CLINIC | Age: 49
End: 2024-12-20
Payer: COMMERCIAL

## 2024-12-20 VITALS
HEIGHT: 71 IN | WEIGHT: 286.63 LBS | BODY MASS INDEX: 40.13 KG/M2 | SYSTOLIC BLOOD PRESSURE: 127 MMHG | HEART RATE: 88 BPM | RESPIRATION RATE: 17 BRPM | DIASTOLIC BLOOD PRESSURE: 84 MMHG

## 2024-12-20 DIAGNOSIS — G43.719 INTRACTABLE CHRONIC MIGRAINE WITHOUT AURA AND WITHOUT STATUS MIGRAINOSUS: Primary | ICD-10-CM

## 2024-12-20 NOTE — PROCEDURES
Procedures   BOTOX PROCEDURE    PROCEDURE PERFORMED: Botulinum toxin injection (18679)    CLINICAL INDICATION: G43.719    A time out was conducted just before the start of the procedure to verify the correct patient and procedure, procedure location, and all relevant critical information.     Conventional methods of treatment but the patient has been unresponsive and refractory.The patient meets criteria for chronic headaches according to the ICHD-II, the patient has more than 15 headaches a month which last for more than 4 hours a day.    This is the first Botox injections and I am aiming for at least 50%  improvement in the patient's symptoms. Frequency of treatment is every 3 months unless no response to the treatments, at which time we will discontinue the injections.     DESCRIPTION OF PROCEDURE: After obtaining informed consent and under aseptic technique, a total of 155 units of botulinum toxin type A were injected in the following muscles: Procerus 5 units,  5 units bilaterally, frontalis 20 units, temporalis 20 units bilaterally, occipitalis 15 units, upper cervical paraspinals 10 units bilaterally and trapezius 15 units bilaterally. The patient was given a total of 155 units in 31 sites.The patient tolerated the procedure well. There were no complications. The patient was given a prescription for repeat treatment in 3 months.     Unavoidable waste 45 units          Saúl Braswell M.D  Medical Director, Headache and Facial Pain  [facility]

## 2025-01-15 ENCOUNTER — OFFICE VISIT (OUTPATIENT)
Dept: NEUROLOGY | Facility: CLINIC | Age: 50
End: 2025-01-15
Payer: COMMERCIAL

## 2025-01-15 VITALS
BODY MASS INDEX: 39.16 KG/M2 | SYSTOLIC BLOOD PRESSURE: 136 MMHG | DIASTOLIC BLOOD PRESSURE: 73 MMHG | RESPIRATION RATE: 17 BRPM | WEIGHT: 280.75 LBS | HEART RATE: 111 BPM

## 2025-01-15 DIAGNOSIS — E66.9 OBESITY (BMI 30-39.9): ICD-10-CM

## 2025-01-15 DIAGNOSIS — E66.01 CLASS 2 SEVERE OBESITY WITH SERIOUS COMORBIDITY AND BODY MASS INDEX (BMI) OF 39.0 TO 39.9 IN ADULT, UNSPECIFIED OBESITY TYPE: ICD-10-CM

## 2025-01-15 DIAGNOSIS — F41.1 GAD (GENERALIZED ANXIETY DISORDER): ICD-10-CM

## 2025-01-15 DIAGNOSIS — G43.719 INTRACTABLE CHRONIC MIGRAINE WITHOUT AURA AND WITHOUT STATUS MIGRAINOSUS: Primary | ICD-10-CM

## 2025-01-15 DIAGNOSIS — E66.812 CLASS 2 SEVERE OBESITY WITH SERIOUS COMORBIDITY AND BODY MASS INDEX (BMI) OF 39.0 TO 39.9 IN ADULT, UNSPECIFIED OBESITY TYPE: ICD-10-CM

## 2025-01-15 DIAGNOSIS — I10 PRIMARY HYPERTENSION: ICD-10-CM

## 2025-01-15 PROCEDURE — 3078F DIAST BP <80 MM HG: CPT | Mod: CPTII,S$GLB,, | Performed by: PSYCHIATRY & NEUROLOGY

## 2025-01-15 PROCEDURE — 3075F SYST BP GE 130 - 139MM HG: CPT | Mod: CPTII,S$GLB,, | Performed by: PSYCHIATRY & NEUROLOGY

## 2025-01-15 PROCEDURE — 3008F BODY MASS INDEX DOCD: CPT | Mod: CPTII,S$GLB,, | Performed by: PSYCHIATRY & NEUROLOGY

## 2025-01-15 PROCEDURE — 99214 OFFICE O/P EST MOD 30 MIN: CPT | Mod: S$GLB,,, | Performed by: PSYCHIATRY & NEUROLOGY

## 2025-01-15 PROCEDURE — 99999 PR PBB SHADOW E&M-EST. PATIENT-LVL III: CPT | Mod: PBBFAC,,, | Performed by: PSYCHIATRY & NEUROLOGY

## 2025-01-15 NOTE — PROGRESS NOTES
INTERVAL HISTORY 01/15/2025  Ms. Coronel presents for follow up. She is stable on Botox and Emgality for prevention. The Botox injections have achieved well over 50%  improvement in the patient's symptoms. Migraines have been reduced at least 7 days per month and the number of cumulative hours suffering with headaches has been reduced at least 100 total hours per month. Toprol and Effexor also on board. Nurtec effective for most attacks except once a month when she gets a very severe one.     INTERVAL HISTORY 1/17/2024  The patient presents for follow up. The Botox injections have achieved well over 50%  improvement in the patient's symptoms. Migraines have been reduced at least 7 days per month and the number of cumulative hours suffering with headaches has been reduced at least 100 total hours per month. She is also on Emgality for added prevention. She states that she typicaly gets 2 to 3 attacks per month.She reports a severe one today associated with photophobia, phonophobia, nausea, sensitivity to motion. For acute attacks she takes Nurtec with pretty consistent relief.    DOS: 12/15/2022  The patient location is: Home  The chief complaint leading to consultation is: Migraine  Visit type: Virtual visit with synchronous audio and video  Total time spent with patient: 15 minutes  The patient was informed of the relationship between the physician and patient and notified that he or she may decline to receive medical services by telemedicine and may withdraw from such care at any time.    The patient presents for follow up. She is on Toprol 100 mg daily, Effexor, and Botox for prevention and Nurtec for acute attacks. She is having about 1 severe attack per month and 1 to 2 at the most, moderate headaches per week. Milder headaches are treated with Excedrin, seldom use. The Botox injections have achieved well over 50%  improvement in the patient's symptoms. Migraines have been reduced at least 7 days per month and the  number of cumulative hours suffering with headaches has been reduced at least 100 total hours per month. Frequency of treatment is every 3 months unless no response to the treatments, at which time we will discontinue the injections.        ROS: Positive for photophobia, phonophobia, nausea, insomnia with attacks     Past Medical History:   Diagnosis Date    Depression with anxiety     Fatigue     HTN (hypertension)     Iron deficiency anemia     Migraines     PCOS (polycystic ovarian syndrome)          Current Outpatient Medications   Medication Sig    BOTOX 200 unit SolR INJECT 200 UNITS  INTRAMUSCULARLY EVERY 3  MONTHS (GIVEN AT MD OFFICE)    clonazePAM (KLONOPIN) 0.5 MG tablet TAKE 1/2 TABLET(0.25 MG) BY MOUTH DAILY AS NEEDED FOR ANXIETY    dextroamphetamine-amphetamine (ADDERALL XR) 20 MG 24 hr capsule Take 1 capsule (20 mg total) by mouth every morning.    ferrous sulfate (FEOSOL) 325 mg (65 mg iron) Tab tablet iron    galcanezumab-gnlm (EMGALITY PEN) 120 mg/mL PnIj Inject 1 pen (120 mg) into the skin every 28 days.    hydrocortisone 2.5 % cream SMARTSI Topical Daily PRN    ketoconazole (NIZORAL) 2 % cream SMARTSI Application Topical 1 to 2 Times Daily    meloxicam (MOBIC) 15 MG tablet Take 1 tablet (15 mg total) by mouth once daily.    metoprolol succinate (TOPROL-XL) 100 MG 24 hr tablet TAKE 1 TABLET(100 MG) BY MOUTH EVERY DAY    olmesartan-hydrochlorothiazide (BENICAR HCT) 40-25 mg per tablet TAKE 1 TABLET BY MOUTH EVERY DAY    ondansetron (ZOFRAN) 4 MG tablet TK 1 T PO TID PRN    rimegepant (NURTEC) 75 mg odt Place one dissolving tablet on the tongue daily as needed for migraine. No more than once per day.    semaglutide, weight loss, (WEGOVY) 0.25 mg/0.5 mL PnIj Inject 0.25 mg into the skin every 7 days.    venlafaxine (EFFEXOR-XR) 150 MG Cp24 TAKE 2 CAPSULES(300 MG) BY MOUTH EVERY DAY    busPIRone (BUSPAR) 10 MG tablet Take 2 tablets (20 mg total) by mouth 3 (three) times daily.     Current  Facility-Administered Medications   Medication    onabotulinumtoxina injection 200 Units    onabotulinumtoxina injection 200 Units    onabotulinumtoxina injection 200 Units    onabotulinumtoxina injection 200 Units    onabotulinumtoxina injection 200 Units         Current Facility-Administered Medications   Medication    onabotulinumtoxina injection 200 Units    onabotulinumtoxina injection 200 Units    onabotulinumtoxina injection 200 Units    onabotulinumtoxina injection 200 Units     Vitals:    01/15/25 1056   BP: 136/73   Pulse: (!) 111   Resp: 17     Body mass index is 39.16 kg/m².        Physical Exam:  Alert and fully oriented   Lungs CTA  Heart RRR  CN II-XII intact  Motor normal bulk and tone, symmetric strength  Coordination intact FTN  Sensory in tact to LT  Gait: normal, pace and base     Data review:    Lab Results   Component Value Date     04/08/2024    K 3.4 (L) 04/08/2024     04/08/2024    CO2 25 04/08/2024    BUN 12 04/08/2024    CREATININE 0.9 04/08/2024     (H) 04/08/2024    HGBA1C 5.9 (H) 04/08/2024    AST 27 04/08/2024    ALT 47 (H) 04/08/2024    ALBUMIN 4.1 04/08/2024    PROT 7.4 04/08/2024    BILITOT 0.4 04/08/2024    CHOL 199 04/08/2024    HDL 58 04/08/2024    LDLCALC 117.0 04/08/2024    TRIG 120 04/08/2024       Lab Results   Component Value Date    WBC 6.91 04/08/2024    HGB 13.5 04/08/2024    HCT 40.6 04/08/2024    MCV 89 04/08/2024     04/08/2024       Lab Results   Component Value Date    TSH 2.915 04/08/2024           A/P:     Chronic Migraine responding to Botox as expected. The Botox injections have achieved well over 50%  improvement in the patient's symptoms. Migraines have been reduced at least 7 days per month and the number of cumulative hours suffering with headaches has been reduced at least 100 total hours per month. Also on Emgality for added prevention.  Continue Botox  Continue Emgality  Continue Toprol 100 mg and Effexor daily  Continue Nurtec.  For severe attacks, about 1 per month, combine Nurtec with Excedrin for added benefit. She is aware of medication overuse headache and is far from being at risk.    RTC as scheduled      Saúl Braswell M.D  Medical Director, Headache and Facial Pain  [facility]

## 2025-01-30 ENCOUNTER — DOCUMENTATION ONLY (OUTPATIENT)
Dept: FAMILY MEDICINE | Facility: CLINIC | Age: 50
End: 2025-01-30
Payer: COMMERCIAL

## 2025-01-31 NOTE — PROGRESS NOTES
Anneliese Rocha (Granados: [id number]) - [id number]  Wegovy 0.25MG/0.5ML auto-injectors  status: PA Response - DeniedCreated: January 30th, 2025Sent: January 30th, 2025  Anneliese Rocha (Granados: [id number])  Wegovy 0.25MG/0.5ML auto-injectors  Form  [employer] Electronic Prior Authorization Form (2017 NCPDP)

## 2025-03-17 DIAGNOSIS — Z00.00 ANNUAL PHYSICAL EXAM: Primary | ICD-10-CM

## 2025-03-20 ENCOUNTER — PROCEDURE VISIT (OUTPATIENT)
Dept: NEUROLOGY | Facility: CLINIC | Age: 50
End: 2025-03-20
Payer: COMMERCIAL

## 2025-03-20 VITALS — RESPIRATION RATE: 17 BRPM | SYSTOLIC BLOOD PRESSURE: 141 MMHG | HEART RATE: 96 BPM | DIASTOLIC BLOOD PRESSURE: 91 MMHG

## 2025-03-20 DIAGNOSIS — G43.719 INTRACTABLE CHRONIC MIGRAINE WITHOUT AURA AND WITHOUT STATUS MIGRAINOSUS: Primary | ICD-10-CM

## 2025-03-20 NOTE — PROCEDURES
Procedures   BOTOX PROCEDURE    PROCEDURE PERFORMED: Botulinum toxin injection (37319)    CLINICAL INDICATION: G43.719    A time out was conducted just before the start of the procedure to verify the correct patient and procedure, procedure location, and all relevant critical information.     Conventional methods of treatment but the patient has been unresponsive and refractory.The patient meets criteria for chronic headaches according to the ICHD-II, the patient has more than 15 headaches a month which last for more than 4 hours a day.    This is the first Botox injections and I am aiming for at least 50%  improvement in the patient's symptoms. Frequency of treatment is every 3 months unless no response to the treatments, at which time we will discontinue the injections.     DESCRIPTION OF PROCEDURE: After obtaining informed consent and under aseptic technique, a total of 155 units of botulinum toxin type A were injected in the following muscles: Procerus 5 units,  5 units bilaterally, frontalis 20 units, temporalis 20 units bilaterally, occipitalis 15 units, upper cervical paraspinals 10 units bilaterally and trapezius 15 units bilaterally. The patient was given a total of 155 units in 31 sites.The patient tolerated the procedure well. There were no complications. The patient was given a prescription for repeat treatment in 3 months.     Unavoidable waste 45 units          Saúl Braswell M.D  Medical Director, Headache and Facial Pain  [facility]

## 2025-04-12 DIAGNOSIS — F41.1 GAD (GENERALIZED ANXIETY DISORDER): ICD-10-CM

## 2025-04-12 NOTE — TELEPHONE ENCOUNTER
Care Due:                  Date            Visit Type   Department     Provider  --------------------------------------------------------------------------------                                ESTABLISHED                              PATIENT -    [facility]  Last Visit: 10-      VIRTUAL      MEDICINE       Steve Holcomb                              ADMIT                               REVIEW EXEC   [facility]  Next Visit: 04-      CHECK        MEDICINE       Steve Holcomb                                                            Last  Test          Frequency    Reason                     Performed    Due Date  --------------------------------------------------------------------------------    CMP.........  12 months..  olmesartan-hydrochlorothi  04- 04-                             azide, venlafaxine.......    HBA1C.......  6 months...  semaglutide,.............  04-   10-    [facility] Embedded Care Due Messages. Reference number: [id number].   4/12/2025 2:50:53 PM CDT

## 2025-04-12 NOTE — TELEPHONE ENCOUNTER
Refill Routing Note   Medication(s) are not appropriate for processing by [facility] for the following reason(s):        Required vitals abnormal  Required labs outdated    [facility] action(s):  Defer     Requires labs : Yes      Medication Therapy Plan: FOVS      Appointments  past 12m or future 3m with PCP    Date Provider   Last Visit   10/17/2024 Steve Holcomb MD   Next Visit   4/16/2025 Steve Holcomb MD   ED visits in past 90 days: 0        Note composed:3:45 PM 04/12/2025

## 2025-04-13 RX ORDER — VENLAFAXINE HYDROCHLORIDE 150 MG/1
CAPSULE, EXTENDED RELEASE ORAL
Qty: 180 CAPSULE | Refills: 1 | Status: SHIPPED | OUTPATIENT
Start: 2025-04-13

## 2025-04-16 ENCOUNTER — CLINICAL SUPPORT (OUTPATIENT)
Dept: INTERNAL MEDICINE | Facility: CLINIC | Age: 50
End: 2025-04-16
Payer: COMMERCIAL

## 2025-04-16 ENCOUNTER — HOSPITAL ENCOUNTER (OUTPATIENT)
Dept: RADIOLOGY | Facility: HOSPITAL | Age: 50
Discharge: HOME | End: 2025-04-16
Attending: FAMILY MEDICINE
Payer: COMMERCIAL

## 2025-04-16 ENCOUNTER — OFFICE VISIT (OUTPATIENT)
Dept: FAMILY MEDICINE | Facility: CLINIC | Age: 50
End: 2025-04-16
Payer: COMMERCIAL

## 2025-04-16 VITALS
DIASTOLIC BLOOD PRESSURE: 76 MMHG | HEIGHT: 71 IN | WEIGHT: 280.88 LBS | OXYGEN SATURATION: 96 % | HEART RATE: 91 BPM | BODY MASS INDEX: 39.32 KG/M2 | SYSTOLIC BLOOD PRESSURE: 132 MMHG

## 2025-04-16 DIAGNOSIS — I10 HYPERTENSION, UNSPECIFIED TYPE: ICD-10-CM

## 2025-04-16 DIAGNOSIS — E66.812 CLASS 2 SEVERE OBESITY WITH SERIOUS COMORBIDITY AND BODY MASS INDEX (BMI) OF 39.0 TO 39.9 IN ADULT, UNSPECIFIED OBESITY TYPE: ICD-10-CM

## 2025-04-16 DIAGNOSIS — Z00.00 PREVENTATIVE HEALTH CARE: Primary | ICD-10-CM

## 2025-04-16 DIAGNOSIS — F33.2 MDD (MAJOR DEPRESSIVE DISORDER), RECURRENT SEVERE, WITHOUT PSYCHOSIS: ICD-10-CM

## 2025-04-16 DIAGNOSIS — Z00.00 ANNUAL PHYSICAL EXAM: Primary | ICD-10-CM

## 2025-04-16 DIAGNOSIS — E66.01 CLASS 2 SEVERE OBESITY WITH SERIOUS COMORBIDITY AND BODY MASS INDEX (BMI) OF 39.0 TO 39.9 IN ADULT, UNSPECIFIED OBESITY TYPE: ICD-10-CM

## 2025-04-16 DIAGNOSIS — G47.33 MODERATE OBSTRUCTIVE SLEEP APNEA: ICD-10-CM

## 2025-04-16 DIAGNOSIS — Z00.00 ANNUAL PHYSICAL EXAM: ICD-10-CM

## 2025-04-16 LAB
ALBUMIN SERPL BCP-MCNC: 4.3 G/DL (ref 3.5–5.2)
ALP SERPL-CCNC: 45 UNIT/L (ref 40–150)
ALT SERPL W/O P-5'-P-CCNC: 48 UNIT/L (ref 10–44)
ANION GAP (OHS): 13 MMOL/L (ref 8–16)
AST SERPL-CCNC: 25 UNIT/L (ref 11–45)
BILIRUB SERPL-MCNC: 0.6 MG/DL (ref 0.1–1)
BUN SERPL-MCNC: 13 MG/DL (ref 6–20)
CALCIUM SERPL-MCNC: 10 MG/DL (ref 8.7–10.5)
CHLORIDE SERPL-SCNC: 103 MMOL/L (ref 95–110)
CHOLEST SERPL-MCNC: 219 MG/DL (ref 120–199)
CHOLEST/HDLC SERPL: 3.7 {RATIO} (ref 2–5)
CO2 SERPL-SCNC: 24 MMOL/L (ref 23–29)
CREAT SERPL-MCNC: 0.8 MG/DL (ref 0.5–1.4)
EAG (OHS): 137 MG/DL (ref 68–131)
ERYTHROCYTE [DISTWIDTH] IN BLOOD BY AUTOMATED COUNT: 12.9 % (ref 11.5–14.5)
GFR SERPLBLD CREATININE-BSD FMLA CKD-EPI: >60 ML/MIN/1.73/M2
GLUCOSE SERPL-MCNC: 167 MG/DL (ref 70–110)
HBA1C MFR BLD: 6.4 % (ref 4–5.6)
HCT VFR BLD AUTO: 42.5 % (ref 37–48.5)
HDLC SERPL-MCNC: 59 MG/DL (ref 40–75)
HDLC SERPL: 26.9 % (ref 20–50)
HGB BLD-MCNC: 14.2 GM/DL (ref 12–16)
LDLC SERPL CALC-MCNC: 134.4 MG/DL (ref 63–159)
MCH RBC QN AUTO: 29.6 PG (ref 27–31)
MCHC RBC AUTO-ENTMCNC: 33.4 G/DL (ref 32–36)
MCV RBC AUTO: 89 FL (ref 82–98)
NONHDLC SERPL-MCNC: 160 MG/DL
PLATELET # BLD AUTO: 285 K/UL (ref 150–450)
PMV BLD AUTO: 11.4 FL (ref 9.2–12.9)
POTASSIUM SERPL-SCNC: 3.6 MMOL/L (ref 3.5–5.1)
PROT SERPL-MCNC: 7.7 GM/DL (ref 6–8.4)
RBC # BLD AUTO: 4.8 M/UL (ref 4–5.4)
SODIUM SERPL-SCNC: 140 MMOL/L (ref 136–145)
TRIGL SERPL-MCNC: 128 MG/DL (ref 30–150)
TSH SERPL-ACNC: 2.6 UIU/ML (ref 0.4–4)
WBC # BLD AUTO: 7.48 K/UL (ref 3.9–12.7)

## 2025-04-16 PROCEDURE — 36415 COLL VENOUS BLD VENIPUNCTURE: CPT | Mod: PO

## 2025-04-16 PROCEDURE — 83036 HEMOGLOBIN GLYCOSYLATED A1C: CPT

## 2025-04-16 PROCEDURE — 85027 COMPLETE CBC AUTOMATED: CPT | Mod: PO

## 2025-04-16 PROCEDURE — 84443 ASSAY THYROID STIM HORMONE: CPT

## 2025-04-16 PROCEDURE — 99999 PR PBB SHADOW E&M-EST. PATIENT-LVL III: CPT | Mod: PBBFAC,,, | Performed by: FAMILY MEDICINE

## 2025-04-16 PROCEDURE — 77063 BREAST TOMOSYNTHESIS BI: CPT | Mod: 26,,, | Performed by: RADIOLOGY

## 2025-04-16 PROCEDURE — 77067 SCR MAMMO BI INCL CAD: CPT | Mod: TC,PO

## 2025-04-16 PROCEDURE — 82465 ASSAY BLD/SERUM CHOLESTEROL: CPT

## 2025-04-16 PROCEDURE — 77067 SCR MAMMO BI INCL CAD: CPT | Mod: 26,,, | Performed by: RADIOLOGY

## 2025-04-16 PROCEDURE — 80053 COMPREHEN METABOLIC PANEL: CPT | Mod: PO

## 2025-04-16 RX ORDER — SEMAGLUTIDE 0.25 MG/.5ML
0.25 INJECTION, SOLUTION SUBCUTANEOUS
Qty: 2 ML | Refills: 0 | Status: SHIPPED | OUTPATIENT
Start: 2025-04-16

## 2025-04-16 RX ORDER — OLMESARTAN MEDOXOMIL AND HYDROCHLOROTHIAZIDE 40/25 40; 25 MG/1; MG/1
1 TABLET ORAL DAILY
Qty: 90 TABLET | Refills: 3 | Status: SHIPPED | OUTPATIENT
Start: 2025-04-16

## 2025-04-16 NOTE — PROGRESS NOTES
April 16, 2025                                                                                                                                                                                                                                                                                      Anneliese Coronel Aj  [address]  [place] [address]                                                                                                                                                                                                                                                                                                RE: Anneliese Coronel Aj                                                        Clinic #:[id number]                                                                                                                                   Dear  Anneliese Rocha,                                                                                                                                           Thank you for allowing me to serve you and perform your Executive Health exam on April 16, 2025.   This letter will serve a brief summary of the history, physical findings, and laboratory/studies performed and recommendations at that time.                                                                                     REASON FOR VISIT: Executive Health Preventive Physical Examination    Past Medical History:   Diagnosis Date    Depression with anxiety     Fatigue     HTN (hypertension)     Iron deficiency anemia     Migraines     PCOS (polycystic ovarian syndrome)     Sleep apnea, unspecified     on CPAP       Past Surgical History:   Procedure Laterality Date    BREAST BIOPSY Right     core  neg    CATARACT EXTRACTION, BILATERAL      COLONOSCOPY N/A 03/14/2023    Procedure: COLONOSCOPY;  Surgeon: Raad Delacruz MD;  Location: [facility];  Service: Endoscopy;  Laterality: N/A;    CYSTECTOMY Left 1994     "left ovary       Family History   Problem Relation Name Age of Onset    Breast cancer Maternal Grandmother      Diabetes Father      Hypertension Father      Coronary artery disease Father  55    Thyroid disease Mother      Migraines Mother      Hypertension Mother      Melanoma Paternal Uncle      Ovarian cancer Neg Hx         Social History     Social History    Marital status:      Spouse name: N/A     Occupational History    accounting [employer]     Social History Main Topics    Smoking status: Never Smoker    Smokeless tobacco: Never Used    Alcohol use Yes      Comment: rarely     Social History Narrative    From [place]        Exercise: none regularly       Allergies: Lisinopril      REVIEW OF SYSTEMS:  No recent changes in weight, or complaints of fatigue. No recent changes in vision, or hearing. Denies frequent headaches.No recent changes in voice. No new or changing skin lesions. Denies abnormal bruising or bleeding.  Denies chest pain or sensation of skipped beats. No new onset of shortness of breath, or dyspnea on exertion. Denies abdominal discomfort, constipation, diarrhea,or blood in stool. Denies difficulty with urination.   No recent joint swelling or muscle discomfort. Denies pain or weakness in extremeties. No recent loss of balance. Denies problems with sleep or depression.        Remainder of the review of systems without pertinent positves at this time.  Some regular sweating.                                                                              PHYSICAL EXAM:   VITAL SIGNS: /76   Pulse 91   Ht 5' 11" (1.803 m)   Wt 127.4 kg (280 lb 13.9 oz)   SpO2 96%   BMI 39.17 kg/m²   GENERAL APPEARANCE:  Well nourished and normally developed,  pleasant 50 y.o. female, in good spirits.  SKIN: Without rashes or overt lesions.  HEENT: Head normacephalic. There was no scleral icterus. Mucous membranes were moist. Dentition. Neck is supple, no thyromegally, or carotid " bruits.  LUNGS: Clear to auscultation bilaterally. Normal respiratory effort.  HEART: Exam reveals regular rate and rhythm. First and second heart sounds normal. No murmurs, rubs or gallops.   ABDOMEN: Soft, non-tender, non-distended. Exam reveals normal bowl sounds, no masses, no organomegaly and no aortic enlargement.    EXTREMITIES:  Nonedematous, both femoral and pedal pulses are normal. No joint stiffness or tenderness. Full range of motion and strength, upper and lower bilaterally.    LAB DATA/STUDIES REVIEWED:  LABS: reviewed   Mammogram: normal    ASSESSMENT/RECOMMENDATIONS :    At this time,  you appear to be in good medical condition.    Mood stable: continue present meds  HTN stable: continue olmesartan HCT  Trial of Wegovy to help with weight and glucose metabolism  Continue to work on regular exercise, maintenance of a healthy weight, balanced diet rich in fruits/vegetables and lean protein, and avoidance of unhealthy habits like smoking and excessive alcohol intake.  I look forward to seeing you again next year.    Please contact me should you have any questions or concerns regarding physical findings, or my recommendations.       Sincerely yours,          Steve Holcomb M.D.  Department of Family Practice  [facility]

## 2025-05-07 ENCOUNTER — OFFICE VISIT (OUTPATIENT)
Dept: URGENT CARE | Facility: CLINIC | Age: 50
End: 2025-05-07
Payer: COMMERCIAL

## 2025-05-07 VITALS
HEIGHT: 71 IN | BODY MASS INDEX: 39.2 KG/M2 | HEART RATE: 97 BPM | SYSTOLIC BLOOD PRESSURE: 141 MMHG | DIASTOLIC BLOOD PRESSURE: 86 MMHG | OXYGEN SATURATION: 98 % | WEIGHT: 280 LBS | RESPIRATION RATE: 18 BRPM | TEMPERATURE: 99 F

## 2025-05-07 DIAGNOSIS — R05.9 COUGH, UNSPECIFIED TYPE: ICD-10-CM

## 2025-05-07 DIAGNOSIS — J40 WHEEZY BRONCHITIS: Primary | ICD-10-CM

## 2025-05-07 LAB
CTP QC/QA: YES
SARS CORONAVIRUS 2 ANTIGEN: NEGATIVE

## 2025-05-07 PROCEDURE — 87811 SARS-COV-2 COVID19 W/OPTIC: CPT | Mod: QW,S$GLB,, | Performed by: NURSE PRACTITIONER

## 2025-05-07 PROCEDURE — 99214 OFFICE O/P EST MOD 30 MIN: CPT | Mod: 25,S$GLB,, | Performed by: NURSE PRACTITIONER

## 2025-05-07 RX ORDER — IPRATROPIUM BROMIDE 0.5 MG/2.5ML
0.5 SOLUTION RESPIRATORY (INHALATION)
Status: COMPLETED | OUTPATIENT
Start: 2025-05-07 | End: 2025-05-07

## 2025-05-07 RX ORDER — ALBUTEROL SULFATE 0.83 MG/ML
2.5 SOLUTION RESPIRATORY (INHALATION)
Status: COMPLETED | OUTPATIENT
Start: 2025-05-07 | End: 2025-05-07

## 2025-05-07 RX ORDER — PROMETHAZINE HYDROCHLORIDE 6.25 MG/5ML
6.25 SYRUP ORAL NIGHTLY PRN
Qty: 120 ML | Refills: 0 | Status: SHIPPED | OUTPATIENT
Start: 2025-05-07

## 2025-05-07 RX ADMIN — ALBUTEROL SULFATE 2.5 MG: 0.83 SOLUTION RESPIRATORY (INHALATION) at 09:05

## 2025-05-07 RX ADMIN — IPRATROPIUM BROMIDE 0.5 MG: 0.5 SOLUTION RESPIRATORY (INHALATION) at 09:05

## 2025-05-07 NOTE — PROGRESS NOTES
"Subjective:      Patient ID: Anneliese Rocha is a 50 y.o. female.    Vitals:  height is 5' 11" (1.803 m) and weight is 127 kg (280 lb). Her oral temperature is 98.9 °F (37.2 °C). Her blood pressure is 141/86 (abnormal) and her pulse is 97. Her respiration is 18 and oxygen saturation is 98%.     Chief Complaint: Cough (Entered by patient)    Pt presents with cough for 2 days. States some sneezing and watery eyes.  Patient denies any chest pain or shortness for breath.    Cough  The current episode started in the past 7 days. The cough is Non-productive. Associated symptoms include postnasal drip. Pertinent negatives include no fever or headaches.       Constitution: Negative for fever.   HENT:  Positive for postnasal drip.    Respiratory:  Positive for cough.    Neurological:  Negative for headaches.      Objective:     Physical Exam   Constitutional: She is oriented to person, place, and time. She appears well-developed. She is cooperative.  Non-toxic appearance. She does not appear ill. No distress.   HENT:   Head: Normocephalic and atraumatic.   Ears:   Right Ear: Hearing, tympanic membrane, external ear and ear canal normal.   Left Ear: Hearing, tympanic membrane, external ear and ear canal normal.   Nose: Nose normal. No mucosal edema, rhinorrhea or nasal deformity. No epistaxis. Right sinus exhibits no maxillary sinus tenderness and no frontal sinus tenderness. Left sinus exhibits no maxillary sinus tenderness and no frontal sinus tenderness.   Mouth/Throat: Uvula is midline, oropharynx is clear and moist and mucous membranes are normal. No trismus in the jaw. Normal dentition. No uvula swelling. No oropharyngeal exudate, posterior oropharyngeal edema or posterior oropharyngeal erythema.   Eyes: Conjunctivae and lids are normal. No scleral icterus.   Neck: Trachea normal and phonation normal. Neck supple. No edema present. No erythema present. No neck rigidity present.   Cardiovascular: Normal rate, regular " rhythm, normal heart sounds and normal pulses.   Pulmonary/Chest: Effort normal. No respiratory distress. She has no decreased breath sounds. She has wheezes in the right upper field and the left upper field. She has no rhonchi.   Abdominal: Normal appearance.   Musculoskeletal: Normal range of motion.         General: No deformity. Normal range of motion.   Neurological: She is alert and oriented to person, place, and time. She exhibits normal muscle tone. Coordination normal.   Skin: Skin is warm, dry, intact, not diaphoretic and not pale.   Psychiatric: Her speech is normal and behavior is normal. Judgment and thought content normal.   Nursing note and vitals reviewed.      Assessment:     1. Wheezy bronchitis    2. Cough, unspecified type        Plan:       Results for orders placed or performed in visit on 05/07/25   SARS Coronavirus 2 Antigen, POCT Manual Read    Collection Time: 05/07/25  9:10 AM   Result Value Ref Range    SARS Coronavirus 2 Antigen Negative Negative, Presumptive Negative     Acceptable Yes        Patient informed of negative COVID results in the clinic today.  Patient given nebulizer treatment in the clinic today.  Improvement in wheezing noted on exam.  Room air saturation is 98%.  Patient has albuterol inhaler at home.  Advised to continue.  Given cough suppressant to use at night advised on over-the-counter safe medications to use during the day.  We will follow up with the PCP.  ER precautions reviewed.        - Discussed ddx, home care, tx options, and given follow up precautions. I have reviewed the patient's chart to view previous visits, labs, and imaging to assess PMH and look for any trends or previous treatments.        Wheezy bronchitis  -     ipratropium 0.02 % nebulizer solution 0.5 mg  -     albuterol nebulizer solution 2.5 mg  -     promethazine (PHENERGAN) 6.25 mg/5 mL syrup; Take 5 mLs (6.25 mg total) by mouth nightly as needed (cough).  Dispense: 120 mL;  Refill: 0    Cough, unspecified type  -     SARS Coronavirus 2 Antigen, POCT Manual Read  -     promethazine (PHENERGAN) 6.25 mg/5 mL syrup; Take 5 mLs (6.25 mg total) by mouth nightly as needed (cough).  Dispense: 120 mL; Refill: 0      Patient Instructions   Covid test is negative today.  Albuterol inhaler as needed at home.  Increase fluids and electrolytes.  Coricidin HBP as needed for cough during the day.  Cough syrup to take at night.  Follow up with Dr. Holcomb.  If symptoms worsen go to the ER.    You must understand that you've received an Urgent Care treatment only and that you may be released before all your medical problems are known or treated. You, the patient, will arrange for follow up care as instructed.  Follow up with your PCP or specialty clinic as directed in the next 1-2 weeks if not improved or as needed.  You can call [phone] to schedule an appointment with the appropriate provider.  If your condition worsens we recommend that you receive another evaluation at the emergency room immediately or contact your primary medical clinics after hours call service to discuss your concerns.  Please return here or go to the Emergency Department for any concerns or worsening of condition.       You must understand that you have received treatment at an Urgent Care facility only, and that you may be  released before all of your medical problems are known or treated. Urgent Care facilities are not equipped to  handle life threatening emergencies. It is recommended that you seek care at an Emergency Department for  further evaluation of worsening or concerning symptoms, or possibly life threatening conditions as  discussed.

## 2025-05-07 NOTE — PATIENT INSTRUCTIONS
Covid test is negative today.  Albuterol inhaler as needed at home.  Increase fluids and electrolytes.  Coricidin HBP as needed for cough during the day.  Cough syrup to take at night.  Follow up with Dr. Holcomb.  If symptoms worsen go to the ER.    You must understand that you've received an Urgent Care treatment only and that you may be released before all your medical problems are known or treated. You, the patient, will arrange for follow up care as instructed.  Follow up with your PCP or specialty clinic as directed in the next 1-2 weeks if not improved or as needed.  You can call [phone] to schedule an appointment with the appropriate provider.  If your condition worsens we recommend that you receive another evaluation at the emergency room immediately or contact your primary medical clinics after hours call service to discuss your concerns.  Please return here or go to the Emergency Department for any concerns or worsening of condition.

## 2025-06-04 ENCOUNTER — HOSPITAL ENCOUNTER (OUTPATIENT)
Dept: RADIOLOGY | Facility: HOSPITAL | Age: 50
Discharge: HOME | End: 2025-06-04
Attending: NURSE PRACTITIONER
Payer: COMMERCIAL

## 2025-06-04 ENCOUNTER — OFFICE VISIT (OUTPATIENT)
Dept: FAMILY MEDICINE | Facility: CLINIC | Age: 50
End: 2025-06-04
Payer: COMMERCIAL

## 2025-06-04 VITALS
SYSTOLIC BLOOD PRESSURE: 138 MMHG | DIASTOLIC BLOOD PRESSURE: 82 MMHG | TEMPERATURE: 99 F | BODY MASS INDEX: 39.47 KG/M2 | HEIGHT: 71 IN | OXYGEN SATURATION: 98 % | HEART RATE: 110 BPM | WEIGHT: 281.94 LBS

## 2025-06-04 DIAGNOSIS — R05.9 COUGH, UNSPECIFIED TYPE: Primary | ICD-10-CM

## 2025-06-04 DIAGNOSIS — J45.40 MODERATE PERSISTENT REACTIVE AIRWAY DISEASE WITHOUT COMPLICATION: ICD-10-CM

## 2025-06-04 DIAGNOSIS — R05.8 PAROXYSMAL COUGH: ICD-10-CM

## 2025-06-04 DIAGNOSIS — R05.9 COUGH, UNSPECIFIED TYPE: ICD-10-CM

## 2025-06-04 PROCEDURE — 99214 OFFICE O/P EST MOD 30 MIN: CPT | Mod: S$GLB,,, | Performed by: NURSE PRACTITIONER

## 2025-06-04 PROCEDURE — 1160F RVW MEDS BY RX/DR IN RCRD: CPT | Mod: CPTII,S$GLB,, | Performed by: NURSE PRACTITIONER

## 2025-06-04 PROCEDURE — 3008F BODY MASS INDEX DOCD: CPT | Mod: CPTII,S$GLB,, | Performed by: NURSE PRACTITIONER

## 2025-06-04 PROCEDURE — 3079F DIAST BP 80-89 MM HG: CPT | Mod: CPTII,S$GLB,, | Performed by: NURSE PRACTITIONER

## 2025-06-04 PROCEDURE — 99999 PR PBB SHADOW E&M-EST. PATIENT-LVL IV: CPT | Mod: PBBFAC,,, | Performed by: NURSE PRACTITIONER

## 2025-06-04 PROCEDURE — 1159F MED LIST DOCD IN RCRD: CPT | Mod: CPTII,S$GLB,, | Performed by: NURSE PRACTITIONER

## 2025-06-04 PROCEDURE — 3044F HG A1C LEVEL LT 7.0%: CPT | Mod: CPTII,S$GLB,, | Performed by: NURSE PRACTITIONER

## 2025-06-04 PROCEDURE — 71046 X-RAY EXAM CHEST 2 VIEWS: CPT | Mod: TC,FY,PO

## 2025-06-04 PROCEDURE — 3075F SYST BP GE 130 - 139MM HG: CPT | Mod: CPTII,S$GLB,, | Performed by: NURSE PRACTITIONER

## 2025-06-04 PROCEDURE — 71046 X-RAY EXAM CHEST 2 VIEWS: CPT | Mod: 26,,, | Performed by: RADIOLOGY

## 2025-06-04 RX ORDER — BUDESONIDE AND FORMOTEROL FUMARATE DIHYDRATE 80; 4.5 UG/1; UG/1
2 AEROSOL RESPIRATORY (INHALATION) 2 TIMES DAILY
Qty: 10.2 G | Refills: 0 | Status: SHIPPED | OUTPATIENT
Start: 2025-06-04 | End: 2026-06-04

## 2025-06-05 ENCOUNTER — RESULTS FOLLOW-UP (OUTPATIENT)
Dept: FAMILY MEDICINE | Facility: CLINIC | Age: 50
End: 2025-06-05

## 2025-06-05 ENCOUNTER — LAB VISIT (OUTPATIENT)
Dept: LAB | Facility: HOSPITAL | Age: 50
End: 2025-06-05
Payer: COMMERCIAL

## 2025-06-05 DIAGNOSIS — R05.8 PAROXYSMAL COUGH: ICD-10-CM

## 2025-06-05 PROCEDURE — 36415 COLL VENOUS BLD VENIPUNCTURE: CPT | Mod: PO

## 2025-06-05 PROCEDURE — 86615 BORDETELLA ANTIBODY: CPT

## 2025-06-05 PROCEDURE — 86615 BORDETELLA ANTIBODY: CPT | Mod: 59

## 2025-06-06 ENCOUNTER — PATIENT MESSAGE (OUTPATIENT)
Dept: FAMILY MEDICINE | Facility: CLINIC | Age: 50
End: 2025-06-06
Payer: COMMERCIAL

## 2025-06-10 LAB
Lab: 0.2 IV
Lab: 0.9 IV
Lab: 2.86 IV
Lab: ABNORMAL
Lab: ABNORMAL
Lab: POSITIVE
Lab: POSITIVE

## 2025-06-11 ENCOUNTER — TELEPHONE (OUTPATIENT)
Dept: FAMILY MEDICINE | Facility: CLINIC | Age: 50
End: 2025-06-11
Payer: COMMERCIAL

## 2025-06-11 ENCOUNTER — LAB VISIT (OUTPATIENT)
Dept: LAB | Facility: HOSPITAL | Age: 50
End: 2025-06-11
Attending: FAMILY MEDICINE
Payer: COMMERCIAL

## 2025-06-11 ENCOUNTER — TELEPHONE (OUTPATIENT)
Dept: NEUROLOGY | Facility: CLINIC | Age: 50
End: 2025-06-11
Payer: COMMERCIAL

## 2025-06-11 ENCOUNTER — PATIENT MESSAGE (OUTPATIENT)
Dept: NEUROLOGY | Facility: CLINIC | Age: 50
End: 2025-06-11
Payer: COMMERCIAL

## 2025-06-11 DIAGNOSIS — R50.9 FEVER, UNSPECIFIED FEVER CAUSE: ICD-10-CM

## 2025-06-11 DIAGNOSIS — R50.9 FEVER, UNSPECIFIED FEVER CAUSE: Primary | ICD-10-CM

## 2025-06-11 LAB
ABSOLUTE EOSINOPHIL (OHS): 0.21 K/UL
ABSOLUTE MONOCYTE (OHS): 0.81 K/UL (ref 0.3–1)
ABSOLUTE NEUTROPHIL COUNT (OHS): 6.32 K/UL (ref 1.8–7.7)
BASOPHILS # BLD AUTO: 0.1 K/UL
BASOPHILS NFR BLD AUTO: 0.9 %
ERYTHROCYTE [DISTWIDTH] IN BLOOD BY AUTOMATED COUNT: 13 % (ref 11.5–14.5)
HCT VFR BLD AUTO: 44.2 % (ref 37–48.5)
HGB BLD-MCNC: 13.9 GM/DL (ref 12–16)
IMM GRANULOCYTES # BLD AUTO: 0.09 K/UL (ref 0–0.04)
IMM GRANULOCYTES NFR BLD AUTO: 0.8 % (ref 0–0.5)
LYMPHOCYTES # BLD AUTO: 3.88 K/UL (ref 1–4.8)
MCH RBC QN AUTO: 28.7 PG (ref 27–31)
MCHC RBC AUTO-ENTMCNC: 31.4 G/DL (ref 32–36)
MCV RBC AUTO: 91 FL (ref 82–98)
NUCLEATED RBC (/100WBC) (OHS): 0 /100 WBC
PLATELET # BLD AUTO: 301 K/UL (ref 150–450)
PMV BLD AUTO: 11.6 FL (ref 9.2–12.9)
RBC # BLD AUTO: 4.85 M/UL (ref 4–5.4)
RELATIVE EOSINOPHIL (OHS): 1.8 %
RELATIVE LYMPHOCYTE (OHS): 34 % (ref 18–48)
RELATIVE MONOCYTE (OHS): 7.1 % (ref 4–15)
RELATIVE NEUTROPHIL (OHS): 55.4 % (ref 38–73)
WBC # BLD AUTO: 11.41 K/UL (ref 3.9–12.7)

## 2025-06-11 PROCEDURE — 36415 COLL VENOUS BLD VENIPUNCTURE: CPT | Mod: PO

## 2025-06-11 PROCEDURE — 85025 COMPLETE CBC W/AUTO DIFF WBC: CPT

## 2025-06-11 NOTE — TELEPHONE ENCOUNTER
Call placed to patient, informed her that Gabbie put a lab order in for CBC. Patient stated she's going out of town and if she could get it down today that would be great. Scheduled patient on today 06/11/2025 @2:50 pm.

## 2025-06-11 NOTE — TELEPHONE ENCOUNTER
Sent pt message in portal    ----- Message from Gayle sent at 6/11/2025 10:31 AM CDT -----  Contact: PT  Type: LEONEL 06/13/25 Apoointment RequestName of Caller:PT When is the first available appointment?N/ASymptoms:BOTOXWould the patient rather a call back or a response via [facility]? CALL Best Call Back Number:[phone] Information: THANK YOU

## 2025-06-11 NOTE — TELEPHONE ENCOUNTER
Spoke with pt and informed her that pertussis antibodies are positive. Persistent cough and low grade fever is consistent with recent pertussis. Pt reports her temperature never gets above 100. Reports some relief with symbicort inhaler and cough suppressants. Pt advised that cough can last 2-3 mos but should gradually improve. Will continue symptomatic treatment. CBC ordered for further evaluation. Follow up with pulmonology as scheduled on 6/30/25

## 2025-06-12 ENCOUNTER — RESULTS FOLLOW-UP (OUTPATIENT)
Dept: FAMILY MEDICINE | Facility: CLINIC | Age: 50
End: 2025-06-12

## 2025-07-09 ENCOUNTER — PROCEDURE VISIT (OUTPATIENT)
Dept: NEUROLOGY | Facility: CLINIC | Age: 50
End: 2025-07-09
Payer: COMMERCIAL

## 2025-07-09 VITALS
BODY MASS INDEX: 39.05 KG/M2 | HEART RATE: 101 BPM | WEIGHT: 280 LBS | DIASTOLIC BLOOD PRESSURE: 90 MMHG | SYSTOLIC BLOOD PRESSURE: 151 MMHG

## 2025-07-09 DIAGNOSIS — G43.719 INTRACTABLE CHRONIC MIGRAINE WITHOUT AURA AND WITHOUT STATUS MIGRAINOSUS: Primary | ICD-10-CM

## 2025-07-09 NOTE — PROCEDURES
Procedures   BOTOX PROCEDURE    PROCEDURE PERFORMED: Botulinum toxin injection (31938)    CLINICAL INDICATION: G43.719    A time out was conducted just before the start of the procedure to verify the correct patient and procedure, procedure location, and all relevant critical information.     Conventional methods of treatment but the patient has been unresponsive and refractory.The patient meets criteria for chronic headaches according to the ICHD-II, the patient has more than 15 headaches a month which last for more than 4 hours a day.    This is the first Botox injections and I am aiming for at least 50%  improvement in the patient's symptoms. Frequency of treatment is every 3 months unless no response to the treatments, at which time we will discontinue the injections.     DESCRIPTION OF PROCEDURE: After obtaining informed consent and under aseptic technique, a total of 155 units of botulinum toxin type A were injected in the following muscles: Procerus 5 units,  5 units bilaterally, frontalis 20 units, temporalis 20 units bilaterally, occipitalis 15 units, upper cervical paraspinals 10 units bilaterally and trapezius 15 units bilaterally. The patient was given a total of 155 units in 31 sites.The patient tolerated the procedure well. There were no complications. The patient was given a prescription for repeat treatment in 3 months.     Unavoidable waste 45 units          Saúl Braswell M.D  Medical Director, Headache and Facial Pain  [facility]

## 2025-07-10 DIAGNOSIS — F41.1 GAD (GENERALIZED ANXIETY DISORDER): ICD-10-CM

## 2025-07-11 RX ORDER — BUSPIRONE HYDROCHLORIDE 10 MG/1
TABLET ORAL
Qty: 180 TABLET | Refills: 3 | Status: SHIPPED | OUTPATIENT
Start: 2025-07-11

## 2025-07-11 NOTE — TELEPHONE ENCOUNTER
No care due was identified.  [facility] Embedded Care Due Messages. Reference number: [id number].   7/10/2025 9:45:50 PM CDT